# Patient Record
Sex: FEMALE | Race: WHITE | URBAN - METROPOLITAN AREA
[De-identification: names, ages, dates, MRNs, and addresses within clinical notes are randomized per-mention and may not be internally consistent; named-entity substitution may affect disease eponyms.]

---

## 2017-06-13 ENCOUNTER — OUTPATIENT (OUTPATIENT)
Dept: OUTPATIENT SERVICES | Facility: HOSPITAL | Age: 77
LOS: 1 days | Discharge: HOME | End: 2017-06-13

## 2017-06-28 DIAGNOSIS — D13.1 BENIGN NEOPLASM OF STOMACH: ICD-10-CM

## 2017-06-28 DIAGNOSIS — Z88.0 ALLERGY STATUS TO PENICILLIN: ICD-10-CM

## 2017-06-28 DIAGNOSIS — K29.50 UNSPECIFIED CHRONIC GASTRITIS WITHOUT BLEEDING: ICD-10-CM

## 2017-06-28 DIAGNOSIS — K86.2 CYST OF PANCREAS: ICD-10-CM

## 2017-06-28 DIAGNOSIS — Z88.2 ALLERGY STATUS TO SULFONAMIDES: ICD-10-CM

## 2017-06-28 DIAGNOSIS — I10 ESSENTIAL (PRIMARY) HYPERTENSION: ICD-10-CM

## 2017-06-28 DIAGNOSIS — E78.00 PURE HYPERCHOLESTEROLEMIA, UNSPECIFIED: ICD-10-CM

## 2017-11-04 ENCOUNTER — OUTPATIENT (OUTPATIENT)
Dept: OUTPATIENT SERVICES | Facility: HOSPITAL | Age: 77
LOS: 1 days | Discharge: HOME | End: 2017-11-04

## 2017-11-04 DIAGNOSIS — Z12.31 ENCOUNTER FOR SCREENING MAMMOGRAM FOR MALIGNANT NEOPLASM OF BREAST: ICD-10-CM

## 2018-04-25 ENCOUNTER — RX RENEWAL (OUTPATIENT)
Age: 78
End: 2018-04-25

## 2018-04-25 PROBLEM — Z00.00 ENCOUNTER FOR PREVENTIVE HEALTH EXAMINATION: Status: ACTIVE | Noted: 2018-04-25

## 2018-11-24 ENCOUNTER — OUTPATIENT (OUTPATIENT)
Dept: OUTPATIENT SERVICES | Facility: HOSPITAL | Age: 78
LOS: 1 days | Discharge: HOME | End: 2018-11-24

## 2018-11-24 DIAGNOSIS — Z12.31 ENCOUNTER FOR SCREENING MAMMOGRAM FOR MALIGNANT NEOPLASM OF BREAST: ICD-10-CM

## 2019-06-18 ENCOUNTER — RX RENEWAL (OUTPATIENT)
Age: 79
End: 2019-06-18

## 2019-07-12 ENCOUNTER — INPATIENT (INPATIENT)
Facility: HOSPITAL | Age: 79
LOS: 5 days | Discharge: HOME | End: 2019-07-18
Attending: INTERNAL MEDICINE | Admitting: INTERNAL MEDICINE
Payer: MEDICARE

## 2019-07-12 VITALS — WEIGHT: 190.04 LBS

## 2019-07-12 LAB
ALBUMIN SERPL ELPH-MCNC: 3.4 G/DL — LOW (ref 3.5–5.2)
ALP SERPL-CCNC: 566 U/L — HIGH (ref 30–115)
ALT FLD-CCNC: 288 U/L — HIGH (ref 0–41)
ANION GAP SERPL CALC-SCNC: 13 MMOL/L — SIGNIFICANT CHANGE UP (ref 7–14)
APPEARANCE UR: CLEAR — SIGNIFICANT CHANGE UP
AST SERPL-CCNC: 399 U/L — HIGH (ref 0–41)
BACTERIA # UR AUTO: NEGATIVE — SIGNIFICANT CHANGE UP
BASOPHILS # BLD AUTO: 0.01 K/UL — SIGNIFICANT CHANGE UP (ref 0–0.2)
BASOPHILS NFR BLD AUTO: 0.2 % — SIGNIFICANT CHANGE UP (ref 0–1)
BILIRUB SERPL-MCNC: 2.5 MG/DL — HIGH (ref 0.2–1.2)
BILIRUB UR-MCNC: NEGATIVE — SIGNIFICANT CHANGE UP
BUN SERPL-MCNC: 18 MG/DL — SIGNIFICANT CHANGE UP (ref 10–20)
CALCIUM SERPL-MCNC: 8.4 MG/DL — LOW (ref 8.5–10.1)
CHLORIDE SERPL-SCNC: 102 MMOL/L — SIGNIFICANT CHANGE UP (ref 98–110)
CO2 SERPL-SCNC: 23 MMOL/L — SIGNIFICANT CHANGE UP (ref 17–32)
COLOR SPEC: YELLOW — SIGNIFICANT CHANGE UP
CREAT SERPL-MCNC: 0.9 MG/DL — SIGNIFICANT CHANGE UP (ref 0.7–1.5)
DIFF PNL FLD: ABNORMAL
EOSINOPHIL # BLD AUTO: 0.01 K/UL — SIGNIFICANT CHANGE UP (ref 0–0.7)
EOSINOPHIL NFR BLD AUTO: 0.2 % — SIGNIFICANT CHANGE UP (ref 0–8)
EPI CELLS # UR: 1 /HPF — SIGNIFICANT CHANGE UP (ref 0–5)
GLUCOSE SERPL-MCNC: 136 MG/DL — HIGH (ref 70–99)
GLUCOSE UR QL: NEGATIVE — SIGNIFICANT CHANGE UP
HCT VFR BLD CALC: 32.4 % — LOW (ref 37–47)
HGB BLD-MCNC: 10 G/DL — LOW (ref 12–16)
HYALINE CASTS # UR AUTO: 1 /LPF — SIGNIFICANT CHANGE UP (ref 0–7)
IMM GRANULOCYTES NFR BLD AUTO: 0.2 % — SIGNIFICANT CHANGE UP (ref 0.1–0.3)
KETONES UR-MCNC: NEGATIVE — SIGNIFICANT CHANGE UP
LACTATE SERPL-SCNC: 1.7 MMOL/L — SIGNIFICANT CHANGE UP (ref 0.5–2.2)
LEUKOCYTE ESTERASE UR-ACNC: NEGATIVE — SIGNIFICANT CHANGE UP
LYMPHOCYTES # BLD AUTO: 0.15 K/UL — LOW (ref 1.2–3.4)
LYMPHOCYTES # BLD AUTO: 2.8 % — LOW (ref 20.5–51.1)
MCHC RBC-ENTMCNC: 30 PG — SIGNIFICANT CHANGE UP (ref 27–31)
MCHC RBC-ENTMCNC: 30.9 G/DL — LOW (ref 32–37)
MCV RBC AUTO: 97.3 FL — SIGNIFICANT CHANGE UP (ref 81–99)
MONOCYTES # BLD AUTO: 0.35 K/UL — SIGNIFICANT CHANGE UP (ref 0.1–0.6)
MONOCYTES NFR BLD AUTO: 6.6 % — SIGNIFICANT CHANGE UP (ref 1.7–9.3)
NEUTROPHILS # BLD AUTO: 4.81 K/UL — SIGNIFICANT CHANGE UP (ref 1.4–6.5)
NEUTROPHILS NFR BLD AUTO: 90 % — HIGH (ref 42.2–75.2)
NITRITE UR-MCNC: NEGATIVE — SIGNIFICANT CHANGE UP
NRBC # BLD: 0 /100 WBCS — SIGNIFICANT CHANGE UP (ref 0–0)
PH UR: 6 — SIGNIFICANT CHANGE UP (ref 5–8)
PLATELET # BLD AUTO: 230 K/UL — SIGNIFICANT CHANGE UP (ref 130–400)
POTASSIUM SERPL-MCNC: 3.5 MMOL/L — SIGNIFICANT CHANGE UP (ref 3.5–5)
POTASSIUM SERPL-SCNC: 3.5 MMOL/L — SIGNIFICANT CHANGE UP (ref 3.5–5)
PROT SERPL-MCNC: 6.3 G/DL — SIGNIFICANT CHANGE UP (ref 6–8)
PROT UR-MCNC: ABNORMAL
RBC # BLD: 3.33 M/UL — LOW (ref 4.2–5.4)
RBC # FLD: 13.9 % — SIGNIFICANT CHANGE UP (ref 11.5–14.5)
RBC CASTS # UR COMP ASSIST: 2 /HPF — SIGNIFICANT CHANGE UP (ref 0–4)
SODIUM SERPL-SCNC: 138 MMOL/L — SIGNIFICANT CHANGE UP (ref 135–146)
SP GR SPEC: 1.01 — SIGNIFICANT CHANGE UP (ref 1.01–1.02)
UROBILINOGEN FLD QL: ABNORMAL
WBC # BLD: 5.34 K/UL — SIGNIFICANT CHANGE UP (ref 4.8–10.8)
WBC # FLD AUTO: 5.34 K/UL — SIGNIFICANT CHANGE UP (ref 4.8–10.8)
WBC UR QL: 5 /HPF — SIGNIFICANT CHANGE UP (ref 0–5)

## 2019-07-12 PROCEDURE — 71046 X-RAY EXAM CHEST 2 VIEWS: CPT | Mod: 26

## 2019-07-12 PROCEDURE — 93010 ELECTROCARDIOGRAM REPORT: CPT

## 2019-07-12 PROCEDURE — 99285 EMERGENCY DEPT VISIT HI MDM: CPT

## 2019-07-12 PROCEDURE — 76705 ECHO EXAM OF ABDOMEN: CPT | Mod: 26

## 2019-07-12 PROCEDURE — 74177 CT ABD & PELVIS W/CONTRAST: CPT | Mod: 26

## 2019-07-12 RX ORDER — IBUPROFEN 200 MG
600 TABLET ORAL ONCE
Refills: 0 | Status: COMPLETED | OUTPATIENT
Start: 2019-07-12 | End: 2019-07-12

## 2019-07-12 RX ORDER — AZTREONAM 2 G
2000 VIAL (EA) INJECTION ONCE
Refills: 0 | Status: COMPLETED | OUTPATIENT
Start: 2019-07-12 | End: 2019-07-12

## 2019-07-12 RX ORDER — SODIUM CHLORIDE 9 MG/ML
2700 INJECTION, SOLUTION INTRAVENOUS ONCE
Refills: 0 | Status: COMPLETED | OUTPATIENT
Start: 2019-07-12 | End: 2019-07-12

## 2019-07-12 RX ORDER — METRONIDAZOLE 500 MG
500 TABLET ORAL ONCE
Refills: 0 | Status: COMPLETED | OUTPATIENT
Start: 2019-07-12 | End: 2019-07-12

## 2019-07-12 RX ADMIN — Medication 600 MILLIGRAM(S): at 18:30

## 2019-07-12 RX ADMIN — SODIUM CHLORIDE 2700 MILLILITER(S): 9 INJECTION, SOLUTION INTRAVENOUS at 22:29

## 2019-07-12 RX ADMIN — SODIUM CHLORIDE 2700 MILLILITER(S): 9 INJECTION, SOLUTION INTRAVENOUS at 18:10

## 2019-07-12 RX ADMIN — Medication 100 MILLIGRAM(S): at 20:56

## 2019-07-12 RX ADMIN — Medication 100 MILLIGRAM(S): at 22:29

## 2019-07-12 RX ADMIN — Medication 2000 MILLIGRAM(S): at 22:29

## 2019-07-12 NOTE — ED PROVIDER NOTE - OBJECTIVE STATEMENT
79 y/o female non smoker with hx of asthma, dyslipidemia, HTN, RA on Arava, with known low WBC counts presents to ED for evaluation of fever/chills x 1 day.  (+) generalized malaise and weakness.  No cough, dyspnea or chest pain.  No HA, sorethroat or rhinorrhea.  No abdominal pain, N/V/D.  No dysuria/flank pain. PMD Marchisella, Cardio Sicat, Heme Augusto.  ALL:  PCN/Sulfa

## 2019-07-12 NOTE — ED PROVIDER NOTE - CLINICAL SUMMARY MEDICAL DECISION MAKING FREE TEXT BOX
Elevated transaminases, RUQ sono shows dilated CBD.  Abx given for cholangitis.  Will need GI and MRCP.  Clinically stable for floor admission.  D/w MAR

## 2019-07-12 NOTE — ED PROVIDER NOTE - PHYSICAL EXAMINATION
VITAL SIGNS: I have reviewed nursing notes and confirm.  CONSTITUTIONAL: Well-developed; well-nourished; in no acute distress.  SKIN: Skin exam is warm and dry, no acute rash.  HEAD: Normocephalic; atraumatic.  EYES: PERRL, EOM intact; conjunctiva and sclera clear.  No icterus.    ENT: No nasal discharge; airway clear. TMs clear.  NECK: Supple; non tender.  CARD: S1, S2 normal; Regular rate and rhythm.  RESP: No wheezes, rales or rhonchi.  ABD: Normal bowel sounds; soft; non-distended; non-tender;   EXT: Normal ROM. Bilateral non pitting edema.  LYMPH: No acute cervical adenopathy.  NEURO: Alert, oriented. Grossly unremarkable. No focal deficits.  PSYCH: Cooperative, appropriate.

## 2019-07-12 NOTE — ED PROVIDER NOTE - NS ED ROS FT
Constitutional: (+) fever  (+)chills  (-)sweats  Eyes/ENT: (-) blurry vision, (-) epistaxis  (-)rhinorrhea   (-) sore throat    Cardiovascular: (-) chest pain, (-) palpitations (-) edema   Respiratory: (-) cough, (-) shortness of breath   Gastrointestinal: (-)nausea  (-)vomiting, (-) diarrhea  (-) abdominal pain   Musculoskeletal: (-) neck pain, (-) back pain, (-) joint pain  Integumentary: (-) rash, (-) edema  Neurological: (-) headache, (-) altered mental status  (-)LOC  Psychiatric: (-) hallucinations  Allergic/Immunologic: (-) pruritus

## 2019-07-12 NOTE — ED ADULT NURSE NOTE - NSIMPLEMENTINTERV_GEN_ALL_ED
Implemented All Fall Risk Interventions:  [place] to call system. Call bell, personal items and telephone within reach. Instruct patient to call for assistance. Room bathroom lighting operational. Non-slip footwear when patient is off stretcher. Physically safe environment: no spills, clutter or unnecessary equipment. Stretcher in lowest position, wheels locked, appropriate side rails in place. Provide visual cue, wrist band, yellow gown, etc. Monitor gait and stability. Monitor for mental status changes and reorient to person, place, and time. Review medications for side effects contributing to fall risk. Reinforce activity limits and safety measures with patient and family.

## 2019-07-13 DIAGNOSIS — Z96.651 PRESENCE OF RIGHT ARTIFICIAL KNEE JOINT: Chronic | ICD-10-CM

## 2019-07-13 LAB
ALBUMIN SERPL ELPH-MCNC: 3.1 G/DL — LOW (ref 3.5–5.2)
ALP SERPL-CCNC: 451 U/L — HIGH (ref 30–115)
ALT FLD-CCNC: 233 U/L — HIGH (ref 0–41)
ANION GAP SERPL CALC-SCNC: 11 MMOL/L — SIGNIFICANT CHANGE UP (ref 7–14)
AST SERPL-CCNC: 209 U/L — HIGH (ref 0–41)
BASOPHILS # BLD AUTO: 0.02 K/UL — SIGNIFICANT CHANGE UP (ref 0–0.2)
BASOPHILS NFR BLD AUTO: 0.4 % — SIGNIFICANT CHANGE UP (ref 0–1)
BILIRUB DIRECT SERPL-MCNC: 3.3 MG/DL — HIGH (ref 0–0.2)
BILIRUB INDIRECT FLD-MCNC: 0.4 MG/DL — SIGNIFICANT CHANGE UP (ref 0.2–1.2)
BILIRUB SERPL-MCNC: 3.7 MG/DL — HIGH (ref 0.2–1.2)
BUN SERPL-MCNC: 17 MG/DL — SIGNIFICANT CHANGE UP (ref 10–20)
CALCIUM SERPL-MCNC: 8.1 MG/DL — LOW (ref 8.5–10.1)
CHLORIDE SERPL-SCNC: 107 MMOL/L — SIGNIFICANT CHANGE UP (ref 98–110)
CO2 SERPL-SCNC: 27 MMOL/L — SIGNIFICANT CHANGE UP (ref 17–32)
CREAT SERPL-MCNC: 0.9 MG/DL — SIGNIFICANT CHANGE UP (ref 0.7–1.5)
E COLI DNA BLD POS QL NAA+NON-PROBE: SIGNIFICANT CHANGE UP
EOSINOPHIL # BLD AUTO: 0.03 K/UL — SIGNIFICANT CHANGE UP (ref 0–0.7)
EOSINOPHIL NFR BLD AUTO: 0.5 % — SIGNIFICANT CHANGE UP (ref 0–8)
GLUCOSE SERPL-MCNC: 102 MG/DL — HIGH (ref 70–99)
GRAM STN FLD: SIGNIFICANT CHANGE UP
HCT VFR BLD CALC: 31.1 % — LOW (ref 37–47)
HGB BLD-MCNC: 9.4 G/DL — LOW (ref 12–16)
IMM GRANULOCYTES NFR BLD AUTO: 0.5 % — HIGH (ref 0.1–0.3)
LIDOCAIN IGE QN: 17 U/L — SIGNIFICANT CHANGE UP (ref 7–60)
LYMPHOCYTES # BLD AUTO: 0.27 K/UL — LOW (ref 1.2–3.4)
LYMPHOCYTES # BLD AUTO: 4.9 % — LOW (ref 20.5–51.1)
MAGNESIUM SERPL-MCNC: 1.6 MG/DL — LOW (ref 1.8–2.4)
MCHC RBC-ENTMCNC: 30 PG — SIGNIFICANT CHANGE UP (ref 27–31)
MCHC RBC-ENTMCNC: 30.2 G/DL — LOW (ref 32–37)
MCV RBC AUTO: 99.4 FL — HIGH (ref 81–99)
METHOD TYPE: SIGNIFICANT CHANGE UP
MONOCYTES # BLD AUTO: 0.53 K/UL — SIGNIFICANT CHANGE UP (ref 0.1–0.6)
MONOCYTES NFR BLD AUTO: 9.7 % — HIGH (ref 1.7–9.3)
NEUTROPHILS # BLD AUTO: 4.58 K/UL — SIGNIFICANT CHANGE UP (ref 1.4–6.5)
NEUTROPHILS NFR BLD AUTO: 84 % — HIGH (ref 42.2–75.2)
NRBC # BLD: 0 /100 WBCS — SIGNIFICANT CHANGE UP (ref 0–0)
PHOSPHATE SERPL-MCNC: 3.3 MG/DL — SIGNIFICANT CHANGE UP (ref 2.1–4.9)
PLATELET # BLD AUTO: 101 K/UL — LOW (ref 130–400)
POTASSIUM SERPL-MCNC: 3.8 MMOL/L — SIGNIFICANT CHANGE UP (ref 3.5–5)
POTASSIUM SERPL-SCNC: 3.8 MMOL/L — SIGNIFICANT CHANGE UP (ref 3.5–5)
PROT SERPL-MCNC: 5.8 G/DL — LOW (ref 6–8)
RBC # BLD: 3.13 M/UL — LOW (ref 4.2–5.4)
RBC # FLD: 14.1 % — SIGNIFICANT CHANGE UP (ref 11.5–14.5)
SODIUM SERPL-SCNC: 145 MMOL/L — SIGNIFICANT CHANGE UP (ref 135–146)
SPECIMEN SOURCE: SIGNIFICANT CHANGE UP
SPECIMEN SOURCE: SIGNIFICANT CHANGE UP
WBC # BLD: 5.46 K/UL — SIGNIFICANT CHANGE UP (ref 4.8–10.8)
WBC # FLD AUTO: 5.46 K/UL — SIGNIFICANT CHANGE UP (ref 4.8–10.8)

## 2019-07-13 PROCEDURE — 99223 1ST HOSP IP/OBS HIGH 75: CPT

## 2019-07-13 RX ORDER — PANTOPRAZOLE SODIUM 20 MG/1
40 TABLET, DELAYED RELEASE ORAL
Refills: 0 | Status: DISCONTINUED | OUTPATIENT
Start: 2019-07-13 | End: 2019-07-18

## 2019-07-13 RX ORDER — ATENOLOL 25 MG/1
50 TABLET ORAL DAILY
Refills: 0 | Status: DISCONTINUED | OUTPATIENT
Start: 2019-07-13 | End: 2019-07-18

## 2019-07-13 RX ORDER — CHLORHEXIDINE GLUCONATE 213 G/1000ML
1 SOLUTION TOPICAL
Refills: 0 | Status: DISCONTINUED | OUTPATIENT
Start: 2019-07-13 | End: 2019-07-18

## 2019-07-13 RX ORDER — SODIUM CHLORIDE 9 MG/ML
1000 INJECTION, SOLUTION INTRAVENOUS
Refills: 0 | Status: DISCONTINUED | OUTPATIENT
Start: 2019-07-13 | End: 2019-07-17

## 2019-07-13 RX ORDER — POTASSIUM CHLORIDE 20 MEQ
20 PACKET (EA) ORAL
Refills: 0 | Status: DISCONTINUED | OUTPATIENT
Start: 2019-07-13 | End: 2019-07-13

## 2019-07-13 RX ORDER — OXYBUTYNIN CHLORIDE 5 MG
10 TABLET ORAL DAILY
Refills: 0 | Status: DISCONTINUED | OUTPATIENT
Start: 2019-07-13 | End: 2019-07-16

## 2019-07-13 RX ORDER — FOLIC ACID 0.8 MG
1 TABLET ORAL DAILY
Refills: 0 | Status: DISCONTINUED | OUTPATIENT
Start: 2019-07-13 | End: 2019-07-18

## 2019-07-13 RX ORDER — AZTREONAM 2 G
2000 VIAL (EA) INJECTION EVERY 12 HOURS
Refills: 0 | Status: DISCONTINUED | OUTPATIENT
Start: 2019-07-13 | End: 2019-07-15

## 2019-07-13 RX ORDER — AMLODIPINE BESYLATE 2.5 MG/1
5 TABLET ORAL DAILY
Refills: 0 | Status: DISCONTINUED | OUTPATIENT
Start: 2019-07-13 | End: 2019-07-18

## 2019-07-13 RX ORDER — ENOXAPARIN SODIUM 100 MG/ML
40 INJECTION SUBCUTANEOUS EVERY 24 HOURS
Refills: 0 | Status: DISCONTINUED | OUTPATIENT
Start: 2019-07-13 | End: 2019-07-18

## 2019-07-13 RX ORDER — IBUPROFEN 200 MG
400 TABLET ORAL ONCE
Refills: 0 | Status: COMPLETED | OUTPATIENT
Start: 2019-07-13 | End: 2019-07-13

## 2019-07-13 RX ORDER — MAGNESIUM SULFATE 500 MG/ML
2 VIAL (ML) INJECTION ONCE
Refills: 0 | Status: COMPLETED | OUTPATIENT
Start: 2019-07-13 | End: 2019-07-13

## 2019-07-13 RX ORDER — METRONIDAZOLE 500 MG
500 TABLET ORAL EVERY 8 HOURS
Refills: 0 | Status: DISCONTINUED | OUTPATIENT
Start: 2019-07-13 | End: 2019-07-18

## 2019-07-13 RX ADMIN — Medication 100 MILLIGRAM(S): at 22:19

## 2019-07-13 RX ADMIN — Medication 400 MILLIGRAM(S): at 20:50

## 2019-07-13 RX ADMIN — AMLODIPINE BESYLATE 5 MILLIGRAM(S): 2.5 TABLET ORAL at 05:55

## 2019-07-13 RX ADMIN — Medication 100 MILLIGRAM(S): at 13:03

## 2019-07-13 RX ADMIN — SODIUM CHLORIDE 75 MILLILITER(S): 9 INJECTION, SOLUTION INTRAVENOUS at 05:53

## 2019-07-13 RX ADMIN — ATENOLOL 50 MILLIGRAM(S): 25 TABLET ORAL at 05:55

## 2019-07-13 RX ADMIN — Medication 16.67 GRAM(S): at 11:12

## 2019-07-13 RX ADMIN — Medication 1 MILLIGRAM(S): at 11:02

## 2019-07-13 RX ADMIN — Medication 400 MILLIGRAM(S): at 20:15

## 2019-07-13 RX ADMIN — PANTOPRAZOLE SODIUM 40 MILLIGRAM(S): 20 TABLET, DELAYED RELEASE ORAL at 05:55

## 2019-07-13 RX ADMIN — ENOXAPARIN SODIUM 40 MILLIGRAM(S): 100 INJECTION SUBCUTANEOUS at 05:53

## 2019-07-13 RX ADMIN — Medication 10 MILLIGRAM(S): at 11:02

## 2019-07-13 RX ADMIN — Medication 100 MILLIGRAM(S): at 17:21

## 2019-07-13 RX ADMIN — Medication 100 MILLIGRAM(S): at 05:53

## 2019-07-13 RX ADMIN — SODIUM CHLORIDE 75 MILLILITER(S): 9 INJECTION, SOLUTION INTRAVENOUS at 17:21

## 2019-07-13 NOTE — H&P ADULT - NSHPLABSRESULTS_GEN_ALL_CORE
10.0   5.34  )-----------( 230      ( 2019 18:31 )             32.4       138  |  102  |  18  ----------------------------<  136<H>  3.5   |  23  |  0.9    Ca    8.4<L>      2019 18:31    TPro  6.3  /  Alb  3.4<L>  /  TBili  2.5<H>  /  DBili  x   /  AST  399<H>  /  ALT  288<H>  /  AlkPhos  566<H>        Urinalysis Basic - ( 2019 20:30 )  Color: Yellow / Appearance: Clear / S.011 / pH: x  Gluc: x / Ketone: Negative  / Bili: Negative / Urobili: 3 mg/dL   Blood: x / Protein: 30 mg/dL / Nitrite: Negative   Leuk Esterase: Negative / RBC: 2 /HPF / WBC 5 /HPF   Sq Epi: x / Non Sq Epi: 1 /HPF / Bacteria: Negative      < from: 12 Lead ECG (19 @ 18:52) >  Ventricular Rate 88 BPM  Atrial Rate 88 BPM  P-R Interval 162 ms  QRS Duration 100 ms  Q-T Interval 374 ms  QTC Calculation(Bezet) 452 ms  P Axis 25 degrees  R Axis -26 degrees  T Axis -15 degrees    Diagnosis Line Normal sinus rhythm  Voltage criteria for left ventricular hypertrophy  Nonspecific ST and T wave abnormality  Abnormal ECG      < from: US Abdomen Limited (19 @ 21:37) >IMPRESSION:  1.  Dilated common bile duct measuring 11 mm and mildly dilated pancreatic duct measuring 4 mm. Further evaluation with a contrast enhanced pancreas protocol MRI/MRCP is recommended.     2.  Gallbladder sludge without cholelithiasis or sonographic evidence for acute cholecystitis.

## 2019-07-13 NOTE — H&P ADULT - ATTENDING COMMENTS
I agree with the above history ,physical and plan which I Have reviewed and examined independently  seen and examined  document notes

## 2019-07-13 NOTE — PROGRESS NOTE ADULT - SUBJECTIVE AND OBJECTIVE BOX
SUBJECTIVE:    Patient is a 78y old Female who presents with a chief complaint of Fever and chills (2019 01:17)    Currently admitted to medicine with the primary diagnosis of Cholangitis   HPI:  78 F PMHx of asthma, DLD, HTN, RA on Arava, with known low WBC counts presents to ED for evaluation of fever/chills x 1 day associated with generalized malaise and weakness. Pt states highest temp at home was 105. Pt denies cough, dyspnea or chest pain, h/a, URI symptoms, abdominal pain, N/V/D, dysuria or flank pain or recent sick contacts. She states she had bad RA adn was taken off Remicade because of low blood counts, and she has been on Arava for sometime now.     Initial ER V/S significant for tachycardia to 111, Tmax 103.1. Elevated liver enzymes on CMP prompted RUQ U/S + GB sludge and dilated CBD. (2019 01:17)    Today is hospital day 1d. This morning she is resting comfortably in bed and reports no new issues or overnight events.     PAST MEDICAL & SURGICAL HISTORY  Asthma  Stress incontinence  Hypertension  Hyperlipemia  Rheumatoid arthritis  S/P total knee replacement, right    SOCIAL HISTORY:  Negative for smoking/alcohol/drug use.     ALLERGIES:  penicillins (Rash)  sulfa drugs (Rash)    MEDICATIONS:  STANDING MEDICATIONS  amLODIPine   Tablet 5 milliGRAM(s) Oral daily  ATENolol  Tablet 50 milliGRAM(s) Oral daily  aztreonam  IVPB 2000 milliGRAM(s) IV Intermittent every 12 hours  chlorhexidine 4% Liquid 1 Application(s) Topical <User Schedule>  enoxaparin Injectable 40 milliGRAM(s) SubCutaneous every 24 hours  folic acid 1 milliGRAM(s) Oral daily  lactated ringers. 1000 milliLiter(s) IV Continuous <Continuous>  metroNIDAZOLE  IVPB 500 milliGRAM(s) IV Intermittent every 8 hours  oxybutynin 10 milliGRAM(s) Oral daily  pantoprazole    Tablet 40 milliGRAM(s) Oral before breakfast    PRN MEDICATIONS    VITALS:   T(F): 98.2  HR: 78  BP: 165/70  RR: 18  SpO2: 96%    LABS:                        10.0   5.34  )-----------( 230      ( 2019 18:31 )             32.4     07-12    138  |  102  |  18  ----------------------------<  136<H>  3.5   |  23  |  0.9    Ca    8.4<L>      2019 18:31    TPro  6.3  /  Alb  3.4<L>  /  TBili  2.5<H>  /  DBili  x   /  AST  399<H>  /  ALT  288<H>  /  AlkPhos  566<H>        Urinalysis Basic - ( 2019 20:30 )    Color: Yellow / Appearance: Clear / S.011 / pH: x  Gluc: x / Ketone: Negative  / Bili: Negative / Urobili: 3 mg/dL   Blood: x / Protein: 30 mg/dL / Nitrite: Negative   Leuk Esterase: Negative / RBC: 2 /HPF / WBC 5 /HPF   Sq Epi: x / Non Sq Epi: 1 /HPF / Bacteria: Negative        Lactate, Blood: 1.7 mmol/L (19 @ 18:31)          RADIOLOGY:    PHYSICAL EXAM:  GEN: No acute distress  LUNGS: Clear to auscultation bilaterally   HEART: Regular  ABD: Soft, non-tender, non-distended.  EXT: NC/NC/NE/2+PP/CEDILLO/Skin Intact.   NEURO: AAOX3    Intravenous access:   NG tube:   Jama Catheter:

## 2019-07-13 NOTE — H&P ADULT - HISTORY OF PRESENT ILLNESS
78 F PMHx of asthma, DLD, HTN, RA on Arava, with known low WBC counts presents to ED for evaluation of fever/chills x 1 day associated with generalized malaise and weakness. Pt states highest temp at home was 105. Pt denies cough, dyspnea or chest pain, h/a, URI symptoms, abdominal pain, N/V/D, dysuria or flank pain or recent sick contacts. She states she had bad RA adn was taken off Remicade because of low blood counts, and she has been on Arava for sometime now.     Initial ER V/S significant for tachycardia to 111, Tmax 103.1. Elevated liver enzymes on CMP prompted RUQ U/S + GB sludge and dilated CBD.

## 2019-07-13 NOTE — H&P ADULT - ASSESSMENT
A/P: 78 F PMHx of asthma, DLD, HTN, RA on Arava with known low WBC counts presents to ED for evaluation of fever/chills x 1 day associated with generalized malaise and weakness. ER V/S significant for tachycardia to 111, Tmax 103.1, Labs with Elevated liver enzymes which prompted RUQ U/S + GB sludge and dilated CBD. Pt admitted for sepsis secondary acute cholangitis     # Fever tachycardia secondary to sepsis due to acute cholangitis in immunocompromised pt  - Tmax 103.1,  s/p 2L LR bolus, no leukocytosis but + L shift (90%), likely blunted response due Leflunomide.   - Responded to IVF, normotensive non tachy, no need for pressors or ICU at this time. Closely monitor  - U/A negative, CXR no change from prior or discrete consolidation noted (no cough)  - U/S with GB sludge, dilated CBD 11 mm and pancreatic duct 4mm. Pt has no abdominal pain clinically, but given fever and tachy, elevated LFT TB 2.5 [id number] AST//288 treat for cholangitis.  - NPO, IVF, LR@100/hr, Aztreonam/ Flagyl (GNR/ anaerobic coverage). MRCP and GI consult.   - Follow up blood cultures, trend LFT, f/u Lipase, f/u CT A/P. Hold immunosuppressive therapy.     # RA - holding Arava due sepsis. Resume Folic acid.   # HTN / DLD - Resume Atenolol / Statin and Norvasc  # Holosystolic murmur - Obtain 2D echo  # GERD- Resume Protonix   # Stress incontinence - Resume oxybutynin   # DVT PPx - (X) Lovenox 40 mg SubQ    # Activity -  (X) Increase as Tolerated    # Dispo -   Patient to be discharged when medically optimized.  # Code Status - (X) FULL A/P: 78 F PMHx of asthma, DLD, HTN, RA on Arava with known low WBC counts presents to ED for evaluation of fever/chills x 1 day associated with generalized malaise and weakness. ER V/S significant for tachycardia to 111, Tmax 103.1, Labs with Elevated liver enzymes which prompted RUQ U/S + GB sludge and dilated CBD. Pt admitted for sepsis secondary acute cholangitis     # Fever tachycardia secondary to sepsis due to acute cholangitis in immunocompromised pt  - T 103.1, , WBC 5 but + L shift (90%), likely blunted response due Leflunomide.   - Responded to 2L bolus, normotensive non tachy, no need for pressors / ICU at this time. Monitor  - U/A negative, CXR no change from prior or discrete consolidation noted (no cough)  - Pt has no abdominal pain clinically, but given fever and tachy, elevated LFT TB 2.5 [id number] AST//288 and U/S with GB sludge, dilated CBD 11 mm and pancreatic duct 4mm treat for cholangitis.  - NPO, IVF, LR@100/hr, Aztreonam/ Flagyl (GNR/ anaerobic coverage). MRCP and GI consult.   - Follow up blood cultures, trend LFT, f/u Lipase, f/u CT A/P. Hold immunosuppressive therapy.   - If fever use Ibuprofen, no Tylenol for now.     # RA - holding Arava due sepsis. Resume Folic acid.   # HTN / DLD - Resume Atenolol / Statin and Norvasc  # Holosystolic murmur - Obtain 2D echo  # GERD- Resume Protonix   # Stress incontinence - Resume oxybutynin   # DVT PPx - (X) Lovenox 40 mg SubQ    # Activity -  (X) Increase as Tolerated    # Dispo -   Patient to be discharged when medically optimized.  # Code Status - (X) FULL

## 2019-07-13 NOTE — H&P ADULT - NSHPPHYSICALEXAM_GEN_ALL_CORE
PHYSICAL EXAM:  GENERAL: The patient is a well-developed, well-nourished in no apparent distress. AOX3.  HEENT: NCAT anicteric   NECK: Supple. No lymphadenopathy or thyromegaly.  LUNGS: No respiratory distress or accessory muscle use. CTAB  HEART: RRR, S1 S2 Audible. + Holosystolic blowing murmur noted.   ABDOMEN: Soft, nontender, and nondistended.  No guarding or rigidity.  No hepatosplenomegaly was noted.  EXTREMITIES: Without any cyanosis, clubbing, rash, lesions or edema.

## 2019-07-13 NOTE — H&P ADULT - NSICDXPASTMEDICALHX_GEN_ALL_CORE_FT
PAST MEDICAL HISTORY:  Asthma     Hyperlipemia     Hypertension     Rheumatoid arthritis     Stress incontinence

## 2019-07-13 NOTE — CONSULT NOTE ADULT - ASSESSMENT
79 yo F Hx of HTN, RA, asthma, presented for fever and chills admitted for choledocholithiasis and cholangitis.    1)Choledocholithiasis/Cholangitis  2) Pancreas divisum    Rec:  IV fluids  IV Abx  Bcx  ID consult  ERCP within 48 hours. on urgent basis if develops hypotension. 77 yo F Hx of HTN, RA, asthma, presented for fever and chills admitted for choledocholithiasis and cholangitis.    1)Choledocholithiasis/Cholangitis  2) Pancreas divisum    Rec:  IV fluids  Trend CMP and CBC  IV Abx  Bcx  ID consult  ERCP within 48 hours. on urgent basis if develops hypotension.

## 2019-07-13 NOTE — CONSULT NOTE ADULT - SUBJECTIVE AND OBJECTIVE BOX
Gastroenterology/Hepatology Consultation    For questions and inquiries please page [phone].  For urgent matters or after 5pm and on weekends please page the fellow on call through the GI paging system.    78y Female with   Patient is a 78y old  Female who presents with a chief complaint of Fever and chills (13 Jul 2019 07:11)    HPI:  78 F PMHx of asthma, DLD, HTN, RA on Arava, with known low WBC counts presents to ED for evaluation of fever/chills x 1 day associated with generalized malaise and weakness. Pt states highest temp at home was 105. Pt denies cough, dyspnea or chest pain, h/a, URI symptoms, abdominal pain, N/V/D, dysuria or flank pain or recent sick contacts. She states she had bad RA adn was taken off Remicade because of low blood counts, and she has been on Arava for sometime now.     Initial ER V/S significant for tachycardia to 111, Tmax 103.1. Elevated liver enzymes on CMP prompted RUQ U/S + GB sludge and dilated CBD. (13 Jul 2019 01:17)      GI Hx:    Previous colonoscopy(ies):  Previous EGD(s):    FH: hypertension      Review of system  General:  (-) weight loss, (-) fevers  Eyes:  (-) visual changes  CV:  (-) chest pain  Resp: (-) SOB, (-) wheezing  GI: (-) abdominal pain,  (-) nausea, (-) vomiting, (-) dysphagia, (-) diarrhea, (-) constipation, (-) rectal bleeding, (-) melena, (-) hematemesis.  Neuro: (-) confusion, (-) weakness  Psych:  (-) Hallucinations  Heme:  (-) easy bruisability    Past medical/surgical Hx:  PAST MEDICAL & SURGICAL HISTORY:  Asthma  Stress incontinence  Hypertension  Hyperlipemia  Rheumatoid arthritis  S/P total knee replacement, right    Home Medications:  amLODIPine 5 mg oral tablet: 1 tab(s) orally once a day  Arava 100 mg oral tablet:   atenolol 50 mg oral tablet: 1 tab(s) orally once a day  folic acid 1 mg oral tablet: 1 tab(s) orally once a day  oxybutynin 15 mg/24 hr oral tablet, extended release: 1 tab(s) orally once a day  pantoprazole 40 mg oral delayed release tablet: 1 tab(s) orally once a day  simvastatin 40 mg oral tablet: 1 tab(s) orally once a day (at bedtime)      Allergies: penicillins (Rash)  sulfa drugs (Rash)      Current Medications:   amLODIPine   Tablet 5 milliGRAM(s) Oral daily  ATENolol  Tablet 50 milliGRAM(s) Oral daily  aztreonam  IVPB 2000 milliGRAM(s) IV Intermittent every 12 hours  chlorhexidine 4% Liquid 1 Application(s) Topical <User Schedule>  enoxaparin Injectable 40 milliGRAM(s) SubCutaneous every 24 hours  folic acid 1 milliGRAM(s) Oral daily  lactated ringers. 1000 milliLiter(s) IV Continuous <Continuous>  metroNIDAZOLE  IVPB 500 milliGRAM(s) IV Intermittent every 8 hours  oxybutynin 10 milliGRAM(s) Oral daily  pantoprazole    Tablet 40 milliGRAM(s) Oral before breakfast      Physical exam:  T(C): 36.8 (07-13-19 @ 06:02), Max: 39.5 (07-12-19 @ 16:52)  HR: 78 (07-13-19 @ 06:02) (76 - 111)  BP: 165/70 (07-13-19 @ 06:02) (124/55 - 165/70)  RR: 18 (07-13-19 @ 06:02) (18 - 20)  SpO2: 96% (07-13-19 @ 06:02) (95% - 96%)  GENERAL: NAD  HEAD:  Atraumatic, Normocephalic  EYES: Sclera:NL  NECK: Supple, no JVD or thyromegaly  CHEST/LUNG: Good bilateral air entry  HEART: normal S1, S2. Regular  ABDOMEN: (-) distended, (-) tender, (-) rebound, (+) BS, (-)HSM  EXTREMITIES: (-) edema  NEUROLOGY: (-) asterixis  SKIN: (-) jaundice    Data:                        9.4    5.46  )-----------( 101      ( 13 Jul 2019 06:06 )             31.1     MCV 99.4 (07-13-19)  97.3 (07-12-19)    RDW 14.1 (07-13-19)  13.9 (07-12-19)    HGB trend:  9.4  07-13-19 @ 06:06  10.0  07-12-19 @ 18:31      07-13    145  |  107  |  17  ----------------------------<  102<H>  3.8   |  27  |  0.9    Ca    8.1<L>      13 Jul 2019 06:06  Phos  3.3     07-13  Mg     1.6     07-13    LipaseLipase, Serum: 17 U/L (07.13.19 @ 06:06)        TPro  5.8<L>  /  Alb  3.1<L>  /  TBili  3.7<H>  /  DBili  3.3<H>  /  AST  209<H>  /  ALT  233<H>  /  AlkPhos  451<H>  07-13    Liver panel trend:  TBili 3.7   /      /      /   AlkP 451   /   Tptn 5.8   /   Alb 3.1    /   DBili 3.3      07-13  TBili 2.5   /      /      /   AlkP 566   /   Tptn 6.3   /   Alb 3.4    /   DBili --      07-12    Lipase, Serum: 17 U/L (07-13-19 @ 06:06) Gastroenterology/Hepatology Consultation    For questions and inquiries please page [phone].  For urgent matters or after 5pm and on weekends please page the fellow on call through the GI paging system.    78y Female with choledocholithiasis/cholangitis  Patient is a 78y old  Female who presents with a chief complaint of Fever and chills (13 Jul 2019 07:11)    HPI:  78 F PMHx of asthma, DLD, HTN, RA on Arava, with known low WBC counts presents to ED for evaluation of fever/chills x 1 day associated with generalized malaise and weakness. Pt states highest temp at home was 105. Pt denies cough, dyspnea or chest pain, h/a, URI symptoms, abdominal pain, N/V/D, dysuria or flank pain or recent sick contacts. She states she had bad RA adn was taken off Remicade because of low blood counts, and she has been on Arava for sometime now.     Initial ER V/S significant for tachycardia to 111, Tmax 103.1. Elevated liver enzymes on CMP prompted RUQ U/S + GB sludge and dilated CBD. (13 Jul 2019 01:17)        FH: hypertension      Review of system  General:  (-) weight loss, (-) fevers  Eyes:  (-) visual changes  CV:  (-) chest pain  Resp: (-) SOB, (-) wheezing  GI: (-) abdominal pain,  (-) nausea, (-) vomiting, (-) dysphagia, (-) diarrhea, (-) constipation, (-) rectal bleeding, (-) melena, (-) hematemesis.  Neuro: (-) confusion, (-) weakness  Psych:  (-) Hallucinations  Heme:  (-) easy bruisability    Past medical/surgical Hx:  PAST MEDICAL & SURGICAL HISTORY:  Asthma  Stress incontinence  Hypertension  Hyperlipemia  Rheumatoid arthritis  S/P total knee replacement, right    Home Medications:  amLODIPine 5 mg oral tablet: 1 tab(s) orally once a day  Arava 100 mg oral tablet:   atenolol 50 mg oral tablet: 1 tab(s) orally once a day  folic acid 1 mg oral tablet: 1 tab(s) orally once a day  oxybutynin 15 mg/24 hr oral tablet, extended release: 1 tab(s) orally once a day  pantoprazole 40 mg oral delayed release tablet: 1 tab(s) orally once a day  simvastatin 40 mg oral tablet: 1 tab(s) orally once a day (at bedtime)      Allergies: penicillins (Rash)  sulfa drugs (Rash)      Current Medications:   amLODIPine   Tablet 5 milliGRAM(s) Oral daily  ATENolol  Tablet 50 milliGRAM(s) Oral daily  aztreonam  IVPB 2000 milliGRAM(s) IV Intermittent every 12 hours  chlorhexidine 4% Liquid 1 Application(s) Topical <User Schedule>  enoxaparin Injectable 40 milliGRAM(s) SubCutaneous every 24 hours  folic acid 1 milliGRAM(s) Oral daily  lactated ringers. 1000 milliLiter(s) IV Continuous <Continuous>  metroNIDAZOLE  IVPB 500 milliGRAM(s) IV Intermittent every 8 hours  oxybutynin 10 milliGRAM(s) Oral daily  pantoprazole    Tablet 40 milliGRAM(s) Oral before breakfast      Physical exam:  T(C): 36.8 (07-13-19 @ 06:02), Max: 39.5 (07-12-19 @ 16:52)  HR: 78 (07-13-19 @ 06:02) (76 - 111)  BP: 165/70 (07-13-19 @ 06:02) (124/55 - 165/70)  RR: 18 (07-13-19 @ 06:02) (18 - 20)  SpO2: 96% (07-13-19 @ 06:02) (95% - 96%)  GENERAL: NAD  HEAD:  Atraumatic, Normocephalic  EYES: Sclera:NL  NECK: Supple, no JVD or thyromegaly  CHEST/LUNG: Good bilateral air entry  HEART: normal S1, S2. Regular  ABDOMEN: (-) distended, (-) tender, (-) rebound, (+) BS, (-)HSM  EXTREMITIES: (-) edema  NEUROLOGY: (-) asterixis  SKIN: (-) jaundice    Data:                        9.4    5.46  )-----------( 101      ( 13 Jul 2019 06:06 )             31.1     MCV 99.4 (07-13-19)  97.3 (07-12-19)    RDW 14.1 (07-13-19)  13.9 (07-12-19)    HGB trend:  9.4  07-13-19 @ 06:06  10.0  07-12-19 @ 18:31      07-13    145  |  107  |  17  ----------------------------<  102<H>  3.8   |  27  |  0.9    Ca    8.1<L>      13 Jul 2019 06:06  Phos  3.3     07-13  Mg     1.6     07-13    Lipase, Serum: 17 U/L (07.13.19 @ 06:06)        TPro  5.8<L>  /  Alb  3.1<L>  /  TBili  3.7<H>  /  DBili  3.3<H>  /  AST  209<H>  /  ALT  233<H>  /  AlkPhos  451<H>  07-13    Liver panel trend:  TBili 3.7   /      /      /   AlkP 451   /   Tptn 5.8   /   Alb 3.1    /   DBili 3.3      07-13  TBili 2.5   /      /      /   AlkP 566   /   Tptn 6.3   /   Alb 3.4    /   DBili --      07-12    Lipase, Serum: 17 U/L (07-13-19 @ 06:06)

## 2019-07-13 NOTE — PROGRESS NOTE ADULT - ASSESSMENT
>>> pancreatic cholangitis w dilated common bile duct w fever and increase liver function test      plan    continue   _aztreonam   _flagyl     need MRCP   GI consult   continue home meds

## 2019-07-14 LAB
ALBUMIN SERPL ELPH-MCNC: 2.7 G/DL — LOW (ref 3.5–5.2)
ALP SERPL-CCNC: 389 U/L — HIGH (ref 30–115)
ALT FLD-CCNC: 149 U/L — HIGH (ref 0–41)
ANION GAP SERPL CALC-SCNC: 13 MMOL/L — SIGNIFICANT CHANGE UP (ref 7–14)
AST SERPL-CCNC: 88 U/L — HIGH (ref 0–41)
BASOPHILS # BLD AUTO: 0.02 K/UL — SIGNIFICANT CHANGE UP (ref 0–0.2)
BASOPHILS NFR BLD AUTO: 0.4 % — SIGNIFICANT CHANGE UP (ref 0–1)
BILIRUB DIRECT SERPL-MCNC: 2 MG/DL — HIGH (ref 0–0.2)
BILIRUB INDIRECT FLD-MCNC: 0.5 MG/DL — SIGNIFICANT CHANGE UP (ref 0.2–1.2)
BILIRUB SERPL-MCNC: 2.5 MG/DL — HIGH (ref 0.2–1.2)
BUN SERPL-MCNC: 12 MG/DL — SIGNIFICANT CHANGE UP (ref 10–20)
CALCIUM SERPL-MCNC: 8.2 MG/DL — LOW (ref 8.5–10.1)
CHLORIDE SERPL-SCNC: 106 MMOL/L — SIGNIFICANT CHANGE UP (ref 98–110)
CO2 SERPL-SCNC: 25 MMOL/L — SIGNIFICANT CHANGE UP (ref 17–32)
CREAT SERPL-MCNC: 0.7 MG/DL — SIGNIFICANT CHANGE UP (ref 0.7–1.5)
EOSINOPHIL # BLD AUTO: 0.05 K/UL — SIGNIFICANT CHANGE UP (ref 0–0.7)
EOSINOPHIL NFR BLD AUTO: 1 % — SIGNIFICANT CHANGE UP (ref 0–8)
GLUCOSE SERPL-MCNC: 115 MG/DL — HIGH (ref 70–99)
HCT VFR BLD CALC: 30.1 % — LOW (ref 37–47)
HGB BLD-MCNC: 9.3 G/DL — LOW (ref 12–16)
IMM GRANULOCYTES NFR BLD AUTO: 0.4 % — HIGH (ref 0.1–0.3)
LYMPHOCYTES # BLD AUTO: 0.23 K/UL — LOW (ref 1.2–3.4)
LYMPHOCYTES # BLD AUTO: 4.7 % — LOW (ref 20.5–51.1)
MAGNESIUM SERPL-MCNC: 1.8 MG/DL — SIGNIFICANT CHANGE UP (ref 1.8–2.4)
MCHC RBC-ENTMCNC: 30.2 PG — SIGNIFICANT CHANGE UP (ref 27–31)
MCHC RBC-ENTMCNC: 30.9 G/DL — LOW (ref 32–37)
MCV RBC AUTO: 97.7 FL — SIGNIFICANT CHANGE UP (ref 81–99)
MONOCYTES # BLD AUTO: 0.43 K/UL — SIGNIFICANT CHANGE UP (ref 0.1–0.6)
MONOCYTES NFR BLD AUTO: 8.9 % — SIGNIFICANT CHANGE UP (ref 1.7–9.3)
NEUTROPHILS # BLD AUTO: 4.1 K/UL — SIGNIFICANT CHANGE UP (ref 1.4–6.5)
NEUTROPHILS NFR BLD AUTO: 84.6 % — HIGH (ref 42.2–75.2)
NRBC # BLD: 0 /100 WBCS — SIGNIFICANT CHANGE UP (ref 0–0)
PHOSPHATE SERPL-MCNC: 2.6 MG/DL — SIGNIFICANT CHANGE UP (ref 2.1–4.9)
PLATELET # BLD AUTO: 107 K/UL — LOW (ref 130–400)
POTASSIUM SERPL-MCNC: 3.5 MMOL/L — SIGNIFICANT CHANGE UP (ref 3.5–5)
POTASSIUM SERPL-SCNC: 3.5 MMOL/L — SIGNIFICANT CHANGE UP (ref 3.5–5)
PROT SERPL-MCNC: 5.6 G/DL — LOW (ref 6–8)
RBC # BLD: 3.08 M/UL — LOW (ref 4.2–5.4)
RBC # FLD: 14.3 % — SIGNIFICANT CHANGE UP (ref 11.5–14.5)
SODIUM SERPL-SCNC: 144 MMOL/L — SIGNIFICANT CHANGE UP (ref 135–146)
WBC # BLD: 4.85 K/UL — SIGNIFICANT CHANGE UP (ref 4.8–10.8)
WBC # FLD AUTO: 4.85 K/UL — SIGNIFICANT CHANGE UP (ref 4.8–10.8)

## 2019-07-14 PROCEDURE — 99233 SBSQ HOSP IP/OBS HIGH 50: CPT

## 2019-07-14 PROCEDURE — 74181 MRI ABDOMEN W/O CONTRAST: CPT | Mod: 26

## 2019-07-14 RX ORDER — POTASSIUM CHLORIDE 20 MEQ
20 PACKET (EA) ORAL ONCE
Refills: 0 | Status: COMPLETED | OUTPATIENT
Start: 2019-07-14 | End: 2019-07-14

## 2019-07-14 RX ADMIN — ENOXAPARIN SODIUM 40 MILLIGRAM(S): 100 INJECTION SUBCUTANEOUS at 05:21

## 2019-07-14 RX ADMIN — PANTOPRAZOLE SODIUM 40 MILLIGRAM(S): 20 TABLET, DELAYED RELEASE ORAL at 13:58

## 2019-07-14 RX ADMIN — Medication 100 MILLIGRAM(S): at 18:29

## 2019-07-14 RX ADMIN — Medication 100 MILLIGRAM(S): at 13:58

## 2019-07-14 RX ADMIN — AMLODIPINE BESYLATE 5 MILLIGRAM(S): 2.5 TABLET ORAL at 05:21

## 2019-07-14 RX ADMIN — Medication 100 MILLIGRAM(S): at 21:13

## 2019-07-14 RX ADMIN — Medication 50 MILLIEQUIVALENT(S): at 13:56

## 2019-07-14 RX ADMIN — Medication 1 MILLIGRAM(S): at 13:58

## 2019-07-14 RX ADMIN — Medication 100 MILLIGRAM(S): at 05:21

## 2019-07-14 RX ADMIN — SODIUM CHLORIDE 75 MILLILITER(S): 9 INJECTION, SOLUTION INTRAVENOUS at 18:29

## 2019-07-14 RX ADMIN — ATENOLOL 50 MILLIGRAM(S): 25 TABLET ORAL at 05:21

## 2019-07-14 NOTE — PROGRESS NOTE ADULT - SUBJECTIVE AND OBJECTIVE BOX
Hospital Day # 2d    Gastroenterology team is following this patient for  choledocholithiasis and cholangitis    Interval Events: no events over night , patient is asymptomatic     PAST MEDICAL & SURGICAL HISTORY  Asthma  Stress incontinence  Hypertension  Hyperlipemia  Rheumatoid arthritis  S/P total knee replacement, right      ALLERGIES:  penicillins (Rash)  sulfa drugs (Rash)      MEDICATIONS:  MEDICATIONS  (STANDING):  amLODIPine   Tablet 5 milliGRAM(s) Oral daily  ATENolol  Tablet 50 milliGRAM(s) Oral daily  aztreonam  IVPB 2000 milliGRAM(s) IV Intermittent every 12 hours  chlorhexidine 4% Liquid 1 Application(s) Topical <User Schedule>  enoxaparin Injectable 40 milliGRAM(s) SubCutaneous every 24 hours  folic acid 1 milliGRAM(s) Oral daily  lactated ringers. 1000 milliLiter(s) (75 mL/Hr) IV Continuous <Continuous>  metroNIDAZOLE  IVPB 500 milliGRAM(s) IV Intermittent every 8 hours  oxybutynin 10 milliGRAM(s) Oral daily  pantoprazole    Tablet 40 milliGRAM(s) Oral before breakfast    MEDICATIONS  (PRN):      REVIEW OF SYSTEMS:    CONSTITUTIONAL: No fever  EYES/ENT: No scleral icterus  RESPIRATORY: No shortness of breath  CARDIOVASCULAR: No chest pain   GASTROINTESTINAL:  as listed above   GENITOURINARY: No dysuria  NEUROLOGICAL: No dizziness  SKIN: No cyanosis        VITALS:   T(F): 97.4 (07-14 @ 15:28), Max: 103.1 (07-12 @ 16:52)  HR: 94 (07-14 @ 15:28) (76 - 111)  BP: 189/85 (07-14 @ 15:28) (124/55 - 189/85)  BP(mean): --  RR: 18 (07-14 @ 15:28) (18 - 20)  SpO2: 94% (07-13 @ 19:30) (94% - 96%)    I&O's Summary    13 Jul 2019 07:01  -  14 Jul 2019 07:00  --------------------------------------------------------  IN: 300 mL / OUT: 0 mL / NET: 300 mL        Physical Exam:  GENERAL: NAD, well-developed  HEAD:  Atraumatic, Normocephalic  EYES: EOMI, PERRLA, conjunctiva and sclera clear  NECK: No thyromegaly  CHEST/LUNG: No accessory use of muscle  HEART: S1S2 Normal  ABDOMEN: Soft, Nontender, Nondistended; Bowel sounds present, no guarding or rigidity.  EXTREMITIES:  2+ Peripheral Pulses, No cyanosis  PSYCH: AAOx3  NEUROLOGY: non-focal      LABS:                        9.3    4.85  )-----------( 107      ( 14 Jul 2019 08:20 )             30.1         LIVER FUNCTIONS - ( 14 Jul 2019 08:20 )  Alb: 2.7 g/dL / Pro: 5.6 g/dL / ALK PHOS: 389 U/L / ALT: 149 U/L / AST: 88 U/L / GGT: x           LIVER FUNCTIONS - ( 14 Jul 2019 08:20 )  Alb: 2.7 [3.5 - 5.2] / Pro: 5.6 [6.0 - 8.0] / ALK PHOS: 389 [30 - 115] / ALT: 149 [0 - 41] / AST: 88 [0 - 41] / GGT: x     LIVER FUNCTIONS - ( 13 Jul 2019 06:06 )  Alb: 3.1 [3.5 - 5.2] / Pro: 5.8 [6.0 - 8.0] / ALK PHOS: 451 [30 - 115] / ALT: 233 [0 - 41] / AST: 209 [0 - 41] / GGT: x     LIVER FUNCTIONS - ( 12 Jul 2019 18:31 )  Alb: 3.4 [3.5 - 5.2] / Pro: 6.3 [6.0 - 8.0] / ALK PHOS: 566 [30 - 115] / ALT: 288 [0 - 41] / AST: 399 [0 - 41] / GGT: x       07-14    144  |  106  |  12  ----------------------------<  115<H>  3.5   |  25  |  0.7    Ca    8.2<L>      14 Jul 2019 08:20  Phos  2.6     07-14  Mg     1.8     07-14

## 2019-07-14 NOTE — PROGRESS NOTE ADULT - SUBJECTIVE AND OBJECTIVE BOX
SUBJECTIVE:    Patient is a 78y old Female who presents with a chief complaint of Fever and chills (2019 06:53)    Currently admitted to medicine with the primary diagnosis of Cholangitis     Today is hospital day 2d.   still feeling chills    PAST MEDICAL & SURGICAL HISTORY  Asthma  Stress incontinence  Hypertension  Hyperlipemia  Rheumatoid arthritis  S/P total knee replacement, right    SOCIAL HISTORY:  Negative for smoking/alcohol/drug use.     ALLERGIES:  penicillins (Rash)  sulfa drugs (Rash)    MEDICATIONS:  STANDING MEDICATIONS  amLODIPine   Tablet 5 milliGRAM(s) Oral daily  ATENolol  Tablet 50 milliGRAM(s) Oral daily  aztreonam  IVPB 2000 milliGRAM(s) IV Intermittent every 12 hours  chlorhexidine 4% Liquid 1 Application(s) Topical <User Schedule>  enoxaparin Injectable 40 milliGRAM(s) SubCutaneous every 24 hours  folic acid 1 milliGRAM(s) Oral daily  lactated ringers. 1000 milliLiter(s) IV Continuous <Continuous>  metroNIDAZOLE  IVPB 500 milliGRAM(s) IV Intermittent every 8 hours  oxybutynin 10 milliGRAM(s) Oral daily  pantoprazole    Tablet 40 milliGRAM(s) Oral before breakfast  potassium chloride  20 mEq/100 mL IVPB 20 milliEquivalent(s) IV Intermittent once    PRN MEDICATIONS    VITALS:   T(F): 98.4  HR: 93  BP: 168/79  RR: 18  SpO2: 94%    LABS:                        9.3    4.85  )-----------( 107      ( 2019 08:20 )             30.1     07-14    144  |  106  |  12  ----------------------------<  115<H>  3.5   |  25  |  0.7    Ca    8.2<L>      2019 08:20  Phos  2.6     07-14  Mg     1.8     07-14    TPro  5.6<L>  /  Alb  2.7<L>  /  TBili  2.5<H>  /  DBili  2.0<H>  /  AST  88<H>  /  ALT  149<H>  /  AlkPhos  389<H>  -14      Urinalysis Basic - ( 2019 20:30 )    Color: Yellow / Appearance: Clear / S.011 / pH: x  Gluc: x / Ketone: Negative  / Bili: Negative / Urobili: 3 mg/dL   Blood: x / Protein: 30 mg/dL / Nitrite: Negative   Leuk Esterase: Negative / RBC: 2 /HPF / WBC 5 /HPF   Sq Epi: x / Non Sq Epi: 1 /HPF / Bacteria: Negative            Culture - Blood (collected 2019 18:31)  Source: .Blood Blood-Peripheral  Gram Stain (2019 13:11):    Growth in anaerobic bottle: Gram Negative Rods  Preliminary Report (2019 09:59):    Growth in anaerobic bottle: Gram Negative Rods    See previous culture [id number]    Culture - Blood (collected 2019 18:31)  Source: .Blood Blood-Peripheral  Gram Stain (2019 16:32):    Growth in aerobic and anaerobic bottles: Gram Negative Rods  Preliminary Report (2019 16:33):    Growth in aerobic and anaerobic bottles: Gram Negative Rods    "Due to technical problems, Proteus sp. will Not be reported as part of    the BCID panel until further notice"    ***Blood Panel PCR results on this specimen are available    approximately 3 hours after the Gram stain result.***    Gram stain, PCR, and/or culture results may not always    correspond due to difference in methodologies.    ************************************************************    This PCR assay was performed using [employer].    The following targets are tested for: Enterococcus,    vancomycin resistant enterococci, Listeria monocytogenes,    coagulase negative staphylococci, S. aureus,    methicillin resistant S. aureus, Streptococcus agalactiae    (Group B), S. pneumoniae, S. pyogenes (Group A),    Acinetobacter baumannii, Enterobacter cloacae, E. coli,    Klebsiella oxytoca, K. pneumoniae, Proteus sp.,    Serratia marcescens, Haemophilus influenzae,    Neisseria meningitidis, Pseudomonas aeruginosa, Candida    albicans, C. glabrata, C krusei, C parapsilosis,    C. tropicalis and the KPC resistance gene.  Organism: Blood Culture PCR (2019 14:01)  Organism: Blood Culture PCR (2019 14:01)          RADIOLOGY:    PHYSICAL EXAM:  GEN: No acute distress  LUNGS: Clear to auscultation bilaterally   HEART: Regular  ABD: Soft, non-tender, non-distended.  EXT: NC/NC/NE/2+PP/CEDILLO/Skin Intact.   NEURO: AAOX3    Intravenous access:   NG tube:   Jama Catheter:

## 2019-07-14 NOTE — CONSULT NOTE ADULT - ASSESSMENT
ASSESSMENT  78 F PMHx of asthma, DLD, HTN, RA on Arava, with known low WBC counts presents to ED for evaluation of fever/chills x 1 day associated with generalized malaise and weakness     IMPRESSION  #Ecoli bacteremia 2/2 Cholangitis    Severe Sepsis on admission T>101F, Pulse>90    Pt has an acute illness which poses threat to bodily function     UA neg    CTAP Mildly dilated CBD to 9 mm secondary to apparent distal CBD choledocholithiasis measuring 1 cm    RUQ 1.  Dilated common bile duct measuring 11 mm and mildly dilated pancreatic duct measuring 4 mm. Further evaluation with a contrast enhanced pancreas protocol MRI/MRCP is recommended. Gallbladder sludge without cholelithiasis or sonographic evidence for acute cholecystitis.  #Transaminitis  #Obesity BMI (kg/m2): 31.9 (07-14-19 @ 12:59)  #PCN rash, Sulfa Rash    RECOMMENDATIONS  - CHANGE aztreonam to Cefepime 2g q12h IV (aware of PCN allergy)  - Flagyl 500mg q8h IV  - GI consulted ERCP    Spectra [id number]

## 2019-07-14 NOTE — PROGRESS NOTE ADULT - ASSESSMENT
>>> Choledocholithiasis/Cholangitis  >>> blood culture positive   >>> gram negative rods    plan     check result of MRCP  IV antibiotic  infectious dis consult d/w ID  IVF

## 2019-07-14 NOTE — CONSULT NOTE ADULT - SUBJECTIVE AND OBJECTIVE BOX
MADISON GLORIA  78y, Female  Allergy: penicillins (Rash)  sulfa drugs (Rash)      CHIEF COMPLAINT: Fever and chills (2019 12:59)      HPI:  78 F PMHx of asthma, DLD, HTN, RA on Arava, with known low WBC counts presents to ED for evaluation of fever/chills x 1 day associated with generalized malaise and weakness. Pt states highest temp at home was 105. Pt denies cough, dyspnea or chest pain, h/a, URI symptoms, abdominal pain, N/V/D, dysuria or flank pain or recent sick contacts. She states she had bad RA adn was taken off Remicade because of low blood counts, and she has been on Arava for sometime now.     Initial ER V/S significant for tachycardia to 111, Tmax 103.1. Elevated liver enzymes on CMP prompted RUQ U/S + GB sludge and dilated CBD. (2019 01:17)      INFECTIOUS DISEASE HISTORY:    PAST MEDICAL & SURGICAL HISTORY:  Asthma  Stress incontinence  Hypertension  Hyperlipemia  Rheumatoid arthritis  S/P total knee replacement, right      FAMILY HISTORY  FH: hypertension      SOCIAL HISTORY    Substance Use ( x ) never used  (  ) IVDU (  ) Other:  Tobacco Usage:  ( x  ) never smoked   (   ) former smoker   (   ) current smoker   Alcohol Usage: (   ) social  (   ) daily use ( x  ) denies  Sexual History: na      ROS  General: as noted above   HEENT: Denies headache, rhinorrhea, sore throat, eye pain  CV: Denies CP, palpitations  PULM: Denies SOB, wheezing  GI: as noted above   : Denies dysuria, hematuria  MSK: Denies arthralgias, myalgias  SKIN: Denies rash, lesions  NEURO: Denies paresthesias, weakness  PSYCH: Denies depression, anxiety    VITALS:  T(F): 98.4, Max: 100.2 (19 @ 19:37)  HR: 93  BP: 168/79  RR: 18Vital Signs Last 24 Hrs  T(C): 36.9 (2019 05:17), Max: 37.9 (2019 19:37)  T(F): 98.4 (2019 05:17), Max: 100.2 (2019 19:37)  HR: 93 (2019 05:17) (77 - 100)  BP: 168/79 (2019 05:17) (144/80 - 168/79)  BP(mean): --  RR: 18 (2019 05:17) (18 - 20)  SpO2: 94% (2019 19:30) (94% - 96%)    PHYSICAL EXAM:  Gen: NAD, resting in bed  HEENT: Normocephalic, atraumatic  Neck: supple, no lymphadenopathy  CV: Regular rate & regular rhythm  Lungs: decreased BS at bases, no fremitus  Abdomen: Soft, RUQ TTP  Ext: Warm, well perfused  Neuro: non focal, awake  Skin: no rash, no erythema  Lines: no phlebitis    TESTS & MEASUREMENTS:                        9.3    4.85  )-----------( 107      ( 2019 08:20 )             30.1         144  |  106  |  12  ----------------------------<  115<H>  3.5   |  25  |  0.7    Ca    8.2<L>      2019 08:20  Phos  2.6       Mg     1.8         TPro  5.6<L>  /  Alb  2.7<L>  /  TBili  2.5<H>  /  DBili  2.0<H>  /  AST  88<H>  /  ALT  149<H>  /  AlkPhos  389<H>      eGFR if Non African American: 84 mL/min/1.73M2 (19 @ 08:20)  eGFR if : 97 mL/min/1.73M2 (19 @ 08:20)    LIVER FUNCTIONS - ( 2019 08:20 )  Alb: 2.7 g/dL / Pro: 5.6 g/dL / ALK PHOS: 389 U/L / ALT: 149 U/L / AST: 88 U/L / GGT: x           Urinalysis Basic - ( 2019 20:30 )    Color: Yellow / Appearance: Clear / S.011 / pH: x  Gluc: x / Ketone: Negative  / Bili: Negative / Urobili: 3 mg/dL   Blood: x / Protein: 30 mg/dL / Nitrite: Negative   Leuk Esterase: Negative / RBC: 2 /HPF / WBC 5 /HPF   Sq Epi: x / Non Sq Epi: 1 /HPF / Bacteria: Negative        Culture - Blood (collected 19 @ 18:31)  Source: .Blood Blood-Peripheral  Gram Stain (19 @ 13:11):    Growth in anaerobic bottle: Gram Negative Rods  Preliminary Report (19 @ 09:59):    Growth in anaerobic bottle: Gram Negative Rods    See previous culture [id number]    Culture - Blood (collected 19 @ 18:31)  Source: .Blood Blood-Peripheral  Gram Stain (19 @ 16:32):    Growth in aerobic and anaerobic bottles: Gram Negative Rods  Preliminary Report (19 @ 16:33):    Growth in aerobic and anaerobic bottles: Gram Negative Rods    "Due to technical problems, Proteus sp. will Not be reported as part of    the BCID panel until further notice"    ***Blood Panel PCR results on this specimen are available    approximately 3 hours after the Gram stain result.***    Gram stain, PCR, and/or culture results may not always    correspond due to difference in methodologies.    ************************************************************    This PCR assay was performed using [employer].    The following targets are tested for: Enterococcus,    vancomycin resistant enterococci, Listeria monocytogenes,    coagulase negative staphylococci, S. aureus,    methicillin resistant S. aureus, Streptococcus agalactiae    (Group B), S. pneumoniae, S. pyogenes (Group A),    Acinetobacter baumannii, Enterobacter cloacae, E. coli,    Klebsiella oxytoca, K. pneumoniae, Proteus sp.,    Serratia marcescens, Haemophilus influenzae,    Neisseria meningitidis, Pseudomonas aeruginosa, Candida    albicans, C. glabrata, C krusei, C parapsilosis,    C. tropicalis and the KPC resistance gene.  Organism: Blood Culture PCR (19 @ 14:01)  Organism: Blood Culture PCR (19 @ 14:01)      -  Escherichia coli: Detec      Method Type: PCR        Lactate, Blood: 1.7 mmol/L (19 @ 18:31)      INFECTIOUS DISEASES TESTING      RADIOLOGY & ADDITIONAL TESTS:  I have personally reviewed the last Chest xray  CXR  Xray Chest 2 Views PA/Lat:   EXAM:  XR CHEST PA LAT 2V            PROCEDURE DATE:  2019            INTERPRETATION:  Clinical History/Reason for Exam:  Sepsis  Comparison : Chest x-ray-  10/13/2003      Findings:    Technique/Positioning:  Satisfactory. Lateral views    Support devices:  none    Cardiac/mediastinum/hilum: Mild cardiomegaly with enlarged central   pulmonary vascularity particularly on the right.    Lung parenchyma/ Pleura: Thin wall cyst, lingula segment left upper lobe.   No consolidation or pneumothorax. Mild blunting right costophrenic angle.      Skeleton/soft tissues: Degenerative changes shoulders and thoracic spine.      Impression:    Mild cardiomegaly with prominent central pulmonary vascularity   particularly on the right. Consider pulmonary hypertension.    New thin wall cyst, lingula segment left upper lobe. No consolidation or   pneumothorax.     Nonspecific blunting left costophrenic angle.                        NAHOMY SNIDER M.D., ATTENDING RADIOLOGIST  This document has been electronically signed. 2019 11:51AM             (19 @ 20:49)      CT  CT Abdomen and Pelvis w/ IV Cont:   EXAM:  CT ABDOMEN AND PELVIS IC            PROCEDURE DATE:  2019            INTERPRETATION:  CLINICAL STATEMENT: Fever. Elevated transaminases      TECHNIQUE: Contiguous CT images were obtained of the abdomen and pelvis.  Intravenous Contrast:  Optiray 320 intravenous contrast administered.    Oral contrast was not administered.      COMPARISON:  None    FINDINGS:    LOWER CHEST: There is mild bibasilar subsegmental atelectasis. A few   small pulmonary cysts.    LIVER: Unremarkable.    SPLEEN: Unremarkable.    PANCREAS: There is suggestion of pancreatic divisum. The main pancreatic   duct is minimally dilated measuring 4 to 5 mm. Relative homogeneous   pancreatic enhancement without identifiable mass. No peripancreatic   inflammation.    GALLBLADDER AND BILIARY TREE: Mildly distended gallbladder. CBD is mildly   dilated to 9 mm with apparent filling defect in the distal CBD likely a   choledocholith measuring up to 1 cm    ADRENALS: Unremarkable.    KIDNEYS: Symmetric renal enhancement. No hydronephrosis. There are   bilateral renal hypodensities, many of which cannot be accurately   characterize given streak artifact from bilateral arms. A 3 cm left renal   hypodensity (series 7, image 106) appears to measure higher attenuation   than expected for simple fluid.    LYMPH NODES: There are no enlarged abdominal or pelvic lymph nodes.    VASCULATURE: The abdominal aorta is normal in caliber and demonstrates   atherosclerotic changes.    BOWEL: No bowel obstruction or bowel wall thickening. Unremarkable   appendix. Colonic diverticulosis.    PERITONEUM/RETROPERITONEUM/MESENTERY: There is no ascites or   pneumoperitoneum.    PELVIC VISCERA: Hysterectomy    BONES AND SOFT TISSUES: No acute osseous abnormality is noted.     IMPRESSION:    Mildly dilated CBD to 9 mm secondary to apparent distal CBD   choledocholithiasis measuring 1 cm    Suggestion of pancreatic divisum with minimal dilatation of the   pancreatic duct to 4 to 5 mm.                  YANIV ASIF M.D., ATTENDING RADIOLOGIST  This document has been electronically signed. 2019  3:18AM             (19 @ 23:22)      CARDIOLOGY TESTING  12 Lead ECG:   Ventricular Rate 88 BPM    Atrial Rate 88 BPM    P-R Interval 162 ms    QRS Duration 100 ms    Q-T Interval 374 ms    QTC Calculation(Bezet) 452 ms    P Axis 25 degrees    R Axis -26 degrees    T Axis -15 degrees    Diagnosis Line Normal sinus rhythm  Voltage criteria for left ventricular hypertrophy  Nonspecific ST and T wave abnormality  Abnormal ECG    Confirmed by Tonny Edwards ([id number]) on 2019 8:16:28 PM (19 @ 18:52)      MEDICATIONS  amLODIPine   Tablet 5  ATENolol  Tablet 50  aztreonam  IVPB 2000  chlorhexidine 4% Liquid 1  enoxaparin Injectable 40  folic acid 1  lactated ringers. 1000  metroNIDAZOLE  IVPB 500  oxybutynin 10  pantoprazole    Tablet 40  potassium chloride  20 mEq/100 mL IVPB 20      ANTIBIOTICS:  aztreonam  IVPB 2000 milliGRAM(s) IV Intermittent every 12 hours  metroNIDAZOLE  IVPB 500 milliGRAM(s) IV Intermittent every 8 hours      penicillins (Rash)  sulfa drugs (Rash) MADISON GLORIA  78y, Female  Allergy: penicillins (Rash)  sulfa drugs (Rash)      CHIEF COMPLAINT: Fever and chills (2019 12:59)      HPI:  78 F PMHx of asthma, DLD, HTN, RA on Arava, with known low WBC counts presents to ED for evaluation of fever/chills x 1 day associated with generalized malaise and weakness. Pt states highest temp at home was 105. Pt denies cough, dyspnea or chest pain, h/a, URI symptoms, abdominal pain, N/V/D, dysuria or flank pain or recent sick contacts. She states she had bad RA adn was taken off Remicade because of low blood counts, and she has been on Arava for sometime now.     Initial ER V/S significant for tachycardia to 111, Tmax 103.1. Elevated liver enzymes on CMP prompted RUQ U/S + GB sludge and dilated CBD. (2019 01:17)      INFECTIOUS DISEASE HISTORY:    PAST MEDICAL & SURGICAL HISTORY:  Asthma  Stress incontinence  Hypertension  Hyperlipemia  Rheumatoid arthritis  S/P total knee replacement, right      FAMILY HISTORY  FH: hypertension      SOCIAL HISTORY    Substance Use ( x ) never used  (  ) IVDU (  ) Other:  Tobacco Usage:  ( x  ) never smoked   (   ) former smoker   (   ) current smoker   Alcohol Usage: (   ) social  (   ) daily use ( x  ) denies  Sexual History: na      ROS  General: as noted above   HEENT: Denies headache, rhinorrhea, sore throat, eye pain  CV: Denies CP, palpitations  PULM: Denies SOB, wheezing  GI: as noted above   : Denies dysuria, hematuria  MSK: Denies arthralgias, myalgias  SKIN: Denies rash, lesions  NEURO: Denies paresthesias, weakness  PSYCH: Denies depression, anxiety    VITALS:  T(F): 98.4, Max: 100.2 (19 @ 19:37)  HR: 93  BP: 168/79  RR: 18Vital Signs Last 24 Hrs  T(C): 36.9 (2019 05:17), Max: 37.9 (2019 19:37)  T(F): 98.4 (2019 05:17), Max: 100.2 (2019 19:37)  HR: 93 (2019 05:17) (77 - 100)  BP: 168/79 (2019 05:17) (144/80 - 168/79)  BP(mean): --  RR: 18 (2019 05:17) (18 - 20)  SpO2: 94% (2019 19:30) (94% - 96%)    PHYSICAL EXAM:  Gen: NAD, resting in bed  HEENT: Normocephalic, atraumatic  Neck: supple, no lymphadenopathy  CV: Regular rate & regular rhythm  Lungs: decreased BS at bases, no fremitus  Abdomen: Soft, no RUQ TTP, neg hernandez  Ext: Warm, well perfused  Neuro: non focal, awake  Skin: no rash, no erythema  Lines: no phlebitis    TESTS & MEASUREMENTS:                        9.3    4.85  )-----------( 107      ( 2019 08:20 )             30.1         144  |  106  |  12  ----------------------------<  115<H>  3.5   |  25  |  0.7    Ca    8.2<L>      2019 08:20  Phos  2.6       Mg     1.8         TPro  5.6<L>  /  Alb  2.7<L>  /  TBili  2.5<H>  /  DBili  2.0<H>  /  AST  88<H>  /  ALT  149<H>  /  AlkPhos  389<H>      eGFR if Non African American: 84 mL/min/1.73M2 (19 @ 08:20)  eGFR if : 97 mL/min/1.73M2 (19 @ 08:20)    LIVER FUNCTIONS - ( 2019 08:20 )  Alb: 2.7 g/dL / Pro: 5.6 g/dL / ALK PHOS: 389 U/L / ALT: 149 U/L / AST: 88 U/L / GGT: x           Urinalysis Basic - ( 2019 20:30 )    Color: Yellow / Appearance: Clear / S.011 / pH: x  Gluc: x / Ketone: Negative  / Bili: Negative / Urobili: 3 mg/dL   Blood: x / Protein: 30 mg/dL / Nitrite: Negative   Leuk Esterase: Negative / RBC: 2 /HPF / WBC 5 /HPF   Sq Epi: x / Non Sq Epi: 1 /HPF / Bacteria: Negative        Culture - Blood (collected 19 @ 18:31)  Source: .Blood Blood-Peripheral  Gram Stain (19 @ 13:11):    Growth in anaerobic bottle: Gram Negative Rods  Preliminary Report (19 @ 09:59):    Growth in anaerobic bottle: Gram Negative Rods    See previous culture [id number]    Culture - Blood (collected 19 @ 18:31)  Source: .Blood Blood-Peripheral  Gram Stain (19 @ 16:32):    Growth in aerobic and anaerobic bottles: Gram Negative Rods  Preliminary Report (19 @ 16:33):    Growth in aerobic and anaerobic bottles: Gram Negative Rods    "Due to technical problems, Proteus sp. will Not be reported as part of    the BCID panel until further notice"    ***Blood Panel PCR results on this specimen are available    approximately 3 hours after the Gram stain result.***    Gram stain, PCR, and/or culture results may not always    correspond due to difference in methodologies.    ************************************************************    This PCR assay was performed using [employer].    The following targets are tested for: Enterococcus,    vancomycin resistant enterococci, Listeria monocytogenes,    coagulase negative staphylococci, S. aureus,    methicillin resistant S. aureus, Streptococcus agalactiae    (Group B), S. pneumoniae, S. pyogenes (Group A),    Acinetobacter baumannii, Enterobacter cloacae, E. coli,    Klebsiella oxytoca, K. pneumoniae, Proteus sp.,    Serratia marcescens, Haemophilus influenzae,    Neisseria meningitidis, Pseudomonas aeruginosa, Candida    albicans, C. glabrata, C krusei, C parapsilosis,    C. tropicalis and the KPC resistance gene.  Organism: Blood Culture PCR (19 @ 14:01)  Organism: Blood Culture PCR (19 @ 14:01)      -  Escherichia coli: Detec      Method Type: PCR        Lactate, Blood: 1.7 mmol/L (19 @ 18:31)      INFECTIOUS DISEASES TESTING      RADIOLOGY & ADDITIONAL TESTS:  I have personally reviewed the last Chest xray  CXR  Xray Chest 2 Views PA/Lat:   EXAM:  XR CHEST PA LAT 2V            PROCEDURE DATE:  2019            INTERPRETATION:  Clinical History/Reason for Exam:  Sepsis  Comparison : Chest x-ray-  10/13/2003      Findings:    Technique/Positioning:  Satisfactory. Lateral views    Support devices:  none    Cardiac/mediastinum/hilum: Mild cardiomegaly with enlarged central   pulmonary vascularity particularly on the right.    Lung parenchyma/ Pleura: Thin wall cyst, lingula segment left upper lobe.   No consolidation or pneumothorax. Mild blunting right costophrenic angle.      Skeleton/soft tissues: Degenerative changes shoulders and thoracic spine.      Impression:    Mild cardiomegaly with prominent central pulmonary vascularity   particularly on the right. Consider pulmonary hypertension.    New thin wall cyst, lingula segment left upper lobe. No consolidation or   pneumothorax.     Nonspecific blunting left costophrenic angle.                        NAHOMY SNIDER M.D., ATTENDING RADIOLOGIST  This document has been electronically signed. 2019 11:51AM             (19 @ 20:49)      CT  CT Abdomen and Pelvis w/ IV Cont:   EXAM:  CT ABDOMEN AND PELVIS IC            PROCEDURE DATE:  2019            INTERPRETATION:  CLINICAL STATEMENT: Fever. Elevated transaminases      TECHNIQUE: Contiguous CT images were obtained of the abdomen and pelvis.  Intravenous Contrast:  Optiray 320 intravenous contrast administered.    Oral contrast was not administered.      COMPARISON:  None    FINDINGS:    LOWER CHEST: There is mild bibasilar subsegmental atelectasis. A few   small pulmonary cysts.    LIVER: Unremarkable.    SPLEEN: Unremarkable.    PANCREAS: There is suggestion of pancreatic divisum. The main pancreatic   duct is minimally dilated measuring 4 to 5 mm. Relative homogeneous   pancreatic enhancement without identifiable mass. No peripancreatic   inflammation.    GALLBLADDER AND BILIARY TREE: Mildly distended gallbladder. CBD is mildly   dilated to 9 mm with apparent filling defect in the distal CBD likely a   choledocholith measuring up to 1 cm    ADRENALS: Unremarkable.    KIDNEYS: Symmetric renal enhancement. No hydronephrosis. There are   bilateral renal hypodensities, many of which cannot be accurately   characterize given streak artifact from bilateral arms. A 3 cm left renal   hypodensity (series 7, image 106) appears to measure higher attenuation   than expected for simple fluid.    LYMPH NODES: There are no enlarged abdominal or pelvic lymph nodes.    VASCULATURE: The abdominal aorta is normal in caliber and demonstrates   atherosclerotic changes.    BOWEL: No bowel obstruction or bowel wall thickening. Unremarkable   appendix. Colonic diverticulosis.    PERITONEUM/RETROPERITONEUM/MESENTERY: There is no ascites or   pneumoperitoneum.    PELVIC VISCERA: Hysterectomy    BONES AND SOFT TISSUES: No acute osseous abnormality is noted.     IMPRESSION:    Mildly dilated CBD to 9 mm secondary to apparent distal CBD   choledocholithiasis measuring 1 cm    Suggestion of pancreatic divisum with minimal dilatation of the   pancreatic duct to 4 to 5 mm.                  YANIV ASIF M.D., ATTENDING RADIOLOGIST  This document has been electronically signed. 2019  3:18AM             (19 @ 23:22)      CARDIOLOGY TESTING  12 Lead ECG:   Ventricular Rate 88 BPM    Atrial Rate 88 BPM    P-R Interval 162 ms    QRS Duration 100 ms    Q-T Interval 374 ms    QTC Calculation(Bezet) 452 ms    P Axis 25 degrees    R Axis -26 degrees    T Axis -15 degrees    Diagnosis Line Normal sinus rhythm  Voltage criteria for left ventricular hypertrophy  Nonspecific ST and T wave abnormality  Abnormal ECG    Confirmed by Tonny Edwards ([id number]) on 2019 8:16:28 PM (19 @ 18:52)      MEDICATIONS  amLODIPine   Tablet 5  ATENolol  Tablet 50  aztreonam  IVPB 2000  chlorhexidine 4% Liquid 1  enoxaparin Injectable 40  folic acid 1  lactated ringers. 1000  metroNIDAZOLE  IVPB 500  oxybutynin 10  pantoprazole    Tablet 40  potassium chloride  20 mEq/100 mL IVPB 20      ANTIBIOTICS:  aztreonam  IVPB 2000 milliGRAM(s) IV Intermittent every 12 hours  metroNIDAZOLE  IVPB 500 milliGRAM(s) IV Intermittent every 8 hours      penicillins (Rash)  sulfa drugs (Rash)

## 2019-07-14 NOTE — PROGRESS NOTE ADULT - ASSESSMENT
79 yo F Hx of HTN, RA, asthma, presented for fever and chills admitted for choledocholithiasis and cholangitis.    1)Choledocholithiasis/Cholangitis  2) Pancreas divisum    Rec:  IV fluids  Trend CMP and CBC  IV Abx  Bcx  f/u ID recommendations  ERCP Monday am on urgent basis if develops hypotension.  keep NPO after midnight 79 yo F Hx of HTN, RA, asthma, presented for fever and chills admitted for choledocholithiasis and cholangitis.    1)Choledocholithiasis/Cholangitis  2) Pancreas divisum    Rec:  IV fluids  Trend CMP and CBC  IV Abx  Bcx Culture Results:  Growth in anaerobic bottle: Escherichia coli  f/u ID recommendations  ERCP Monday am on urgent basis if develops hypotension.  keep NPO after midnight 77 yo F Hx of HTN, RA, asthma, presented for fever and chills admitted for choledocholithiasis and cholangitis.    1)Choledocholithiasis/Cholangitis  2) Pancreas divisum    Rec:  IV fluids  Trend CMP and CBC  IV Abx  Bcx Culture Results:  Growth in anaerobic bottle: Escherichia coli  f/u ID recommendations  ERCP Monday am   or earlier  if develops hypotension.  keep NPO after midnight

## 2019-07-15 LAB
-  AMIKACIN: SIGNIFICANT CHANGE UP
-  AMPICILLIN/SULBACTAM: SIGNIFICANT CHANGE UP
-  AMPICILLIN: SIGNIFICANT CHANGE UP
-  AZTREONAM: SIGNIFICANT CHANGE UP
-  CEFAZOLIN: SIGNIFICANT CHANGE UP
-  CEFEPIME: SIGNIFICANT CHANGE UP
-  CEFOXITIN: SIGNIFICANT CHANGE UP
-  CEFTRIAXONE: SIGNIFICANT CHANGE UP
-  CIPROFLOXACIN: SIGNIFICANT CHANGE UP
-  ERTAPENEM: SIGNIFICANT CHANGE UP
-  GENTAMICIN: SIGNIFICANT CHANGE UP
-  IMIPENEM: SIGNIFICANT CHANGE UP
-  LEVOFLOXACIN: SIGNIFICANT CHANGE UP
-  MEROPENEM: SIGNIFICANT CHANGE UP
-  PIPERACILLIN/TAZOBACTAM: SIGNIFICANT CHANGE UP
-  TOBRAMYCIN: SIGNIFICANT CHANGE UP
-  TRIMETHOPRIM/SULFAMETHOXAZOLE: SIGNIFICANT CHANGE UP
ALBUMIN SERPL ELPH-MCNC: 2.7 G/DL — LOW (ref 3.5–5.2)
ALP SERPL-CCNC: 364 U/L — HIGH (ref 30–115)
ALT FLD-CCNC: 100 U/L — HIGH (ref 0–41)
ANION GAP SERPL CALC-SCNC: 12 MMOL/L — SIGNIFICANT CHANGE UP (ref 7–14)
APTT BLD: 33.2 SEC — SIGNIFICANT CHANGE UP (ref 27–39.2)
AST SERPL-CCNC: 38 U/L — SIGNIFICANT CHANGE UP (ref 0–41)
BASOPHILS # BLD AUTO: 0.01 K/UL — SIGNIFICANT CHANGE UP (ref 0–0.2)
BASOPHILS NFR BLD AUTO: 0.3 % — SIGNIFICANT CHANGE UP (ref 0–1)
BILIRUB SERPL-MCNC: 1.9 MG/DL — HIGH (ref 0.2–1.2)
BLD GP AB SCN SERPL QL: SIGNIFICANT CHANGE UP
BUN SERPL-MCNC: 12 MG/DL — SIGNIFICANT CHANGE UP (ref 10–20)
CALCIUM SERPL-MCNC: 8 MG/DL — LOW (ref 8.5–10.1)
CHLORIDE SERPL-SCNC: 106 MMOL/L — SIGNIFICANT CHANGE UP (ref 98–110)
CO2 SERPL-SCNC: 26 MMOL/L — SIGNIFICANT CHANGE UP (ref 17–32)
CREAT SERPL-MCNC: 0.7 MG/DL — SIGNIFICANT CHANGE UP (ref 0.7–1.5)
CULTURE RESULTS: SIGNIFICANT CHANGE UP
CULTURE RESULTS: SIGNIFICANT CHANGE UP
EOSINOPHIL # BLD AUTO: 0.02 K/UL — SIGNIFICANT CHANGE UP (ref 0–0.7)
EOSINOPHIL NFR BLD AUTO: 0.5 % — SIGNIFICANT CHANGE UP (ref 0–8)
GLUCOSE SERPL-MCNC: 97 MG/DL — SIGNIFICANT CHANGE UP (ref 70–99)
HCT VFR BLD CALC: 30.3 % — LOW (ref 37–47)
HGB BLD-MCNC: 9.4 G/DL — LOW (ref 12–16)
IMM GRANULOCYTES NFR BLD AUTO: 0.8 % — HIGH (ref 0.1–0.3)
INR BLD: 1.11 RATIO — SIGNIFICANT CHANGE UP (ref 0.65–1.3)
LYMPHOCYTES # BLD AUTO: 0.25 K/UL — LOW (ref 1.2–3.4)
LYMPHOCYTES # BLD AUTO: 6.5 % — LOW (ref 20.5–51.1)
MCHC RBC-ENTMCNC: 30 PG — SIGNIFICANT CHANGE UP (ref 27–31)
MCHC RBC-ENTMCNC: 31 G/DL — LOW (ref 32–37)
MCV RBC AUTO: 96.8 FL — SIGNIFICANT CHANGE UP (ref 81–99)
METHOD TYPE: SIGNIFICANT CHANGE UP
MONOCYTES # BLD AUTO: 0.37 K/UL — SIGNIFICANT CHANGE UP (ref 0.1–0.6)
MONOCYTES NFR BLD AUTO: 9.6 % — HIGH (ref 1.7–9.3)
NEUTROPHILS # BLD AUTO: 3.18 K/UL — SIGNIFICANT CHANGE UP (ref 1.4–6.5)
NEUTROPHILS NFR BLD AUTO: 82.3 % — HIGH (ref 42.2–75.2)
NRBC # BLD: 0 /100 WBCS — SIGNIFICANT CHANGE UP (ref 0–0)
ORGANISM # SPEC MICROSCOPIC CNT: SIGNIFICANT CHANGE UP
PLATELET # BLD AUTO: 127 K/UL — LOW (ref 130–400)
POTASSIUM SERPL-MCNC: 3.4 MMOL/L — LOW (ref 3.5–5)
POTASSIUM SERPL-SCNC: 3.4 MMOL/L — LOW (ref 3.5–5)
PROT SERPL-MCNC: 5.5 G/DL — LOW (ref 6–8)
PROTHROM AB SERPL-ACNC: 12.8 SEC — SIGNIFICANT CHANGE UP (ref 9.95–12.87)
RBC # BLD: 3.13 M/UL — LOW (ref 4.2–5.4)
RBC # FLD: 14.1 % — SIGNIFICANT CHANGE UP (ref 11.5–14.5)
SODIUM SERPL-SCNC: 144 MMOL/L — SIGNIFICANT CHANGE UP (ref 135–146)
SPECIMEN SOURCE: SIGNIFICANT CHANGE UP
SPECIMEN SOURCE: SIGNIFICANT CHANGE UP
WBC # BLD: 3.86 K/UL — LOW (ref 4.8–10.8)
WBC # FLD AUTO: 3.86 K/UL — LOW (ref 4.8–10.8)

## 2019-07-15 PROCEDURE — 99233 SBSQ HOSP IP/OBS HIGH 50: CPT

## 2019-07-15 RX ORDER — CEFTRIAXONE 500 MG/1
2000 INJECTION, POWDER, FOR SOLUTION INTRAMUSCULAR; INTRAVENOUS EVERY 24 HOURS
Refills: 0 | Status: DISCONTINUED | OUTPATIENT
Start: 2019-07-15 | End: 2019-07-18

## 2019-07-15 RX ORDER — CEFEPIME 1 G/1
2000 INJECTION, POWDER, FOR SOLUTION INTRAMUSCULAR; INTRAVENOUS EVERY 12 HOURS
Refills: 0 | Status: DISCONTINUED | OUTPATIENT
Start: 2019-07-15 | End: 2019-07-15

## 2019-07-15 RX ADMIN — Medication 100 MILLIGRAM(S): at 13:40

## 2019-07-15 RX ADMIN — Medication 10 MILLIGRAM(S): at 13:40

## 2019-07-15 RX ADMIN — SODIUM CHLORIDE 75 MILLILITER(S): 9 INJECTION, SOLUTION INTRAVENOUS at 07:58

## 2019-07-15 RX ADMIN — Medication 1 MILLIGRAM(S): at 13:39

## 2019-07-15 RX ADMIN — PANTOPRAZOLE SODIUM 40 MILLIGRAM(S): 20 TABLET, DELAYED RELEASE ORAL at 08:01

## 2019-07-15 RX ADMIN — Medication 100 MILLIGRAM(S): at 23:48

## 2019-07-15 RX ADMIN — AMLODIPINE BESYLATE 5 MILLIGRAM(S): 2.5 TABLET ORAL at 05:42

## 2019-07-15 RX ADMIN — Medication 100 MILLIGRAM(S): at 05:42

## 2019-07-15 RX ADMIN — CEFTRIAXONE 100 MILLIGRAM(S): 500 INJECTION, POWDER, FOR SOLUTION INTRAMUSCULAR; INTRAVENOUS at 14:58

## 2019-07-15 RX ADMIN — ATENOLOL 50 MILLIGRAM(S): 25 TABLET ORAL at 05:41

## 2019-07-15 NOTE — PROGRESS NOTE ADULT - SUBJECTIVE AND OBJECTIVE BOX
MADISON GLORIA  78y, Female      OVERNIGHT EVENTS:    no fevers, has no abdominal pain    VITALS:  T(F): 97.2, Max: 98 (07-15-19 @ 02:46)  HR: 91  BP: 145/77  RR: 18Vital Signs Last 24 Hrs  T(C): 36.2 (15 Jul 2019 05:54), Max: 36.7 (15 Jul 2019 02:46)  T(F): 97.2 (15 Jul 2019 05:54), Max: 98 (15 Jul 2019 02:46)  HR: 91 (15 Jul 2019 05:54) (91 - 94)  BP: 145/77 (15 Jul 2019 05:54) (145/77 - 189/85)  BP(mean): --  RR: 18 (15 Jul 2019 05:54) (18 - 18)  SpO2: 94% (15 Jul 2019 08:22) (94% - 94%)    TESTS & MEASUREMENTS:                        9.4    3.86  )-----------( 127      ( 15 Jul 2019 08:28 )             30.3     07-15    144  |  106  |  12  ----------------------------<  97  3.4<L>   |  26  |  0.7    Ca    8.0<L>      15 Jul 2019 08:28  Phos  2.6     07-14  Mg     1.8     07-14    TPro  5.5<L>  /  Alb  2.7<L>  /  TBili  1.9<H>  /  DBili  1.4<H>  /  AST  38  /  ALT  100<H>  /  AlkPhos  364<H>  07-15    LIVER FUNCTIONS - ( 15 Jul 2019 08:28 )  Alb: 2.7 g/dL / Pro: 5.5 g/dL / ALK PHOS: 364 U/L / ALT: 100 U/L / AST: 38 U/L / GGT: x             Culture - Blood (collected 07-12-19 @ 18:31)  Source: .Blood Blood-Peripheral  Gram Stain (07-13-19 @ 13:11):    Growth in anaerobic bottle: Gram Negative Rods  Final Report (07-15-19 @ 10:38):    Growth in anaerobic bottle: Escherichia coli    See previous culture [id number]    Culture - Blood (collected 07-12-19 @ 18:31)  Source: .Blood Blood-Peripheral  Gram Stain (07-13-19 @ 16:32):    Growth in aerobic and anaerobic bottles: Gram Negative Rods  Final Report (07-15-19 @ 08:47):    Growth in aerobic and anaerobic bottles: Escherichia coli    "Due to technical problems, Proteus sp. will Not be reported as part of    the BCID panel until further notice"    ***Blood Panel PCR results on this specimen are available    approximately 3 hours after the Gram stain result.***    Gram stain, PCR, and/or culture results may not always    correspond due to difference in methodologies.    ************************************************************    This PCR assay was performed using [employer].    The following targets are tested for: Enterococcus,    vancomycin resistant enterococci, Listeria monocytogenes,    coagulase negative staphylococci, S. aureus,    methicillin resistant S. aureus, Streptococcus agalactiae    (Group B), S. pneumoniae, S.pyogenes (Group A),    Acinetobacter baumannii, Enterobacter cloacae, E. coli,    Klebsiella oxytoca, K. pneumoniae, Proteus sp.,    Serratia marcescens, Haemophilus influenzae,    Neisseria meningitidis, Pseudomonas aeruginosa, Candida    albicans, C. glabrata, C krusei, C parapsilosis,    C. tropicalis and the KPC resistance gene.  Organism: Blood Culture PCR  Escherichia coli (07-15-19 @ 08:47)  Organism: Escherichia coli (07-15-19 @ 08:47)      -  Amikacin: S <=8      -  Ampicillin: S 4 These ampicillin results predict results for amoxicillin      -  Ampicillin/Sulbactam: S <=4/2 Enterobacter, Citrobacter, and Serratia may develop resistance during prolonged therapy (3-4 days)      -  Aztreonam: S <=4      -  Cefazolin: S <=2 Enterobacter, Citrobacter, and Serratia may develop resistance during prolonged therapy (3-4 days)      -  Cefepime: S <=2      -  Cefoxitin: S <=4      -  Ceftriaxone: S <=1 Enterobacter, Citrobacter, and Serratia may develop resistance during prolonged therapy      -  Ciprofloxacin: S <=0.5      -  Ertapenem: S <=0.5      -  Gentamicin: S <=1      -  Imipenem: S <=1      -  Levofloxacin: S <=1      -  Meropenem: S <=1      -  Piperacillin/Tazobactam: S <=8      -  Tobramycin: S <=2      -  Trimethoprim/Sulfamethoxazole: S <=0.5/9.5      Method Type: BRIANA  Organism: Blood Culture PCR (07-15-19 @ 08:47)      -  Escherichia coli: Detec      Method Type: PCR            RADIOLOGY & ADDITIONAL TESTS:    ANTIBIOTICS:  cefepime   IVPB 2000 milliGRAM(s) IV Intermittent every 12 hours  metroNIDAZOLE  IVPB 500 milliGRAM(s) IV Intermittent every 8 hours

## 2019-07-15 NOTE — PROGRESS NOTE ADULT - SUBJECTIVE AND OBJECTIVE BOX
patient is a 78y old Female with PMHx of DLD,HTN, RA on Arava who presents with a chief complaint of Fever and chills   Currently admitted to medicine with the primary diagnosis of Cholangitis     Today is hospital day 2d.   still feeling chills        PAST MEDICAL & SURGICAL HISTORY:  Asthma  Stress incontinence  Hypertension  Hyperlipemia  Rheumatoid arthritis  S/P total knee replacement, right    MEDICATIONS  (STANDING):  amLODIPine   Tablet 5 milliGRAM(s) Oral daily  ATENolol  Tablet 50 milliGRAM(s) Oral daily  aztreonam  IVPB 2000 milliGRAM(s) IV Intermittent every 12 hours  chlorhexidine 4% Liquid 1 Application(s) Topical <User Schedule>  enoxaparin Injectable 40 milliGRAM(s) SubCutaneous every 24 hours  folic acid 1 milliGRAM(s) Oral daily  lactated ringers. 1000 milliLiter(s) (75 mL/Hr) IV Continuous <Continuous>  metroNIDAZOLE  IVPB 500 milliGRAM(s) IV Intermittent every 8 hours  oxybutynin 10 milliGRAM(s) Oral daily  pantoprazole    Tablet 40 milliGRAM(s) Oral before breakfast          Vital Signs Last 24 Hrs  T(C): 36.2 (15 Jul 2019 05:54), Max: 36.7 (15 Jul 2019 02:46)  T(F): 97.2 (15 Jul 2019 05:54), Max: 98 (15 Jul 2019 02:46)  HR: 91 (15 Jul 2019 05:54) (91 - 94)  BP: 145/77 (15 Jul 2019 05:54) (145/77 - 189/85)  BP(mean): --  RR: 18 (15 Jul 2019 05:54) (18 - 18)  SpO2: 94% (15 Jul 2019 08:22) (94% - 94%)    Physical exam :  General: NAD, Alert, conscious, oriented x3   chest: equal bilateral breath sound, no wheezes, S1/S2, Holosystolic murmur  abdomen: soft, non tender, non distended, no RUQ pain  lower extremities: No edema       RECENT LABS    Vital Signs Last 24 Hrs  T(C): 36.2 (15 Jul 2019 05:54), Max: 36.7 (15 Jul 2019 02:46)  T(F): 97.2 (15 Jul 2019 05:54), Max: 98 (15 Jul 2019 02:46)  HR: 91 (15 Jul 2019 05:54) (91 - 94)  BP: 145/77 (15 Jul 2019 05:54) (145/77 - 189/85)  BP(mean): --  RR: 18 (15 Jul 2019 05:54) (18 - 18)  SpO2: 94% (15 Jul 2019 08:22) (94% - 94%)                          9.4    3.86  )-----------( 127      ( 15 Jul 2019 08:28 )             30.3     07-15    144  |  106  |  12  ----------------------------<  97  3.4<L>   |  26  |  0.7    Ca    8.0<L>      15 Jul 2019 08:28  Phos  2.6     07-14  Mg     1.8     07-14    TPro  5.5<L>  /  Alb  2.7<L>  /  TBili  1.9<H>  /  DBili  1.4<H>  /  AST  38  /  ALT  100<H>  /  AlkPhos  364<H>  07-15      Culture - Blood (collected 12 Jul 2019 18:31)  Source: .Blood Blood-Peripheral  Gram Stain (13 Jul 2019 13:11):    Growth in anaerobic bottle: Gram Negative Rods  Final Report (15 Jul 2019 10:38):    Growth in anaerobic bottle: Escherichia coli    See previous culture [id number]    Culture - Blood (collected 12 Jul 2019 18:31)  Source: .Blood Blood-Peripheral  Gram Stain (13 Jul 2019 16:32):    Growth in aerobic and anaerobic bottles: Gram Negative Rods  Final Report (15 Jul 2019 08:47):    Growth in aerobic and anaerobic bottles: Escherichia coli    "Due to technical problems, Proteus sp. will Not be reported as part of    the BCID panel until further notice"    ***Blood Panel PCR results on this specimen are available    approximately 3 hours after the Gram stain result.***    Gram stain, PCR, and/or culture results may not always    correspond due to difference in methodologies.    ************************************************************    This PCR assay was performed using [employer].    The following targets are tested for: Enterococcus,    vancomycin resistant enterococci, Listeria monocytogenes,    coagulase negative staphylococci, S. aureus,    methicillin resistant S. aureus, Streptococcus agalactiae    (Group B), S. pneumoniae, S.pyogenes (Group A),    Acinetobacter baumannii, Enterobacter cloacae, E. coli,    Klebsiella oxytoca, K. pneumoniae, Proteus sp.,    Serratia marcescens, Haemophilus influenzae,    Neisseria meningitidis, Pseudomonas aeruginosa, Candida    albicans, C. glabrata, C krusei, C parapsilosis,    C. tropicalis and the KPC resistance gene.  Organism: Blood Culture PCR  Escherichia coli (15 Jul 2019 08:47)  Organism: Escherichia coli (15 Jul 2019 08:47)  Organism: Blood Culture PCR (15 Jul 2019 08:47)      Assessment and Plan :  A 77 YO female with PMHx of RA on Arava to the ED with fever, chills, generalized weakness. She was admitted as a case of cholangitis based on the fever, WBCs was 5 but shift left was seen 90% neutrophils and because the patient is leukopenic at baseline due to her medication for RA. LFT showed ALP of 566. AST and ALT were 399,288. Tb was 2.5    - As per ID recommendations: Switch from AZtreonam to Cefepime 2g IV [id number] And Start Flagyl 500 mg IV [id number]

## 2019-07-15 NOTE — PROGRESS NOTE ADULT - ASSESSMENT
Patient is a 79 y/o PMHx of asthma, DLD, HTN, RA on Arava, with known low WBC counts presents to ED for evaluation of fever/chills  day associated with generalized malaise and weakness. Patient found to have Gram negative bacteremia as well as a CBD stone. Clinically she is improved but will require ERCP.    Choledocholithiasis / E Coli bacteremia  - Antibiotics tailor to speciations  - Fluid resuscitation  - Plan ERCP 7/16 in am  - Keep NPO tonight  - INR and TnS ordered  - Hold AC  - Notify GI team or fellow on call for any change in clinical status

## 2019-07-15 NOTE — PROGRESS NOTE ADULT - SUBJECTIVE AND OBJECTIVE BOX
SUBJECTIVE:    Patient is a 78y old Female who presents with a chief complaint of Fever and chills (15 Jul 2019 11:12)    Currently admitted to medicine with the primary diagnosis of Cholangitis     Today is hospital day 3d. This morning she is resting comfortably in bed and reports no new issues or overnight events.     PAST MEDICAL & SURGICAL HISTORY  Asthma  Stress incontinence  Hypertension  Hyperlipemia  Rheumatoid arthritis  S/P total knee replacement, right    SOCIAL HISTORY:  Negative for smoking/alcohol/drug use.     ALLERGIES:  penicillins (Rash)  sulfa drugs (Rash)    MEDICATIONS:  STANDING MEDICATIONS  amLODIPine   Tablet 5 milliGRAM(s) Oral daily  ATENolol  Tablet 50 milliGRAM(s) Oral daily  cefTRIAXone   IVPB 2000 milliGRAM(s) IV Intermittent every 24 hours  chlorhexidine 4% Liquid 1 Application(s) Topical <User Schedule>  enoxaparin Injectable 40 milliGRAM(s) SubCutaneous every 24 hours  folic acid 1 milliGRAM(s) Oral daily  lactated ringers. 1000 milliLiter(s) IV Continuous <Continuous>  metroNIDAZOLE  IVPB 500 milliGRAM(s) IV Intermittent every 8 hours  oxybutynin 10 milliGRAM(s) Oral daily  pantoprazole    Tablet 40 milliGRAM(s) Oral before breakfast    PRN MEDICATIONS    VITALS:   T(F): 97.2  HR: 91  BP: 145/77  RR: 18  SpO2: 94%    LABS:                        9.4    3.86  )-----------( 127      ( 15 Jul 2019 08:28 )             30.3     07-15    144  |  106  |  12  ----------------------------<  97  3.4<L>   |  26  |  0.7    Ca    8.0<L>      15 Jul 2019 08:28  Phos  2.6     07-14  Mg     1.8     07-14    TPro  5.5<L>  /  Alb  2.7<L>  /  TBili  1.9<H>  /  DBili  1.4<H>  /  AST  38  /  ALT  100<H>  /  AlkPhos  364<H>  07-15              Culture - Blood (collected 12 Jul 2019 18:31)  Source: .Blood Blood-Peripheral  Gram Stain (13 Jul 2019 13:11):    Growth in anaerobic bottle: Gram Negative Rods  Final Report (15 Jul 2019 10:38):    Growth in anaerobic bottle: Escherichia coli    See previous culture [id number]    Culture - Blood (collected 12 Jul 2019 18:31)  Source: .Blood Blood-Peripheral  Gram Stain (13 Jul 2019 16:32):    Growth in aerobic and anaerobic bottles: Gram Negative Rods  Final Report (15 Jul 2019 08:47):    Growth in aerobic and anaerobic bottles: Escherichia coli    "Due to technical problems, Proteus sp. will Not be reported as part of    the BCID panel until further notice"    ***Blood Panel PCR results on this specimen are available    approximately 3 hours after the Gram stain result.***    Gram stain, PCR, and/or culture results may not always    correspond due to difference in methodologies.    ************************************************************    This PCR assay was performed using [employer].    The following targets are tested for: Enterococcus,    vancomycin resistant enterococci, Listeria monocytogenes,    coagulase negative staphylococci, S. aureus,    methicillin resistant S. aureus, Streptococcus agalactiae    (Group B), S. pneumoniae, S.pyogenes (Group A),    Acinetobacter baumannii, Enterobacter cloacae, E. coli,    Klebsiella oxytoca, K. pneumoniae, Proteus sp.,    Serratia marcescens, Haemophilus influenzae,    Neisseria meningitidis, Pseudomonas aeruginosa, Candida    albicans, C. glabrata, C krusei, C parapsilosis,    C. tropicalis and the KPC resistance gene.  Organism: Blood Culture PCR  Escherichia coli (15 Jul 2019 08:47)  Organism: Escherichia coli (15 Jul 2019 08:47)  Organism: Blood Culture PCR (15 Jul 2019 08:47)          RADIOLOGY:    PHYSICAL EXAM:  GEN: No acute distress  LUNGS: Clear to auscultation bilaterally   HEART: Regular  ABD: Soft, non-tender, non-distended.  EXT: NC/NC/NE/2+PP/CEDILLO/Skin Intact.   NEURO: AAOX3    Intravenous access:   NG tube:   Jama Catheter:

## 2019-07-15 NOTE — PROGRESS NOTE ADULT - SUBJECTIVE AND OBJECTIVE BOX
Patient is a 79 y/o PMHx of asthma, DLD, HTN, RA on Arava, with known low WBC counts presents to ED for evaluation of fever/chills  day associated with generalized malaise and weakness. Patient feels better since admission and denies any abdominal pain, nausea, emesis, itchiness, or discoloration of skin.       PAST MEDICAL & SURGICAL HISTORY:  Asthma  Stress incontinence  Hypertension  Hyperlipemia  Rheumatoid arthritis  S/P total knee replacement, right        Vital Signs:  T(F): 98.9  HR: 68   BP: 165/78   RR: 18   SpO2: 94%  PHYSICAL EXAM:  GENERAL: NAD, well-appearing  CHEST/LUNG: Clear to auscultation bilaterally  HEART: Regular rate and rhythm  ABDOMEN: Soft, Nontender, Nondistended  EXTREMITIES:  No clubbing, cyanosis, or edema                              9.4    3.86  )-----------( 127      ( 15 Jul 2019 08:28 )             30.3           144  |  106  |  12  ----------------------------<  97  3.4<L>   |  26  |  0.7      Calcium, Total Serum: 8.0 mg/dL (07-15-19 @ 08:28)      LFTs:             5.5  | 1.9  | 38       ------------------[364     ( 15 Jul 2019 08:28 )  2.7  | 1.4  | 100           Lactate, Blood: 1.7 mmol/L (07-12-19 @ 18:31)      MR Abdomen No Cont 07.14.19  IMPRESSION:  Intra and extra hepatic biliary ductal dilatation with a 1.2 cm calculus   in the distal common bile duct.    Pancreas divisum.

## 2019-07-15 NOTE — PROGRESS NOTE ADULT - ASSESSMENT
· Assessment		  ASSESSMENT  78 F PMHx of asthma, DLD, HTN, RA on Arava, with known low WBC counts presents to ED for evaluation of fever/chills x 1 day associated with generalized malaise and weakness     IMPRESSION  #Ecoli bacteremia 2/2 Cholangitis with CBD stone    Severe Sepsis on admission T>101F, Pulse>90    Pt has an acute illness which poses threat to bodily function     presently asymptomatic    UA neg    CTAP Mildly dilated CBD to 9 mm secondary to apparent distal CBD choledocholithiasis measuring 1 cm    RUQ 1.  Dilated common bile duct measuring 11 mm and mildly dilated pancreatic duct measuring 4 mm. Further evaluation with a contrast enhanced pancreas protocol MRI/MRCP is recommended. Gallbladder sludge without cholelithiasis or sonographic evidence for acute cholecystitis.  #Transaminitis  #Obesity BMI (kg/m2): 31.9 (07-14-19 @ 12:59)  #PCN rash, Sulfa Rash    RECOMMENDATIONS  - CHANGE Cefepime to Rocephin 2 gm iv q24h ( i am aware of PCN allergies )  - Flagyl 500mg q8h IV  - GI ERCP today

## 2019-07-15 NOTE — PRE-OP CHECKLIST - SELECT TESTS ORDERED
CXR/CBC/EKG/BMP/PT/PTT/INR/Urinalysis/CMP/Hepatic Function/Type and Screen BMP/CMP/CBC/EKG/CXR/Hepatic Function/Urinalysis

## 2019-07-15 NOTE — PROGRESS NOTE ADULT - ASSESSMENT
MRCP common bile duct stone going for ERCP today    gram negative bacteremia infectious dis advised IV antibiotic    monitor lab

## 2019-07-16 ENCOUNTER — TRANSCRIPTION ENCOUNTER (OUTPATIENT)
Age: 79
End: 2019-07-16

## 2019-07-16 LAB
ALBUMIN SERPL ELPH-MCNC: 2.6 G/DL — LOW (ref 3.5–5.2)
ALP SERPL-CCNC: 350 U/L — HIGH (ref 30–115)
ALT FLD-CCNC: 83 U/L — HIGH (ref 0–41)
ANION GAP SERPL CALC-SCNC: 15 MMOL/L — HIGH (ref 7–14)
AST SERPL-CCNC: 40 U/L — SIGNIFICANT CHANGE UP (ref 0–41)
BASOPHILS # BLD AUTO: 0.03 K/UL — SIGNIFICANT CHANGE UP (ref 0–0.2)
BASOPHILS NFR BLD AUTO: 0.8 % — SIGNIFICANT CHANGE UP (ref 0–1)
BILIRUB DIRECT SERPL-MCNC: 0.6 MG/DL — HIGH (ref 0–0.2)
BILIRUB INDIRECT FLD-MCNC: 0.5 MG/DL — SIGNIFICANT CHANGE UP (ref 0.2–1.2)
BILIRUB SERPL-MCNC: 1.1 MG/DL — SIGNIFICANT CHANGE UP (ref 0.2–1.2)
BUN SERPL-MCNC: 15 MG/DL — SIGNIFICANT CHANGE UP (ref 10–20)
CALCIUM SERPL-MCNC: 8.2 MG/DL — LOW (ref 8.5–10.1)
CHLORIDE SERPL-SCNC: 106 MMOL/L — SIGNIFICANT CHANGE UP (ref 98–110)
CO2 SERPL-SCNC: 22 MMOL/L — SIGNIFICANT CHANGE UP (ref 17–32)
CREAT SERPL-MCNC: 0.9 MG/DL — SIGNIFICANT CHANGE UP (ref 0.7–1.5)
EOSINOPHIL # BLD AUTO: 0.14 K/UL — SIGNIFICANT CHANGE UP (ref 0–0.7)
EOSINOPHIL NFR BLD AUTO: 3.7 % — SIGNIFICANT CHANGE UP (ref 0–8)
GLUCOSE SERPL-MCNC: 89 MG/DL — SIGNIFICANT CHANGE UP (ref 70–99)
HCT VFR BLD CALC: 30.5 % — LOW (ref 37–47)
HGB BLD-MCNC: 9.6 G/DL — LOW (ref 12–16)
IMM GRANULOCYTES NFR BLD AUTO: 1.1 % — HIGH (ref 0.1–0.3)
LYMPHOCYTES # BLD AUTO: 0.47 K/UL — LOW (ref 1.2–3.4)
LYMPHOCYTES # BLD AUTO: 12.4 % — LOW (ref 20.5–51.1)
MCHC RBC-ENTMCNC: 30.6 PG — SIGNIFICANT CHANGE UP (ref 27–31)
MCHC RBC-ENTMCNC: 31.5 G/DL — LOW (ref 32–37)
MCV RBC AUTO: 97.1 FL — SIGNIFICANT CHANGE UP (ref 81–99)
MONOCYTES # BLD AUTO: 0.37 K/UL — SIGNIFICANT CHANGE UP (ref 0.1–0.6)
MONOCYTES NFR BLD AUTO: 9.7 % — HIGH (ref 1.7–9.3)
NEUTROPHILS # BLD AUTO: 2.75 K/UL — SIGNIFICANT CHANGE UP (ref 1.4–6.5)
NEUTROPHILS NFR BLD AUTO: 72.3 % — SIGNIFICANT CHANGE UP (ref 42.2–75.2)
NRBC # BLD: 0 /100 WBCS — SIGNIFICANT CHANGE UP (ref 0–0)
PLATELET # BLD AUTO: 140 K/UL — SIGNIFICANT CHANGE UP (ref 130–400)
POTASSIUM SERPL-MCNC: 3.9 MMOL/L — SIGNIFICANT CHANGE UP (ref 3.5–5)
POTASSIUM SERPL-SCNC: 3.9 MMOL/L — SIGNIFICANT CHANGE UP (ref 3.5–5)
PROT SERPL-MCNC: 6 G/DL — SIGNIFICANT CHANGE UP (ref 6–8)
RBC # BLD: 3.14 M/UL — LOW (ref 4.2–5.4)
RBC # FLD: 14.3 % — SIGNIFICANT CHANGE UP (ref 11.5–14.5)
SODIUM SERPL-SCNC: 143 MMOL/L — SIGNIFICANT CHANGE UP (ref 135–146)
WBC # BLD: 3.8 K/UL — LOW (ref 4.8–10.8)
WBC # FLD AUTO: 3.8 K/UL — LOW (ref 4.8–10.8)

## 2019-07-16 PROCEDURE — 74328 X-RAY BILE DUCT ENDOSCOPY: CPT | Mod: 26

## 2019-07-16 PROCEDURE — 43264 ERCP REMOVE DUCT CALCULI: CPT

## 2019-07-16 PROCEDURE — 43262 ENDO CHOLANGIOPANCREATOGRAPH: CPT

## 2019-07-16 PROCEDURE — 43235 EGD DIAGNOSTIC BRUSH WASH: CPT | Mod: 59

## 2019-07-16 RX ORDER — OXYBUTYNIN CHLORIDE 5 MG
5 TABLET ORAL EVERY 12 HOURS
Refills: 0 | Status: DISCONTINUED | OUTPATIENT
Start: 2019-07-16 | End: 2019-07-18

## 2019-07-16 RX ORDER — INDOMETHACIN 50 MG
100 CAPSULE ORAL ONCE
Refills: 0 | Status: COMPLETED | OUTPATIENT
Start: 2019-07-16 | End: 2019-07-16

## 2019-07-16 RX ORDER — HYDRALAZINE HCL 50 MG
25 TABLET ORAL ONCE
Refills: 0 | Status: COMPLETED | OUTPATIENT
Start: 2019-07-16 | End: 2019-07-16

## 2019-07-16 RX ADMIN — AMLODIPINE BESYLATE 5 MILLIGRAM(S): 2.5 TABLET ORAL at 05:43

## 2019-07-16 RX ADMIN — Medication 20 MILLIGRAM(S): at 16:41

## 2019-07-16 RX ADMIN — ATENOLOL 50 MILLIGRAM(S): 25 TABLET ORAL at 05:43

## 2019-07-16 RX ADMIN — Medication 100 MILLIGRAM(S): at 12:23

## 2019-07-16 RX ADMIN — CEFTRIAXONE 100 MILLIGRAM(S): 500 INJECTION, POWDER, FOR SOLUTION INTRAMUSCULAR; INTRAVENOUS at 16:42

## 2019-07-16 RX ADMIN — Medication 1 MILLIGRAM(S): at 14:19

## 2019-07-16 RX ADMIN — Medication 100 MILLIGRAM(S): at 22:38

## 2019-07-16 RX ADMIN — Medication 100 MILLIGRAM(S): at 05:43

## 2019-07-16 RX ADMIN — SODIUM CHLORIDE 75 MILLILITER(S): 9 INJECTION, SOLUTION INTRAVENOUS at 14:20

## 2019-07-16 RX ADMIN — Medication 5 MILLIGRAM(S): at 16:43

## 2019-07-16 RX ADMIN — CHLORHEXIDINE GLUCONATE 1 APPLICATION(S): 213 SOLUTION TOPICAL at 05:41

## 2019-07-16 RX ADMIN — SODIUM CHLORIDE 75 MILLILITER(S): 9 INJECTION, SOLUTION INTRAVENOUS at 16:36

## 2019-07-16 RX ADMIN — Medication 100 MILLIGRAM(S): at 14:19

## 2019-07-16 NOTE — DIETITIAN INITIAL EVALUATION ADULT. - FACTORS AFF FOOD INTAKE
denies difficulty chewing/swallowing or abd discomfort at this time despite choledocholithiasis. LBM 7/14

## 2019-07-16 NOTE — CHART NOTE - NSCHARTNOTEFT_GEN_A_CORE
PACU ANESTHESIA ADMISSION NOTE  X    Procedure:   Post op diagnosis:      ____  Intubated  TV:______       Rate: ______      FiO2: ______    ___X_  Patent Airway    ___X_  Full return of protective reflexes    ___X_  Full recovery from anesthesia / back to baseline     Vitals:   T:           R:                  BP:                  Sat:                   P:   SEE ANESTHESIA RECORD    Mental Status:  ___X_ Awake   _____ Alert   _____ Drowsy   _____ Sedated    Nausea/Vomiting:  ____ NO  _____X_Yes,   See Post - Op Orders          Pain Scale (0-10):  _____    Treatment: ____ None    ____ See Post - Op/PCA Orders    Post - Operative Fluids:   ____ Oral   ____ See Post - Op Orders    Plan: Discharge:   ____Home       ____X_Floor     _____Critical Care    _____  Other:_________________    Comments: REPORT GIVEN

## 2019-07-16 NOTE — DIETITIAN INITIAL EVALUATION ADULT. - ENERGY NEEDS
estimated calorie needs = ~5490-8882 kcal/day (MSJ x1.0-1.1 d/t BMI >30 with limited physical activity).   estimated protein needs = 59-71 g/day(1.0-1.2 g/kg IBW, reason mentioned above).   estimated fluid needs = 1mL/kcal or per LIP

## 2019-07-16 NOTE — PROGRESS NOTE ADULT - ASSESSMENT
A 77 YO F patient with PMHx of asthma, HTN, DLD and RA on arava (known to have low WBCs) presented to the ED 07/12 due to fever, malaise, chills. Her CBC back then showed wbcs of 5 but a left shift of 90% was noted at that time. Her other significant lab showed ALP of 566, AST and ALT of 399 and 288 so a diagnosis of Cholangitis was made. Today she received ERCP with sphincterectomy and stone retraction. Blood culture were positive.    Plan :    - Repeat blood culture  -echocardiogram

## 2019-07-16 NOTE — PROGRESS NOTE ADULT - SUBJECTIVE AND OBJECTIVE BOX
Patient was seen and examined. Spoke with RN. Chart reviewed.  s/p ercp  bp elevated  daughter at bedisde  d/w ho    No events overnight.  Vital Signs Last 24 Hrs  T(F): 97.3 (16 Jul 2019 13:20), Max: 99.4 (15 Jul 2019 20:02)  HR: 63 (16 Jul 2019 13:20) (61 - 95)  BP: 185/83 (16 Jul 2019 13:20) (128/64 - 186/87)  SpO2: 99% (16 Jul 2019 12:15) (99% - 99%)  MEDICATIONS  (STANDING):  amLODIPine   Tablet 5 milliGRAM(s) Oral daily  ATENolol  Tablet 50 milliGRAM(s) Oral daily  cefTRIAXone   IVPB 2000 milliGRAM(s) IV Intermittent every 24 hours  chlorhexidine 4% Liquid 1 Application(s) Topical <User Schedule>  enoxaparin Injectable 40 milliGRAM(s) SubCutaneous every 24 hours  folic acid 1 milliGRAM(s) Oral daily  hydrALAZINE Injectable 25 milliGRAM(s) IV Push once  lactated ringers. 1000 milliLiter(s) (75 mL/Hr) IV Continuous <Continuous>  metroNIDAZOLE  IVPB 500 milliGRAM(s) IV Intermittent every 8 hours  oxybutynin 5 milliGRAM(s) Oral every 12 hours  pantoprazole    Tablet 40 milliGRAM(s) Oral before breakfast    MEDICATIONS  (PRN):    Labs:                        9.6    3.80  )-----------( 140      ( 16 Jul 2019 06:55 )             30.5                         9.4    3.86  )-----------( 127      ( 15 Jul 2019 08:28 )             30.3     16 Jul 2019 06:55    143    |  106    |  15     ----------------------------<  89     3.9     |  22     |  0.9    15 Jul 2019 08:28    144    |  106    |  12     ----------------------------<  97     3.4     |  26     |  0.7      Ca    8.2        16 Jul 2019 06:55  Ca    8.0        15 Jul 2019 08:28    TPro  6.0    /  Alb  2.6    /  TBili  1.1    /  DBili  0.6    /  AST  40     /  ALT  83     /  AlkPhos  350    16 Jul 2019 06:55  TPro  5.5    /  Alb  2.7    /  TBili  1.9    /  DBili  1.4    /  AST  38     /  ALT  100    /  AlkPhos  364    15 Jul 2019 08:28    PT/INR - ( 15 Jul 2019 19:00 )   PT: 12.80 sec;   INR: 1.11 ratio         PTT - ( 15 Jul 2019 19:00 )  PTT:33.2 sec        Radiology:    General: comfortable, NAD  Neurology: A&Ox3, nonfocal  Head:  Normocephalic, atraumatic  ENT:  Mucosa moist, no ulcerations  Neck:  Supple, no JVD,   Skin: no breakdowns (as per RN)  Resp: CTA B/L  CV: RRR, S1S2,   GI: Soft, NT, bowel sounds  MS: No edema, + peripheral pulses, FROM all 4 extremity

## 2019-07-16 NOTE — PROGRESS NOTE ADULT - SUBJECTIVE AND OBJECTIVE BOX
24H events:    Patient is a 78y old Female who presents with a chief complaint of Fever and chills   Primary diagnosis of Cholangitis     Today is hospital day 4d.     Patient was feeling well today with no complaints. she was taken to ERCP at 9:00 am with Successful ERCP with sphincterotomy, and stone extraction.     Plan: Monitor for pain, fever and hypotension   If any of the above symptoms are not experienced after 1 hour may start clears  Advance diet in AM to low fat  Follow up with Dr. Richter in 4 weeks for MRI to evaluate for possible  choledochal cyst type 1       PAST MEDICAL & SURGICAL HISTORY  Asthma  Stress incontinence  Hypertension  Hyperlipemia  Rheumatoid arthritis  S/P total knee replacement, right    SOCIAL HISTORY:  Negative for smoking/alcohol/drug use.     ALLERGIES:  penicillins (Rash)  sulfa drugs (Rash)    MEDICATIONS:  STANDING MEDICATIONS  amLODIPine   Tablet 5 milliGRAM(s) Oral daily  ATENolol  Tablet 50 milliGRAM(s) Oral daily  cefTRIAXone   IVPB 2000 milliGRAM(s) IV Intermittent every 24 hours  chlorhexidine 4% Liquid 1 Application(s) Topical <User Schedule>  enoxaparin Injectable 40 milliGRAM(s) SubCutaneous every 24 hours  folic acid 1 milliGRAM(s) Oral daily  lactated ringers. 1000 milliLiter(s) IV Continuous <Continuous>  metroNIDAZOLE  IVPB 500 milliGRAM(s) IV Intermittent every 8 hours  oxybutynin 5 milliGRAM(s) Oral every 12 hours  pantoprazole    Tablet 40 milliGRAM(s) Oral before breakfast    PRN MEDICATIONS    VITALS:   T(F): 98.5  HR: 65  BP: 186/87  RR: 18  SpO2: 99%    LABS:                        9.6    3.80  )-----------( 140      ( 16 Jul 2019 06:55 )             30.5     07-16    143  |  106  |  15  ----------------------------<  89  3.9   |  22  |  0.9    Ca    8.2<L>      16 Jul 2019 06:55    TPro  6.0  /  Alb  2.6<L>  /  TBili  1.1  /  DBili  0.6<H>  /  AST  40  /  ALT  83<H>  /  AlkPhos  350<H>  07-16    PT/INR - ( 15 Jul 2019 19:00 )   PT: 12.80 sec;   INR: 1.11 ratio         PTT - ( 15 Jul 2019 19:00 )  PTT:33.2 sec              RADIOLOGY:    PHYSICAL EXAM:  GENERAL: NAD, well-groomed, well-developed  HEAD:  Atraumatic, Normocephalic  EYES: EOMI, PERRLA, conjunctiva and sclera clear  ENMT: No tonsillar erythema, exudates, or enlargement; Moist mucous membranes, Good dentition, No lesions  NECK: Supple, No JVD, Normal thyroid  NERVOUS SYSTEM:  Alert & Oriented X3, Good concentration; Motor Strength 5/5 B/L upper and lower extremities; DTRs 2+ intact and symmetric  CHEST/LUNG: Clear to percussion bilaterally; No rales, rhonchi, wheezing, or rubs  HEART: Regular rate and rhythm; No murmurs, rubs, or gallops  ABDOMEN: Soft, Nontender, Nondistended; Bowel sounds present  EXTREMITIES:  2+ Peripheral Pulses, No clubbing, cyanosis, or edema  LYMPH: No lymphadenopathy noted  SKIN: No rashes or lesions 24H events:    Patient is a 78y old Female who presents with a chief complaint of Fever and chills   Primary diagnosis of Cholangitis     Today is hospital day 4d.     Patient was feeling well today with no complaints. she was taken to ERCP at 9:00 am with Successful ERCP with sphincterotomy, and stone extraction.     Plan: Monitor for pain, fever and hypotension   If any of the above symptoms are not experienced after 1 hour may start clears  Advance diet in AM to low fat  Follow up with Dr. Richter in 4 weeks for MRI to evaluate for possible  choledochal cyst type 1       PAST MEDICAL & SURGICAL HISTORY  Asthma  Stress incontinence  Hypertension  Hyperlipemia  Rheumatoid arthritis  S/P total knee replacement, right    SOCIAL HISTORY:  Negative for smoking/alcohol/drug use.     ALLERGIES:  penicillins (Rash)  sulfa drugs (Rash)    MEDICATIONS:  STANDING MEDICATIONS  amLODIPine   Tablet 5 milliGRAM(s) Oral daily  ATENolol  Tablet 50 milliGRAM(s) Oral daily  cefTRIAXone   IVPB 2000 milliGRAM(s) IV Intermittent every 24 hours  chlorhexidine 4% Liquid 1 Application(s) Topical <User Schedule>  enoxaparin Injectable 40 milliGRAM(s) SubCutaneous every 24 hours  folic acid 1 milliGRAM(s) Oral daily  lactated ringers. 1000 milliLiter(s) IV Continuous <Continuous>  metroNIDAZOLE  IVPB 500 milliGRAM(s) IV Intermittent every 8 hours  oxybutynin 5 milliGRAM(s) Oral every 12 hours  pantoprazole    Tablet 40 milliGRAM(s) Oral before breakfast    PRN MEDICATIONS    VITALS:   T(F): 98.5  HR: 65  BP: 186/87  RR: 18  SpO2: 99%    LABS:                        9.6    3.80  )-----------( 140      ( 16 Jul 2019 06:55 )             30.5     07-16    143  |  106  |  15  ----------------------------<  89  3.9   |  22  |  0.9    Ca    8.2<L>      16 Jul 2019 06:55    TPro  6.0  /  Alb  2.6<L>  /  TBili  1.1  /  DBili  0.6<H>  /  AST  40  /  ALT  83<H>  /  AlkPhos  350<H>  07-16    PT/INR - ( 15 Jul 2019 19:00 )   PT: 12.80 sec;   INR: 1.11 ratio         PTT - ( 15 Jul 2019 19:00 )  PTT:33.2 sec              RADIOLOGY:    PHYSICAL EXAM:  GENERAL: NAD, well-groomed, well-developed  HEAD:  Atraumatic, Normocephalic  EYES: EOMI, PERRLA, conjunctiva and sclera clear  ENMT: No tonsillar erythema, exudates, or enlargement; Moist mucous membranes, Good dentition, No lesions  NECK: Supple, No JVD, Normal thyroid  NERVOUS SYSTEM:  Alert & Oriented X3, Good concentration; Motor Strength 5/5 B/L upper and lower extremities; DTRs 2+ intact and symmetric  CHEST/LUNG:; No rales, rhonchi, wheezing, or rubs  HEART: Regular rate and rhythm; No murmurs, rubs, or gallops  ABDOMEN: Soft, Nontender, Nondistended; Bowel sounds present

## 2019-07-16 NOTE — DIETITIAN INITIAL EVALUATION ADULT. - OTHER INFO
Pt p/w fever and weakness x1 day PTA. Primary dx: cholangitis. On adm pt found to have gram negative bacteremia and CBD stone. Per GI: Choledocholithiasis / E Coli bacteremia s/p ERCP today with sphincterotomy, and stone extraction.

## 2019-07-16 NOTE — PROGRESS NOTE ADULT - ASSESSMENT
severe sepsis  choledocholithiasis  cholangitis  bacteremia  RA on arava  accel htn    s/p ercp  iv abx  id fu  given iv hydrallazine  optimizebp meds  2decho  gi fu   continue current rx

## 2019-07-16 NOTE — PROGRESS NOTE ADULT - SUBJECTIVE AND OBJECTIVE BOX
MADISON GLORIA  78y, Female  Allergy: penicillins (Rash)  sulfa drugs (Rash)      CHIEF COMPLAINT: Fever and chills (16 Jul 2019 13:12)      INTERVAL EVENTS/HPI  - No acute events overnight  - T(F): , Max: 99.4 (07-15-19 @ 20:02)  - Denies any worsening symptoms  - Tolerating medication  - WBC Count: 3.80 K/uL (07-16-19 @ 06:55)      ROS  General: Denies rigors, nightsweats  HEENT: Denies headache, rhinorrhea, sore throat, eye pain  CV: Denies CP, palpitations  PULM: Denies SOB, wheezing  GI: Denies abdominal pain, hematochezia/melena  : Denies dysuria, hematuria  MSK: Denies arthralgias, myalgias  SKIN: Denies rash, lesions  NEURO: Denies paresthesias, weakness  PSYCH: Denies depression, anxiety    VITALS:  T(F): 97.3, Max: 99.4 (07-15-19 @ 20:02)  HR: 63  BP: 185/83  RR: 19Vital Signs Last 24 Hrs  T(C): 36.3 (16 Jul 2019 13:20), Max: 37.4 (15 Jul 2019 20:02)  T(F): 97.3 (16 Jul 2019 13:20), Max: 99.4 (15 Jul 2019 20:02)  HR: 63 (16 Jul 2019 13:20) (61 - 95)  BP: 185/83 (16 Jul 2019 13:20) (128/64 - 186/87)  BP(mean): --  RR: 19 (16 Jul 2019 13:20) (12 - 24)  SpO2: 99% (16 Jul 2019 12:15) (99% - 99%)    PHYSICAL EXAM:  Gen: NAD, resting in bed  HEENT: Normocephalic, atraumatic  Neck: supple, no lymphadenopathy  CV: Regular rate & regular rhythm  Lungs: decreased BS at bases, no fremitus  Abdomen: Soft, BS present  Ext: Warm, well perfused  Neuro: non focal, awake  Skin: no rash, no erythema  Lines: no phlebitis    FH: Non-contributory  Social Hx: Non-contributory    TESTS & MEASUREMENTS:                        9.6    3.80  )-----------( 140      ( 16 Jul 2019 06:55 )             30.5     07-16    143  |  106  |  15  ----------------------------<  89  3.9   |  22  |  0.9    Ca    8.2<L>      16 Jul 2019 06:55    TPro  6.0  /  Alb  2.6<L>  /  TBili  1.1  /  DBili  0.6<H>  /  AST  40  /  ALT  83<H>  /  AlkPhos  350<H>  07-16    eGFR if Non African American: 62 mL/min/1.73M2 (07-16-19 @ 06:55)  eGFR if African American: 71 mL/min/1.73M2 (07-16-19 @ 06:55)    LIVER FUNCTIONS - ( 16 Jul 2019 06:55 )  Alb: 2.6 g/dL / Pro: 6.0 g/dL / ALK PHOS: 350 U/L / ALT: 83 U/L / AST: 40 U/L / GGT: x               Culture - Blood (collected 07-12-19 @ 18:31)  Source: .Blood Blood-Peripheral  Gram Stain (07-13-19 @ 13:11):    Growth in anaerobic bottle: Gram Negative Rods  Final Report (07-15-19 @ 10:38):    Growth in anaerobic bottle: Escherichia coli    See previous culture [id number]    Culture - Blood (collected 07-12-19 @ 18:31)  Source: .Blood Blood-Peripheral  Gram Stain (07-13-19 @ 16:32):    Growth in aerobic and anaerobic bottles: Gram Negative Rods  Final Report (07-15-19 @ 08:47):    Growth in aerobic and anaerobic bottles: Escherichia coli    "Due to technical problems, Proteus sp. will Not be reported as part of    the BCID panel until further notice"    ***Blood Panel PCR results on this specimen are available    approximately 3 hours after the Gram stain result.***    Gram stain, PCR, and/or culture results may not always    correspond due to difference in methodologies.    ************************************************************    This PCR assay was performed using [employer].    The following targets are tested for: Enterococcus,    vancomycin resistant enterococci, Listeria monocytogenes,    coagulase negative staphylococci, S. aureus,    methicillin resistant S. aureus, Streptococcus agalactiae    (Group B), S. pneumoniae, S.pyogenes (Group A),    Acinetobacter baumannii, Enterobacter cloacae, E. coli,    Klebsiella oxytoca, K. pneumoniae, Proteus sp.,    Serratia marcescens, Haemophilus influenzae,    Neisseria meningitidis, Pseudomonas aeruginosa, Candida    albicans, C. glabrata, C krusei, C parapsilosis,    C. tropicalis and the KPC resistance gene.  Organism: Blood Culture PCR  Escherichia coli (07-15-19 @ 08:47)  Organism: Escherichia coli (07-15-19 @ 08:47)      -  Amikacin: S <=8      -  Ampicillin: S 4 These ampicillin results predict results for amoxicillin      -  Ampicillin/Sulbactam: S <=4/2 Enterobacter, Citrobacter, and Serratia may develop resistance during prolonged therapy (3-4 days)      -  Aztreonam: S <=4      -  Cefazolin: S <=2 Enterobacter, Citrobacter, and Serratia may develop resistance during prolonged therapy (3-4 days)      -  Cefepime: S <=2      -  Cefoxitin: S <=4      -  Ceftriaxone: S <=1 Enterobacter, Citrobacter, and Serratia may develop resistance during prolonged therapy      -  Ciprofloxacin: S <=0.5      -  Ertapenem: S <=0.5      -  Gentamicin: S <=1      -  Imipenem: S <=1      -  Levofloxacin: S <=1      -  Meropenem: S <=1      -  Piperacillin/Tazobactam: S <=8      -  Tobramycin: S <=2      -  Trimethoprim/Sulfamethoxazole: S <=0.5/9.5      Method Type: BRIANA  Organism: Blood Culture PCR (07-15-19 @ 08:47)      -  Escherichia coli: Detec      Method Type: PCR        Lactate, Blood: 1.7 mmol/L (07-12-19 @ 18:31)      INFECTIOUS DISEASES TESTING      RADIOLOGY & ADDITIONAL TESTS:  I have personally reviewed the last Chest xray  CXR      CT      CARDIOLOGY TESTING  12 Lead ECG:   Ventricular Rate 88 BPM    Atrial Rate 88 BPM    P-R Interval 162 ms    QRS Duration 100 ms    Q-T Interval 374 ms    QTC Calculation(Bezet) 452 ms    P Axis 25 degrees    R Axis -26 degrees    T Axis -15 degrees    Diagnosis Line Normal sinus rhythm  Voltage criteria for left ventricular hypertrophy  Nonspecific ST and T wave abnormality  Abnormal ECG    Confirmed by Tonny Edwards ([id number]) on 7/12/2019 8:16:28 PM (07-12-19 @ 18:52)      MEDICATIONS  amLODIPine   Tablet 5  ATENolol  Tablet 50  cefTRIAXone   IVPB 2000  chlorhexidine 4% Liquid 1  enoxaparin Injectable 40  folic acid 1  hydrALAZINE Injectable 25  lactated ringers. 1000  metroNIDAZOLE  IVPB 500  oxybutynin 5  pantoprazole    Tablet 40      ANTIBIOTICS:  cefTRIAXone   IVPB 2000 milliGRAM(s) IV Intermittent every 24 hours  metroNIDAZOLE  IVPB 500 milliGRAM(s) IV Intermittent every 8 hours

## 2019-07-16 NOTE — DIETITIAN INITIAL EVALUATION ADULT. - FEEDING SKILL
Pt reports adequate appetite and PO intake at baseline. Notes decreased intake x1 day PTA d/t acute symptoms but since has resolved and appetite has returned, awaiting diet advance. NKFA. Denies use of nutrition supplements./independent

## 2019-07-16 NOTE — PROGRESS NOTE ADULT - ASSESSMENT
· Assessment		  ASSESSMENT  78 F PMHx of asthma, DLD, HTN, RA on Arava, with known low WBC counts presents to ED for evaluation of fever/chills x 1 day associated with generalized malaise and weakness     IMPRESSION  #Ecoli bacteremia 2/2 Cholangitis with CBD stone    7/16 s/p ERCP with sphincterotomy, and stone extraction    Severe Sepsis on admission T>101F, Pulse>90    Pt has an acute illness which poses threat to bodily function     presently asymptomatic    UA neg    CTAP Mildly dilated CBD to 9 mm secondary to apparent distal CBD choledocholithiasis measuring 1 cm    RUQ 1.  Dilated common bile duct measuring 11 mm and mildly dilated pancreatic duct measuring 4 mm. Further evaluation with a contrast enhanced pancreas protocol MRI/MRCP is recommended. Gallbladder sludge without cholelithiasis or sonographic evidence for acute cholecystitis.  #Transaminitis  #Obesity BMI (kg/m2): 31.9 (07-14-19 @ 12:59)  #PCN rash, Sulfa Rash    RECOMMENDATIONS  - Ceftriaxone 2 gm iv q24h ( i am aware of PCN allergies )  - Flagyl 500mg q8h IV  - on d/c cipro 500mg BID and flagyl 500mg BID x 7 days from stone extraction (end 7/22)  - GI ERCP today

## 2019-07-17 PROCEDURE — 99232 SBSQ HOSP IP/OBS MODERATE 35: CPT

## 2019-07-17 RX ADMIN — Medication 1 MILLIGRAM(S): at 11:13

## 2019-07-17 RX ADMIN — CEFTRIAXONE 100 MILLIGRAM(S): 500 INJECTION, POWDER, FOR SOLUTION INTRAMUSCULAR; INTRAVENOUS at 15:28

## 2019-07-17 RX ADMIN — ATENOLOL 50 MILLIGRAM(S): 25 TABLET ORAL at 06:57

## 2019-07-17 RX ADMIN — Medication 5 MILLIGRAM(S): at 05:43

## 2019-07-17 RX ADMIN — ENOXAPARIN SODIUM 40 MILLIGRAM(S): 100 INJECTION SUBCUTANEOUS at 05:44

## 2019-07-17 RX ADMIN — Medication 100 MILLIGRAM(S): at 05:47

## 2019-07-17 RX ADMIN — Medication 100 MILLIGRAM(S): at 15:30

## 2019-07-17 RX ADMIN — PANTOPRAZOLE SODIUM 40 MILLIGRAM(S): 20 TABLET, DELAYED RELEASE ORAL at 05:43

## 2019-07-17 RX ADMIN — Medication 5 MILLIGRAM(S): at 18:19

## 2019-07-17 RX ADMIN — AMLODIPINE BESYLATE 5 MILLIGRAM(S): 2.5 TABLET ORAL at 05:43

## 2019-07-17 RX ADMIN — Medication 100 MILLIGRAM(S): at 21:51

## 2019-07-17 NOTE — PROGRESS NOTE ADULT - SUBJECTIVE AND OBJECTIVE BOX
MADISON GLORIA  78y, Female  Allergy: penicillins (Rash)  sulfa drugs (Rash)      CHIEF COMPLAINT: Fever and chills (16 Jul 2019 16:12)      INTERVAL EVENTS/HPI  - No acute events overnight  - T(F): , Max: 97.9 (07-16-19 @ 19:40)  - Denies any worsening symptoms  - Tolerating medication  -     ROS  General: Denies rigors, nightsweats  HEENT: Denies headache, rhinorrhea, sore throat, eye pain  CV: Denies CP, palpitations  PULM: Denies SOB, wheezing  GI: Denies abdominal pain, hematochezia/melena  : Denies dysuria, hematuria  MSK: Denies arthralgias, myalgias  SKIN: Denies rash, lesions  NEURO: Denies paresthesias, weakness  PSYCH: Denies depression, anxiety    VITALS:  T(F): 97.6, Max: 97.9 (07-16-19 @ 19:40)  HR: 68  BP: 151/67  RR: 28Vital Signs Last 24 Hrs  T(C): 36.4 (17 Jul 2019 05:12), Max: 36.9 (16 Jul 2019 10:52)  T(F): 97.6 (17 Jul 2019 05:12), Max: 97.9 (16 Jul 2019 19:40)  HR: 68 (17 Jul 2019 05:12) (61 - 95)  BP: 151/67 (17 Jul 2019 05:12) (129/59 - 186/87)  BP(mean): --  RR: 28 (17 Jul 2019 05:12) (12 - 28)  SpO2: 99% (16 Jul 2019 12:15) (99% - 99%)    PHYSICAL EXAM:  Gen: NAD, resting in bed  HEENT: Normocephalic, atraumatic  Neck: supple, no lymphadenopathy  CV: Regular rate & regular rhythm  Lungs: decreased BS at bases, no fremitus  Abdomen: Soft, BS present  Ext: Warm, well perfused  Neuro: non focal, awake  Skin: no rash, no erythema  Lines: no phlebitis      FH: Non-contributory  Social Hx: Non-contributory    TESTS & MEASUREMENTS:                        9.6    3.80  )-----------( 140      ( 16 Jul 2019 06:55 )             30.5     07-16    143  |  106  |  15  ----------------------------<  89  3.9   |  22  |  0.9    Ca    8.2<L>      16 Jul 2019 06:55    TPro  6.0  /  Alb  2.6<L>  /  TBili  1.1  /  DBili  0.6<H>  /  AST  40  /  ALT  83<H>  /  AlkPhos  350<H>  07-16      LIVER FUNCTIONS - ( 16 Jul 2019 06:55 )  Alb: 2.6 g/dL / Pro: 6.0 g/dL / ALK PHOS: 350 U/L / ALT: 83 U/L / AST: 40 U/L / GGT: x               Culture - Blood (collected 07-15-19 @ 19:00)  Source: .Blood None  Preliminary Report (07-17-19 @ 03:01):    No growth to date.    Culture - Blood (collected 07-12-19 @ 18:31)  Source: .Blood Blood-Peripheral  Gram Stain (07-13-19 @ 13:11):    Growth in anaerobic bottle: Gram Negative Rods  Final Report (07-15-19 @ 10:38):    Growth in anaerobic bottle: Escherichia coli    See previous culture [id number]    Culture - Blood (collected 07-12-19 @ 18:31)  Source: .Blood Blood-Peripheral  Gram Stain (07-13-19 @ 16:32):    Growth in aerobic and anaerobic bottles: Gram Negative Rods  Final Report (07-15-19 @ 08:47):    Growth in aerobic and anaerobic bottles: Escherichia coli    "Due to technical problems, Proteus sp. will Not be reported as part of    the BCID panel until further notice"    ***Blood Panel PCR results on this specimen are available    approximately 3 hours after the Gram stain result.***    Gram stain, PCR, and/or culture results may not always    correspond due to difference in methodologies.    ************************************************************    This PCR assay was performed using [employer].    The following targets are tested for: Enterococcus,    vancomycin resistant enterococci, Listeria monocytogenes,    coagulase negative staphylococci, S. aureus,    methicillin resistant S. aureus, Streptococcus agalactiae    (Group B), S. pneumoniae, S.pyogenes (Group A),    Acinetobacter baumannii, Enterobacter cloacae, E. coli,    Klebsiella oxytoca, K. pneumoniae, Proteus sp.,    Serratia marcescens, Haemophilus influenzae,    Neisseria meningitidis, Pseudomonas aeruginosa, Candida    albicans, C. glabrata, C krusei, C parapsilosis,    C. tropicalis and the KPC resistance gene.  Organism: Blood Culture PCR  Escherichia coli (07-15-19 @ 08:47)  Organism: Escherichia coli (07-15-19 @ 08:47)      -  Amikacin: S <=8      -  Ampicillin: S 4 These ampicillin results predict results for amoxicillin      -  Ampicillin/Sulbactam: S <=4/2 Enterobacter, Citrobacter, and Serratia may develop resistance during prolonged therapy (3-4 days)      -  Aztreonam: S <=4      -  Cefazolin: S <=2 Enterobacter, Citrobacter, and Serratia may develop resistance during prolonged therapy (3-4 days)      -  Cefepime: S <=2      -  Cefoxitin: S <=4      -  Ceftriaxone: S <=1 Enterobacter, Citrobacter, and Serratia may develop resistance during prolonged therapy      -  Ciprofloxacin: S <=0.5      -  Ertapenem: S <=0.5      -  Gentamicin: S <=1      -  Imipenem: S <=1      -  Levofloxacin: S <=1      -  Meropenem: S <=1      -  Piperacillin/Tazobactam: S <=8      -  Tobramycin: S <=2      -  Trimethoprim/Sulfamethoxazole: S <=0.5/9.5      Method Type: BRIANA  Organism: Blood Culture PCR (07-15-19 @ 08:47)      -  Escherichia coli: Detec      Method Type: PCR        Lactate, Blood: 1.7 mmol/L (07-12-19 @ 18:31)      INFECTIOUS DISEASES TESTING      RADIOLOGY & ADDITIONAL TESTS:  I have personally reviewed the last Chest xray  CXR      CT      CARDIOLOGY TESTING  12 Lead ECG:   Ventricular Rate 88 BPM    Atrial Rate 88 BPM    P-R Interval 162 ms    QRS Duration 100 ms    Q-T Interval 374 ms    QTC Calculation(Bezet) 452 ms    P Axis 25 degrees    R Axis -26 degrees    T Axis -15 degrees    Diagnosis Line Normal sinus rhythm  Voltage criteria for left ventricular hypertrophy  Nonspecific ST and T wave abnormality  Abnormal ECG    Confirmed by Kandov, Tonny ([id number]) on 7/12/2019 8:16:28 PM (07-12-19 @ 18:52)      MEDICATIONS  amLODIPine   Tablet 5  ATENolol  Tablet 50  cefTRIAXone   IVPB 2000  chlorhexidine 4% Liquid 1  enoxaparin Injectable 40  folic acid 1  metroNIDAZOLE  IVPB 500  oxybutynin 5  pantoprazole    Tablet 40      ANTIBIOTICS:  cefTRIAXone   IVPB 2000 milliGRAM(s) IV Intermittent every 24 hours  metroNIDAZOLE  IVPB 500 milliGRAM(s) IV Intermittent every 8 hours

## 2019-07-17 NOTE — PROGRESS NOTE ADULT - SUBJECTIVE AND OBJECTIVE BOX
Hepatology/GI follow up note: Pt seen and examined at bedside.     For questions and inquiries please page [phone].  For urgent matters or after 5pm and on weekends please page the fellow on call through the GI paging system.    78y Female seen for: Cholangitis    Subjective/Interval events:  Post ERCP with sphincterotomy and large CBD stone extraction  No bleeding over night  tolerating diet    Review of system  General:  (-) weight loss, (-) fevers  Eyes:  (-) visual changes  CV:  (-) chest pain  Resp: (-) SOB, (-) wheezing  GI: (-) abdominal pain,  (-) nausea, (-) vomiting, (-) dysphagia, (-) diarrhea, (-) constipation, (-) rectal bleeding, (-) melena, (-) hematemesis.  Neuro: (-) confusion, (-) weakness  Psych:  (-) Hallucinations  Heme:  (-) easy bruisability    Past medical/surgical Hx:  PAST MEDICAL & SURGICAL HISTORY:  Asthma  Stress incontinence  Hypertension  Hyperlipemia  Rheumatoid arthritis  S/P total knee replacement, right    Home Medications:  Last Order Reconciliation Date: 07-13-19 @ 01:28 (Admission Reconciliation)  amLODIPine 5 mg oral tablet: 1 tab(s) orally once a day  Arava 100 mg oral tablet:   atenolol 50 mg oral tablet: 1 tab(s) orally once a day  folic acid 1 mg oral tablet: 1 tab(s) orally once a day  oxybutynin 15 mg/24 hr oral tablet, extended release: 1 tab(s) orally once a day  pantoprazole 40 mg oral delayed release tablet: 1 tab(s) orally once a day  simvastatin 40 mg oral tablet: 1 tab(s) orally once a day (at bedtime)      Allergies:  penicillins (Rash)  sulfa drugs (Rash)      Current Medications:   amLODIPine   Tablet 5 milliGRAM(s) Oral daily  ATENolol  Tablet 50 milliGRAM(s) Oral daily  cefTRIAXone   IVPB 2000 milliGRAM(s) IV Intermittent every 24 hours  chlorhexidine 4% Liquid 1 Application(s) Topical <User Schedule>  enoxaparin Injectable 40 milliGRAM(s) SubCutaneous every 24 hours  folic acid 1 milliGRAM(s) Oral daily  metroNIDAZOLE  IVPB 500 milliGRAM(s) IV Intermittent every 8 hours  oxybutynin 5 milliGRAM(s) Oral every 12 hours  pantoprazole    Tablet 40 milliGRAM(s) Oral before breakfast        Physical exam:  T(C): 36.4 (07-17-19 @ 05:12), Max: 36.9 (07-16-19 @ 10:52)  HR: 68 (07-17-19 @ 05:12) (61 - 95)  BP: 151/67 (07-17-19 @ 05:12) (129/59 - 186/87)  RR: 28 (07-17-19 @ 05:12) (12 - 28)  SpO2: 99% (07-16-19 @ 12:15) (99% - 99%)    GENERAL: NAD  HEAD:  Atraumatic, Normocephalic  EYES: Sclera:NL  NECK: Supple, no JVD or thyromegaly  CHEST/LUNG: Good bilateral air entry  HEART: normal S1, S2. Regular  ABDOMEN: (-) distended, (-) tender, (-) rebound, (+) BS, (-)HSM  EXTREMITIES: (-) edema  NEUROLOGY: (-) asterixis  SKIN: (-) jaundice      Data:                        9.6    3.80  )-----------( 140      ( 16 Jul 2019 06:55 )             30.5     MCV   RDW   HGB trend:  9.6  07-16-19 @ 06:55  9.4  07-15-19 @ 08:28        WBC trend:  3.80  07-16-19 @ 06:55  3.86  07-15-19 @ 08:28    07-16    143  |  106  |  15  ----------------------------<  89  3.9   |  22  |  0.9    Ca    8.2<L>      16 Jul 2019 06:55    TPro  6.0  /  Alb  2.6<L>  /  TBili  1.1  /  DBili  0.6<H>  /  AST  40  /  ALT  83<H>  /  AlkPhos  350<H>  07-16    Liver panel trend:  TBili 1.1   /   AST 40   /   ALT 83   /   AlkP 350   /   Tptn 6.0   /   Alb 2.6    /   DBili 0.6      07-16  TBili 1.9   /   AST 38   /      /   AlkP 364   /   Tptn 5.5   /   Alb 2.7    /   DBili 1.4      07-15  TBili 2.5   /   AST 88   /      /   AlkP 389   /   Tptn 5.6   /   Alb 2.7    /   DBili 2.0      07-14  TBili 3.7   /      /      /   AlkP 451   /   Tptn 5.8   /   Alb 3.1    /   DBili 3.3      07-13  TBili 2.5   /      /      /   AlkP 566   /   Tptn 6.3   /   Alb 3.4    /   DBili --      07-12        PT/INR - ( 15 Jul 2019 19:00 )   PT: 12.80 sec;   INR: 1.11 ratio         PTT - ( 15 Jul 2019 19:00 )  PTT:33.2 sec    Culture - Blood (collected 15 Jul 2019 19:00)  Source: .Blood None  Preliminary Report (17 Jul 2019 03:01):    No growth to date. Hepatology/GI follow up note: Pt seen and examined at bedside.     For questions and inquiries please page [phone].  For urgent matters or after 5pm and on weekends please page the fellow on call through the GI paging system.    78y Female seen for: Cholangitis    Subjective/Interval events:  Post ERCP with sphincterotomy and large CBD stone extraction  No bleeding over night  tolerating diet  2 loose BMs    Review of system  General:  (-) weight loss, (-) fevers  Eyes:  (-) visual changes  CV:  (-) chest pain  Resp: (-) SOB, (-) wheezing  GI: (-) abdominal pain,  (-) nausea, (-) vomiting, (-) dysphagia, (+) diarrhea, (-) constipation, (-) rectal bleeding, (-) melena, (-) hematemesis.  Neuro: (-) confusion, (-) weakness  Psych:  (-) Hallucinations  Heme:  (-) easy bruisability    Past medical/surgical Hx:  PAST MEDICAL & SURGICAL HISTORY:  Asthma  Stress incontinence  Hypertension  Hyperlipemia  Rheumatoid arthritis  S/P total knee replacement, right    Home Medications:  Last Order Reconciliation Date: 07-13-19 @ 01:28 (Admission Reconciliation)  amLODIPine 5 mg oral tablet: 1 tab(s) orally once a day  Arava 100 mg oral tablet:   atenolol 50 mg oral tablet: 1 tab(s) orally once a day  folic acid 1 mg oral tablet: 1 tab(s) orally once a day  oxybutynin 15 mg/24 hr oral tablet, extended release: 1 tab(s) orally once a day  pantoprazole 40 mg oral delayed release tablet: 1 tab(s) orally once a day  simvastatin 40 mg oral tablet: 1 tab(s) orally once a day (at bedtime)      Allergies:  penicillins (Rash)  sulfa drugs (Rash)      Current Medications:   amLODIPine   Tablet 5 milliGRAM(s) Oral daily  ATENolol  Tablet 50 milliGRAM(s) Oral daily  cefTRIAXone   IVPB 2000 milliGRAM(s) IV Intermittent every 24 hours  chlorhexidine 4% Liquid 1 Application(s) Topical <User Schedule>  enoxaparin Injectable 40 milliGRAM(s) SubCutaneous every 24 hours  folic acid 1 milliGRAM(s) Oral daily  metroNIDAZOLE  IVPB 500 milliGRAM(s) IV Intermittent every 8 hours  oxybutynin 5 milliGRAM(s) Oral every 12 hours  pantoprazole    Tablet 40 milliGRAM(s) Oral before breakfast        Physical exam:  T(C): 36.4 (07-17-19 @ 05:12), Max: 36.9 (07-16-19 @ 10:52)  HR: 68 (07-17-19 @ 05:12) (61 - 95)  BP: 151/67 (07-17-19 @ 05:12) (129/59 - 186/87)  RR: 28 (07-17-19 @ 05:12) (12 - 28)  SpO2: 99% (07-16-19 @ 12:15) (99% - 99%)    GENERAL: NAD  HEAD:  Atraumatic, Normocephalic  EYES: Sclera:NL  NECK: Supple, no JVD or thyromegaly  CHEST/LUNG: Good bilateral air entry  HEART: normal S1, S2. Regular  ABDOMEN: (-) distended, (-) tender, (-) rebound, (+) BS, (-)HSM  EXTREMITIES: (-) edema  NEUROLOGY: (-) asterixis  SKIN: (-) jaundice      Data:                        9.6    3.80  )-----------( 140      ( 16 Jul 2019 06:55 )             30.5     MCV   RDW   HGB trend:  9.6  07-16-19 @ 06:55  9.4  07-15-19 @ 08:28        WBC trend:  3.80  07-16-19 @ 06:55  3.86  07-15-19 @ 08:28    07-16    143  |  106  |  15  ----------------------------<  89  3.9   |  22  |  0.9    Ca    8.2<L>      16 Jul 2019 06:55    TPro  6.0  /  Alb  2.6<L>  /  TBili  1.1  /  DBili  0.6<H>  /  AST  40  /  ALT  83<H>  /  AlkPhos  350<H>  07-16    Liver panel trend:  TBili 1.1   /   AST 40   /   ALT 83   /   AlkP 350   /   Tptn 6.0   /   Alb 2.6    /   DBili 0.6      07-16  TBili 1.9   /   AST 38   /      /   AlkP 364   /   Tptn 5.5   /   Alb 2.7    /   DBili 1.4      07-15  TBili 2.5   /   AST 88   /      /   AlkP 389   /   Tptn 5.6   /   Alb 2.7    /   DBili 2.0      07-14  TBili 3.7   /      /      /   AlkP 451   /   Tptn 5.8   /   Alb 3.1    /   DBili 3.3      07-13  TBili 2.5   /      /      /   AlkP 566   /   Tptn 6.3   /   Alb 3.4    /   DBili --      07-12        PT/INR - ( 15 Jul 2019 19:00 )   PT: 12.80 sec;   INR: 1.11 ratio         PTT - ( 15 Jul 2019 19:00 )  PTT:33.2 sec    Culture - Blood (collected 15 Jul 2019 19:00)  Source: .Blood None  Preliminary Report (17 Jul 2019 03:01):    No growth to date. Hepatology/GI follow up note: Pt seen and examined at bedside.     For questions and inquiries please page [phone].  For urgent matters or after 5pm and on weekends please page the fellow on call through the GI paging system.    78y Female seen for: Cholangitis    Subjective/Interval events:  Post ERCP with sphincterotomy and large CBD stone extraction  No bleeding over night  tolerating diet (clear liquids)  2 loose BMs    Review of system  General:  (-) weight loss, (-) fevers  Eyes:  (-) visual changes  CV:  (-) chest pain  Resp: (-) SOB, (-) wheezing  GI: (-) abdominal pain,  (-) nausea, (-) vomiting, (-) dysphagia, (+) diarrhea, (-) constipation, (-) rectal bleeding, (-) melena, (-) hematemesis.  Neuro: (-) confusion, (-) weakness  Psych:  (-) Hallucinations  Heme:  (-) easy bruisability    Past medical/surgical Hx:  PAST MEDICAL & SURGICAL HISTORY:  Asthma  Stress incontinence  Hypertension  Hyperlipemia  Rheumatoid arthritis  S/P total knee replacement, right    Home Medications:  Last Order Reconciliation Date: 07-13-19 @ 01:28 (Admission Reconciliation)  amLODIPine 5 mg oral tablet: 1 tab(s) orally once a day  Arava 100 mg oral tablet:   atenolol 50 mg oral tablet: 1 tab(s) orally once a day  folic acid 1 mg oral tablet: 1 tab(s) orally once a day  oxybutynin 15 mg/24 hr oral tablet, extended release: 1 tab(s) orally once a day  pantoprazole 40 mg oral delayed release tablet: 1 tab(s) orally once a day  simvastatin 40 mg oral tablet: 1 tab(s) orally once a day (at bedtime)      Allergies:  penicillins (Rash)  sulfa drugs (Rash)      Current Medications:   amLODIPine   Tablet 5 milliGRAM(s) Oral daily  ATENolol  Tablet 50 milliGRAM(s) Oral daily  cefTRIAXone   IVPB 2000 milliGRAM(s) IV Intermittent every 24 hours  chlorhexidine 4% Liquid 1 Application(s) Topical <User Schedule>  enoxaparin Injectable 40 milliGRAM(s) SubCutaneous every 24 hours  folic acid 1 milliGRAM(s) Oral daily  metroNIDAZOLE  IVPB 500 milliGRAM(s) IV Intermittent every 8 hours  oxybutynin 5 milliGRAM(s) Oral every 12 hours  pantoprazole    Tablet 40 milliGRAM(s) Oral before breakfast        Physical exam:  T(C): 36.4 (07-17-19 @ 05:12), Max: 36.9 (07-16-19 @ 10:52)  HR: 68 (07-17-19 @ 05:12) (61 - 95)  BP: 151/67 (07-17-19 @ 05:12) (129/59 - 186/87)  RR: 28 (07-17-19 @ 05:12) (12 - 28)  SpO2: 99% (07-16-19 @ 12:15) (99% - 99%)    GENERAL: NAD  HEAD:  Atraumatic, Normocephalic  EYES: Sclera:NL  NECK: Supple, no JVD or thyromegaly  CHEST/LUNG: Good bilateral air entry  HEART: normal S1, S2. Regular  ABDOMEN: (-) distended, (-) tender, (-) rebound, (+) BS, (-)HSM  EXTREMITIES: (-) edema  NEUROLOGY: (-) asterixis  SKIN: (-) jaundice      Data:                        9.6    3.80  )-----------( 140      ( 16 Jul 2019 06:55 )             30.5     MCV   RDW   HGB trend:  9.6  07-16-19 @ 06:55  9.4  07-15-19 @ 08:28        WBC trend:  3.80  07-16-19 @ 06:55  3.86  07-15-19 @ 08:28    07-16    143  |  106  |  15  ----------------------------<  89  3.9   |  22  |  0.9    Ca    8.2<L>      16 Jul 2019 06:55    TPro  6.0  /  Alb  2.6<L>  /  TBili  1.1  /  DBili  0.6<H>  /  AST  40  /  ALT  83<H>  /  AlkPhos  350<H>  07-16    Liver panel trend:  TBili 1.1   /   AST 40   /   ALT 83   /   AlkP 350   /   Tptn 6.0   /   Alb 2.6    /   DBili 0.6      07-16  TBili 1.9   /   AST 38   /      /   AlkP 364   /   Tptn 5.5   /   Alb 2.7    /   DBili 1.4      07-15  TBili 2.5   /   AST 88   /      /   AlkP 389   /   Tptn 5.6   /   Alb 2.7    /   DBili 2.0      07-14  TBili 3.7   /      /      /   AlkP 451   /   Tptn 5.8   /   Alb 3.1    /   DBili 3.3      07-13  TBili 2.5   /      /      /   AlkP 566   /   Tptn 6.3   /   Alb 3.4    /   DBili --      07-12        PT/INR - ( 15 Jul 2019 19:00 )   PT: 12.80 sec;   INR: 1.11 ratio         PTT - ( 15 Jul 2019 19:00 )  PTT:33.2 sec    Culture - Blood (collected 15 Jul 2019 19:00)  Source: .Blood None  Preliminary Report (17 Jul 2019 03:01):    No growth to date.

## 2019-07-17 NOTE — PHARMACOTHERAPY INTERVENTION NOTE - COMMENTS
oxybutinin  10mg daily. I recommended on profile review to change to 10mg xl daily or 5mg q12h. dr haji changed to 5mg q12h

## 2019-07-17 NOTE — PROGRESS NOTE ADULT - ASSESSMENT
ASSESSMENT  78 F PMHx of asthma, DLD, HTN, RA on Arava, with known low WBC counts presents to ED for evaluation of fever/chills x 1 day associated with generalized malaise and weakness     IMPRESSION  #Ecoli bacteremia 2/2 Cholangitis with CBD stone +7/12, 7/15 BCX NG    7/16 s/p ERCP with sphincterotomy, and stone extraction    Severe Sepsis on admission T>101F, Pulse>90  #Obesity BMI (kg/m2): 31.9 (07-14-19 @ 12:59)  #PCN rash, Sulfa Rash    RECOMMENDATIONS  - Ceftriaxone 2 gm iv q24h  - Flagyl 500mg q8h IV  - on d/c cipro 500mg BID and flagyl 500mg BID x 7 days from stone extraction (end 7/22)  - GI following

## 2019-07-17 NOTE — PROGRESS NOTE ADULT - SUBJECTIVE AND OBJECTIVE BOX
Patient was seen and examined. Spoke with RN. Chart reviewed.  on po clears as per gi    No events overnight.  Vital Signs Last 24 Hrs  T(F): 97.7 (17 Jul 2019 13:47), Max: 97.9 (16 Jul 2019 19:40)  HR: 69 (17 Jul 2019 13:47) (68 - 71)  BP: 127/60 (17 Jul 2019 13:47) (127/60 - 166/70)  SpO2: 98% (17 Jul 2019 08:01) (98% - 98%)  MEDICATIONS  (STANDING):  amLODIPine   Tablet 5 milliGRAM(s) Oral daily  ATENolol  Tablet 50 milliGRAM(s) Oral daily  cefTRIAXone   IVPB 2000 milliGRAM(s) IV Intermittent every 24 hours  chlorhexidine 4% Liquid 1 Application(s) Topical <User Schedule>  enoxaparin Injectable 40 milliGRAM(s) SubCutaneous every 24 hours  folic acid 1 milliGRAM(s) Oral daily  metroNIDAZOLE  IVPB 500 milliGRAM(s) IV Intermittent every 8 hours  oxybutynin 5 milliGRAM(s) Oral every 12 hours  pantoprazole    Tablet 40 milliGRAM(s) Oral before breakfast    MEDICATIONS  (PRN):    Labs:                        9.6    3.80  )-----------( 140      ( 16 Jul 2019 06:55 )             30.5     16 Jul 2019 06:55    143    |  106    |  15     ----------------------------<  89     3.9     |  22     |  0.9      Ca    8.2        16 Jul 2019 06:55    TPro  6.0    /  Alb  2.6    /  TBili  1.1    /  DBili  0.6    /  AST  40     /  ALT  83     /  AlkPhos  350    16 Jul 2019 06:55    PT/INR - ( 15 Jul 2019 19:00 )   PT: 12.80 sec;   INR: 1.11 ratio         PTT - ( 15 Jul 2019 19:00 )  PTT:33.2 sec      Culture - Blood (collected 15 Jul 2019 19:00)  Source: .Blood None  Preliminary Report (17 Jul 2019 03:01):    No growth to date.        Radiology:    General: comfortable, NAD  Neurology: A&Ox3, nonfocal  Head:  Normocephalic, atraumatic  ENT:  Mucosa moist, no ulcerations  Neck:  Supple, no JVD,   Skin: no breakdowns (as per RN)  Resp: CTA B/L  CV: RRR, S1S2,   GI: Soft, NT, bowel sounds  MS: No edema, + peripheral pulses, FROM all 4 extremity

## 2019-07-17 NOTE — PROGRESS NOTE ADULT - ASSESSMENT
79 yo F Hx of HTN, RA, asthma, presented for fever and chills admitted for choledocholithiasis and cholangitis.  Post ERCP + sphincterotomy and stone extraction    1)Choledocholithiasis/Cholangitis  2) Pancreas divisum    rec:  - Continue PO for 3 days then DC  - May DC home 79 yo F Hx of HTN, RA, asthma, presented for fever and chills admitted for choledocholithiasis and cholangitis.  Post ERCP + sphincterotomy and stone extraction    1)Choledocholithiasis/Cholangitis  2) Pancreas divisum    rec:  - Continue ABx for 7 days then DC  - Advance diet  - Repeat CBC and CMP  - Monitor for pain 77 yo F Hx of HTN, RA, asthma, presented for fever and chills admitted for choledocholithiasis and cholangitis.  Post ERCP + sphincterotomy and stone extraction    1)Choledocholithiasis/Cholangitis  2) Pancreas divisum    rec:  - Continue ABx for 7 days then DC  - Advance diet to lactose free, low fat  - If diarrhea persists, check cdiff PCR  - Repeat CBC and CMP  - Monitor for pain

## 2019-07-18 ENCOUNTER — TRANSCRIPTION ENCOUNTER (OUTPATIENT)
Age: 79
End: 2019-07-18

## 2019-07-18 VITALS
HEART RATE: 66 BPM | RESPIRATION RATE: 18 BRPM | TEMPERATURE: 98 F | DIASTOLIC BLOOD PRESSURE: 60 MMHG | SYSTOLIC BLOOD PRESSURE: 132 MMHG

## 2019-07-18 PROCEDURE — 93306 TTE W/DOPPLER COMPLETE: CPT | Mod: 26

## 2019-07-18 PROCEDURE — 99231 SBSQ HOSP IP/OBS SF/LOW 25: CPT

## 2019-07-18 RX ORDER — METRONIDAZOLE 500 MG
1 TABLET ORAL
Qty: 12 | Refills: 0
Start: 2019-07-18 | End: 2019-07-21

## 2019-07-18 RX ADMIN — CEFTRIAXONE 100 MILLIGRAM(S): 500 INJECTION, POWDER, FOR SOLUTION INTRAMUSCULAR; INTRAVENOUS at 15:28

## 2019-07-18 RX ADMIN — Medication 100 MILLIGRAM(S): at 06:25

## 2019-07-18 RX ADMIN — PANTOPRAZOLE SODIUM 40 MILLIGRAM(S): 20 TABLET, DELAYED RELEASE ORAL at 06:25

## 2019-07-18 RX ADMIN — Medication 5 MILLIGRAM(S): at 06:25

## 2019-07-18 RX ADMIN — AMLODIPINE BESYLATE 5 MILLIGRAM(S): 2.5 TABLET ORAL at 06:25

## 2019-07-18 RX ADMIN — Medication 100 MILLIGRAM(S): at 15:33

## 2019-07-18 RX ADMIN — Medication 1 MILLIGRAM(S): at 11:37

## 2019-07-18 RX ADMIN — ENOXAPARIN SODIUM 40 MILLIGRAM(S): 100 INJECTION SUBCUTANEOUS at 06:25

## 2019-07-18 RX ADMIN — ATENOLOL 50 MILLIGRAM(S): 25 TABLET ORAL at 06:25

## 2019-07-18 RX ADMIN — Medication 5 MILLIGRAM(S): at 17:49

## 2019-07-18 NOTE — DISCHARGE NOTE NURSING/CASE MANAGEMENT/SOCIAL WORK - NSDCDPATPORTLINK_GEN_ALL_CORE
You can access the "[employer] Patient Portal, offered by [facility], by registering with the following website: [email or web address]

## 2019-07-18 NOTE — PROGRESS NOTE ADULT - PROVIDER SPECIALTY LIST ADULT
Gastroenterology
Infectious Disease
Internal Medicine
Infectious Disease
Infectious Disease
Internal Medicine

## 2019-07-18 NOTE — DISCHARGE NOTE PROVIDER - CARE PROVIDER_API CALL
Kareem Quintana)  Gastroenterology; Internal Medicine  [address] [place] [address]  Phone: [phone]  Fax: [phone]  Follow Up Time: 1 week

## 2019-07-18 NOTE — PROGRESS NOTE ADULT - ASSESSMENT
severe sepsis  choledocholithiasis  cholangitis  bacteremia  RA on arava  accel htn    advance po  continue iv abx today  id fu? po abx  change to po abx in am  fu bp today  anticipate dc in am on po abx as per gi

## 2019-07-18 NOTE — PROGRESS NOTE ADULT - SUBJECTIVE AND OBJECTIVE BOX
Patient was seen and examined. Spoke with RN. Chart reviewed.  joe po well,  id fu ? po abx  then dc home  d/w ho    No events overnight.  Vital Signs Last 24 Hrs  T(F): 98.1 (18 Jul 2019 14:31), Max: 98.1 (18 Jul 2019 14:31)  HR: 66 (18 Jul 2019 14:31) (66 - 75)  BP: 132/60 (18 Jul 2019 14:31) (132/60 - 148/68)  SpO2: 96% (18 Jul 2019 07:54) (96% - 96%)  MEDICATIONS  (STANDING):  amLODIPine   Tablet 5 milliGRAM(s) Oral daily  ATENolol  Tablet 50 milliGRAM(s) Oral daily  cefTRIAXone   IVPB 2000 milliGRAM(s) IV Intermittent every 24 hours  chlorhexidine 4% Liquid 1 Application(s) Topical <User Schedule>  enoxaparin Injectable 40 milliGRAM(s) SubCutaneous every 24 hours  folic acid 1 milliGRAM(s) Oral daily  metroNIDAZOLE  IVPB 500 milliGRAM(s) IV Intermittent every 8 hours  oxybutynin 5 milliGRAM(s) Oral every 12 hours  pantoprazole    Tablet 40 milliGRAM(s) Oral before breakfast    MEDICATIONS  (PRN):    Labs:                Culture - Blood (collected 15 Jul 2019 19:00)  Source: .Blood None  Preliminary Report (17 Jul 2019 03:01):    No growth to date.        Radiology:    General: comfortable, NAD  Neurology: A&Ox3, nonfocal  Head:  Normocephalic, atraumatic  ENT:  Mucosa moist, no ulcerations  Neck:  Supple, no JVD,   Skin: no breakdowns (as per RN)  Resp: CTA B/L  CV: RRR, S1S2,   GI: Soft, NT, bowel sounds  MS: No edema, + peripheral pulses, FROM all 4 extremity

## 2019-07-18 NOTE — PROGRESS NOTE ADULT - REASON FOR ADMISSION
Fever and chills

## 2019-07-18 NOTE — DISCHARGE NOTE PROVIDER - NSDCCPCAREPLAN_GEN_ALL_CORE_FT
PRINCIPAL DISCHARGE DIAGNOSIS  Diagnosis: Cholangitis  Assessment and Plan of Treatment: you had an infection in the gallbladder secondery to a stone. We had performed an ERCP where we went in with a tube and took the stone  out and opened the sphincter

## 2019-07-18 NOTE — PROGRESS NOTE ADULT - ASSESSMENT
Patient is a 77 y/o PMHx of asthma, DLD, HTN, RA that presented to ED for evaluation of fever/chills  day associated with generalized malaise and weakness. Patient found to have evidence of Cholangitis and had ERCP on 7/16 ( stone extraction and sphincterotomy). Patient feels well today and without pain. Currently no complaints.      Choledocholithiasis / E Coli bacteremia/ S/P successful ERCP with stone extraction and sphincterotomy   - If she is admitted would recommend CMP and CBC tomorrow  - Finish course of Antibiotic, would transition to PO based on Susceptibility as in EMR ( E. Coli)   - Will need follow up on discharge with Dr Richter [phone], non-urgent   - Recall GI if needed

## 2019-07-18 NOTE — PROGRESS NOTE ADULT - SUBJECTIVE AND OBJECTIVE BOX
Patient is a 79 y/o PMHx of asthma, DLD, HTN, RA that presented to ED for evaluation of fever/chills  day associated with generalized malaise and weakness. Patient found to have evidence of Cholangitis and had ERCP on 7/16 ( stone extraction and sphincterotomy). Patient feels well today and without pain. Currently no complaints.        Vital Signs:  T(F): 98.1  HR: 66   BP: 132/60   RR: 18   SpO2: 96%   PHYSICAL EXAM:  GENERAL: NAD, well-appearing  CHEST/LUNG: Clear to auscultation bilaterally  HEART: Regular rate and rhythm  ABDOMEN: Soft, Nontender, Nondistended  EXTREMITIES:  No clubbing, cyanosis, or edema

## 2019-07-18 NOTE — DISCHARGE NOTE PROVIDER - HOSPITAL COURSE
A 78 F patient with PMHx of asthma, DLD, HTN, RA on Arava with known low WBCs count presents to ED for Evaluation of fever and chills for 1 day and generalized mailaise . Pt states highest temp at home of 105. A LFT was performed as a part of her work up despite the lack of any of the abdominal symptoms and was found to have ALP of 599, AST and ALT of 388 and 266 respectively. A blood culture was performed and a diagnosis of Cholangitis was made. She was scheduled for ERCP and was started on IV antibiotics and echo for the heart. ERCP was performed successfully with a stone extraction and sphincterectomy. The patient has no complaints and the LFT are normalizing.         We are discharging her on PO Abx cipro and flagyl.

## 2019-07-19 RX ORDER — CIPROFLOXACIN LACTATE 400MG/40ML
1 VIAL (ML) INTRAVENOUS
Qty: 8 | Refills: 0
Start: 2019-07-19 | End: 2019-07-22

## 2019-07-21 LAB
CULTURE RESULTS: SIGNIFICANT CHANGE UP
SPECIMEN SOURCE: SIGNIFICANT CHANGE UP

## 2019-07-22 LAB
CULTURE RESULTS: SIGNIFICANT CHANGE UP
SPECIMEN SOURCE: SIGNIFICANT CHANGE UP

## 2019-07-25 DIAGNOSIS — Z96.651 PRESENCE OF RIGHT ARTIFICIAL KNEE JOINT: ICD-10-CM

## 2019-07-25 DIAGNOSIS — E78.5 HYPERLIPIDEMIA, UNSPECIFIED: ICD-10-CM

## 2019-07-25 DIAGNOSIS — Q45.3 OTHER CONGENITAL MALFORMATIONS OF PANCREAS AND PANCREATIC DUCT: ICD-10-CM

## 2019-07-25 DIAGNOSIS — I10 ESSENTIAL (PRIMARY) HYPERTENSION: ICD-10-CM

## 2019-07-25 DIAGNOSIS — Z88.0 ALLERGY STATUS TO PENICILLIN: ICD-10-CM

## 2019-07-25 DIAGNOSIS — I27.20 PULMONARY HYPERTENSION, UNSPECIFIED: ICD-10-CM

## 2019-07-25 DIAGNOSIS — E66.9 OBESITY, UNSPECIFIED: ICD-10-CM

## 2019-07-25 DIAGNOSIS — A41.51 SEPSIS DUE TO ESCHERICHIA COLI [E. COLI]: ICD-10-CM

## 2019-07-25 DIAGNOSIS — N39.3 STRESS INCONTINENCE (FEMALE) (MALE): ICD-10-CM

## 2019-07-25 DIAGNOSIS — K80.31 CALCULUS OF BILE DUCT WITH CHOLANGITIS, UNSPECIFIED, WITH OBSTRUCTION: ICD-10-CM

## 2019-07-25 DIAGNOSIS — R32 UNSPECIFIED URINARY INCONTINENCE: ICD-10-CM

## 2019-07-25 DIAGNOSIS — K21.9 GASTRO-ESOPHAGEAL REFLUX DISEASE WITHOUT ESOPHAGITIS: ICD-10-CM

## 2019-07-25 DIAGNOSIS — M06.9 RHEUMATOID ARTHRITIS, UNSPECIFIED: ICD-10-CM

## 2019-07-25 DIAGNOSIS — E66.01 MORBID (SEVERE) OBESITY DUE TO EXCESS CALORIES: ICD-10-CM

## 2019-07-25 DIAGNOSIS — R65.20 SEVERE SEPSIS WITHOUT SEPTIC SHOCK: ICD-10-CM

## 2019-07-25 DIAGNOSIS — J45.909 UNSPECIFIED ASTHMA, UNCOMPLICATED: ICD-10-CM

## 2019-07-25 DIAGNOSIS — R74.0 NONSPECIFIC ELEVATION OF LEVELS OF TRANSAMINASE AND LACTIC ACID DEHYDROGENASE [LDH]: ICD-10-CM

## 2019-07-25 DIAGNOSIS — Z88.2 ALLERGY STATUS TO SULFONAMIDES: ICD-10-CM

## 2019-07-25 DIAGNOSIS — K80.32 CALCULUS OF BILE DUCT WITH ACUTE CHOLANGITIS WITHOUT OBSTRUCTION: ICD-10-CM

## 2019-11-07 PROBLEM — M06.9 RHEUMATOID ARTHRITIS, UNSPECIFIED: Chronic | Status: ACTIVE | Noted: 2019-07-13

## 2019-11-07 PROBLEM — J45.909 UNSPECIFIED ASTHMA, UNCOMPLICATED: Chronic | Status: ACTIVE | Noted: 2019-07-13

## 2019-11-07 PROBLEM — E78.5 HYPERLIPIDEMIA, UNSPECIFIED: Chronic | Status: ACTIVE | Noted: 2019-07-13

## 2019-11-07 PROBLEM — I10 ESSENTIAL (PRIMARY) HYPERTENSION: Chronic | Status: ACTIVE | Noted: 2019-07-13

## 2019-11-07 PROBLEM — N39.3 STRESS INCONTINENCE (FEMALE) (MALE): Chronic | Status: ACTIVE | Noted: 2019-07-13

## 2019-11-29 ENCOUNTER — FORM ENCOUNTER (OUTPATIENT)
Age: 79
End: 2019-11-29

## 2019-11-30 ENCOUNTER — OUTPATIENT (OUTPATIENT)
Dept: OUTPATIENT SERVICES | Facility: HOSPITAL | Age: 79
LOS: 1 days | Discharge: HOME | End: 2019-11-30
Payer: MEDICARE

## 2019-11-30 DIAGNOSIS — Z12.31 ENCOUNTER FOR SCREENING MAMMOGRAM FOR MALIGNANT NEOPLASM OF BREAST: ICD-10-CM

## 2019-11-30 DIAGNOSIS — Z96.651 PRESENCE OF RIGHT ARTIFICIAL KNEE JOINT: Chronic | ICD-10-CM

## 2019-11-30 PROCEDURE — 77063 BREAST TOMOSYNTHESIS BI: CPT | Mod: 26

## 2019-11-30 PROCEDURE — 77067 SCR MAMMO BI INCL CAD: CPT | Mod: 26

## 2019-12-09 ENCOUNTER — APPOINTMENT (OUTPATIENT)
Dept: OBGYN | Facility: CLINIC | Age: 79
End: 2019-12-09

## 2021-01-07 ENCOUNTER — RESULT REVIEW (OUTPATIENT)
Age: 81
End: 2021-01-07

## 2021-01-07 ENCOUNTER — OUTPATIENT (OUTPATIENT)
Dept: OUTPATIENT SERVICES | Facility: HOSPITAL | Age: 81
LOS: 1 days | Discharge: HOME | End: 2021-01-07
Payer: MEDICARE

## 2021-01-07 DIAGNOSIS — Z12.31 ENCOUNTER FOR SCREENING MAMMOGRAM FOR MALIGNANT NEOPLASM OF BREAST: ICD-10-CM

## 2021-01-07 DIAGNOSIS — Z96.651 PRESENCE OF RIGHT ARTIFICIAL KNEE JOINT: Chronic | ICD-10-CM

## 2021-01-07 PROCEDURE — 77067 SCR MAMMO BI INCL CAD: CPT | Mod: 26

## 2021-01-07 PROCEDURE — 77063 BREAST TOMOSYNTHESIS BI: CPT | Mod: 26

## 2021-01-14 ENCOUNTER — OUTPATIENT (OUTPATIENT)
Dept: OUTPATIENT SERVICES | Facility: HOSPITAL | Age: 81
LOS: 1 days | Discharge: HOME | End: 2021-01-14
Payer: MEDICARE

## 2021-01-14 ENCOUNTER — RESULT REVIEW (OUTPATIENT)
Age: 81
End: 2021-01-14

## 2021-01-14 DIAGNOSIS — R92.2 INCONCLUSIVE MAMMOGRAM: ICD-10-CM

## 2021-01-14 DIAGNOSIS — Z96.651 PRESENCE OF RIGHT ARTIFICIAL KNEE JOINT: Chronic | ICD-10-CM

## 2021-01-14 PROCEDURE — 76641 ULTRASOUND BREAST COMPLETE: CPT | Mod: 26,50

## 2021-03-18 NOTE — PRE-OP CHECKLIST - TAMPON REMOVED
Outpatient IV Medication Monitoring Note    Artemio's TPN formula, labs, and nutritional status were reviewed today.       Based on the results the following changes are recommended to the TPN:  decreased vitamin K      This was discussed with Amisha Senior MD and communicated to [facility].    Artemio will be in clinic Friday 3/19 for labs and reassessment.      The following reflects the new TPN formula.  Dosing Weight:  64.5 kg  Volume:  1800 mL, cycled over 12 hours  Protein:  100 gm  Dextrose:  236 gm  Lipids:  240 mL/day     Sodium:  80 mEq/day  Potassium: 25 mEq/day   Calcium:  10 mEq/day  Magnesium:  45 mEq/day  Phosphorus:  zero  Chloride:Acetate ratio:  1:2  Trace elements with Selenium:  standard  Multivitamins:  standard  Vitamin K:  5 mg (decreased from 10 mg)        Pharmacy will continue to follow.   Sherice Henson, PharmD, BCPPS  
n/a

## 2022-05-26 ENCOUNTER — APPOINTMENT (OUTPATIENT)
Age: 82
End: 2022-05-26
Payer: MEDICARE

## 2022-05-26 VITALS
DIASTOLIC BLOOD PRESSURE: 70 MMHG | SYSTOLIC BLOOD PRESSURE: 120 MMHG | OXYGEN SATURATION: 97 % | HEART RATE: 79 BPM | RESPIRATION RATE: 14 BRPM | WEIGHT: 190 LBS

## 2022-05-26 DIAGNOSIS — J45.909 UNSPECIFIED ASTHMA, UNCOMPLICATED: ICD-10-CM

## 2022-05-26 DIAGNOSIS — Z86.39 PERSONAL HISTORY OF OTHER ENDOCRINE, NUTRITIONAL AND METABOLIC DISEASE: ICD-10-CM

## 2022-05-26 DIAGNOSIS — Z86.79 PERSONAL HISTORY OF OTHER DISEASES OF THE CIRCULATORY SYSTEM: ICD-10-CM

## 2022-05-26 PROCEDURE — 99214 OFFICE O/P EST MOD 30 MIN: CPT

## 2022-05-26 RX ORDER — ALBUTEROL SULFATE 90 UG/1
108 (90 BASE) INHALANT RESPIRATORY (INHALATION)
Qty: 1 | Refills: 0 | Status: ACTIVE | COMMUNITY
Start: 2022-05-26

## 2022-05-26 NOTE — PHYSICAL EXAM
[No Acute Distress] : no acute distress [Normal Oropharynx] : normal oropharynx [Normal Appearance] : normal appearance [No Neck Mass] : no neck mass [Normal Rate/Rhythm] : normal rate/rhythm [Normal S1, S2] : normal s1, s2 [No Resp Distress] : no resp distress [Clear to Auscultation Bilaterally] : clear to auscultation bilaterally [No Abnormalities] : no abnormalities [No Clubbing] : no clubbing [No Cyanosis] : no cyanosis [No Edema] : no edema [FROM] : FROM [No Focal Deficits] : no focal deficits [Oriented x3] : oriented x3 [Normal Affect] : normal affect [TextBox_54] : systolic murmur

## 2022-05-27 ENCOUNTER — RESULT REVIEW (OUTPATIENT)
Age: 82
End: 2022-05-27

## 2022-05-27 ENCOUNTER — OUTPATIENT (OUTPATIENT)
Dept: OUTPATIENT SERVICES | Facility: HOSPITAL | Age: 82
LOS: 1 days | Discharge: HOME | End: 2022-05-27
Payer: MEDICARE

## 2022-05-27 DIAGNOSIS — Z12.31 ENCOUNTER FOR SCREENING MAMMOGRAM FOR MALIGNANT NEOPLASM OF BREAST: ICD-10-CM

## 2022-05-27 DIAGNOSIS — Z96.651 PRESENCE OF RIGHT ARTIFICIAL KNEE JOINT: Chronic | ICD-10-CM

## 2022-05-27 PROCEDURE — 77063 BREAST TOMOSYNTHESIS BI: CPT | Mod: 26

## 2022-05-27 PROCEDURE — 77067 SCR MAMMO BI INCL CAD: CPT | Mod: 26

## 2022-12-07 ENCOUNTER — APPOINTMENT (OUTPATIENT)
Age: 82
End: 2022-12-07

## 2023-06-15 ENCOUNTER — OUTPATIENT (OUTPATIENT)
Dept: OUTPATIENT SERVICES | Facility: HOSPITAL | Age: 83
LOS: 1 days | End: 2023-06-15
Payer: MEDICARE

## 2023-06-15 DIAGNOSIS — Z96.651 PRESENCE OF RIGHT ARTIFICIAL KNEE JOINT: Chronic | ICD-10-CM

## 2023-06-15 DIAGNOSIS — Z12.31 ENCOUNTER FOR SCREENING MAMMOGRAM FOR MALIGNANT NEOPLASM OF BREAST: ICD-10-CM

## 2023-06-15 PROCEDURE — 77063 BREAST TOMOSYNTHESIS BI: CPT | Mod: 26

## 2023-06-15 PROCEDURE — 77067 SCR MAMMO BI INCL CAD: CPT

## 2023-06-15 PROCEDURE — 77067 SCR MAMMO BI INCL CAD: CPT | Mod: 26

## 2023-06-15 PROCEDURE — 77063 BREAST TOMOSYNTHESIS BI: CPT

## 2023-06-16 DIAGNOSIS — Z12.31 ENCOUNTER FOR SCREENING MAMMOGRAM FOR MALIGNANT NEOPLASM OF BREAST: ICD-10-CM

## 2023-08-31 ENCOUNTER — APPOINTMENT (OUTPATIENT)
Dept: PULMONOLOGY | Facility: CLINIC | Age: 83
End: 2023-08-31

## 2023-10-11 ENCOUNTER — APPOINTMENT (OUTPATIENT)
Dept: UROLOGY | Facility: CLINIC | Age: 83
End: 2023-10-11

## 2023-11-09 ENCOUNTER — APPOINTMENT (OUTPATIENT)
Dept: UROGYNECOLOGY | Facility: CLINIC | Age: 83
End: 2023-11-09
Payer: MEDICARE

## 2023-11-09 ENCOUNTER — LABORATORY RESULT (OUTPATIENT)
Age: 83
End: 2023-11-09

## 2023-11-09 VITALS
SYSTOLIC BLOOD PRESSURE: 173 MMHG | HEART RATE: 64 BPM | WEIGHT: 190 LBS | HEIGHT: 67 IN | BODY MASS INDEX: 29.82 KG/M2 | DIASTOLIC BLOOD PRESSURE: 76 MMHG

## 2023-11-09 DIAGNOSIS — Z82.3 FAMILY HISTORY OF STROKE: ICD-10-CM

## 2023-11-09 DIAGNOSIS — Z86.79 PERSONAL HISTORY OF OTHER DISEASES OF THE CIRCULATORY SYSTEM: ICD-10-CM

## 2023-11-09 DIAGNOSIS — Z82.49 FAMILY HISTORY OF ISCHEMIC HEART DISEASE AND OTHER DISEASES OF THE CIRCULATORY SYSTEM: ICD-10-CM

## 2023-11-09 DIAGNOSIS — Z86.39 PERSONAL HISTORY OF OTHER ENDOCRINE, NUTRITIONAL AND METABOLIC DISEASE: ICD-10-CM

## 2023-11-09 DIAGNOSIS — Z87.898 PERSONAL HISTORY OF OTHER SPECIFIED CONDITIONS: ICD-10-CM

## 2023-11-09 DIAGNOSIS — R91.1 SOLITARY PULMONARY NODULE: ICD-10-CM

## 2023-11-09 DIAGNOSIS — Z80.52 FAMILY HISTORY OF MALIGNANT NEOPLASM OF BLADDER: ICD-10-CM

## 2023-11-09 DIAGNOSIS — N90.89 OTHER SPECIFIED NONINFLAMMATORY DISORDERS OF VULVA AND PERINEUM: ICD-10-CM

## 2023-11-09 DIAGNOSIS — N39.41 URGE INCONTINENCE: ICD-10-CM

## 2023-11-09 DIAGNOSIS — M19.90 UNSPECIFIED OSTEOARTHRITIS, UNSPECIFIED SITE: ICD-10-CM

## 2023-11-09 PROCEDURE — 51701 INSERT BLADDER CATHETER: CPT

## 2023-11-09 PROCEDURE — 99205 OFFICE O/P NEW HI 60 MIN: CPT | Mod: 25

## 2023-11-09 RX ORDER — OXYBUTYNIN CHLORIDE 15 MG/1
15 TABLET, EXTENDED RELEASE ORAL
Refills: 0 | Status: ACTIVE | COMMUNITY

## 2023-11-09 RX ORDER — ASPIRIN 81 MG
81 TABLET, DELAYED RELEASE (ENTERIC COATED) ORAL
Refills: 0 | Status: ACTIVE | COMMUNITY

## 2023-11-09 RX ORDER — CHROMIUM 200 MCG
TABLET ORAL
Refills: 0 | Status: ACTIVE | COMMUNITY

## 2023-11-09 RX ORDER — CLOTRIMAZOLE AND BETAMETHASONE DIPROPIONATE 10; .5 MG/G; MG/G
1-0.05 CREAM TOPICAL
Qty: 1 | Refills: 1 | Status: ACTIVE | COMMUNITY
Start: 2023-11-09 | End: 1900-01-01

## 2023-11-09 RX ORDER — LOSARTAN POTASSIUM 100 MG/1
100 TABLET, FILM COATED ORAL
Refills: 0 | Status: ACTIVE | COMMUNITY

## 2023-11-09 RX ORDER — OXYBUTYNIN CHLORIDE 15 MG/1
15 TABLET, EXTENDED RELEASE ORAL
Qty: 90 | Refills: 3 | Status: COMPLETED | COMMUNITY
Start: 2018-04-25 | End: 2023-11-09

## 2023-11-09 RX ORDER — PANTOPRAZOLE SODIUM 40 MG/1
40 GRANULE, DELAYED RELEASE ORAL
Refills: 0 | Status: ACTIVE | COMMUNITY

## 2023-11-09 RX ORDER — ATENOLOL 50 MG/1
50 TABLET ORAL
Refills: 0 | Status: ACTIVE | COMMUNITY

## 2023-11-10 RX ORDER — TROSPIUM CHLORIDE 20 MG/1
20 TABLET, FILM COATED ORAL
Qty: 60 | Refills: 2 | Status: DISCONTINUED | COMMUNITY
Start: 2023-11-09 | End: 2023-11-10

## 2023-11-13 LAB — URINE CULTURE <10: NORMAL

## 2023-11-20 ENCOUNTER — INPATIENT (INPATIENT)
Facility: HOSPITAL | Age: 83
LOS: 9 days | Discharge: NURSING, REHAB OR LONG-TERM CARE | DRG: 65 | End: 2023-11-30
Attending: PSYCHIATRY & NEUROLOGY | Admitting: PSYCHIATRY & NEUROLOGY
Payer: MEDICARE

## 2023-11-20 VITALS
OXYGEN SATURATION: 93 % | DIASTOLIC BLOOD PRESSURE: 84 MMHG | HEART RATE: 96 BPM | TEMPERATURE: 98 F | WEIGHT: 190.04 LBS | SYSTOLIC BLOOD PRESSURE: 216 MMHG | RESPIRATION RATE: 18 BRPM

## 2023-11-20 DIAGNOSIS — M81.0 AGE-RELATED OSTEOPOROSIS WITHOUT CURRENT PATHOLOGICAL FRACTURE: ICD-10-CM

## 2023-11-20 DIAGNOSIS — N39.3 STRESS INCONTINENCE (FEMALE) (MALE): ICD-10-CM

## 2023-11-20 DIAGNOSIS — R20.0 ANESTHESIA OF SKIN: ICD-10-CM

## 2023-11-20 DIAGNOSIS — E78.5 HYPERLIPIDEMIA, UNSPECIFIED: ICD-10-CM

## 2023-11-20 DIAGNOSIS — I65.23 OCCLUSION AND STENOSIS OF BILATERAL CAROTID ARTERIES: ICD-10-CM

## 2023-11-20 DIAGNOSIS — G37.89 OTHER SPECIFIED DEMYELINATING DISEASES OF CENTRAL NERVOUS SYSTEM: ICD-10-CM

## 2023-11-20 DIAGNOSIS — Z88.0 ALLERGY STATUS TO PENICILLIN: ICD-10-CM

## 2023-11-20 DIAGNOSIS — I63.512 CEREBRAL INFARCTION DUE TO UNSPECIFIED OCCLUSION OR STENOSIS OF LEFT MIDDLE CEREBRAL ARTERY: ICD-10-CM

## 2023-11-20 DIAGNOSIS — Z88.2 ALLERGY STATUS TO SULFONAMIDES: ICD-10-CM

## 2023-11-20 DIAGNOSIS — R29.703 NIHSS SCORE 3: ICD-10-CM

## 2023-11-20 DIAGNOSIS — R26.89 OTHER ABNORMALITIES OF GAIT AND MOBILITY: ICD-10-CM

## 2023-11-20 DIAGNOSIS — Z96.651 PRESENCE OF RIGHT ARTIFICIAL KNEE JOINT: Chronic | ICD-10-CM

## 2023-11-20 DIAGNOSIS — M19.072 PRIMARY OSTEOARTHRITIS, LEFT ANKLE AND FOOT: ICD-10-CM

## 2023-11-20 DIAGNOSIS — I27.20 PULMONARY HYPERTENSION, UNSPECIFIED: ICD-10-CM

## 2023-11-20 DIAGNOSIS — E53.8 DEFICIENCY OF OTHER SPECIFIED B GROUP VITAMINS: ICD-10-CM

## 2023-11-20 DIAGNOSIS — M06.9 RHEUMATOID ARTHRITIS, UNSPECIFIED: ICD-10-CM

## 2023-11-20 DIAGNOSIS — R27.0 ATAXIA, UNSPECIFIED: ICD-10-CM

## 2023-11-20 DIAGNOSIS — I10 ESSENTIAL (PRIMARY) HYPERTENSION: ICD-10-CM

## 2023-11-20 DIAGNOSIS — J45.909 UNSPECIFIED ASTHMA, UNCOMPLICATED: ICD-10-CM

## 2023-11-20 DIAGNOSIS — I35.0 NONRHEUMATIC AORTIC (VALVE) STENOSIS: ICD-10-CM

## 2023-11-20 DIAGNOSIS — Z96.651 PRESENCE OF RIGHT ARTIFICIAL KNEE JOINT: ICD-10-CM

## 2023-11-20 LAB
ALBUMIN SERPL ELPH-MCNC: 4.1 G/DL — SIGNIFICANT CHANGE UP (ref 3.5–5.2)
ALBUMIN SERPL ELPH-MCNC: 4.1 G/DL — SIGNIFICANT CHANGE UP (ref 3.5–5.2)
ALP SERPL-CCNC: 70 U/L — SIGNIFICANT CHANGE UP (ref 30–115)
ALP SERPL-CCNC: 70 U/L — SIGNIFICANT CHANGE UP (ref 30–115)
ALT FLD-CCNC: 12 U/L — SIGNIFICANT CHANGE UP (ref 0–41)
ALT FLD-CCNC: 12 U/L — SIGNIFICANT CHANGE UP (ref 0–41)
ANION GAP SERPL CALC-SCNC: 11 MMOL/L — SIGNIFICANT CHANGE UP (ref 7–14)
ANION GAP SERPL CALC-SCNC: 11 MMOL/L — SIGNIFICANT CHANGE UP (ref 7–14)
APPEARANCE UR: CLEAR — SIGNIFICANT CHANGE UP
APPEARANCE UR: CLEAR — SIGNIFICANT CHANGE UP
APTT BLD: 29.8 SEC — SIGNIFICANT CHANGE UP (ref 27–39.2)
APTT BLD: 29.8 SEC — SIGNIFICANT CHANGE UP (ref 27–39.2)
AST SERPL-CCNC: 20 U/L — SIGNIFICANT CHANGE UP (ref 0–41)
AST SERPL-CCNC: 20 U/L — SIGNIFICANT CHANGE UP (ref 0–41)
BACTERIA # UR AUTO: NEGATIVE /HPF — SIGNIFICANT CHANGE UP
BACTERIA # UR AUTO: NEGATIVE /HPF — SIGNIFICANT CHANGE UP
BASE EXCESS BLDV CALC-SCNC: 2.9 MMOL/L — SIGNIFICANT CHANGE UP (ref -2–3)
BASE EXCESS BLDV CALC-SCNC: 2.9 MMOL/L — SIGNIFICANT CHANGE UP (ref -2–3)
BASOPHILS # BLD AUTO: 0.03 K/UL — SIGNIFICANT CHANGE UP (ref 0–0.2)
BASOPHILS # BLD AUTO: 0.03 K/UL — SIGNIFICANT CHANGE UP (ref 0–0.2)
BASOPHILS NFR BLD AUTO: 0.7 % — SIGNIFICANT CHANGE UP (ref 0–1)
BASOPHILS NFR BLD AUTO: 0.7 % — SIGNIFICANT CHANGE UP (ref 0–1)
BILIRUB SERPL-MCNC: 0.6 MG/DL — SIGNIFICANT CHANGE UP (ref 0.2–1.2)
BILIRUB SERPL-MCNC: 0.6 MG/DL — SIGNIFICANT CHANGE UP (ref 0.2–1.2)
BILIRUB UR-MCNC: NEGATIVE — SIGNIFICANT CHANGE UP
BILIRUB UR-MCNC: NEGATIVE — SIGNIFICANT CHANGE UP
BUN SERPL-MCNC: 19 MG/DL — SIGNIFICANT CHANGE UP (ref 10–20)
BUN SERPL-MCNC: 19 MG/DL — SIGNIFICANT CHANGE UP (ref 10–20)
CA-I SERPL-SCNC: 1.22 MMOL/L — SIGNIFICANT CHANGE UP (ref 1.15–1.33)
CA-I SERPL-SCNC: 1.22 MMOL/L — SIGNIFICANT CHANGE UP (ref 1.15–1.33)
CALCIUM SERPL-MCNC: 9.5 MG/DL — SIGNIFICANT CHANGE UP (ref 8.4–10.5)
CALCIUM SERPL-MCNC: 9.5 MG/DL — SIGNIFICANT CHANGE UP (ref 8.4–10.5)
CAST: 0 /LPF — SIGNIFICANT CHANGE UP (ref 0–4)
CAST: 0 /LPF — SIGNIFICANT CHANGE UP (ref 0–4)
CHLORIDE SERPL-SCNC: 103 MMOL/L — SIGNIFICANT CHANGE UP (ref 98–110)
CHLORIDE SERPL-SCNC: 103 MMOL/L — SIGNIFICANT CHANGE UP (ref 98–110)
CO2 SERPL-SCNC: 27 MMOL/L — SIGNIFICANT CHANGE UP (ref 17–32)
CO2 SERPL-SCNC: 27 MMOL/L — SIGNIFICANT CHANGE UP (ref 17–32)
COLOR SPEC: YELLOW — SIGNIFICANT CHANGE UP
COLOR SPEC: YELLOW — SIGNIFICANT CHANGE UP
CREAT SERPL-MCNC: 1.3 MG/DL — SIGNIFICANT CHANGE UP (ref 0.7–1.5)
CREAT SERPL-MCNC: 1.3 MG/DL — SIGNIFICANT CHANGE UP (ref 0.7–1.5)
DIFF PNL FLD: NEGATIVE — SIGNIFICANT CHANGE UP
DIFF PNL FLD: NEGATIVE — SIGNIFICANT CHANGE UP
EGFR: 41 ML/MIN/1.73M2 — LOW
EGFR: 41 ML/MIN/1.73M2 — LOW
EOSINOPHIL # BLD AUTO: 0.04 K/UL — SIGNIFICANT CHANGE UP (ref 0–0.7)
EOSINOPHIL # BLD AUTO: 0.04 K/UL — SIGNIFICANT CHANGE UP (ref 0–0.7)
EOSINOPHIL NFR BLD AUTO: 1 % — SIGNIFICANT CHANGE UP (ref 0–8)
EOSINOPHIL NFR BLD AUTO: 1 % — SIGNIFICANT CHANGE UP (ref 0–8)
GAS PNL BLDV: 139 MMOL/L — SIGNIFICANT CHANGE UP (ref 136–145)
GAS PNL BLDV: 139 MMOL/L — SIGNIFICANT CHANGE UP (ref 136–145)
GAS PNL BLDV: SIGNIFICANT CHANGE UP
GAS PNL BLDV: SIGNIFICANT CHANGE UP
GLUCOSE SERPL-MCNC: 101 MG/DL — HIGH (ref 70–99)
GLUCOSE SERPL-MCNC: 101 MG/DL — HIGH (ref 70–99)
GLUCOSE UR QL: NEGATIVE MG/DL — SIGNIFICANT CHANGE UP
GLUCOSE UR QL: NEGATIVE MG/DL — SIGNIFICANT CHANGE UP
HCO3 BLDV-SCNC: 28 MMOL/L — SIGNIFICANT CHANGE UP (ref 22–29)
HCO3 BLDV-SCNC: 28 MMOL/L — SIGNIFICANT CHANGE UP (ref 22–29)
HCT VFR BLD CALC: 36.2 % — LOW (ref 37–47)
HCT VFR BLD CALC: 36.2 % — LOW (ref 37–47)
HCT VFR BLDA CALC: 35 % — LOW (ref 39–51)
HCT VFR BLDA CALC: 35 % — LOW (ref 39–51)
HGB BLD CALC-MCNC: 11.6 G/DL — LOW (ref 12.6–17.4)
HGB BLD CALC-MCNC: 11.6 G/DL — LOW (ref 12.6–17.4)
HGB BLD-MCNC: 11.2 G/DL — LOW (ref 12–16)
HGB BLD-MCNC: 11.2 G/DL — LOW (ref 12–16)
IMM GRANULOCYTES NFR BLD AUTO: 0.5 % — HIGH (ref 0.1–0.3)
IMM GRANULOCYTES NFR BLD AUTO: 0.5 % — HIGH (ref 0.1–0.3)
INR BLD: 1.01 RATIO — SIGNIFICANT CHANGE UP (ref 0.65–1.3)
INR BLD: 1.01 RATIO — SIGNIFICANT CHANGE UP (ref 0.65–1.3)
KETONES UR-MCNC: NEGATIVE MG/DL — SIGNIFICANT CHANGE UP
KETONES UR-MCNC: NEGATIVE MG/DL — SIGNIFICANT CHANGE UP
LACTATE BLDV-MCNC: 0.9 MMOL/L — SIGNIFICANT CHANGE UP (ref 0.5–2)
LACTATE BLDV-MCNC: 0.9 MMOL/L — SIGNIFICANT CHANGE UP (ref 0.5–2)
LEUKOCYTE ESTERASE UR-ACNC: ABNORMAL
LEUKOCYTE ESTERASE UR-ACNC: ABNORMAL
LYMPHOCYTES # BLD AUTO: 0.75 K/UL — LOW (ref 1.2–3.4)
LYMPHOCYTES # BLD AUTO: 0.75 K/UL — LOW (ref 1.2–3.4)
LYMPHOCYTES # BLD AUTO: 18.6 % — LOW (ref 20.5–51.1)
LYMPHOCYTES # BLD AUTO: 18.6 % — LOW (ref 20.5–51.1)
MCHC RBC-ENTMCNC: 30.9 G/DL — LOW (ref 32–37)
MCHC RBC-ENTMCNC: 30.9 G/DL — LOW (ref 32–37)
MCHC RBC-ENTMCNC: 31.8 PG — HIGH (ref 27–31)
MCHC RBC-ENTMCNC: 31.8 PG — HIGH (ref 27–31)
MCV RBC AUTO: 102.8 FL — HIGH (ref 81–99)
MCV RBC AUTO: 102.8 FL — HIGH (ref 81–99)
MONOCYTES # BLD AUTO: 0.38 K/UL — SIGNIFICANT CHANGE UP (ref 0.1–0.6)
MONOCYTES # BLD AUTO: 0.38 K/UL — SIGNIFICANT CHANGE UP (ref 0.1–0.6)
MONOCYTES NFR BLD AUTO: 9.4 % — HIGH (ref 1.7–9.3)
MONOCYTES NFR BLD AUTO: 9.4 % — HIGH (ref 1.7–9.3)
NEUTROPHILS # BLD AUTO: 2.81 K/UL — SIGNIFICANT CHANGE UP (ref 1.4–6.5)
NEUTROPHILS # BLD AUTO: 2.81 K/UL — SIGNIFICANT CHANGE UP (ref 1.4–6.5)
NEUTROPHILS NFR BLD AUTO: 69.8 % — SIGNIFICANT CHANGE UP (ref 42.2–75.2)
NEUTROPHILS NFR BLD AUTO: 69.8 % — SIGNIFICANT CHANGE UP (ref 42.2–75.2)
NITRITE UR-MCNC: NEGATIVE — SIGNIFICANT CHANGE UP
NITRITE UR-MCNC: NEGATIVE — SIGNIFICANT CHANGE UP
NRBC # BLD: 0 /100 WBCS — SIGNIFICANT CHANGE UP (ref 0–0)
NRBC # BLD: 0 /100 WBCS — SIGNIFICANT CHANGE UP (ref 0–0)
PCO2 BLDV: 47 MMHG — HIGH (ref 39–42)
PCO2 BLDV: 47 MMHG — HIGH (ref 39–42)
PH BLDV: 7.39 — SIGNIFICANT CHANGE UP (ref 7.32–7.43)
PH BLDV: 7.39 — SIGNIFICANT CHANGE UP (ref 7.32–7.43)
PH UR: 6 — SIGNIFICANT CHANGE UP (ref 5–8)
PH UR: 6 — SIGNIFICANT CHANGE UP (ref 5–8)
PLATELET # BLD AUTO: 166 K/UL — SIGNIFICANT CHANGE UP (ref 130–400)
PLATELET # BLD AUTO: 166 K/UL — SIGNIFICANT CHANGE UP (ref 130–400)
PMV BLD: 11.2 FL — HIGH (ref 7.4–10.4)
PMV BLD: 11.2 FL — HIGH (ref 7.4–10.4)
PO2 BLDV: 39 MMHG — SIGNIFICANT CHANGE UP
PO2 BLDV: 39 MMHG — SIGNIFICANT CHANGE UP
POTASSIUM BLDV-SCNC: 4.4 MMOL/L — SIGNIFICANT CHANGE UP (ref 3.5–5.1)
POTASSIUM BLDV-SCNC: 4.4 MMOL/L — SIGNIFICANT CHANGE UP (ref 3.5–5.1)
POTASSIUM SERPL-MCNC: 4.7 MMOL/L — SIGNIFICANT CHANGE UP (ref 3.5–5)
POTASSIUM SERPL-MCNC: 4.7 MMOL/L — SIGNIFICANT CHANGE UP (ref 3.5–5)
POTASSIUM SERPL-SCNC: 4.7 MMOL/L — SIGNIFICANT CHANGE UP (ref 3.5–5)
POTASSIUM SERPL-SCNC: 4.7 MMOL/L — SIGNIFICANT CHANGE UP (ref 3.5–5)
PROT SERPL-MCNC: 7 G/DL — SIGNIFICANT CHANGE UP (ref 6–8)
PROT SERPL-MCNC: 7 G/DL — SIGNIFICANT CHANGE UP (ref 6–8)
PROT UR-MCNC: SIGNIFICANT CHANGE UP MG/DL
PROT UR-MCNC: SIGNIFICANT CHANGE UP MG/DL
PROTHROM AB SERPL-ACNC: 11.5 SEC — SIGNIFICANT CHANGE UP (ref 9.95–12.87)
PROTHROM AB SERPL-ACNC: 11.5 SEC — SIGNIFICANT CHANGE UP (ref 9.95–12.87)
RBC # BLD: 3.52 M/UL — LOW (ref 4.2–5.4)
RBC # BLD: 3.52 M/UL — LOW (ref 4.2–5.4)
RBC # FLD: 13.2 % — SIGNIFICANT CHANGE UP (ref 11.5–14.5)
RBC # FLD: 13.2 % — SIGNIFICANT CHANGE UP (ref 11.5–14.5)
RBC CASTS # UR COMP ASSIST: 1 /HPF — SIGNIFICANT CHANGE UP (ref 0–4)
RBC CASTS # UR COMP ASSIST: 1 /HPF — SIGNIFICANT CHANGE UP (ref 0–4)
SAO2 % BLDV: 68.1 % — SIGNIFICANT CHANGE UP
SAO2 % BLDV: 68.1 % — SIGNIFICANT CHANGE UP
SODIUM SERPL-SCNC: 141 MMOL/L — SIGNIFICANT CHANGE UP (ref 135–146)
SODIUM SERPL-SCNC: 141 MMOL/L — SIGNIFICANT CHANGE UP (ref 135–146)
SP GR SPEC: 1.01 — SIGNIFICANT CHANGE UP (ref 1–1.03)
SP GR SPEC: 1.01 — SIGNIFICANT CHANGE UP (ref 1–1.03)
SQUAMOUS # UR AUTO: 1 /HPF — SIGNIFICANT CHANGE UP (ref 0–5)
SQUAMOUS # UR AUTO: 1 /HPF — SIGNIFICANT CHANGE UP (ref 0–5)
TROPONIN T SERPL-MCNC: <0.01 NG/ML — SIGNIFICANT CHANGE UP
TROPONIN T SERPL-MCNC: <0.01 NG/ML — SIGNIFICANT CHANGE UP
UROBILINOGEN FLD QL: 0.2 MG/DL — SIGNIFICANT CHANGE UP (ref 0.2–1)
UROBILINOGEN FLD QL: 0.2 MG/DL — SIGNIFICANT CHANGE UP (ref 0.2–1)
WBC # BLD: 4.03 K/UL — LOW (ref 4.8–10.8)
WBC # BLD: 4.03 K/UL — LOW (ref 4.8–10.8)
WBC # FLD AUTO: 4.03 K/UL — LOW (ref 4.8–10.8)
WBC # FLD AUTO: 4.03 K/UL — LOW (ref 4.8–10.8)
WBC UR QL: 12 /HPF — HIGH (ref 0–5)
WBC UR QL: 12 /HPF — HIGH (ref 0–5)

## 2023-11-20 PROCEDURE — 84550 ASSAY OF BLOOD/URIC ACID: CPT

## 2023-11-20 PROCEDURE — 86901 BLOOD TYPING SEROLOGIC RH(D): CPT

## 2023-11-20 PROCEDURE — 86780 TREPONEMA PALLIDUM: CPT

## 2023-11-20 PROCEDURE — C1894: CPT

## 2023-11-20 PROCEDURE — 76937 US GUIDE VASCULAR ACCESS: CPT

## 2023-11-20 PROCEDURE — 84446 ASSAY OF VITAMIN E: CPT

## 2023-11-20 PROCEDURE — 72156 MRI NECK SPINE W/O & W/DYE: CPT | Mod: MA

## 2023-11-20 PROCEDURE — 82728 ASSAY OF FERRITIN: CPT

## 2023-11-20 PROCEDURE — C1760: CPT

## 2023-11-20 PROCEDURE — 70450 CT HEAD/BRAIN W/O DYE: CPT | Mod: 26,MA,XU

## 2023-11-20 PROCEDURE — 86900 BLOOD TYPING SEROLOGIC ABO: CPT

## 2023-11-20 PROCEDURE — 84165 PROTEIN E-PHORESIS SERUM: CPT

## 2023-11-20 PROCEDURE — 84100 ASSAY OF PHOSPHORUS: CPT

## 2023-11-20 PROCEDURE — 73700 CT LOWER EXTREMITY W/O DYE: CPT | Mod: LT

## 2023-11-20 PROCEDURE — A9579: CPT

## 2023-11-20 PROCEDURE — 94150 VITAL CAPACITY TEST: CPT

## 2023-11-20 PROCEDURE — 85025 COMPLETE CBC W/AUTO DIFF WBC: CPT

## 2023-11-20 PROCEDURE — 93010 ELECTROCARDIOGRAM REPORT: CPT

## 2023-11-20 PROCEDURE — 87389 HIV-1 AG W/HIV-1&-2 AB AG IA: CPT

## 2023-11-20 PROCEDURE — 84425 ASSAY OF VITAMIN B-1: CPT

## 2023-11-20 PROCEDURE — 97530 THERAPEUTIC ACTIVITIES: CPT | Mod: GP

## 2023-11-20 PROCEDURE — 82550 ASSAY OF CK (CPK): CPT

## 2023-11-20 PROCEDURE — 97116 GAIT TRAINING THERAPY: CPT | Mod: GP

## 2023-11-20 PROCEDURE — 73600 X-RAY EXAM OF ANKLE: CPT | Mod: LT

## 2023-11-20 PROCEDURE — 73590 X-RAY EXAM OF LOWER LEG: CPT | Mod: LT

## 2023-11-20 PROCEDURE — C1769: CPT

## 2023-11-20 PROCEDURE — 73630 X-RAY EXAM OF FOOT: CPT | Mod: LT

## 2023-11-20 PROCEDURE — 80048 BASIC METABOLIC PNL TOTAL CA: CPT

## 2023-11-20 PROCEDURE — 97166 OT EVAL MOD COMPLEX 45 MIN: CPT | Mod: GO

## 2023-11-20 PROCEDURE — 80061 LIPID PANEL: CPT

## 2023-11-20 PROCEDURE — 97110 THERAPEUTIC EXERCISES: CPT | Mod: GP

## 2023-11-20 PROCEDURE — 36223 PLACE CATH CAROTID/INOM ART: CPT | Mod: 50

## 2023-11-20 PROCEDURE — C1887: CPT

## 2023-11-20 PROCEDURE — 71045 X-RAY EXAM CHEST 1 VIEW: CPT | Mod: 26

## 2023-11-20 PROCEDURE — 85652 RBC SED RATE AUTOMATED: CPT

## 2023-11-20 PROCEDURE — 83921 ORGANIC ACID SINGLE QUANT: CPT

## 2023-11-20 PROCEDURE — 36415 COLL VENOUS BLD VENIPUNCTURE: CPT

## 2023-11-20 PROCEDURE — 93306 TTE W/DOPPLER COMPLETE: CPT

## 2023-11-20 PROCEDURE — 84155 ASSAY OF PROTEIN SERUM: CPT

## 2023-11-20 PROCEDURE — 82746 ASSAY OF FOLIC ACID SERUM: CPT

## 2023-11-20 PROCEDURE — 86850 RBC ANTIBODY SCREEN: CPT

## 2023-11-20 PROCEDURE — 83516 IMMUNOASSAY NONANTIBODY: CPT

## 2023-11-20 PROCEDURE — 97163 PT EVAL HIGH COMPLEX 45 MIN: CPT | Mod: GP

## 2023-11-20 PROCEDURE — 86334 IMMUNOFIX E-PHORESIS SERUM: CPT

## 2023-11-20 PROCEDURE — 86140 C-REACTIVE PROTEIN: CPT

## 2023-11-20 PROCEDURE — 83036 HEMOGLOBIN GLYCOSYLATED A1C: CPT

## 2023-11-20 PROCEDURE — 73030 X-RAY EXAM OF SHOULDER: CPT | Mod: 50

## 2023-11-20 PROCEDURE — 99285 EMERGENCY DEPT VISIT HI MDM: CPT

## 2023-11-20 PROCEDURE — 84630 ASSAY OF ZINC: CPT

## 2023-11-20 PROCEDURE — 86341 ISLET CELL ANTIBODY: CPT

## 2023-11-20 PROCEDURE — 97535 SELF CARE MNGMENT TRAINING: CPT | Mod: GO

## 2023-11-20 PROCEDURE — 70498 CT ANGIOGRAPHY NECK: CPT | Mod: 26,MA

## 2023-11-20 PROCEDURE — 82525 ASSAY OF COPPER: CPT

## 2023-11-20 PROCEDURE — 70496 CT ANGIOGRAPHY HEAD: CPT | Mod: 26,MA

## 2023-11-20 PROCEDURE — 85027 COMPLETE CBC AUTOMATED: CPT

## 2023-11-20 PROCEDURE — 83090 ASSAY OF HOMOCYSTEINE: CPT

## 2023-11-20 PROCEDURE — 82607 VITAMIN B-12: CPT

## 2023-11-20 PROCEDURE — 83735 ASSAY OF MAGNESIUM: CPT

## 2023-11-20 PROCEDURE — 84443 ASSAY THYROID STIM HORMONE: CPT

## 2023-11-20 PROCEDURE — 70553 MRI BRAIN STEM W/O & W/DYE: CPT | Mod: MA

## 2023-11-20 RX ORDER — SIMVASTATIN 20 MG/1
20 TABLET, FILM COATED ORAL AT BEDTIME
Refills: 0 | Status: DISCONTINUED | OUTPATIENT
Start: 2023-11-20 | End: 2023-11-21

## 2023-11-20 RX ORDER — LOSARTAN POTASSIUM 100 MG/1
100 TABLET, FILM COATED ORAL DAILY
Refills: 0 | Status: DISCONTINUED | OUTPATIENT
Start: 2023-11-20 | End: 2023-11-30

## 2023-11-20 RX ORDER — ASPIRIN/CALCIUM CARB/MAGNESIUM 324 MG
81 TABLET ORAL DAILY
Refills: 0 | Status: DISCONTINUED | OUTPATIENT
Start: 2023-11-20 | End: 2023-11-30

## 2023-11-20 RX ORDER — THIAMINE MONONITRATE (VIT B1) 100 MG
500 TABLET ORAL ONCE
Refills: 0 | Status: COMPLETED | OUTPATIENT
Start: 2023-11-20 | End: 2023-11-20

## 2023-11-20 RX ORDER — PANTOPRAZOLE SODIUM 20 MG/1
40 TABLET, DELAYED RELEASE ORAL
Refills: 0 | Status: DISCONTINUED | OUTPATIENT
Start: 2023-11-20 | End: 2023-11-30

## 2023-11-20 RX ORDER — LEFLUNOMIDE 10 MG/1
0 TABLET ORAL
Qty: 0 | Refills: 0 | DISCHARGE

## 2023-11-20 RX ORDER — FOLIC ACID 0.8 MG
1 TABLET ORAL DAILY
Refills: 0 | Status: DISCONTINUED | OUTPATIENT
Start: 2023-11-20 | End: 2023-11-30

## 2023-11-20 RX ORDER — ATENOLOL 25 MG/1
50 TABLET ORAL DAILY
Refills: 0 | Status: DISCONTINUED | OUTPATIENT
Start: 2023-11-20 | End: 2023-11-30

## 2023-11-20 RX ORDER — SIMVASTATIN 20 MG/1
1 TABLET, FILM COATED ORAL
Qty: 0 | Refills: 0 | DISCHARGE

## 2023-11-20 RX ORDER — AMLODIPINE BESYLATE 2.5 MG/1
1 TABLET ORAL
Qty: 0 | Refills: 0 | DISCHARGE

## 2023-11-20 RX ORDER — OXYBUTYNIN CHLORIDE 5 MG
5 TABLET ORAL
Refills: 0 | Status: DISCONTINUED | OUTPATIENT
Start: 2023-11-20 | End: 2023-11-30

## 2023-11-20 RX ORDER — ALBUTEROL 90 UG/1
2 AEROSOL, METERED ORAL EVERY 6 HOURS
Refills: 0 | Status: DISCONTINUED | OUTPATIENT
Start: 2023-11-20 | End: 2023-11-30

## 2023-11-20 RX ORDER — OXYBUTYNIN CHLORIDE 5 MG
1 TABLET ORAL
Qty: 0 | Refills: 0 | DISCHARGE

## 2023-11-20 RX ADMIN — Medication 105 MILLIGRAM(S): at 18:08

## 2023-11-20 NOTE — ED PROVIDER NOTE - WR ORDER STATUS 1
Performed Additional Notes: - favoring secondary TE Render Risk Assessment In Note?: no Detail Level: Simple

## 2023-11-20 NOTE — H&P ADULT - NSHPSOCIALHISTORY_GEN_ALL_CORE
Patient denies smoking alcohol or drug use  Patient lives at home with daughter and son in law  She walks with the help of a rollator at baseline and able to perform her ADLs with some assiatnce

## 2023-11-20 NOTE — H&P ADULT - SENSORY
Symmetric to pain , light and crude touch throughout   Absent vibration and proprioception in the lower extremities  No sensory level or saddle anesthesia

## 2023-11-20 NOTE — H&P ADULT - HISTORY OF PRESENT ILLNESS
Patient is a 84 yo female with pmhx of rheumatoid arthritis, HTN, DLD , walks with the help of a walker (rollator) and able to perform ADLs with some assistance presenting today for 3 days of distal numbness and imbalance. Patient states that she got flu shot this past Friday and then on Saturday she cleaned her house and felt extremely tired with right arm pain and rested on a couch then tried getting up after some time but fell back on the couch and thereon, she noted that she had problems with balance and also experienced distal numbness both on her foot and hands. She denies any progression of her sensory symptoms but reports that her balance is worse since onset. She denies any fever chills diarrhoea, focal weakness, speech visual deficits, problems breathing, neck or back pain , swallowing. Patient has baseline urinary incontinence.

## 2023-11-20 NOTE — ED PROVIDER NOTE - CARE PLAN
1 Principal Discharge DX:	Extremity numbness  Secondary Diagnosis:	Weakness of both upper extremities

## 2023-11-20 NOTE — H&P ADULT - MOTOR
There are no tremors, fasciculations or abnormal involuntary movements.  UE exam pain limited but no rigidity noted, normal muscle tone and bulk  LE s noted with marked lymphedema , no rigidity noted    F-N: No obvious incoordination  HKS: Unable to test  RUE 4-/5  LUE 4+/5   (drift noted , exam pain limited)    RLE    LLE  5/5

## 2023-11-20 NOTE — ED PROVIDER NOTE - PHYSICAL EXAMINATION
CONSTITUTIONAL: Well-developed; well-nourished; in no acute distress.   SKIN: warm, dry  HEAD: Normocephalic; atraumatic.  EYES: PERRL, EOMI, no conjunctival erythema  ENT: No nasal discharge; airway clear.  NECK: Supple; non tender.  CARD: S1, S2 normal; no murmurs, gallops, or rubs. Regular rate and rhythm.   RESP: No wheezes, rales or rhonchi.  ABD: soft ntnd  EXT: Normal ROM.  No clubbing, cyanosis or edema.   NEURO: Alert, oriented, dec sensation from knees down bilaterally, as well as bilaterally palms. strength intact in all four extremities initially. FTN bilaterally. CN 2-12 intact.   PSYCH: Cooperative, appropriate.

## 2023-11-20 NOTE — H&P ADULT - MENTAL STATUS
Mental Status: The patient a/o to time, place and situation. The patient was able to give a detailed history. Speech is fluent with good repetition.

## 2023-11-20 NOTE — ED PROVIDER NOTE - CLINICAL SUMMARY MEDICAL DECISION MAKING FREE TEXT BOX
82 yo F w/ hx of asthma, DLD, HTN, chronic incontinence, arthritis p/w decreased sensation to hands and bilateral LEs from knees down. endorses generalized weakness, denies chest pain, sob, fevers. symptoms started on Saturday- she received her flu shot on Friday. exam as documented. labs with elevated MCV, mild anemia. otherwise within normal limits. CT head with chronic infarct. neurology consult appreciated. admitted for further management,

## 2023-11-20 NOTE — ED PROVIDER NOTE - PROGRESS NOTE DETAILS
DC: neurology evaluated patient. MRs ordered. NIF normal per respiratory,   upper extremities weaker on reassessment. NP Minos aware.

## 2023-11-20 NOTE — H&P ADULT - CRANIAL NERVE
CRANIAL NERVES:    II: Visual fields full to confrontation.    III, IV, VI: Extraocular movements full throughout, without nystagmus. No ptosis. Pupils equal, round and react briskly to light and accommodation.    V: Normal sensation to light touch and pinprick bilaterally on the face    VII: no facial droop noted    VIII: Baseline right ear hard of hearing    IX & X: Palate elevates symmetrically bilaterally    XI: shoulder shrug pain limited    XII: Tongue midline without atrophy or fasciculations. No dysarthria.

## 2023-11-20 NOTE — H&P ADULT - GAIT/BALANCE
Patient unable to ambulate currently  DTR: 1+/4 UE              0/4 Bilateral lower extremity (h/o right knee replacement)              Bilateral upgoing toes

## 2023-11-20 NOTE — H&P ADULT - ASSESSMENT
84 yo f with pmhx of rheumatoid arthritis, HTN, DLD , walks with the help of a walker (rollator) and able to perform ADLs with some assistance presenting today for 3 days of distal numbness and imbalance. Patient states that she got flu shot this past Friday and then on Saturday she cleaned her house and felt extremely tired with right arm pain and rested on a couch then tried getting up after some time but fell back on the couch and thereon, she noted that she had problems with balance and also experienced distal numbness both on her foot and hands. She denies any progression of her sensory symptoms but reports that her balance is worse since onset. She denies any fever chills diarrhoea, focal weakness, speech visual deficits, problems breathing, neck or back pain , swallowing. Patient has baseline urinary incontinence. On exam patient has no sensory levels, no focal sensory or motor deficits, no bulbar symptoms and no shortness of breath. Has bilateral UE pain and weakness and diminished reflexes. NIF/VC (-40/2LITERS). Patient is currently not in any acute distress.    # Imbalance and distal numbness x3 days    DDX:  -AIDP  -C spine pathology  -Posterior circulation stroke  -Peripheral neuropathy due to Vitamin deficiencies         Neurological:  - Telemonitoring  - MRI Brain and C spine w w/o contrast  - NIF/VC Q 8 Hrs (call for NIF >-20)  - Neuro Checks q 8 hrs  - Check orthostatic BP (please document corresponding heart rates)  - Check ESR , CRP, Vitamin b12, folate levels, HBAIC, Lipid profile, Lyme antibody,TSH, IgA LEVEL, Ganglioside antibodies including anti GM1, GM2, GD1a, GD1b, and GQ1b.  -Obtain x ray right shoulder  -No LP as of now  - IVIG after imaging   - PT/OT Evaluation    Cardiovascular:  -Telemonitoring  -Maintain normotension and Normovolemia  -Obtain a baseline 12 lead EKG    Pulmonary:  -Monitor closely for respiratory decline  -Keep 02 sat >94%    GI:  -DASH diet  -IV fluids-None      :  Monitor electrolytes and replete as needed      -ID:  Currently afebrile, No concern for infection at this time.    Hematology:  - Initiate sequential stocking  - Heparin sq q 8 hrs for dvt prophylaxis      Code Status: Full code  Case was discussed with neuro attending on call Dr Sunitha Liao   82 yo f with pmhx of rheumatoid arthritis, HTN, DLD , walks with the help of a walker (rollator) and able to perform ADLs with some assistance presenting today for 3 days of distal numbness and imbalance. Patient states that she got flu shot this past Friday and then on Saturday she cleaned her house and felt extremely tired with right arm pain and rested on a couch then tried getting up after some time but fell back on the couch and thereon, she noted that she had problems with balance and also experienced distal numbness both on her foot and hands. She denies any progression of her sensory symptoms but reports that her balance is worse since onset. She denies any fever chills diarrhoea, focal weakness, speech visual deficits, problems breathing, neck or back pain , swallowing. Patient has baseline urinary incontinence. On exam patient has no sensory levels, no focal sensory or motor deficits, no bulbar symptoms and no shortness of breath. Has bilateral UE pain and weakness and diminished reflexes. NIF/VC (-40/2LITERS). Patient is currently not in any acute distress.    # Imbalance and distal numbness x3 days    DDX:  -C spine pathology  -Peripheral neuropathy due to Vitamin deficiencies   -Acute inflammatory demyelinating polyneuropathy  -Posterior circulation stroke      Neurological:  - Telemonitoring  - MRI Brain and C spine w w/o contrast  - NIF/VC Q 8 Hrs (call for NIF >-20)  - Neuro Checks q 8 hrs  - Check orthostatic BP (please document corresponding heart rates)  - Check ESR , CRP, Vitamin b12, folate levels, HBAIC, Lipid profile, Lyme antibody,TSH, IgA LEVEL, Ganglioside antibodies including anti GM1, GM2, GD1a, GD1b, and GQ1b.  -Obtain x ray right shoulder  -No LP as of now  - IVIG after imaging   - PT/OT Evaluation    Cardiovascular:  -Telemonitoring  -Maintain normotension and Normovolemia  -Obtain a baseline 12 lead EKG    Pulmonary:  -Monitor closely for respiratory decline  -Keep 02 sat >94%    GI:  -DASH diet  -IV fluids-None      :  Monitor electrolytes and replete as needed      -ID:  Currently afebrile, No concern for infection at this time.    Hematology:  - Initiate sequential stocking  - Heparin sq q 8 hrs for dvt prophylaxis      Code Status: Full code  Case was discussed with neuro attending on call Dr Sunitha Liao   84 yo f with pmhx of rheumatoid arthritis, HTN, DLD , walks with the help of a walker (rollator) and able to perform ADLs with some assistance presenting today for 3 days of distal numbness and imbalance. Patient states that she got flu shot this past Friday and then on Saturday she cleaned her house and felt extremely tired with right arm pain and rested on a couch then tried getting up after some time but fell back on the couch and thereon, she noted that she had problems with balance and also experienced distal numbness both on her foot and hands. She denies any progression of her sensory symptoms but reports that her balance is worse since onset. She denies any fever chills diarrhoea, focal weakness, speech visual deficits, problems breathing, neck or back pain , swallowing. Patient has baseline urinary incontinence. On exam patient has no sensory levels, no focal sensory or motor deficits, no bulbar symptoms and no shortness of breath. Has bilateral UE pain and weakness and diminished reflexes. NIF/VC (-40/2LITERS). Patient is currently not in any acute distress.    # Imbalance and distal numbness x3 days    DDX:  -C spine pathology  -Peripheral neuropathy due to Vitamin deficiencies   -Acute inflammatory demyelinating polyneuropathy  -Posterior circulation stroke        Neurological:  -CTH: Chronic right occipital stroke  -CTA H/N:Marked calcification at the right and left common carotid artery bifurcations with severe stenosis at the level of the right carotid bulb, and the left CC  bifurcation and at the left carotid bulb.No LVO or aneurysm  - Telemonitoring  - MRI Brain and C spine w w/o contrast  - NIF/VC Q 8 Hrs (call for NIF >-20)  - Neuro Checks q 8 hrs  - Continue ASA 81 mg q daily  -Start Lipitor 40mg q daily  - Check orthostatic BP (please document corresponding heart rates)  - Check ESR , CRP, Vitamin b12, folate levels, HBAIC, Lipid profile, Lyme antibody,TSH, IgA LEVEL, Ganglioside antibodies including anti GM1, GM2, GD1a, GD1b, and GQ1b.  -Obtain x ray right shoulder  - No LP as of now  - IVIG after imaging   - PT/OT Evaluation  - Vascular evaluation for above CTA findings    Cardiovascular:  -Telemonitoring  -Maintain normotension and Normovolemia  -Obtain a baseline 12 lead EKG    Pulmonary:  -Monitor closely for respiratory decline  -Keep 02 sat >94%    GI:  -DASH diet  -IV fluids-None      :  Monitor electrolytes and replete as needed      -ID:  Currently afebrile, No concern for infection at this time.    Hematology:  - Initiate sequential stocking  - Heparin sq q 8 hrs for dvt prophylaxis      Code Status: Full code  Case was discussed with neuro attending on call Dr Sunitha Liao   82 yo f with pmhx of rheumatoid arthritis, HTN, DLD , walks with the help of a walker (rollator) and able to perform ADLs with some assistance presenting today for 3 days of distal numbness and imbalance. Patient states that she got flu shot this past Friday and then on Saturday she cleaned her house and felt extremely tired with right arm pain and rested on a couch then tried getting up after some time but fell back on the couch and thereon, she noted that she had problems with balance and also experienced distal numbness both on her foot and hands. She denies any progression of her sensory symptoms but reports that her balance is worse since onset. She denies any fever chills diarrhoea, focal weakness, speech visual deficits, problems breathing, neck or back pain , swallowing. Patient has baseline urinary incontinence. On exam patient has no sensory levels, no focal sensory or motor deficits, no bulbar symptoms and no shortness of breath. Has bilateral UE pain and weakness and diminished reflexes. NIF/VC (-40/2LITERS). Patient is currently not in any acute distress.    # Imbalance and distal numbness x3 days    DDX:  -C spine pathology  -Peripheral neuropathy due to Vitamin deficiencies   -Acute inflammatory demyelinating polyneuropathy  -Posterior circulation stroke        Neurological:  -CTH: Chronic right occipital stroke  -CTA H/N:Marked calcification at the right and left common carotid artery bifurcations with severe stenosis at the level of the right carotid bulb, and the left CC  bifurcation and at the left carotid bulb.No LVO or aneurysm  - Telemonitoring  - MRI Brain and C spine w w/o contrast  - NIF/VC Q 8 Hrs (call for NIF >-20)  - Neuro Checks q 8 hrs  - Continue ASA 81 mg q daily  -Start Lipitor 40mg q daily  - Check orthostatic BP (please document corresponding heart rates)  - Check ESR , CRP, Vitamin b12, folate levels, HbA1C, Lipid profile, Lyme antibody,TSH, IgA LEVEL, Ganglioside antibodies including anti GM1, GM2, GD1a, GD1b, and GQ1b, SPEP, UPEP, Vitb12, VitB1, Vit-E, Homocystiene, Methylmalonic acid.   -Obtain x ray right shoulder  - No LP as of now  - IVIG after imaging   - PT/OT Evaluation  - Vascular evaluation for above CTA findings    Cardiovascular:  -Telemonitoring  -Maintain normotension and Normovolemia  -Obtain a baseline 12 lead EKG  - H/O Heart murmur: follows with Dr. Patel, supposed to have TTE done on 11/22, need sto be informed    Pulmonary:  -Monitor closely for respiratory decline  -Keep 02 sat >94%    GI:  -DASH diet  -IV fluids-None      :  Monitor electrolytes and replete as needed      -ID:  Abnormal UA but not symptomatic, Currently afebrile, No concern for infection at this time.    Hematology:  - Initiate sequential stocking  - Heparin sq q 8 hrs for dvt prophylaxis      Code Status: Full code  Case was discussed with neuro attending on call Dr Sunitha Liao

## 2023-11-20 NOTE — H&P ADULT - NSHPREVIEWOFSYSTEMS_GEN_ALL_CORE
CONSTITUTIONAL: No acute distress    SKIN: Denies any skin related problems    HEAD: Denies headache, or headache    EYES: Denies eye pain , Blurry vision, or double vision    ENT: Denies ear pain, ear discharge, or tinnitus    NECK: Denies neck pain or radicular pain    CARD: Denies chest pain    RESP: Denies problems with breathing or chest pain.    ABD: Denies abdominal pain nausea or vomiting.    EXT: Reports pain difficulty with ROM of the upper extremities    NEURO: Ambulates with a rollator at baseline    PSYCH: Denies any anxiety, depression or other complaints

## 2023-11-21 LAB
A1C WITH ESTIMATED AVERAGE GLUCOSE RESULT: 5.3 % — SIGNIFICANT CHANGE UP (ref 4–5.6)
A1C WITH ESTIMATED AVERAGE GLUCOSE RESULT: 5.3 % — SIGNIFICANT CHANGE UP (ref 4–5.6)
ANION GAP SERPL CALC-SCNC: 12 MMOL/L — SIGNIFICANT CHANGE UP (ref 7–14)
ANION GAP SERPL CALC-SCNC: 12 MMOL/L — SIGNIFICANT CHANGE UP (ref 7–14)
BASOPHILS # BLD AUTO: 0.03 K/UL — SIGNIFICANT CHANGE UP (ref 0–0.2)
BASOPHILS # BLD AUTO: 0.03 K/UL — SIGNIFICANT CHANGE UP (ref 0–0.2)
BASOPHILS NFR BLD AUTO: 0.8 % — SIGNIFICANT CHANGE UP (ref 0–1)
BASOPHILS NFR BLD AUTO: 0.8 % — SIGNIFICANT CHANGE UP (ref 0–1)
BUN SERPL-MCNC: 16 MG/DL — SIGNIFICANT CHANGE UP (ref 10–20)
BUN SERPL-MCNC: 16 MG/DL — SIGNIFICANT CHANGE UP (ref 10–20)
CALCIUM SERPL-MCNC: 9.3 MG/DL — SIGNIFICANT CHANGE UP (ref 8.4–10.5)
CALCIUM SERPL-MCNC: 9.3 MG/DL — SIGNIFICANT CHANGE UP (ref 8.4–10.5)
CHLORIDE SERPL-SCNC: 104 MMOL/L — SIGNIFICANT CHANGE UP (ref 98–110)
CHLORIDE SERPL-SCNC: 104 MMOL/L — SIGNIFICANT CHANGE UP (ref 98–110)
CHOLEST SERPL-MCNC: 181 MG/DL — SIGNIFICANT CHANGE UP
CHOLEST SERPL-MCNC: 181 MG/DL — SIGNIFICANT CHANGE UP
CK SERPL-CCNC: 84 U/L — SIGNIFICANT CHANGE UP (ref 0–225)
CK SERPL-CCNC: 84 U/L — SIGNIFICANT CHANGE UP (ref 0–225)
CO2 SERPL-SCNC: 29 MMOL/L — SIGNIFICANT CHANGE UP (ref 17–32)
CO2 SERPL-SCNC: 29 MMOL/L — SIGNIFICANT CHANGE UP (ref 17–32)
CREAT SERPL-MCNC: 1.2 MG/DL — SIGNIFICANT CHANGE UP (ref 0.7–1.5)
CREAT SERPL-MCNC: 1.2 MG/DL — SIGNIFICANT CHANGE UP (ref 0.7–1.5)
CRP SERPL-MCNC: <3 MG/L — SIGNIFICANT CHANGE UP
CRP SERPL-MCNC: <3 MG/L — SIGNIFICANT CHANGE UP
EGFR: 45 ML/MIN/1.73M2 — LOW
EGFR: 45 ML/MIN/1.73M2 — LOW
EOSINOPHIL # BLD AUTO: 0.07 K/UL — SIGNIFICANT CHANGE UP (ref 0–0.7)
EOSINOPHIL # BLD AUTO: 0.07 K/UL — SIGNIFICANT CHANGE UP (ref 0–0.7)
EOSINOPHIL NFR BLD AUTO: 1.8 % — SIGNIFICANT CHANGE UP (ref 0–8)
EOSINOPHIL NFR BLD AUTO: 1.8 % — SIGNIFICANT CHANGE UP (ref 0–8)
ERYTHROCYTE [SEDIMENTATION RATE] IN BLOOD: 57 MM/HR — HIGH (ref 0–20)
ERYTHROCYTE [SEDIMENTATION RATE] IN BLOOD: 57 MM/HR — HIGH (ref 0–20)
ESTIMATED AVERAGE GLUCOSE: 105 MG/DL — SIGNIFICANT CHANGE UP (ref 68–114)
ESTIMATED AVERAGE GLUCOSE: 105 MG/DL — SIGNIFICANT CHANGE UP (ref 68–114)
GLUCOSE SERPL-MCNC: 87 MG/DL — SIGNIFICANT CHANGE UP (ref 70–99)
GLUCOSE SERPL-MCNC: 87 MG/DL — SIGNIFICANT CHANGE UP (ref 70–99)
HCT VFR BLD CALC: 35.5 % — LOW (ref 37–47)
HCT VFR BLD CALC: 35.5 % — LOW (ref 37–47)
HDLC SERPL-MCNC: 56 MG/DL — SIGNIFICANT CHANGE UP
HDLC SERPL-MCNC: 56 MG/DL — SIGNIFICANT CHANGE UP
HGB BLD-MCNC: 11.1 G/DL — LOW (ref 12–16)
HGB BLD-MCNC: 11.1 G/DL — LOW (ref 12–16)
IMM GRANULOCYTES NFR BLD AUTO: 0.3 % — SIGNIFICANT CHANGE UP (ref 0.1–0.3)
IMM GRANULOCYTES NFR BLD AUTO: 0.3 % — SIGNIFICANT CHANGE UP (ref 0.1–0.3)
LIPID PNL WITH DIRECT LDL SERPL: 107 MG/DL — HIGH
LIPID PNL WITH DIRECT LDL SERPL: 107 MG/DL — HIGH
LYMPHOCYTES # BLD AUTO: 0.56 K/UL — LOW (ref 1.2–3.4)
LYMPHOCYTES # BLD AUTO: 0.56 K/UL — LOW (ref 1.2–3.4)
LYMPHOCYTES # BLD AUTO: 14.7 % — LOW (ref 20.5–51.1)
LYMPHOCYTES # BLD AUTO: 14.7 % — LOW (ref 20.5–51.1)
MCHC RBC-ENTMCNC: 31.3 G/DL — LOW (ref 32–37)
MCHC RBC-ENTMCNC: 31.3 G/DL — LOW (ref 32–37)
MCHC RBC-ENTMCNC: 32.1 PG — HIGH (ref 27–31)
MCHC RBC-ENTMCNC: 32.1 PG — HIGH (ref 27–31)
MCV RBC AUTO: 102.6 FL — HIGH (ref 81–99)
MCV RBC AUTO: 102.6 FL — HIGH (ref 81–99)
MONOCYTES # BLD AUTO: 0.36 K/UL — SIGNIFICANT CHANGE UP (ref 0.1–0.6)
MONOCYTES # BLD AUTO: 0.36 K/UL — SIGNIFICANT CHANGE UP (ref 0.1–0.6)
MONOCYTES NFR BLD AUTO: 9.5 % — HIGH (ref 1.7–9.3)
MONOCYTES NFR BLD AUTO: 9.5 % — HIGH (ref 1.7–9.3)
NEUTROPHILS # BLD AUTO: 2.77 K/UL — SIGNIFICANT CHANGE UP (ref 1.4–6.5)
NEUTROPHILS # BLD AUTO: 2.77 K/UL — SIGNIFICANT CHANGE UP (ref 1.4–6.5)
NEUTROPHILS NFR BLD AUTO: 72.9 % — SIGNIFICANT CHANGE UP (ref 42.2–75.2)
NEUTROPHILS NFR BLD AUTO: 72.9 % — SIGNIFICANT CHANGE UP (ref 42.2–75.2)
NON HDL CHOLESTEROL: 125 MG/DL — SIGNIFICANT CHANGE UP
NON HDL CHOLESTEROL: 125 MG/DL — SIGNIFICANT CHANGE UP
NRBC # BLD: 0 /100 WBCS — SIGNIFICANT CHANGE UP (ref 0–0)
NRBC # BLD: 0 /100 WBCS — SIGNIFICANT CHANGE UP (ref 0–0)
PLATELET # BLD AUTO: 175 K/UL — SIGNIFICANT CHANGE UP (ref 130–400)
PLATELET # BLD AUTO: 175 K/UL — SIGNIFICANT CHANGE UP (ref 130–400)
PMV BLD: 10.8 FL — HIGH (ref 7.4–10.4)
PMV BLD: 10.8 FL — HIGH (ref 7.4–10.4)
POTASSIUM SERPL-MCNC: 4.1 MMOL/L — SIGNIFICANT CHANGE UP (ref 3.5–5)
POTASSIUM SERPL-MCNC: 4.1 MMOL/L — SIGNIFICANT CHANGE UP (ref 3.5–5)
POTASSIUM SERPL-SCNC: 4.1 MMOL/L — SIGNIFICANT CHANGE UP (ref 3.5–5)
POTASSIUM SERPL-SCNC: 4.1 MMOL/L — SIGNIFICANT CHANGE UP (ref 3.5–5)
RBC # BLD: 3.46 M/UL — LOW (ref 4.2–5.4)
RBC # BLD: 3.46 M/UL — LOW (ref 4.2–5.4)
RBC # FLD: 13.2 % — SIGNIFICANT CHANGE UP (ref 11.5–14.5)
RBC # FLD: 13.2 % — SIGNIFICANT CHANGE UP (ref 11.5–14.5)
SODIUM SERPL-SCNC: 145 MMOL/L — SIGNIFICANT CHANGE UP (ref 135–146)
SODIUM SERPL-SCNC: 145 MMOL/L — SIGNIFICANT CHANGE UP (ref 135–146)
T PALLIDUM AB TITR SER: NEGATIVE — SIGNIFICANT CHANGE UP
T PALLIDUM AB TITR SER: NEGATIVE — SIGNIFICANT CHANGE UP
TRIGL SERPL-MCNC: 89 MG/DL — SIGNIFICANT CHANGE UP
TRIGL SERPL-MCNC: 89 MG/DL — SIGNIFICANT CHANGE UP
TSH SERPL-MCNC: 2.19 UIU/ML — SIGNIFICANT CHANGE UP (ref 0.27–4.2)
TSH SERPL-MCNC: 2.19 UIU/ML — SIGNIFICANT CHANGE UP (ref 0.27–4.2)
VIT B12 SERPL-MCNC: 413 PG/ML — SIGNIFICANT CHANGE UP (ref 232–1245)
VIT B12 SERPL-MCNC: 413 PG/ML — SIGNIFICANT CHANGE UP (ref 232–1245)
WBC # BLD: 3.8 K/UL — LOW (ref 4.8–10.8)
WBC # BLD: 3.8 K/UL — LOW (ref 4.8–10.8)
WBC # FLD AUTO: 3.8 K/UL — LOW (ref 4.8–10.8)
WBC # FLD AUTO: 3.8 K/UL — LOW (ref 4.8–10.8)

## 2023-11-21 PROCEDURE — 93306 TTE W/DOPPLER COMPLETE: CPT | Mod: 26

## 2023-11-21 PROCEDURE — 70553 MRI BRAIN STEM W/O & W/DYE: CPT | Mod: 26

## 2023-11-21 PROCEDURE — 72156 MRI NECK SPINE W/O & W/DYE: CPT | Mod: 26

## 2023-11-21 PROCEDURE — 99223 1ST HOSP IP/OBS HIGH 75: CPT

## 2023-11-21 PROCEDURE — 73030 X-RAY EXAM OF SHOULDER: CPT | Mod: 26,50

## 2023-11-21 RX ORDER — CLOPIDOGREL BISULFATE 75 MG/1
75 TABLET, FILM COATED ORAL DAILY
Refills: 0 | Status: DISCONTINUED | OUTPATIENT
Start: 2023-11-21 | End: 2023-11-24

## 2023-11-21 RX ORDER — HEPARIN SODIUM 5000 [USP'U]/ML
5000 INJECTION INTRAVENOUS; SUBCUTANEOUS EVERY 12 HOURS
Refills: 0 | Status: DISCONTINUED | OUTPATIENT
Start: 2023-11-21 | End: 2023-11-30

## 2023-11-21 RX ORDER — ATORVASTATIN CALCIUM 80 MG/1
80 TABLET, FILM COATED ORAL AT BEDTIME
Refills: 0 | Status: DISCONTINUED | OUTPATIENT
Start: 2023-11-21 | End: 2023-11-30

## 2023-11-21 RX ADMIN — LOSARTAN POTASSIUM 100 MILLIGRAM(S): 100 TABLET, FILM COATED ORAL at 05:55

## 2023-11-21 RX ADMIN — Medication 81 MILLIGRAM(S): at 11:35

## 2023-11-21 RX ADMIN — PANTOPRAZOLE SODIUM 40 MILLIGRAM(S): 20 TABLET, DELAYED RELEASE ORAL at 06:24

## 2023-11-21 RX ADMIN — Medication 5 MILLIGRAM(S): at 05:51

## 2023-11-21 RX ADMIN — Medication 5 MILLIGRAM(S): at 17:42

## 2023-11-21 RX ADMIN — CLOPIDOGREL BISULFATE 75 MILLIGRAM(S): 75 TABLET, FILM COATED ORAL at 21:56

## 2023-11-21 RX ADMIN — ATENOLOL 50 MILLIGRAM(S): 25 TABLET ORAL at 05:51

## 2023-11-21 RX ADMIN — ATORVASTATIN CALCIUM 80 MILLIGRAM(S): 80 TABLET, FILM COATED ORAL at 21:56

## 2023-11-21 RX ADMIN — HEPARIN SODIUM 5000 UNIT(S): 5000 INJECTION INTRAVENOUS; SUBCUTANEOUS at 17:42

## 2023-11-21 RX ADMIN — Medication 1 MILLIGRAM(S): at 11:35

## 2023-11-21 NOTE — PROGRESS NOTE ADULT - ASSESSMENT
82 yo f with pmhx of OA, HTN, DLD , walks with the help of a walker (rollator) and able to perform ADLs with some assistance presenting today for 3 days of acute onset distal numbness and imbalance. Pt denies any progression of her sensory symptoms but reports that her balance is worse since onset. Found to have an acute L thalamic lacunar infarct on admission. As a whole, symptoms do not match stroke localization. 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           Neurological:  -CTH: Chronic right occipital stroke  -CTA H/N:Marked calcification at the right and left common carotid artery bifurcations with severe stenosis at the level of the right carotid bulb, and the left CC  bifurcation and at the left carotid bulb.No LVO or aneurysm  - MRI Brain: Left thalamic acute lacunar infarct. No acute hemorrhage. Transferred to stroke service to complete workup.  - Telemonitoring  - NIF/VC Q 8 Hrs (call for NIF >-20)  - Neuro Checks q 8 hrs  - Continue ASA 81 mg q daily  - TTE pending  - Started on atrovastatin 80mg once daily and Plavix 75mg qd  - Check orthostatic BP (please document corresponding heart rates)  - ESR 57 , CRP <3.0, Vitamin b12, folate levels, HbA1C 5.3, , Lyme antibody, TSH 2.19, IgA LEVEL, Ganglioside antibodies including anti GM1, GM2, GD1a, GD1b, and GQ1b, SPEP, UPEP, Vitb12, VitB1, Vit-E, Homocystiene, Methylmalonic acid. Copper, Zinc, HIV, RPR   - Obtain x ray right shoulder  - IVIG and LP on hold for now until better characterization of the possible diagnoses.  - PT/OT Evaluation      Cardiovascular:  -Telemonitoring  -Maintain normotension and Normovolemia  - H/O Heart murmur: follows with Dr. Patel, supposed to have TTE done on 11/22, need sto be informed    Pulmonary:  -Monitor closely for respiratory decline  -Keep 02 sat >94%    GI:  -DASH diet  -IV fluids-None      :  Monitor electrolytes and replete as needed      -ID:  Abnormal UA but not symptomatic, Currently afebrile, No concern for infection at this time.    Hematology:  - Initiate sequential stocking  - Heparin sq q 8 hrs for dvt prophylaxis

## 2023-11-21 NOTE — PROGRESS NOTE ADULT - SUBJECTIVE AND OBJECTIVE BOX
Neurology Progress Note    Interval History:    No acute events over night. Patient reports that her legs feel stronger than before.     HPI:  Patient is a 82 yo female with pmhx of osteoarthritis pt reported being dx with RA and taking rasburicase for 7 years, but recently was re-evaluated by rheum and told she has OA instead), HTN, DLD , walks with the help of a walker (rollator) and able to perform ADLs with some assistance presenting yesterday for 3 days of distal numbness and imbalance. Patient states that she got flu shot this past Friday and then on Saturday she cleaned her house and felt extremely tired with right arm pain and rested on a couch then tried getting up after some time but fell back on the couch and thereon, she noted that she had problems with balance and also experienced distal numbness both on her foot and hands. She denies any progression of her sensory symptoms but reports that her balance is worse since onset. She denies any fever chills diarrhoea, focal weakness, speech visual deficits, problems breathing, neck or back pain , swallowing. Patient has baseline urinary incontinence.        PAST MEDICAL & SURGICAL HISTORY:  Rheumatoid arthritis    Hyperlipemia    Hypertension    Stress incontinence    Asthma    S/P total knee replacement, right            Medications:  albuterol    90 MICROgram(s) HFA Inhaler 2 Puff(s) Inhalation every 6 hours PRN  aspirin enteric coated 81 milliGRAM(s) Oral daily  atenolol  Tablet 50 milliGRAM(s) Oral daily  folic acid 1 milliGRAM(s) Oral daily  losartan 100 milliGRAM(s) Oral daily  oxybutynin 5 milliGRAM(s) Oral two times a day  pantoprazole    Tablet 40 milliGRAM(s) Oral before breakfast  simvastatin 20 milliGRAM(s) Oral at bedtime      Vital Signs Last 24 Hrs  T(C): 37 (21 Nov 2023 16:04), Max: 37 (21 Nov 2023 16:04)  T(F): 98.6 (21 Nov 2023 16:04), Max: 98.6 (21 Nov 2023 16:04)  HR: 61 (21 Nov 2023 16:04) (61 - 96)  BP: 161/70 (21 Nov 2023 16:04) (136/68 - 216/84)  BP(mean): 94 (21 Nov 2023 07:33) (94 - 111)  RR: 18 (21 Nov 2023 16:04) (18 - 18)  SpO2: 96% (21 Nov 2023 16:04) (93% - 100%)    Parameters below as of 21 Nov 2023 16:04  Patient On (Oxygen Delivery Method): room air        Neurological Exam:   Mental status: Awake, alert and oriented x3.  Recent and remote memory intact.  Naming, repetition and comprehension intact.  Attention/concentration intact.  No dysarthria, no aphasia.  Fund of knowledge appropriate.    Cranial nerves: Pupils equally round and reactive to light, visual fields full, no nystagmus, extraocular muscles intact, V1 through V3 intact bilaterally and symmetric, face symmetric, hearing intact to finger rub, palate elevation symmetric, tongue was midline.  Motor:  L mild pronator drift. 4+/5 R arm flexion and extension, 4/5 L arm flexion an extension. 4+/5 R hip flexion 4 L hip flexion. 4+/5 R knee flexion 4/5 L knee flexion. 4+ R Dorsiflexion R 5/5 plantar flexion. 4 L Dorsiflexion L 5/5 plantar flexion.   Sensation: Intact to light touch, proprioception, and pinprick on b/l UE. Absent pinprick on LUE from toe to mid shin. Severely decrease temp sensation on b/l feet, Absent vibration on b/l LE. No propioception on LLE, diminshed propioception in RLE  Coordination: No dysmetria on finger-to-nose and heel-to-shin.  No dysdiadokinesia.  Reflexes: 2+ in bilateral UE/LE, Upgoing toes bilaterally. (-) Garcia. 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   Gait: deferred    Labs:  CBC Full  -  ( 21 Nov 2023 08:32 )  WBC Count : 3.80 K/uL  RBC Count : 3.46 M/uL  Hemoglobin : 11.1 g/dL  Hematocrit : 35.5 %  Platelet Count - Automated : 175 K/uL  Mean Cell Volume : 102.6 fL  Mean Cell Hemoglobin : 32.1 pg  Mean Cell Hemoglobin Concentration : 31.3 g/dL  Auto Neutrophil # : 2.77 K/uL  Auto Lymphocyte # : 0.56 K/uL  Auto Monocyte # : 0.36 K/uL  Auto Eosinophil # : 0.07 K/uL  Auto Basophil # : 0.03 K/uL  Auto Neutrophil % : 72.9 %  Auto Lymphocyte % : 14.7 %  Auto Monocyte % : 9.5 %  Auto Eosinophil % : 1.8 %  Auto Basophil % : 0.8 %    11-21    145  |  104  |  16  ----------------------------<  87  4.1   |  29  |  1.2    Ca    9.3      21 Nov 2023 08:32    TPro  7.0  /  Alb  4.1  /  TBili  0.6  /  DBili  x   /  AST  20  /  ALT  12  /  AlkPhos  70  11-20    LIVER FUNCTIONS - ( 20 Nov 2023 18:08 )  Alb: 4.1 g/dL / Pro: 7.0 g/dL / ALK PHOS: 70 U/L / ALT: 12 U/L / AST: 20 U/L / GGT: x           PT/INR - ( 20 Nov 2023 18:08 )   PT: 11.50 sec;   INR: 1.01 ratio         PTT - ( 20 Nov 2023 18:08 )  PTT:29.8 sec  Urinalysis Basic - ( 21 Nov 2023 08:32 )    Color: x / Appearance: x / SG: x / pH: x  Gluc: 87 mg/dL / Ketone: x  / Bili: x / Urobili: x   Blood: x / Protein: x / Nitrite: x   Leuk Esterase: x / RBC: x / WBC x   Sq Epi: x / Non Sq Epi: x / Bacteria: x        RADIOLOGY    < from: MR Cervical Spine w/wo IV Cont (11.21.23 @ 11:15) >    IMPRESSION:    1.  Multilevel degenerative changes with moderate/severe spinal stenosis   at C5-6 and C6-7. Mild spinal cord flattening at both levels, without   associated spinal cord signal abnormality.    2.  Multilevel moderate/severe neural foraminal stenosis.      < end of copied text >  < from: MR Head w/wo IV Cont (11.21.23 @ 11:15) >    IMPRESSION:  Left thalamic acute lacunar infarct. No acute hemorrhage.    < end of copied text >  < from: CT Angio Neck w/ IV Cont (11.20.23 @ 19:54) >  CTA:    Marked calcification at the right and left common carotid artery   bifurcations with severe stenosis at the level of the right carotid bulb,   and the left CC bifurcation and at the left carotid bulb.    No evidence of major vascular occlusion or aneurysm.    < end of copied text >

## 2023-11-22 ENCOUNTER — TRANSCRIPTION ENCOUNTER (OUTPATIENT)
Age: 83
End: 2023-11-22

## 2023-11-22 LAB
ANION GAP SERPL CALC-SCNC: 14 MMOL/L — SIGNIFICANT CHANGE UP (ref 7–14)
ANION GAP SERPL CALC-SCNC: 14 MMOL/L — SIGNIFICANT CHANGE UP (ref 7–14)
BASOPHILS # BLD AUTO: 0.04 K/UL — SIGNIFICANT CHANGE UP (ref 0–0.2)
BASOPHILS # BLD AUTO: 0.04 K/UL — SIGNIFICANT CHANGE UP (ref 0–0.2)
BASOPHILS NFR BLD AUTO: 1 % — SIGNIFICANT CHANGE UP (ref 0–1)
BASOPHILS NFR BLD AUTO: 1 % — SIGNIFICANT CHANGE UP (ref 0–1)
BUN SERPL-MCNC: 18 MG/DL — SIGNIFICANT CHANGE UP (ref 10–20)
BUN SERPL-MCNC: 18 MG/DL — SIGNIFICANT CHANGE UP (ref 10–20)
CALCIUM SERPL-MCNC: 8.9 MG/DL — SIGNIFICANT CHANGE UP (ref 8.4–10.5)
CALCIUM SERPL-MCNC: 8.9 MG/DL — SIGNIFICANT CHANGE UP (ref 8.4–10.5)
CHLORIDE SERPL-SCNC: 102 MMOL/L — SIGNIFICANT CHANGE UP (ref 98–110)
CHLORIDE SERPL-SCNC: 102 MMOL/L — SIGNIFICANT CHANGE UP (ref 98–110)
CO2 SERPL-SCNC: 26 MMOL/L — SIGNIFICANT CHANGE UP (ref 17–32)
CO2 SERPL-SCNC: 26 MMOL/L — SIGNIFICANT CHANGE UP (ref 17–32)
CREAT SERPL-MCNC: 1.2 MG/DL — SIGNIFICANT CHANGE UP (ref 0.7–1.5)
CREAT SERPL-MCNC: 1.2 MG/DL — SIGNIFICANT CHANGE UP (ref 0.7–1.5)
CULTURE RESULTS: SIGNIFICANT CHANGE UP
CULTURE RESULTS: SIGNIFICANT CHANGE UP
EGFR: 45 ML/MIN/1.73M2 — LOW
EGFR: 45 ML/MIN/1.73M2 — LOW
EOSINOPHIL # BLD AUTO: 0.18 K/UL — SIGNIFICANT CHANGE UP (ref 0–0.7)
EOSINOPHIL # BLD AUTO: 0.18 K/UL — SIGNIFICANT CHANGE UP (ref 0–0.7)
EOSINOPHIL NFR BLD AUTO: 4.5 % — SIGNIFICANT CHANGE UP (ref 0–8)
EOSINOPHIL NFR BLD AUTO: 4.5 % — SIGNIFICANT CHANGE UP (ref 0–8)
FERRITIN SERPL-MCNC: 293 NG/ML — SIGNIFICANT CHANGE UP (ref 13–330)
FERRITIN SERPL-MCNC: 293 NG/ML — SIGNIFICANT CHANGE UP (ref 13–330)
FOLATE SERPL-MCNC: >20 NG/ML — SIGNIFICANT CHANGE UP
FOLATE SERPL-MCNC: >20 NG/ML — SIGNIFICANT CHANGE UP
GLUCOSE SERPL-MCNC: 113 MG/DL — HIGH (ref 70–99)
GLUCOSE SERPL-MCNC: 113 MG/DL — HIGH (ref 70–99)
HCT VFR BLD CALC: 36.8 % — LOW (ref 37–47)
HCT VFR BLD CALC: 36.8 % — LOW (ref 37–47)
HCYS SERPL-MCNC: 10.9 UMOL/L — SIGNIFICANT CHANGE UP
HCYS SERPL-MCNC: 10.9 UMOL/L — SIGNIFICANT CHANGE UP
HGB BLD-MCNC: 11.2 G/DL — LOW (ref 12–16)
HGB BLD-MCNC: 11.2 G/DL — LOW (ref 12–16)
HIV 1+2 AB+HIV1 P24 AG SERPL QL IA: SIGNIFICANT CHANGE UP
HIV 1+2 AB+HIV1 P24 AG SERPL QL IA: SIGNIFICANT CHANGE UP
IMM GRANULOCYTES NFR BLD AUTO: 0.3 % — SIGNIFICANT CHANGE UP (ref 0.1–0.3)
IMM GRANULOCYTES NFR BLD AUTO: 0.3 % — SIGNIFICANT CHANGE UP (ref 0.1–0.3)
LYMPHOCYTES # BLD AUTO: 0.79 K/UL — LOW (ref 1.2–3.4)
LYMPHOCYTES # BLD AUTO: 0.79 K/UL — LOW (ref 1.2–3.4)
LYMPHOCYTES # BLD AUTO: 19.8 % — LOW (ref 20.5–51.1)
LYMPHOCYTES # BLD AUTO: 19.8 % — LOW (ref 20.5–51.1)
MAGNESIUM SERPL-MCNC: 1.9 MG/DL — SIGNIFICANT CHANGE UP (ref 1.8–2.4)
MAGNESIUM SERPL-MCNC: 1.9 MG/DL — SIGNIFICANT CHANGE UP (ref 1.8–2.4)
MCHC RBC-ENTMCNC: 30.4 G/DL — LOW (ref 32–37)
MCHC RBC-ENTMCNC: 30.4 G/DL — LOW (ref 32–37)
MCHC RBC-ENTMCNC: 31.3 PG — HIGH (ref 27–31)
MCHC RBC-ENTMCNC: 31.3 PG — HIGH (ref 27–31)
MCV RBC AUTO: 102.8 FL — HIGH (ref 81–99)
MCV RBC AUTO: 102.8 FL — HIGH (ref 81–99)
MONOCYTES # BLD AUTO: 0.38 K/UL — SIGNIFICANT CHANGE UP (ref 0.1–0.6)
MONOCYTES # BLD AUTO: 0.38 K/UL — SIGNIFICANT CHANGE UP (ref 0.1–0.6)
MONOCYTES NFR BLD AUTO: 9.5 % — HIGH (ref 1.7–9.3)
MONOCYTES NFR BLD AUTO: 9.5 % — HIGH (ref 1.7–9.3)
NEUTROPHILS # BLD AUTO: 2.58 K/UL — SIGNIFICANT CHANGE UP (ref 1.4–6.5)
NEUTROPHILS # BLD AUTO: 2.58 K/UL — SIGNIFICANT CHANGE UP (ref 1.4–6.5)
NEUTROPHILS NFR BLD AUTO: 64.9 % — SIGNIFICANT CHANGE UP (ref 42.2–75.2)
NEUTROPHILS NFR BLD AUTO: 64.9 % — SIGNIFICANT CHANGE UP (ref 42.2–75.2)
NRBC # BLD: 0 /100 WBCS — SIGNIFICANT CHANGE UP (ref 0–0)
NRBC # BLD: 0 /100 WBCS — SIGNIFICANT CHANGE UP (ref 0–0)
PLATELET # BLD AUTO: 142 K/UL — SIGNIFICANT CHANGE UP (ref 130–400)
PLATELET # BLD AUTO: 142 K/UL — SIGNIFICANT CHANGE UP (ref 130–400)
PMV BLD: 11.8 FL — HIGH (ref 7.4–10.4)
PMV BLD: 11.8 FL — HIGH (ref 7.4–10.4)
POTASSIUM SERPL-MCNC: 4.2 MMOL/L — SIGNIFICANT CHANGE UP (ref 3.5–5)
POTASSIUM SERPL-MCNC: 4.2 MMOL/L — SIGNIFICANT CHANGE UP (ref 3.5–5)
POTASSIUM SERPL-SCNC: 4.2 MMOL/L — SIGNIFICANT CHANGE UP (ref 3.5–5)
POTASSIUM SERPL-SCNC: 4.2 MMOL/L — SIGNIFICANT CHANGE UP (ref 3.5–5)
PROT SERPL-MCNC: 6.3 G/DL — SIGNIFICANT CHANGE UP (ref 6–8.3)
PROT SERPL-MCNC: 6.3 G/DL — SIGNIFICANT CHANGE UP (ref 6–8.3)
RBC # BLD: 3.58 M/UL — LOW (ref 4.2–5.4)
RBC # BLD: 3.58 M/UL — LOW (ref 4.2–5.4)
RBC # FLD: 13.2 % — SIGNIFICANT CHANGE UP (ref 11.5–14.5)
RBC # FLD: 13.2 % — SIGNIFICANT CHANGE UP (ref 11.5–14.5)
SODIUM SERPL-SCNC: 142 MMOL/L — SIGNIFICANT CHANGE UP (ref 135–146)
SODIUM SERPL-SCNC: 142 MMOL/L — SIGNIFICANT CHANGE UP (ref 135–146)
SPECIMEN SOURCE: SIGNIFICANT CHANGE UP
SPECIMEN SOURCE: SIGNIFICANT CHANGE UP
T PALLIDUM AB TITR SER: NEGATIVE — SIGNIFICANT CHANGE UP
T PALLIDUM AB TITR SER: NEGATIVE — SIGNIFICANT CHANGE UP
WBC # BLD: 3.98 K/UL — LOW (ref 4.8–10.8)
WBC # BLD: 3.98 K/UL — LOW (ref 4.8–10.8)
WBC # FLD AUTO: 3.98 K/UL — LOW (ref 4.8–10.8)
WBC # FLD AUTO: 3.98 K/UL — LOW (ref 4.8–10.8)

## 2023-11-22 PROCEDURE — 99232 SBSQ HOSP IP/OBS MODERATE 35: CPT

## 2023-11-22 RX ADMIN — PANTOPRAZOLE SODIUM 40 MILLIGRAM(S): 20 TABLET, DELAYED RELEASE ORAL at 06:41

## 2023-11-22 RX ADMIN — CLOPIDOGREL BISULFATE 75 MILLIGRAM(S): 75 TABLET, FILM COATED ORAL at 12:35

## 2023-11-22 RX ADMIN — Medication 1 MILLIGRAM(S): at 12:35

## 2023-11-22 RX ADMIN — Medication 5 MILLIGRAM(S): at 18:17

## 2023-11-22 RX ADMIN — HEPARIN SODIUM 5000 UNIT(S): 5000 INJECTION INTRAVENOUS; SUBCUTANEOUS at 18:18

## 2023-11-22 RX ADMIN — HEPARIN SODIUM 5000 UNIT(S): 5000 INJECTION INTRAVENOUS; SUBCUTANEOUS at 06:41

## 2023-11-22 RX ADMIN — ATORVASTATIN CALCIUM 80 MILLIGRAM(S): 80 TABLET, FILM COATED ORAL at 21:06

## 2023-11-22 RX ADMIN — LOSARTAN POTASSIUM 100 MILLIGRAM(S): 100 TABLET, FILM COATED ORAL at 06:40

## 2023-11-22 RX ADMIN — ATENOLOL 50 MILLIGRAM(S): 25 TABLET ORAL at 06:41

## 2023-11-22 RX ADMIN — Medication 81 MILLIGRAM(S): at 12:35

## 2023-11-22 NOTE — PHYSICAL THERAPY INITIAL EVALUATION ADULT - ADDITIONAL COMMENTS
Pt lives in house with no steps to enter, no steps inside. Pt has home health aide or family present 24/7. Pt ambulates with assistance of aide or family member instead of assistive device. Pt family reports approx 3 falls in the last few months and worsening pt balance.
as per pt, she was independent with independent and ambulation prior to this admission

## 2023-11-22 NOTE — DISCHARGE NOTE NURSING/CASE MANAGEMENT/SOCIAL WORK - PATIENT PORTAL LINK FT
You can access the FollowMyHealth Patient Portal offered by [facility] by registering at the following website: [email or web address]. By joining [employer]’s FollowMyHealth portal, you will also be able to view your health information using other applications (apps) compatible with our system.

## 2023-11-22 NOTE — PHYSICAL THERAPY INITIAL EVALUATION ADULT - SPECIFY REASON(S)
1315 pm PT attempted (2nd attempt) to see pt for eval however, pt + bedpan at bedside, unable to participate with therapy at this time . Will f/u.
1000 am PT attempted to see pt, however upon PT set up, pt needed to use the bedpan. Pt was assisted in bed,  PCA in ED was notified. Will f/u to complete the eval.

## 2023-11-22 NOTE — OCCUPATIONAL THERAPY INITIAL EVALUATION ADULT - PERTINENT HX OF CURRENT PROBLEM, REHAB EVAL
84 yo f with pmhx of OA, HTN, DLD , walks with the help of a walker (rollator) and able to perform ADLs with some assistance presenting today for 3 days of acute onset distal numbness and imbalance. Pt denies any progression of her sensory symptoms but reports that her balance is worse since onset. Found to have an acute L thalamic lacunar infarct on admission. As a whole, symptoms do not match stroke localization. 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

## 2023-11-22 NOTE — OCCUPATIONAL THERAPY INITIAL EVALUATION ADULT - ADL RETRAINING, OT EVAL
In one week, pt will demonstrate UB dressing with mod assist with use of AE as needed. In one week, pt will demonstrate son/doff pants with partial assist. In one week, pt will demonstrate don/doff socks with supervision with set up with use of AE as needed.

## 2023-11-22 NOTE — PROGRESS NOTE ADULT - ASSESSMENT
** INCOMPLETE NOTE ***    84 yo f with pmhx of OA, HTN, DLD , walks with the help of a walker (rollator) and able to perform ADLs with some assistance presenting today for 3 days of acute onset distal numbness and imbalance. Pt denies any progression of her sensory symptoms but reports that her balance is worse since onset. Found to have an acute L thalamic lacunar infarct on admission. As a whole, symptoms do not match stroke localization. 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           Neurological:  -CTH: Chronic right occipital stroke  -CTA H/N:Marked calcification at the right and left common carotid artery bifurcations with severe stenosis at the level of the right carotid bulb, and the left CC  bifurcation and at the left carotid bulb.No LVO or aneurysm  - MRI Brain: Left thalamic acute lacunar infarct. No acute hemorrhage. Transferred to stroke service to complete workup.  - Telemonitoring  - NIF/VC Q 8 Hrs (call for NIF >-20)  - Neuro Checks q 8 hrs  - Continue ASA 81 mg q daily  - TTE pending  - Started on atrovastatin 80mg once daily and Plavix 75mg qd  - Check orthostatic BP (please document corresponding heart rates)  - ESR 57 , CRP <3.0, Vitamin b12, folate levels, HbA1C 5.3, , Lyme antibody, TSH 2.19, IgA LEVEL, Ganglioside antibodies including anti GM1, GM2, GD1a, GD1b, and GQ1b, SPEP, UPEP, Vitb12, VitB1, Vit-E, Homocystiene, Methylmalonic acid. Copper, Zinc, HIV, RPR   - Obtain x ray right shoulder  - IVIG and LP on hold for now until better characterization of the possible diagnoses.  - PT/OT Evaluation      Cardiovascular:  -Telemonitoring  -Maintain normotension and Normovolemia  - H/O Heart murmur: follows with Dr. Patel, supposed to have TTE done on 11/22, need sto be informed    Pulmonary:  -Monitor closely for respiratory decline  -Keep 02 sat >94%    GI:  -DASH diet  -IV fluids-None      :  Monitor electrolytes and replete as needed      -ID:  Abnormal UA but not symptomatic, Currently afebrile, No concern for infection at this time.    Hematology:  - Initiate sequential stocking  - Heparin sq q 8 hrs for dvt prophylaxis         82 yo f with pmhx of OA, HTN, DLD , walks with the help of a walker (rollator) and able to perform ADLs with some assistance presenting today for 3 days of acute onset distal numbness and imbalance. Pt denies any progression of her sensory symptoms but reports that her balance is worse since onset. Found to have an acute L thalamic lacunar infarct on admission. As a whole, symptoms do not match stroke localization. 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       Neurological:  -CTH: Chronic right occipital stroke  -CTA H/N:Marked calcification at the right and left common carotid artery bifurcations with severe stenosis at the level of the right carotid bulb, and the left CC  bifurcation and at the left carotid bulb. No LVO or aneurysm  - NI consulted to evaluate carotid stenosis. f/u recs  - MRI Brain: Left thalamic acute lacunar infarct. No acute hemorrhage. Transferred to stroke service to complete workup.  - Telemonitoring  - NIF/VC Q 8 Hrs (call for NIF >-20)  - Neuro Checks q 8 hrs  - Continue ASA 81 mg once daily and Plavix 75mg once daily. Patient will stay on that regimen for 90 days.  - TTE showing 50-55% EF, with moderate to severe Aortic stenosis.   - Started on atorvastatin 80mg once daily and Plavix 75mg qd  - Check orthostatic BP (please document corresponding heart rates)  - ESR 57 , CRP <3.0, Vitamin b12, folate >20, HbA1C 5.3, , Lyme antibody, TSH 2.19, IgA LEVEL, Ganglioside antibodies including anti GM1, GM2, GD1a, GD1b, and GQ1b, SPEP, UPEP, Vitb12 413, VitB1, Vit-E, Homocysteine, Methylmalonic acid. Copper, Zinc, HIV (-), RPR (-)   - Obtain x ray right shoulder (pending)  - IVIG and LP on hold for now until better characterization of the possible diagnoses.  - PT/OT Evaluation Pending (attempted but patient using bedpan, will return). Anticipated d/c to 4A (pending approval)      Cardiovascular:  -Telemonitoring  -Maintain normotension and Normovolemia  - H/O Heart murmur: follows with Dr. Patel, supposed to have TTE done on 11/22, need sto be informed  - Will Attempt to reach pt's outp cardiologist to obtain past TTE studies.      Pulmonary:  -Monitor closely for respiratory decline  -Keep 02 sat >94%    GI:  -DASH diet  -IV fluids-None      :  Monitor electrolytes and replete as needed      -ID:  Abnormal UA but not symptomatic, Currently afebrile, No concern for infection at this time.    Hematology:  - c/w sequential stocking  - Heparin sq q 8 hrs for dvt prophylaxis

## 2023-11-22 NOTE — PHYSICAL THERAPY INITIAL EVALUATION ADULT - PERTINENT HX OF CURRENT PROBLEM, REHAB EVAL
84 yo female with pmhx of rheumatoid arthritis, HTN, DLD , walks with the help of a walker (rollator) and able to perform ADLs with some assistance presenting today for 3 days of distal numbness and imbalance. Patient states that she got flu shot this past Friday and then on Saturday she cleaned her house and felt extremely tired with right arm pain and rested on a couch then tried getting up after some time but fell back on the couch and thereon, she noted that she had problems with balance and also experienced distal numbness both on her foot and hands. She denies any progression of her sensory symptoms but reports that her balance is worse since onset. She denies any fever chills diarrhoea, focal weakness, speech visual deficits, problems breathing, neck or back pain , swallowing. Patient has baseline urinary incontinence.

## 2023-11-22 NOTE — DISCHARGE NOTE PROVIDER - HOSPITAL COURSE
Patient is a 84 yo female with pmhx of rheumatoid arthritis?, HTN, DLD , walks with the help of a walker (rollator) and able to perform ADLs with some assistance presenting today for 3 days of distal numbness and imbalance. Patient states that she got flu shot this past Friday and then on Saturday she cleaned her house and felt extremely tired with right arm pain and rested on a couch then tried getting up after some time but fell back on the couch and thereon, she noted that she had problems with balance and also experienced distal numbness both on her foot and hands. She denies any progression of her sensory symptoms but reports that her balance is worse since onset. She denies any fever chills diarrhoea, focal weakness, speech visual deficits, problems breathing, neck or back pain , swallowing. Patient has baseline urinary incontinence.       Hospital Course:   Found to have an acute L thalamic lacunar infarct on admission. As a whole, symptoms do not match stroke localization. 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       Neurological:  -CTH: Chronic right occipital stroke  -CTA H/N:Marked calcification at the right and left common carotid artery bifurcations with severe stenosis at the level of the right carotid bulb, and the left CC  bifurcation and at the left carotid bulb. No LVO or aneurysm  - NI consulted to evaluate carotid stenosis.   - MRI Brain: Left thalamic acute lacunar infarct. No acute hemorrhage.   - On ASA 81 mg once daily, started  Plavix 75mg once daily. Patient will stay on that regimen for 90 days.  - TTE showing 50-55% EF, with moderate to severe Aortic stenosis.   - Started on atorvastatin 80mg once daily  - Unlikely to be subacute combined degeneration given B12 levels >400  -  IgA LEVEL, Ganglioside antibodies including anti GM1, GM2, GD1a, GD1b, and GQ1b, SPEP, UPEP, Vitb12 413, VitB1, Vit-E, Homocysteine, Methylmalonic acid. Copper, Zinc, still pending   - HIV (-), RPR (-)   - PT recommends rehab      Cardiovascular:  - H/O Heart murmur: follows with Dr. Patel, supposed to have TTE done on 11/22, need to be informed  - Will Attempt to reach pt's outp cardiologist to obtain past TTE studies.      Pulmonary:  -Monitor closely for respiratory decline           Patient is a 82 yo female with pmhx of rheumatoid arthritis?, HTN, DLD , walks with the help of a walker (rollator) and able to perform ADLs with some assistance presenting today for 3 days of distal numbness and imbalance. Patient states that she got flu shot this past Friday and then on Saturday she cleaned her house and felt extremely tired with right arm pain and rested on a couch then tried getting up after some time but fell back on the couch and thereon, she noted that she had problems with balance and also experienced distal numbness both on her foot and hands. She denies any progression of her sensory symptoms but reports that her balance is worse since onset. She denies any fever chills diarrhoea, focal weakness, speech visual deficits, problems breathing, neck or back pain , swallowing. Patient has baseline urinary incontinence.       Hospital Course:   Found to have an acute L thalamic lacunar infarct on admission. As a whole, symptoms do not match stroke localization. 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       Neurological:  -CTH: Chronic right occipital stroke  -CTA H/N:Marked calcification at the right and left common carotid artery bifurcations with severe stenosis at the level of the right carotid bulb, and the left CC  bifurcation and at the left carotid bulb. No LVO or aneurysm  - NI consulted to evaluate carotid stenosis.   - MRI Brain: Left thalamic acute lacunar infarct. No acute hemorrhage.   - On ASA 81 mg once daily, started  Plavix 75mg once daily. Patient will stay on that regimen for 90 days.  - TTE showing 50-55% EF, with moderate to severe Aortic stenosis.   - Started on atorvastatin 80mg once daily  - Unlikely to be subacute combined degeneration given B12 levels >400  -  IgA LEVEL, Ganglioside antibodies including anti GM1, GM2, GD1a, GD1b, and GQ1b, SPEP, UPEP, Vitb12 413, VitB1, Vit-E, Homocysteine, Methylmalonic acid. Copper, Zinc, still pending   - HIV (-), RPR (-)   - PT recommends rehab      Cardiovascular:  - H/O Heart murmur: follows with Dr. Patel, supposed to have TTE done on 11/22, need to be informed  - Will Attempt to reach pt's outp cardiologist to obtain past TTE studies.      Pulmonary:  -Monitor closely for respiratory decline           PT p/w acute progressive ataxia with L > R sensory loss predominantly proprioception and vibratory involving arm and leg found to have acute L capsular lacunar infarct thromboembolic from symptomatic severe L ICA stenosis currently on medical management awaiting DSA/possible stenting 11/28/23 with neuroendovascular team.  Currently improving numbness found to have GM2ab + of unclear clinical significance. Patient has baseline urinary incontinence.  Plavix switched to Brilinta 90mg twice daily (per neuroendovascular request) given PRU result of 278.    Hospital Course:   Found to have an acute L thalamic lacunar infarct on admission. As a whole, symptoms do not match stroke localization. 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     Workup done in-house:  CTH:  A focal 2 cm hypodensity is noted in the right posterior occipital region consistent with chronic infarction.  No evidence of acute intracranial hemorrhage, acute territorial infarct, mass or midline shift.  CTA:  Marked calcification at the right and left common carotid artery bifurcations with severe stenosis at the level of the right carotid bulb, and the left CC bifurcation and at the left carotid bulb.  No evidence of major vascular occlusion or aneurysm.  MRI:  Left thalamic acute lacunar infarct.  No acute hemorrhage.  MR C-spine: 1.  Multilevel degenerative changes with moderate/severe spinal stenosis at C5-6 and C6-7. Mild spinal cord flattening at both levels, without associated spinal cord signal abnormality.  2.  Multilevel moderate/severe neural foraminal stenosis.  CT Left Ankle: 1. Osteopenia without acute osseous abnormality. 2. Chronic/degenerative change throughout the foot/ankle.    TTE with bubble:  1. Low-normal global left ventricular systolic function.   2. Left ventricular ejection fraction, by visual estimation, is 50 to 55%.   3. Spectral Doppler shows pseudonormal pattern of left ventricular myocardial filling (Grade II diastolic dysfunction).   4. Normal right ventricular size and function.   5. Mildly enlarged left atrium.   6. Mildly enlarged right atrium.   7. Moderate to severe aortic valve stenosis (Vmax 3.37 m/s, PG/MG 45/27 mmHg, [place] 0.85 cm2 and DVI 0.27. Suspect LFLG severe AS (SVi 27.8 mL/m2) vs moderate AS with inaccurate LVOT VTI.   8. Mild mitral valve regurgitation.   9. Adequate TR velocity was not obtained to accurately assess RVSP.    TTE from July 2019:   1. Left ventricular ejection fraction, by visual estimation, is 50 to 55%.   2. Mildly increased LV wall thickness.   3. Spectral Doppler shows impaired relaxation pattern of left ventricular myocardial filling (Grade I diastolic dysfunction).   4. Mitral annular calcification.   5. Thickening and calcification of the anterior and posterior mitral valve leaflets.   6. Aortic valve thickening with decreased leaflet opening.   7. Estimated pulmonary artery systolic pressure is 41.6 mmHg assuming a right atrial pressure of 3 mmHg, which is consistent with mild pulmonary hypertension.   8. The aortic valve mean gradient is 14.9 mmHg consistent with mild aortic stenosis.    Physical exam at time of discharge:  Mental status: Awake, alert and oriented in person time and place. Speech is fluent with intact naming, repetition and comprehension.  Attention/concentration intact.  No dysarthria, no aphasia. Follows commands   Cranial nerves: Pupils equally round and reactive to light, visual fields full, no nystagmus, no visual problems, extraocular muscles intact. Sensation V1 through V3 intact bilaterally and symmetric. Face symmetric with normal eye closure and smile, hearing is bilaterally intact to finger rub, uvula is midline and soft palate rises symmetrically, tongue was midline.  Motor:  Normal bulk and tone, no abnormal movements strength grading is LLE 5/5 LUE 5/5 RUE 5/5 RLE 5/5.   strength 5/5. Pain when she riases her right hand by shoulder osteoarthitis  Sensation: Intact to light touch, proprioception, vibration and pinprick.   Coordination: No dysmetria on finger-to-nose and heel-to-shin.  No dysdiadokinesia.  Reflexes: 2+ in bilateral UE/LE (although LE difficult to assess 2/2 edema), downgoing toes bilaterally. (-) Garcia.

## 2023-11-22 NOTE — OCCUPATIONAL THERAPY INITIAL EVALUATION ADULT - EDEMA, REHAB EVAL
Contacted wife that the patient should come in this week for an INR with starting prednisone. She stated she did not  the Rx and will be doing that today. The patient will go in for an INR on Friday 12/8 to see if Warfarin needs to be decreased while finishing the prednisone.  
No

## 2023-11-22 NOTE — DISCHARGE NOTE PROVIDER - PROVIDER TOKENS
PROVIDER:[TOKEN:[[id number]]] PROVIDER:[TOKEN:[[id number]]] PROVIDER:[TOKEN:[[id number]]]

## 2023-11-22 NOTE — OCCUPATIONAL THERAPY INITIAL EVALUATION ADULT - ADDITIONAL COMMENTS
As per pt report, resides on 1 level in private house with mandatory steps to enter. Owns and uses chiar lift.

## 2023-11-22 NOTE — OCCUPATIONAL THERAPY INITIAL EVALUATION ADULT - RANGE OF MOTION EXAMINATION
AROM: R UE: shoulder flexion 0-20*,distal WFL. L UE: shoulder flex 0-40*, distal WFL./deficits as listed below

## 2023-11-22 NOTE — DISCHARGE NOTE PROVIDER - NSDCMRMEDTOKEN_GEN_ALL_CORE_FT
Albuterol (Eqv-ProAir HFA) 90 mcg/inh inhalation aerosol: 2 puff(s) inhaled every 6 hours as needed for  bronchospasm  aspirin 81 mg oral delayed release tablet: 1 tab(s) orally once a day  atenolol 50 mg oral tablet: 1 tab(s) orally once a day  carbonyl iron 45 mg oral tablet: 1 tab(s) orally once a day  fesoterodine 4 mg oral tablet, extended release: 1 tab(s) orally once a day  folic acid 1 mg oral tablet: 1 tab(s) orally once a day  losartan 100 mg oral tablet: 1 tab(s) orally once a day (at bedtime)  pantoprazole 40 mg oral delayed release tablet: 1 tab(s) orally once a day  simvastatin 20 mg oral tablet: 1 tab(s) orally once a day (at bedtime)   Albuterol (Eqv-ProAir HFA) 90 mcg/inh inhalation aerosol: 2 puff(s) inhaled every 6 hours as needed for  bronchospasm  aspirin 81 mg oral delayed release tablet: 1 tab(s) orally once a day  atenolol 50 mg oral tablet: 1 tab(s) orally once a day  atorvastatin 80 mg oral tablet: 1 tab(s) orally once a day (at bedtime)  carbonyl iron 45 mg oral tablet: 1 tab(s) orally once a day  cyanocobalamin 1000 mcg oral tablet: 1 tab(s) orally every 24 hours  fesoterodine 4 mg oral tablet, extended release: 1 tab(s) orally once a day  folic acid 1 mg oral tablet: 1 tab(s) orally once a day  losartan 100 mg oral tablet: 1 tab(s) orally once a day (at bedtime)  pantoprazole 40 mg oral delayed release tablet: 1 tab(s) orally once a day  ticagrelor 90 mg oral tablet: 1 tab(s) orally every 12 hours

## 2023-11-22 NOTE — DISCHARGE NOTE PROVIDER - CARE PROVIDER_API CALL
Rachele Villegas  Neurology  [address] [place] [id number]  Phone: [phone]  Fax: [phone]  Follow Up Time:    Rachele Villegas  Neurology  [address] [place] [id number]  Phone: [phone]  Fax: [phone]  Follow Up Time:    Rachele Villegas  Neurology  [address] [place] [id number]  Phone: [phone]  Fax: [phone]  Follow Up Time:

## 2023-11-22 NOTE — OCCUPATIONAL THERAPY INITIAL EVALUATION ADULT - SHORT TERM MEMORY, REHAB EVAL
WFL. Recalled 3/3 items s/p immediate recall. Recalled 2/3 items s/p 5 minute delay, despite provision of general vc for 3rd item. s/p 20 minute delay, recalled 3/3 items. (Pt independently stated list of items , not on command.)

## 2023-11-22 NOTE — DISCHARGE NOTE NURSING/CASE MANAGEMENT/SOCIAL WORK - NSDCPEFALRISK_GEN_ALL_CORE
For information on Fall & Injury Prevention, visit: [email or web address] OR  [email or web address] OR  https://www.cdc.gov/steadi/patient.html

## 2023-11-22 NOTE — PHYSICAL THERAPY INITIAL EVALUATION ADULT - GENERAL OBSERVATIONS, REHAB EVAL
Pt attempted for PT IE, vEEG arrived to bedside to begin test. Prior level of function and hx provided by family at bedside, see below. Family educated on role of PT, importance of OOB mobility and POC. PT to f/u at next available session.
Pt off unit at this time, PT to f/u at next available session.
[id number] pm Chart reviewed. Pt. seen semirecline in bed , in No apparent distress , +tele , IV lock, Prima fit device , Pt. alert and oriented X 4, Pt. agreed to activity/therapy. Pt c/o generalized weakness.

## 2023-11-22 NOTE — OCCUPATIONAL THERAPY INITIAL EVALUATION ADULT - VISUAL ACUITY
Per DS. Please refer the pt to     Dr. Rad Wilkinson for Endo [phone]  Located in [facility] in [address] [facility]    Pt advised of recommendations- verbalized understanding.
Pt would like a couple names of thyroid doctors.  ,    Please return call to [phone]
+ Rx for reading

## 2023-11-22 NOTE — PROGRESS NOTE ADULT - SUBJECTIVE AND OBJECTIVE BOX
Neurology Progress Note    Interval History:    No acute events over night. Patient reports that her legs feel stronger than before.     HPI:  Patient is a 84 yo female with pmhx of osteoarthritis pt reported being dx with RA and taking rasburicase for 7 years, but recently was re-evaluated by rheum and told she has OA instead), HTN, DLD , walks with the help of a walker (rollator) and able to perform ADLs with some assistance presenting yesterday for 3 days of distal numbness and imbalance. Patient states that she got flu shot this past Friday and then on Saturday she cleaned her house and felt extremely tired with right arm pain and rested on a couch then tried getting up after some time but fell back on the couch and thereon, she noted that she had problems with balance and also experienced distal numbness both on her foot and hands. She denies any progression of her sensory symptoms but reports that her balance is worse since onset. She denies any fever chills diarrhoea, focal weakness, speech visual deficits, problems breathing, neck or back pain , swallowing. Patient has baseline urinary incontinence.        PAST MEDICAL & SURGICAL HISTORY:  Rheumatoid arthritis    Hyperlipemia    Hypertension    Stress incontinence    Asthma    S/P total knee replacement, right            Medications:  albuterol    90 MICROgram(s) HFA Inhaler 2 Puff(s) Inhalation every 6 hours PRN  aspirin enteric coated 81 milliGRAM(s) Oral daily  atenolol  Tablet 50 milliGRAM(s) Oral daily  folic acid 1 milliGRAM(s) Oral daily  losartan 100 milliGRAM(s) Oral daily  oxybutynin 5 milliGRAM(s) Oral two times a day  pantoprazole    Tablet 40 milliGRAM(s) Oral before breakfast  simvastatin 20 milliGRAM(s) Oral at bedtime      Vital Signs Last 24 Hrs  T(C): 37 (21 Nov 2023 16:04), Max: 37 (21 Nov 2023 16:04)  T(F): 98.6 (21 Nov 2023 16:04), Max: 98.6 (21 Nov 2023 16:04)  HR: 61 (21 Nov 2023 16:04) (61 - 96)  BP: 161/70 (21 Nov 2023 16:04) (136/68 - 216/84)  BP(mean): 94 (21 Nov 2023 07:33) (94 - 111)  RR: 18 (21 Nov 2023 16:04) (18 - 18)  SpO2: 96% (21 Nov 2023 16:04) (93% - 100%)    Parameters below as of 21 Nov 2023 16:04  Patient On (Oxygen Delivery Method): room air        Neurological Exam:   Mental status: Awake, alert and oriented x3.  Recent and remote memory intact.  Naming, repetition and comprehension intact.  Attention/concentration intact.  No dysarthria, no aphasia.  Fund of knowledge appropriate.    Cranial nerves: Pupils equally round and reactive to light, visual fields full, no nystagmus, extraocular muscles intact, V1 through V3 intact bilaterally and symmetric, face symmetric, hearing intact to finger rub, palate elevation symmetric, tongue was midline.  Motor:  L mild pronator drift. 4+/5 R arm flexion and extension, 4/5 L arm flexion an extension. 4+/5 R hip flexion 4 L hip flexion. 4+/5 R knee flexion 4/5 L knee flexion. 4+ R Dorsiflexion R 5/5 plantar flexion. 4 L Dorsiflexion L 5/5 plantar flexion.   Sensation: Intact to light touch, proprioception, and pinprick on b/l UE. Absent pinprick on LUE from toe to mid shin. Severely decrease temp sensation on b/l feet, Absent vibration on b/l LE. No propioception on LLE, diminshed propioception in RLE  Coordination: No dysmetria on finger-to-nose and heel-to-shin.  No dysdiadokinesia.  Reflexes: 2+ in bilateral UE/LE, Upgoing toes bilaterally. (-) Garcia. 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   Gait: deferred    Labs:  CBC Full  -  ( 21 Nov 2023 08:32 )  WBC Count : 3.80 K/uL  RBC Count : 3.46 M/uL  Hemoglobin : 11.1 g/dL  Hematocrit : 35.5 %  Platelet Count - Automated : 175 K/uL  Mean Cell Volume : 102.6 fL  Mean Cell Hemoglobin : 32.1 pg  Mean Cell Hemoglobin Concentration : 31.3 g/dL  Auto Neutrophil # : 2.77 K/uL  Auto Lymphocyte # : 0.56 K/uL  Auto Monocyte # : 0.36 K/uL  Auto Eosinophil # : 0.07 K/uL  Auto Basophil # : 0.03 K/uL  Auto Neutrophil % : 72.9 %  Auto Lymphocyte % : 14.7 %  Auto Monocyte % : 9.5 %  Auto Eosinophil % : 1.8 %  Auto Basophil % : 0.8 %    11-21    145  |  104  |  16  ----------------------------<  87  4.1   |  29  |  1.2    Ca    9.3      21 Nov 2023 08:32    TPro  7.0  /  Alb  4.1  /  TBili  0.6  /  DBili  x   /  AST  20  /  ALT  12  /  AlkPhos  70  11-20    LIVER FUNCTIONS - ( 20 Nov 2023 18:08 )  Alb: 4.1 g/dL / Pro: 7.0 g/dL / ALK PHOS: 70 U/L / ALT: 12 U/L / AST: 20 U/L / GGT: x           PT/INR - ( 20 Nov 2023 18:08 )   PT: 11.50 sec;   INR: 1.01 ratio         PTT - ( 20 Nov 2023 18:08 )  PTT:29.8 sec  Urinalysis Basic - ( 21 Nov 2023 08:32 )    Color: x / Appearance: x / SG: x / pH: x  Gluc: 87 mg/dL / Ketone: x  / Bili: x / Urobili: x   Blood: x / Protein: x / Nitrite: x   Leuk Esterase: x / RBC: x / WBC x   Sq Epi: x / Non Sq Epi: x / Bacteria: x        RADIOLOGY    < from: MR Cervical Spine w/wo IV Cont (11.21.23 @ 11:15) >    IMPRESSION:    1.  Multilevel degenerative changes with moderate/severe spinal stenosis   at C5-6 and C6-7. Mild spinal cord flattening at both levels, without   associated spinal cord signal abnormality.    2.  Multilevel moderate/severe neural foraminal stenosis.      < end of copied text >  < from: MR Head w/wo IV Cont (11.21.23 @ 11:15) >    IMPRESSION:  Left thalamic acute lacunar infarct. No acute hemorrhage.    < end of copied text >  < from: CT Angio Neck w/ IV Cont (11.20.23 @ 19:54) >  CTA:    Marked calcification at the right and left common carotid artery   bifurcations with severe stenosis at the level of the right carotid bulb,   and the left CC bifurcation and at the left carotid bulb.    No evidence of major vascular occlusion or aneurysm.    < end of copied text >

## 2023-11-22 NOTE — DISCHARGE NOTE PROVIDER - NSDCCPCAREPLAN_GEN_ALL_CORE_FT
PRINCIPAL DISCHARGE DIAGNOSIS  Diagnosis: Extremity numbness  Assessment and Plan of Treatment: You were admited for the evaluation of new onset weakness and numbness. While the hospital an Imaging of your brain showed evidence of an acute stroke on the left side of your brain, severe stenosis of your right carotid artery.      SECONDARY DISCHARGE DIAGNOSES  Diagnosis: Weakness of both upper extremities  Assessment and Plan of Treatment:      PRINCIPAL DISCHARGE DIAGNOSIS  Diagnosis: Ischemic stroke  Assessment and Plan of Treatment: During this hospital admission, you had an ischemic stroke. During an ischemic stroke, blood stops flowing to part of your brain because of a blockage in the blood vessel. This can damage areas in the brain that control other parts of the body.  Please take your aspirin and Brilinta for blood thinning and Atorvastatin for cholesterol medication/blood vessel protection as prescribed to prevent further strokes. Do not skip doses and do not run low on your medication. If you run low on your medication, please contact your doctor.  You will follow up outpatient with the stroke Nurse Practitioner as scheduled below.  Doing your regular tasks may be difficult after you've had a stroke, but you can learn new ways to manage your daily activities. In fact, doing daily activities may help you to regain muscle strength. Be patient, give yourself time to adjust, and appreciate the progress you make. For example, when showering or bathing, test the water temperature with a hand or foot that was not affected by the stroke, use grab bars, a shower seat, a hand-held showerhead, etc. It is normal to feel fatigue after a stroke, while some days may be worse than others, you will continue to improve.  Call 911 right away if you have any of the following symptoms of another stroke:  B: Balance: Sudden: Dizziness, loss of balance, or a sense of falling, difficulty with coordinating movement  E: Eyes: Sudden double vision or trouble seeing in one or both eyes  F: Face: Sudden uneven face  A: Arms (Legs): Sudden weakness, tingling, or loss of feeling on one side of your face or body  S: Speech: Sudden trouble talking or slurred speech, sudden difficulty understanding others  T: Time: Please call 911 right away and go to the emergency room  •Sudden, severe headache  •Blackouts or seizures

## 2023-11-23 LAB
% ALBUMIN: 54 % — SIGNIFICANT CHANGE UP
% ALBUMIN: 54 % — SIGNIFICANT CHANGE UP
% ALPHA 1: 4.7 % — SIGNIFICANT CHANGE UP
% ALPHA 1: 4.7 % — SIGNIFICANT CHANGE UP
% ALPHA 2: 13.9 % — SIGNIFICANT CHANGE UP
% ALPHA 2: 13.9 % — SIGNIFICANT CHANGE UP
% BETA: 10.6 % — SIGNIFICANT CHANGE UP
% BETA: 10.6 % — SIGNIFICANT CHANGE UP
% GAMMA: 16.8 % — SIGNIFICANT CHANGE UP
% GAMMA: 16.8 % — SIGNIFICANT CHANGE UP
ALBUMIN SERPL ELPH-MCNC: 3.4 G/DL — LOW (ref 3.6–5.5)
ALBUMIN SERPL ELPH-MCNC: 3.4 G/DL — LOW (ref 3.6–5.5)
ALBUMIN/GLOB SERPL ELPH: 1.2 RATIO — SIGNIFICANT CHANGE UP
ALBUMIN/GLOB SERPL ELPH: 1.2 RATIO — SIGNIFICANT CHANGE UP
ALPHA1 GLOB SERPL ELPH-MCNC: 0.3 G/DL — SIGNIFICANT CHANGE UP (ref 0.1–0.4)
ALPHA1 GLOB SERPL ELPH-MCNC: 0.3 G/DL — SIGNIFICANT CHANGE UP (ref 0.1–0.4)
ALPHA2 GLOB SERPL ELPH-MCNC: 0.9 G/DL — SIGNIFICANT CHANGE UP (ref 0.5–1)
ALPHA2 GLOB SERPL ELPH-MCNC: 0.9 G/DL — SIGNIFICANT CHANGE UP (ref 0.5–1)
ANION GAP SERPL CALC-SCNC: 11 MMOL/L — SIGNIFICANT CHANGE UP (ref 7–14)
ANION GAP SERPL CALC-SCNC: 11 MMOL/L — SIGNIFICANT CHANGE UP (ref 7–14)
B-GLOBULIN SERPL ELPH-MCNC: 0.7 G/DL — SIGNIFICANT CHANGE UP (ref 0.5–1)
B-GLOBULIN SERPL ELPH-MCNC: 0.7 G/DL — SIGNIFICANT CHANGE UP (ref 0.5–1)
BASOPHILS # BLD AUTO: 0.04 K/UL — SIGNIFICANT CHANGE UP (ref 0–0.2)
BASOPHILS # BLD AUTO: 0.04 K/UL — SIGNIFICANT CHANGE UP (ref 0–0.2)
BASOPHILS NFR BLD AUTO: 1.1 % — HIGH (ref 0–1)
BASOPHILS NFR BLD AUTO: 1.1 % — HIGH (ref 0–1)
BUN SERPL-MCNC: 19 MG/DL — SIGNIFICANT CHANGE UP (ref 10–20)
BUN SERPL-MCNC: 19 MG/DL — SIGNIFICANT CHANGE UP (ref 10–20)
CALCIUM SERPL-MCNC: 8.9 MG/DL — SIGNIFICANT CHANGE UP (ref 8.4–10.5)
CALCIUM SERPL-MCNC: 8.9 MG/DL — SIGNIFICANT CHANGE UP (ref 8.4–10.5)
CHLORIDE SERPL-SCNC: 105 MMOL/L — SIGNIFICANT CHANGE UP (ref 98–110)
CHLORIDE SERPL-SCNC: 105 MMOL/L — SIGNIFICANT CHANGE UP (ref 98–110)
CO2 SERPL-SCNC: 27 MMOL/L — SIGNIFICANT CHANGE UP (ref 17–32)
CO2 SERPL-SCNC: 27 MMOL/L — SIGNIFICANT CHANGE UP (ref 17–32)
CREAT SERPL-MCNC: 1.1 MG/DL — SIGNIFICANT CHANGE UP (ref 0.7–1.5)
CREAT SERPL-MCNC: 1.1 MG/DL — SIGNIFICANT CHANGE UP (ref 0.7–1.5)
EGFR: 50 ML/MIN/1.73M2 — LOW
EGFR: 50 ML/MIN/1.73M2 — LOW
EOSINOPHIL # BLD AUTO: 0.15 K/UL — SIGNIFICANT CHANGE UP (ref 0–0.7)
EOSINOPHIL # BLD AUTO: 0.15 K/UL — SIGNIFICANT CHANGE UP (ref 0–0.7)
EOSINOPHIL NFR BLD AUTO: 4.3 % — SIGNIFICANT CHANGE UP (ref 0–8)
EOSINOPHIL NFR BLD AUTO: 4.3 % — SIGNIFICANT CHANGE UP (ref 0–8)
GAMMA GLOBULIN: 1.1 G/DL — SIGNIFICANT CHANGE UP (ref 0.6–1.6)
GAMMA GLOBULIN: 1.1 G/DL — SIGNIFICANT CHANGE UP (ref 0.6–1.6)
GLUCOSE SERPL-MCNC: 99 MG/DL — SIGNIFICANT CHANGE UP (ref 70–99)
GLUCOSE SERPL-MCNC: 99 MG/DL — SIGNIFICANT CHANGE UP (ref 70–99)
HCT VFR BLD CALC: 36.8 % — LOW (ref 37–47)
HCT VFR BLD CALC: 36.8 % — LOW (ref 37–47)
HGB BLD-MCNC: 11.3 G/DL — LOW (ref 12–16)
HGB BLD-MCNC: 11.3 G/DL — LOW (ref 12–16)
IMM GRANULOCYTES NFR BLD AUTO: 0.3 % — SIGNIFICANT CHANGE UP (ref 0.1–0.3)
IMM GRANULOCYTES NFR BLD AUTO: 0.3 % — SIGNIFICANT CHANGE UP (ref 0.1–0.3)
INTERPRETATION SERPL IFE-IMP: SIGNIFICANT CHANGE UP
INTERPRETATION SERPL IFE-IMP: SIGNIFICANT CHANGE UP
LYMPHOCYTES # BLD AUTO: 0.73 K/UL — LOW (ref 1.2–3.4)
LYMPHOCYTES # BLD AUTO: 0.73 K/UL — LOW (ref 1.2–3.4)
LYMPHOCYTES # BLD AUTO: 20.7 % — SIGNIFICANT CHANGE UP (ref 20.5–51.1)
LYMPHOCYTES # BLD AUTO: 20.7 % — SIGNIFICANT CHANGE UP (ref 20.5–51.1)
MAGNESIUM SERPL-MCNC: 2 MG/DL — SIGNIFICANT CHANGE UP (ref 1.8–2.4)
MAGNESIUM SERPL-MCNC: 2 MG/DL — SIGNIFICANT CHANGE UP (ref 1.8–2.4)
MCHC RBC-ENTMCNC: 30.7 G/DL — LOW (ref 32–37)
MCHC RBC-ENTMCNC: 30.7 G/DL — LOW (ref 32–37)
MCHC RBC-ENTMCNC: 31.6 PG — HIGH (ref 27–31)
MCHC RBC-ENTMCNC: 31.6 PG — HIGH (ref 27–31)
MCV RBC AUTO: 102.8 FL — HIGH (ref 81–99)
MCV RBC AUTO: 102.8 FL — HIGH (ref 81–99)
MONOCYTES # BLD AUTO: 0.41 K/UL — SIGNIFICANT CHANGE UP (ref 0.1–0.6)
MONOCYTES # BLD AUTO: 0.41 K/UL — SIGNIFICANT CHANGE UP (ref 0.1–0.6)
MONOCYTES NFR BLD AUTO: 11.6 % — HIGH (ref 1.7–9.3)
MONOCYTES NFR BLD AUTO: 11.6 % — HIGH (ref 1.7–9.3)
NEUTROPHILS # BLD AUTO: 2.18 K/UL — SIGNIFICANT CHANGE UP (ref 1.4–6.5)
NEUTROPHILS # BLD AUTO: 2.18 K/UL — SIGNIFICANT CHANGE UP (ref 1.4–6.5)
NEUTROPHILS NFR BLD AUTO: 62 % — SIGNIFICANT CHANGE UP (ref 42.2–75.2)
NEUTROPHILS NFR BLD AUTO: 62 % — SIGNIFICANT CHANGE UP (ref 42.2–75.2)
NRBC # BLD: 0 /100 WBCS — SIGNIFICANT CHANGE UP (ref 0–0)
NRBC # BLD: 0 /100 WBCS — SIGNIFICANT CHANGE UP (ref 0–0)
PLATELET # BLD AUTO: 173 K/UL — SIGNIFICANT CHANGE UP (ref 130–400)
PLATELET # BLD AUTO: 173 K/UL — SIGNIFICANT CHANGE UP (ref 130–400)
PMV BLD: 11 FL — HIGH (ref 7.4–10.4)
PMV BLD: 11 FL — HIGH (ref 7.4–10.4)
POTASSIUM SERPL-MCNC: 4.2 MMOL/L — SIGNIFICANT CHANGE UP (ref 3.5–5)
POTASSIUM SERPL-MCNC: 4.2 MMOL/L — SIGNIFICANT CHANGE UP (ref 3.5–5)
POTASSIUM SERPL-SCNC: 4.2 MMOL/L — SIGNIFICANT CHANGE UP (ref 3.5–5)
POTASSIUM SERPL-SCNC: 4.2 MMOL/L — SIGNIFICANT CHANGE UP (ref 3.5–5)
PROT PATTERN SERPL ELPH-IMP: SIGNIFICANT CHANGE UP
PROT PATTERN SERPL ELPH-IMP: SIGNIFICANT CHANGE UP
PROT SERPL-MCNC: 6.3 G/DL — SIGNIFICANT CHANGE UP (ref 6–8.3)
PROT SERPL-MCNC: 6.3 G/DL — SIGNIFICANT CHANGE UP (ref 6–8.3)
RBC # BLD: 3.58 M/UL — LOW (ref 4.2–5.4)
RBC # BLD: 3.58 M/UL — LOW (ref 4.2–5.4)
RBC # FLD: 13 % — SIGNIFICANT CHANGE UP (ref 11.5–14.5)
RBC # FLD: 13 % — SIGNIFICANT CHANGE UP (ref 11.5–14.5)
SODIUM SERPL-SCNC: 143 MMOL/L — SIGNIFICANT CHANGE UP (ref 135–146)
SODIUM SERPL-SCNC: 143 MMOL/L — SIGNIFICANT CHANGE UP (ref 135–146)
WBC # BLD: 3.52 K/UL — LOW (ref 4.8–10.8)
WBC # BLD: 3.52 K/UL — LOW (ref 4.8–10.8)
WBC # FLD AUTO: 3.52 K/UL — LOW (ref 4.8–10.8)
WBC # FLD AUTO: 3.52 K/UL — LOW (ref 4.8–10.8)

## 2023-11-23 PROCEDURE — 99232 SBSQ HOSP IP/OBS MODERATE 35: CPT

## 2023-11-23 RX ADMIN — Medication 5 MILLIGRAM(S): at 17:06

## 2023-11-23 RX ADMIN — PANTOPRAZOLE SODIUM 40 MILLIGRAM(S): 20 TABLET, DELAYED RELEASE ORAL at 05:06

## 2023-11-23 RX ADMIN — ATORVASTATIN CALCIUM 80 MILLIGRAM(S): 80 TABLET, FILM COATED ORAL at 22:17

## 2023-11-23 RX ADMIN — LOSARTAN POTASSIUM 100 MILLIGRAM(S): 100 TABLET, FILM COATED ORAL at 05:06

## 2023-11-23 RX ADMIN — Medication 81 MILLIGRAM(S): at 11:13

## 2023-11-23 RX ADMIN — ATENOLOL 50 MILLIGRAM(S): 25 TABLET ORAL at 05:06

## 2023-11-23 RX ADMIN — Medication 1 MILLIGRAM(S): at 11:13

## 2023-11-23 RX ADMIN — CLOPIDOGREL BISULFATE 75 MILLIGRAM(S): 75 TABLET, FILM COATED ORAL at 11:13

## 2023-11-23 RX ADMIN — Medication 5 MILLIGRAM(S): at 05:06

## 2023-11-23 RX ADMIN — HEPARIN SODIUM 5000 UNIT(S): 5000 INJECTION INTRAVENOUS; SUBCUTANEOUS at 17:07

## 2023-11-23 RX ADMIN — HEPARIN SODIUM 5000 UNIT(S): 5000 INJECTION INTRAVENOUS; SUBCUTANEOUS at 05:09

## 2023-11-23 NOTE — PROGRESS NOTE ADULT - SUBJECTIVE AND OBJECTIVE BOX
Neurology Progress Note    Interval History:     Patient was seen and examined, no acute events over night.     Medications:  albuterol    90 MICROgram(s) HFA Inhaler 2 Puff(s) Inhalation every 6 hours PRN  aspirin enteric coated 81 milliGRAM(s) Oral daily  atenolol  Tablet 50 milliGRAM(s) Oral daily  atorvastatin 80 milliGRAM(s) Oral at bedtime  clopidogrel Tablet 75 milliGRAM(s) Oral daily  folic acid 1 milliGRAM(s) Oral daily  heparin   Injectable 5000 Unit(s) SubCutaneous every 12 hours  losartan 100 milliGRAM(s) Oral daily  oxybutynin 5 milliGRAM(s) Oral two times a day  pantoprazole    Tablet 40 milliGRAM(s) Oral before breakfast      Vital Signs Last 24 Hrs  T(C): 36.9 (23 Nov 2023 12:47), Max: 36.9 (22 Nov 2023 21:54)  T(F): 98.4 (23 Nov 2023 12:47), Max: 98.4 (22 Nov 2023 21:54)  HR: 60 (23 Nov 2023 12:47) (59 - 66)  BP: 170/74 (23 Nov 2023 12:47) (137/64 - 189/83)  BP(mean): 92 (23 Nov 2023 04:59) (92 - 92)  RR: 18 (23 Nov 2023 04:59) (18 - 18)  SpO2: 98% (23 Nov 2023 04:59) (97% - 98%)    Parameters below as of 23 Nov 2023 04:59  Patient On (Oxygen Delivery Method): room air        Neurological Exam:   Mental status: Awake, alert and oriented x3.  Recent and remote memory intact.  Naming, repetition and comprehension intact.  Attention/concentration intact.  No dysarthria, no aphasia.  Fund of knowledge appropriate.    Cranial nerves: Pupils equally round and reactive to light, visual fields full, no nystagmus, extraocular muscles intact, V1 through V3 intact bilaterally and symmetric, face symmetric, hearing intact to finger rub, palate elevation symmetric, tongue was midline.  Motor:  L mild pronator drift. 4+/5 R arm flexion and extension, 4/5 L arm flexion an extension. 4+/5 R hip flexion 4 L hip flexion. 4+/5 R knee flexion 4/5 L knee flexion. 4+ R Dorsiflexion R 5/5 plantar flexion. 4 L Dorsiflexion L 5/5 plantar flexion.   Sensation: Intact to light touch, proprioception, and pinprick on b/l UE. Absent pinprick on LUE from toe to mid shin. Severely decrease temp sensation on b/l feet, Absent vibration on b/l LE. No propioception on LLE, diminshed propioception in RLE  Coordination: No dysmetria on finger-to-nose and heel-to-shin.  No dysdiadokinesia.  Reflexes: 2+ in bilateral UE/LE, Upgoing toes bilaterally. (-) Garcia. 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   Gait: deferred      Labs:  CBC Full  -  ( 23 Nov 2023 06:44 )  WBC Count : 3.52 K/uL  RBC Count : 3.58 M/uL  Hemoglobin : 11.3 g/dL  Hematocrit : 36.8 %  Platelet Count - Automated : 173 K/uL  Mean Cell Volume : 102.8 fL  Mean Cell Hemoglobin : 31.6 pg  Mean Cell Hemoglobin Concentration : 30.7 g/dL  Auto Neutrophil # : 2.18 K/uL  Auto Lymphocyte # : 0.73 K/uL  Auto Monocyte # : 0.41 K/uL  Auto Eosinophil # : 0.15 K/uL  Auto Basophil # : 0.04 K/uL  Auto Neutrophil % : 62.0 %  Auto Lymphocyte % : 20.7 %  Auto Monocyte % : 11.6 %  Auto Eosinophil % : 4.3 %  Auto Basophil % : 1.1 %    11-23    143  |  105  |  19  ----------------------------<  99  4.2   |  27  |  1.1    Ca    8.9      23 Nov 2023 06:44  Mg     2.0     11-23    Urinalysis Basic - ( 23 Nov 2023 06:44 )    Color: x / Appearance: x / SG: x / pH: x  Gluc: 99 mg/dL / Ketone: x  / Bili: x / Urobili: x   Blood: x / Protein: x / Nitrite: x   Leuk Esterase: x / RBC: x / WBC x   Sq Epi: x / Non Sq Epi: x / Bacteria: x        RADIOLOGY & ADDITIONAL TESTS:

## 2023-11-23 NOTE — PROVIDER CONTACT NOTE (OTHER) - BACKGROUND
admitted for stroke. PMH: RA, asthma, HTN, HLD. Per pt, prior to stroke, pt has had difficultly lifting her upper extremities due to the RA, verbalized she has poor ROM upper extremities (baseline)

## 2023-11-23 NOTE — PROVIDER CONTACT NOTE (OTHER) - SITUATION
Patient NIH assessment completed and score increased from prior assessment. Patient has some effort against gravity b/l upper extremities

## 2023-11-23 NOTE — PROGRESS NOTE ADULT - ASSESSMENT
82 yo f with pmhx of OA, HTN, DLD , walks with the help of a walker (rollator) and able to perform ADLs with some assistance presenting today for 3 days of acute onset distal numbness and imbalance. Pt denies any progression of her sensory symptoms but reports that her balance is worse since onset. Found to have an acute L thalamic lacunar infarct on admission. As a whole, symptoms do not match stroke localization. 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       Neurological:  -CTH: Chronic right occipital stroke  -CTA H/N:Marked calcification at the right and left common carotid artery bifurcations with severe stenosis at the level of the right carotid bulb, and the left CC  bifurcation and at the left carotid bulb. No LVO or aneurysm  - NI consulted to evaluate carotid stenosis: planning tx stenting procedure on Tuesday.   - MRI Brain: Left thalamic acute lacunar infarct. No acute hemorrhage. Transferred to stroke service to complete workup.  - Telemonitoring  - NIF/VC Q 8 Hrs (call for NIF >-20)  - Neuro Checks q 8 hrs  - Continue ASA 81 mg once daily and Plavix 75mg once daily. Patient will stay on that regimen for 90 days.  - TTE showing 50-55% EF, with moderate to severe Aortic stenosis.   - Started on atorvastatin 80mg once daily and Plavix 75mg qd  - Check orthostatic BP (please document corresponding heart rates)  - ESR 57 , CRP <3.0, Vitamin b12, folate >20, HbA1C 5.3, , Lyme antibody, TSH 2.19, IgA LEVEL, Ganglioside antibodies including anti GM1, GM2, GD1a, GD1b, and GQ1b, SPEP, UPEP, Vitb12 413, VitB1, Vit-E, Homocysteine, Methylmalonic acid. Copper, Zinc, HIV (-), RPR (-)   - Obtain x ray right shoulder (pending)  - IVIG and LP on hold for now until better characterization of the possible diagnoses.  - PT/OT Evaluation Pending (attempted but patient using bedpan, will return). Anticipated d/c to 4A (pending approval)      Cardiovascular:  -Telemonitoring  -Maintain normotension and Normovolemia  - H/O Heart murmur: follows with Dr. Patel, supposed to have TTE done on 11/22, need sto be informed  - Will Attempt to reach pt's outp cardiologist to obtain past TTE studies.      Pulmonary:  -Monitor closely for respiratory decline  -Keep 02 sat >94%    GI:  -DASH diet  -IV fluids-None    :  Monitor electrolytes and replete as needed    -ID:  Abnormal UA but not symptomatic, Currently afebrile, No concern for infection at this time.    Hematology:  - c/w sequential stocking  - Heparin sq q 8 hrs for dvt prophylaxis   84 yo f with pmhx of OA, HTN, DLD , walks with the help of a walker (rollator) and able to perform ADLs with some assistance presenting today for 3 days of acute onset distal numbness and imbalance. Pt denies any progression of her sensory symptoms but reports that her balance is worse since onset. Found to have an acute L thalamic lacunar infarct on admission. As a whole, symptoms do not match stroke localization. 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     Neurological:  -CTH: Chronic right occipital stroke  -CTA H/N:Marked calcification at the right and left common carotid artery bifurcations with severe stenosis at the level of the right carotid bulb, and the left CC  bifurcation and at the left carotid bulb. No LVO or aneurysm  - NI consulted to evaluate carotid stenosis: planning tx stenting procedure on Tuesday.   - MRI Brain: Left thalamic acute lacunar infarct. No acute hemorrhage. Transferred to stroke service to complete workup.  - Telemonitoring  - Neuro Checks q 8 hrs  - Continue ASA 81 mg once daily and Plavix 75mg once daily. Patient will stay on that regimen for 90 days.  - TTE showing 50-55% EF, with moderate to severe Aortic stenosis.   - Started on atorvastatin 80mg once daily and Plavix 75mg qd  - Check orthostatic BP (please document corresponding heart rates)  - ESR 57 , CRP <3.0, Vitamin b12, folate >20, HbA1C 5.3, , Lyme antibody, TSH 2.19, IgA LEVEL, Ganglioside antibodies including anti GM1, GM2, GD1a, GD1b, and GQ1b, SPEP, UPEP, Vitb12 413, VitB1, Vit-E, Homocysteine, Methylmalonic acid. Copper, Zinc, HIV (-), RPR (-)   - Obtain x ray right shoulder (pending)  - PT/OT Evaluation Pending (attempted but patient using bedpan, will return). Anticipated d/c to 4A (pending approval)    Cardiovascular:  -Telemonitoring  -Maintain normotension and Normovolemia  - H/O Heart murmur: follows with Dr. Patel, supposed to have TTE done on 11/22, need sto be informed  - Will Attempt to reach pt's outp cardiologist to obtain past TTE studies.      Pulmonary:  -Monitor closely for respiratory decline  -Keep 02 sat >94%    GI:  -DASH diet  -IV fluids-None    :  Monitor electrolytes and replete as needed    -ID:  Abnormal UA but not symptomatic, Currently afebrile, No concern for infection at this time.    Hematology:  - c/w sequential stocking  - Heparin sq q 8 hrs for dvt prophylaxis

## 2023-11-24 LAB
GD1A GANGL IGG+IGM SER IA-ACNC: SIGNIFICANT CHANGE UP IV (ref 0–50)
GD1A GANGL IGG+IGM SER IA-ACNC: SIGNIFICANT CHANGE UP IV (ref 0–50)
GD1B GANGL IGG+IGM SER IA-ACNC: 47 IV — SIGNIFICANT CHANGE UP (ref 0–50)
GD1B GANGL IGG+IGM SER IA-ACNC: 47 IV — SIGNIFICANT CHANGE UP (ref 0–50)
GM1 ASIALO IGG+IGM SER IA-ACNC: 9 IV — SIGNIFICANT CHANGE UP (ref 0–50)
GM1 ASIALO IGG+IGM SER IA-ACNC: 9 IV — SIGNIFICANT CHANGE UP (ref 0–50)
GM1 GANGL IGG+IGM SER-ACNC: 40 IV — SIGNIFICANT CHANGE UP (ref 0–50)
GM1 GANGL IGG+IGM SER-ACNC: 40 IV — SIGNIFICANT CHANGE UP (ref 0–50)
GM2 GANGL IGG+IGM SER IA-ACNC: 85 IV — HIGH (ref 0–50)
GM2 GANGL IGG+IGM SER IA-ACNC: 85 IV — HIGH (ref 0–50)
GQ1B GANGL IGG+IGM SER IA-ACNC: 9 IV — SIGNIFICANT CHANGE UP (ref 0–50)
GQ1B GANGL IGG+IGM SER IA-ACNC: 9 IV — SIGNIFICANT CHANGE UP (ref 0–50)

## 2023-11-24 PROCEDURE — 99232 SBSQ HOSP IP/OBS MODERATE 35: CPT

## 2023-11-24 PROCEDURE — 73590 X-RAY EXAM OF LOWER LEG: CPT | Mod: 26,LT

## 2023-11-24 PROCEDURE — 73630 X-RAY EXAM OF FOOT: CPT | Mod: 26,LT

## 2023-11-24 PROCEDURE — 99233 SBSQ HOSP IP/OBS HIGH 50: CPT

## 2023-11-24 PROCEDURE — 73600 X-RAY EXAM OF ANKLE: CPT | Mod: 26,LT

## 2023-11-24 PROCEDURE — 73700 CT LOWER EXTREMITY W/O DYE: CPT | Mod: 26,LT

## 2023-11-24 RX ORDER — TICAGRELOR 90 MG/1
90 TABLET ORAL EVERY 12 HOURS
Refills: 0 | Status: DISCONTINUED | OUTPATIENT
Start: 2023-11-24 | End: 2023-11-30

## 2023-11-24 RX ADMIN — Medication 1 MILLIGRAM(S): at 11:32

## 2023-11-24 RX ADMIN — ATORVASTATIN CALCIUM 80 MILLIGRAM(S): 80 TABLET, FILM COATED ORAL at 21:06

## 2023-11-24 RX ADMIN — Medication 81 MILLIGRAM(S): at 11:31

## 2023-11-24 RX ADMIN — PANTOPRAZOLE SODIUM 40 MILLIGRAM(S): 20 TABLET, DELAYED RELEASE ORAL at 06:15

## 2023-11-24 RX ADMIN — HEPARIN SODIUM 5000 UNIT(S): 5000 INJECTION INTRAVENOUS; SUBCUTANEOUS at 18:26

## 2023-11-24 RX ADMIN — Medication 5 MILLIGRAM(S): at 18:26

## 2023-11-24 RX ADMIN — CLOPIDOGREL BISULFATE 75 MILLIGRAM(S): 75 TABLET, FILM COATED ORAL at 11:31

## 2023-11-24 RX ADMIN — TICAGRELOR 90 MILLIGRAM(S): 90 TABLET ORAL at 18:26

## 2023-11-24 RX ADMIN — LOSARTAN POTASSIUM 100 MILLIGRAM(S): 100 TABLET, FILM COATED ORAL at 06:15

## 2023-11-24 RX ADMIN — Medication 5 MILLIGRAM(S): at 06:15

## 2023-11-24 RX ADMIN — HEPARIN SODIUM 5000 UNIT(S): 5000 INJECTION INTRAVENOUS; SUBCUTANEOUS at 06:15

## 2023-11-24 RX ADMIN — ATENOLOL 50 MILLIGRAM(S): 25 TABLET ORAL at 06:15

## 2023-11-24 NOTE — CONSULT NOTE ADULT - ASSESSMENT
IMPRESSION: Rehab of acute left thalamic stroke / gait ataxia / asthma, DLD, HTN, chronic incontinence, arthritis    PRECAUTIONS: [   ] Cardiac  [   ] Respiratory  [   ] Seizures [   ] Contact Isolation  [   ] Droplet Isolation  [   ] Other    Weight Bearing Status:     RECOMMENDATION:    Out of Bed to Chair     DVT/Decubiti Prophylaxis    REHAB PLAN:     [ x   ] Bedside P/T 3-5 times a week   [  x  ]   Bedside O/T  2-3 times a week             [    ] Speech Therapy               [    ]  No Rehab Therapy Indicated   Conditioning/ROM                                    ADL  Bed Mobility                                               Conditioning/ROM  Transfers                                                     Bed Mobility  Sitting /Standing Balance                         Transfers                                        Gait Training                                               Sitting/Standing Balance  Stair Training [   ]Applicable                    Home equipment Eval                                                                        Splinting  [   ] Only      GOALS:   ADL   [ x   ]   Independent                    Transfers  [  x  ] Independent                          Ambulation  [ x   ] Independent     [ x    ] With device                            [    ]  CG                                                         [    ]  CG                                                                  [    ] CG                            [    ] Min A                                                   [    ] Min A                                                              [    ] Min  A          DISCHARGE PLAN:   [    ]  Good candidate for Intensive Rehabilitation/Hospital based                                             Will tolerate 3hrs Intensive Rehab Daily                                       [     ]  Short Term Rehab in Skilled Nursing Facility                                       [     ]  Home with Outpatient or  services                                         [  x   ]  Possible Candidate for Intensive Hospital based Rehab                                       
84 yo f with pmhx of OA, HTN, DLD , walks with the help of a walker (rollator) and able to perform ADLs with some assistance presenting today for 3 days of acute onset distal numbness and imbalance. Pt denies any progression of her sensory symptoms but reports that her balance is worse since onset. Found to have an acute L thalamic lacunar infarct on admission. As a whole, symptoms do not match stroke localization. 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     Acute Lt thalamic infarct  -CTH: Chronic right occipital stroke  -CTA H/N:Marked calcification at the right and left common carotid artery bifurcations with severe stenosis at the level of the right carotid bulb, and the left CC  bifurcation and at the left carotid bulb. No LVO or aneurysm  - NI consulted to evaluate carotid stenosis: planning tx stenting procedure on Tuesday.   - MRI Brain: Left thalamic acute lacunar infarct. No acute hemorrhage. Transferred to stroke service to complete workup.  - Telemonitoring  - Neuro Checks q 8 hrs  - Continue ASA 81 mg once daily and Plavix 75mg once daily. Patient will stay on that regimen for 90 days.  - TTE showing 50-55% EF, with moderate to severe Aortic stenosis.   - Started on atorvastatin 80mg once daily and Plavix 75mg qd  - ESR 57 , CRP <3.0, Vitamin b12, folate >20, HbA1C 5.3, , Lyme antibody, TSH 2.19, IgA LEVEL, Ganglioside antibodies including anti GM1 (-), GM2 (+), GD1a (-), GD1b (-), and GQ1b (-), SPEP, UPEP, Vitb12 413, VitB1, Vit-E, Homocysteine, Methylmalonic acid. Copper, Zinc, HIV (-), RPR (-)   - Obtain x ray right shoulder (pending)  - PT/OT Evaluation Pending (attempted but patient using bedpan, will return). Anticipated d/c to 4A (pending approval)  - Plavix switched to Brilinta 90mg twice daily, per [facility] request following PRU results.       #L ankle pain.   Xray shows possible distal tibial fracture.   Ortho consulted, pending recs.     HTN  - Maintain normotension and Normovolemia  - H/O Heart murmur: follows with Dr. Sicat, supposed to have TTE done on 11/22, need sto be informed  - Will Attempt to reach pt's outp cardiologist to obtain past TTE studies.      #Nutrition/Fluids/Electrolytes   - replete K<4 and Mg <2  - ensure regular BMs  - consider Miralax BID, Senna    #DVT Px  SCDs  Lovenox    #Progress Note Handoff  Pending: Clinical improvement and stability__x___PT____x____  Pt/Family discussion: Pt informed and agrees with the current plan  Disposition: Home______/SNF_______/4A______/To be determined____x____    My note supersedes the residents note should a discrepancy arise.    Chart and notes personally reviewed.  Care Discussed with Consultants/Other Providers/ Housestaff [ x] YES [ ] NO   Radiology, labs, old records personally reviewed.    discussed w/ housestaff, nursing, case management, neuro team    Attestation Statements:    Attestation Statements:  Risk Statement (NON-critical care).     On this date of service, level of risk to patient is considered: High.     Due to: acute stroke, neuro checks, telemetry monitoring, ankle pain, severe ICAD    Time-based billing (NON-critical care).     55 minutes spent on total encounter. The necessity of the time spent during the encounter on this date of service was due to:     time spent on review of labs, imaging studies, old records, obtaining history, personally examining patient, counselling and communicating with patient/ family, entering orders for medications/tests/etc, discussions with other health care providers, documentation in electronic health records, independent interpretation of labs, imaging/procedure results and care coordination.    
Never smoker

## 2023-11-24 NOTE — CHART NOTE - NSCHARTNOTEFT_GEN_A_CORE
A P2y12I Verify Now test was performed on the patient today at 13:15 with a result of [id number].   PRU Test Lot #: [id number]  Recommending that Plavix 75mg daily be replaced by Brilinta 90 mg q 12 hours leading up to possible carotid stenting this coming Tuesday.   [id number] [facility]

## 2023-11-24 NOTE — PROGRESS NOTE ADULT - SUBJECTIVE AND OBJECTIVE BOX
Neuroendovascular Progress Note:    HPI:  The patient is an 83-year-old female with a past history of OA, HTN, and DLD, who presented  with acute onset distal numbness/ trouble walking x 3 days and was  found to have an left thalamic lacunar infarct on MR brain 11/21. Bilateral Common Carotid Artery bifurcation calcifications with left CC and bilateral carotid bulb severe stenosis noted on CTA head/neck 11/20. A neuroendovascular consult had been placed for the above findings. PRU was performed today with a result of 278 (not therapeutic on Plavix). On exam, the patient has no acute complaints.    Past Medical History:  Rheumatoid arthritis  Hyperlipemia  Hypertension  Stress incontinence  Asthma    24-Hour Events: None    Medication:  aspirin enteric coated 81 milliGRAM(s) Oral daily  atenolol  Tablet 50 milliGRAM(s) Oral daily  atorvastatin 80 milliGRAM(s) Oral at bedtime  clopidogrel Tablet 75 milliGRAM(s) Oral daily  folic acid 1 milliGRAM(s) Oral daily  heparin   Injectable 5000 Unit(s) SubCutaneous every 12 hours  losartan 100 milliGRAM(s) Oral daily  oxybutynin 5 milliGRAM(s) Oral two times a day  pantoprazole    Tablet 40 milliGRAM(s) Oral before breakfast    Allergies:  sulfa drugs (Rash)  penicillins (Rash)    Recent Vitals:  T(F): 96.1 (11-24-23 @ 04:54), Max: 96.9 (11-23-23 @ 21:27)  HR: 57 (11-24-23 @ 04:54) (57 - 66)  BP: 148/66 (11-24-23 @ 04:54) (132/63 - 148/66)  RR: 18 (11-24-23 @ 04:54) (18 - 18)  SpO2: 97% (11-23-23 @ 21:27) (97% - 97%)    Recent Labs:                        11.3   3.52  )-----------( 173      ( 23 Nov 2023 06:44 )             36.8     11-23    143  |  105  |  19  ----------------------------<  99  4.2   |  27  |  1.1    Ca    8.9      23 Nov 2023 06:44  Mg     2.0     11-23    Urinalysis Basic - ( 23 Nov 2023 06:44 )    Color: x / Appearance: x / SG: x / pH: x  Gluc: 99 mg/dL / Ketone: x  / Bili: x / Urobili: x   Blood: x / Protein: x / Nitrite: x   Leuk Esterase: x / RBC: x / WBC x   Sq Epi: x / Non Sq Epi: x / Bacteria: x    Exam:  General: NAD  Neuro: AAOx3, following commands, moving all extremities antigravity without drift, EOMI, visual fields full, +decreased b/l lower extremity sensation to light touch, no dysarthria or aphasia, no dysmetria on finger-to-nose or heel-to-shin testing.   Extremities: Reports left ankle pain.     Radiology:     ACC: [id number] EXAM: MR SPINE CERVICAL WAW IC ORDERED BY: MIGUELANGEL EVANS    PROCEDURE DATE: 11/21/2023      IMPRESSION:    1. Multilevel degenerative changes with moderate/severe spinal stenosis at C5-6 and C6-7. Mild spinal cord flattening at both levels, without associated spinal cord signal abnormality.    2. Multilevel moderate/severe neural foraminal stenosis.    --- End of Report ---    LISA DE LA CRUZ MD; Attending Radiologist  This document has been electronically signed. Nov 21 2023 12:27PM      ACC: [id number] EXAM: CT ANGIO NECK (W)AW IC ORDERED BY: MIGUELANGEL EVANS    ACC: [id number] EXAM: CT ANGIO BRAIN (W)AW IC ORDERED BY: MIGUELANGEL EVANS    ACC: [id number] EXAM: CT BRAIN ORDERED BY: MIGUELANGEL EVANS    PROCEDURE DATE: 11/20/2023  IMPRESSION:    CT HEAD:    A focal 2 cm hypodensity is noted in the right posterior occipital region consistent with chronic infarction.    No evidence of acute intracranial hemorrhage, acute territorial infarct, mass or midline shift.      CTA:    Marked calcification at the right and left common carotid artery bifurcations with severe stenosis at the level of the right carotid bulb, and the left CC bifurcation and at the left carotid bulb.    No evidence of major vascular occlusion or aneurysm.    --- End of Report --    SHAW ORTEGA MD; Attending Interventional Radiologist  This document has been electronically signed. Nov 20 2023 8:26PM    Assessment:   The patient is an 83-year-old female with a past history of OA, HTN, and DLD, who presented  with acute onset distal numbness/ trouble walking x 3 days and was  found to have an left thalamic lacunar infarct on MR brain 11/21. Bilateral Common Carotid Artery bifurcation calcifications with left CC and bilateral carotid bulb severe stenosis noted on CTA head/neck 11/20. A neuroendovascular consult had been placed for the above findings. PRU was performed today with a result of 278 (not therapeutic on Plavix). On exam, the patient has no acute complaints.    Suggestions:  - Recommending switch from Plavix 75mg daily to Brilinta 90mg q 12 hours due to PRU result of 278 this afternoon.   - Plan for a diagnostic cerebral angiogram + possible carotid angioplasty and stenting with Dr. Waterman this coming Tuesday 11/28.  - Continue Aspirin 81mg daily and high intensity Statin  [id number] Neuroendovascular with questions/ concerns or if there is any acute change in the patient's neurological status/ exam

## 2023-11-24 NOTE — CONSULT NOTE ADULT - SUBJECTIVE AND OBJECTIVE BOX
MADISON GLORIA  83y  Female    Patient is a 83y old  Female who presents with a chief complaint of Numbness and Imbalance (24 Nov 2023 13:57)      HPI:  Patient is a 84 yo female with pmhx of rheumatoid arthritis, HTN, DLD , walks with the help of a walker (rollator) and able to perform ADLs with some assistance presenting today for 3 days of distal numbness and imbalance. Patient states that she got flu shot this past Friday and then on Saturday she cleaned her house and felt extremely tired with right arm pain and rested on a couch then tried getting up after some time but fell back on the couch and thereon, she noted that she had problems with balance and also experienced distal numbness both on her foot and hands. She denies any progression of her sensory symptoms but reports that her balance is worse since onset. She denies any fever chills diarrhoea, focal weakness, speech visual deficits, problems breathing, neck or back pain , swallowing. Patient has baseline urinary incontinence.  (20 Nov 2023 21:47)    S: Patient was examined and seen at bedside. This morning pt is resting comfortably in bed and reports no new issues or overnight events. C/o Lt ankle pain w/ palpation. No other complaints.  Denies CP, SOB, N/V/D/C/AP, cough, F, chills, dizziness, new focal weakness, HA, vision changes, dysuria, or urinary symptoms, blood in stool.  ROS: all other systems reviewed and are negative    PAST MEDICAL & SURGICAL HISTORY:  Rheumatoid arthritis      Hyperlipemia      Hypertension      Stress incontinence      Asthma      S/P total knee replacement, right        SOCIAL HISTORY:  Tobacco: negative  Illicit drugs: negative  Alcohol: social  Family history reviewed and otherwise non-contributory No clotting disorders, CVAs at early age.  ALLERGIES: NKDA    MEDICATIONS  (STANDING):  aspirin enteric coated 81 milliGRAM(s) Oral daily  atenolol  Tablet 50 milliGRAM(s) Oral daily  atorvastatin 80 milliGRAM(s) Oral at bedtime  folic acid 1 milliGRAM(s) Oral daily  heparin   Injectable 5000 Unit(s) SubCutaneous every 12 hours  losartan 100 milliGRAM(s) Oral daily  oxybutynin 5 milliGRAM(s) Oral two times a day  pantoprazole    Tablet 40 milliGRAM(s) Oral before breakfast  ticagrelor 90 milliGRAM(s) Oral every 12 hours    MEDICATIONS  (PRN):  albuterol    90 MICROgram(s) HFA Inhaler 2 Puff(s) Inhalation every 6 hours PRN for bronchospasm    Home Medications:  Albuterol (Eqv-ProAir HFA) 90 mcg/inh inhalation aerosol: 2 puff(s) inhaled every 6 hours as needed for  bronchospasm (20 Nov 2023 23:28)  aspirin 81 mg oral delayed release tablet: 1 tab(s) orally once a day (20 Nov 2023 23:43)  atenolol 50 mg oral tablet: 1 tab(s) orally once a day (12 Jul 2019 18:35)  carbonyl iron 45 mg oral tablet: 1 tab(s) orally once a day (20 Nov 2023 23:27)  fesoterodine 4 mg oral tablet, extended release: 1 tab(s) orally once a day (20 Nov 2023 23:22)  folic acid 1 mg oral tablet: 1 tab(s) orally once a day (12 Jul 2019 18:35)  losartan 100 mg oral tablet: 1 tab(s) orally once a day (at bedtime) (20 Nov 2023 23:26)  pantoprazole 40 mg oral delayed release tablet: 1 tab(s) orally once a day (12 Jul 2019 18:35)  simvastatin 20 mg oral tablet: 1 tab(s) orally once a day (at bedtime) (20 Nov 2023 23:26)          Vital Signs Last 24 Hrs  T(C): 36.2 (24 Nov 2023 14:35), Max: 36.2 (24 Nov 2023 14:35)  T(F): 97.2 (24 Nov 2023 14:35), Max: 97.2 (24 Nov 2023 14:35)  HR: 64 (24 Nov 2023 15:49) (57 - 66)  BP: 147/66 (24 Nov 2023 15:49) (87/52 - 148/66)  BP(mean): 95 (24 Nov 2023 04:54) (90 - 95)  RR: 18 (24 Nov 2023 14:35) (18 - 18)  SpO2: 97% (23 Nov 2023 21:27) (97% - 97%)    Parameters below as of 24 Nov 2023 04:54  Patient On (Oxygen Delivery Method): room air      CAPILLARY BLOOD GLUCOSE          General: NAD. Looks stated age.  HEENT: clean oropharynx, EOMI, no LAD  Neck: trachea midline, no thyromegaly  CV: nl S1 S2; no m/r/g  Resp: decreased breath sounds at base  GI: NT/ND/S +BS  MS: no clubbing/cyanosis/edema, +pulses. +hammer toes  Neuro: motor 5/5, decreased sensation in LEs  Skin: no rashes, nl turgor  Psychiatric: AA0x3 w/ intact insight and judgement    tele: SR, nonspecific changes (on my own evaluation of tele monitor)    LABS:                        11.3   3.52  )-----------( 173      ( 23 Nov 2023 06:44 )             36.8     11-23    143  |  105  |  19  ----------------------------<  99  4.2   |  27  |  1.1    Ca    8.9      23 Nov 2023 06:44  Mg     2.0     11-23              Urinalysis Basic - ( 23 Nov 2023 06:44 )    Color: x / Appearance: x / SG: x / pH: x  Gluc: 99 mg/dL / Ketone: x  / Bili: x / Urobili: x   Blood: x / Protein: x / Nitrite: x   Leuk Esterase: x / RBC: x / WBC x   Sq Epi: x / Non Sq Epi: x / Bacteria: x            EKG - SR, nonspecific changes (my read)  Chart and consultant noted personally reviewed.  Care Discussed with Consultants/Other Providers/ Housestaff [ x] YES [ ] NO   Radiology, labs, old records personally reviewed.          
Neuroendovascular Consult note:   Consulted for: Carotid stenosis     HPI:  Patient is a 84 yo female with pmhx of rheumatoid arthritis, HTN, DLD , walks with the help of a walker (rollator) and able to perform ADLs with some assistance presenting today for 3 days of distal numbness and imbalance. Patient states that she got flu shot this past Friday and then on Saturday she cleaned her house and felt extremely tired with right arm pain and rested on a couch then tried getting up after some time but fell back on the couch and thereon, she noted that she had problems with balance and also experienced distal numbness both on her foot and hands. She denies any progression of her sensory symptoms but reports that her balance is worse since onset. She denies any fever chills diarrhoea, focal weakness, speech visual deficits, problems breathing, neck or back pain , swallowing. Patient has baseline urinary incontinence.  (20 Nov 2023 21:47)    Neuroendovascular team consulted for evaluation for carotid stenosis. CTH focal 2cm hypodensity right posterior occipital region consistent with chronic infarct, no evidence of acute intracranial hemorrhage, acute territorial infarct, mass or midline shift. CTA marked calcification right and left common carotid artery bifurcations with severe stenosis at the level of the right carotid bulb, left CC bifurcation at the left carotid bulb. Patient without complaints in ED. Now on DAPT - Aspirin 81 and Plavix 75, high dose statin.     Past medical history:   Rheumatoid arthritis  Hyperlipemia  Hypertension  Stress incontinence  Asthma    Medications:  aspirin enteric coated 81 milliGRAM(s) Oral daily  atenolol  Tablet 50 milliGRAM(s) Oral daily  atorvastatin 80 milliGRAM(s) Oral at bedtime  clopidogrel Tablet 75 milliGRAM(s) Oral daily  folic acid 1 milliGRAM(s) Oral daily  heparin   Injectable 5000 Unit(s) SubCutaneous every 12 hours  losartan 100 milliGRAM(s) Oral daily  oxybutynin 5 milliGRAM(s) Oral two times a day  pantoprazole    Tablet 40 milliGRAM(s) Oral before breakfast    Vitals:   T(F): 98.5 (11-22-23 @ 16:10), Max: 98.9 (11-21-23 @ 23:42)  HR: 62 (11-22-23 @ 16:10) (57 - 62)  BP: 182/72 (11-22-23 @ 16:10) (117/56 - 182/72)  RR: 18 (11-22-23 @ 16:10) (18 - 18)  SpO2: 96% (11-22-23 @ 16:10) (95% - 96%)    Labs:                         11.2   3.98  )-----------( 142      ( 22 Nov 2023 08:33 )             36.8   11-22  142  |  102  |  18  ----------------------------<  113<H>  4.2   |  26  |  1.2  Ca    8.9      22 Nov 2023 08:33  Mg     1.9     11-22  TPro  6.3  /  Alb  x   /  TBili  x   /  DBili  x   /  AST  x   /  ALT  x   /  AlkPhos  x   11-21  PT/INR - ( 20 Nov 2023 18:08 )   PT: 11.50 sec;   INR: 1.01 ratio   PTT - ( 20 Nov 2023 18:08 )  PTT:29.8 sec  LIVER FUNCTIONS - ( 21 Nov 2023 08:32 )  Alb: x     / Pro: 6.3 g/dL / ALK PHOS: x     / ALT: x     / AST: x     / GGT: x           Exam:   General - NAD   Neuro - AAO3, follows commands, EOMi, visual fields intact, moving all extremities, RUE limited ROM 2.2 pain, decreased sensation bilateral LE, no dysarthria, no aphasisa, no dysmetria on finger to nose     Radiology:   < from: CT Angio Neck w/ IV Cont (11.20.23 @ 19:54) >  IMPRESSION:    CT HEAD:    A focal 2 cm hypodensity is noted in the right posterior occipital region   consistent with chronic infarction.    No evidence of acute intracranial hemorrhage, acute territorial infarct,   mass or midline shift.      CTA:    Marked calcification at the right and left common carotid artery   bifurcations with severe stenosis at the level of the right carotid bulb,   and the left CC bifurcation and at the left carotid bulb.    No evidence of major vascular occlusion or aneurysm.    < end of copied text >  < from: CT Head No Cont (11.20.23 @ 18:29) >  IMPRESSION:    CT HEAD:    A focal 2 cm hypodensity is noted in the right posterior occipital region   consistent with chronic infarction.    No evidence of acute intracranial hemorrhage, acute territorial infarct,   mass or midline shift.      CTA:    Marked calcification at the right and left common carotid artery   bifurcations with severe stenosis at the level of the right carotid bulb,   and the left CC bifurcation and at the left carotid bulb.    No evidence of major vascular occlusion or aneurysm.      < end of copied text >  < from: MR Head w/wo IV Cont (11.21.23 @ 11:15) >  IMPRESSION:  Left thalamic acute lacunar infarct. No acute hemorrhage.    < end of copied text >  < from: MR Cervical Spine w/wo IV Cont (11.21.23 @ 11:15) >  IMPRESSION:    1.  Multilevel degenerative changes with moderate/severe spinal stenosis   at C5-6 and C6-7. Mild spinal cord flattening at both levels, without   associated spinal cord signal abnormality.    2.  Multilevel moderate/severe neural foraminal stenosis.      < end of copied text >    Assessment:   83 year old with female with history of OA, HTN, DLD, presented with 3 days of acute onset distal numbness and imbalance. Patient was found with left thalamic lacunar infarct on admission. Found with marked calcification at the right and left common carotid artery bifurcations with severe stenosis at the level of the right carotid bulb, left CC bifurcation and ast the left carotid bulb.     Suggestions:  Planning for diagnostic cerebral angiogram with possible carotid angioplasty/stenting Tuesday 11/28  Continue DAPT   Continue high dose statin   PRU Friday   Continue management per stroke team   Plan discussed with Dr Waterman and the stroke team.   [id number] neuroendovascular 
HPI:  Patient is a 84 yo female with pmhx of rheumatoid arthritis, HTN, DLD , walks with the help of a walker (rollator) and able to perform ADLs with some assistance presenting today for 3 days of distal numbness and imbalance. Patient states that she got flu shot this past Friday and then on Saturday she cleaned her house and felt extremely tired with right arm pain and rested on a couch then tried getting up after some time but fell back on the couch and thereon, she noted that she had problems with balance and also experienced distal numbness both on her foot and hands. She denies any progression of her sensory symptoms but reports that her balance is worse since onset. She denies any fever chills diarrhoea, focal weakness, speech visual deficits, problems breathing, neck or back pain , swallowing. Patient has baseline urinary incontinence.     < from: MR Head w/wo IV Cont (11.21.23 @ 11:15) >  IMPRESSION:  Left thalamic acute lacunar infarct. No acute hemorrhage.    --- End of Report ---    < end of copied text >        PAST MEDICAL & SURGICAL HISTORY:  Rheumatoid arthritis      Hyperlipemia      Hypertension      Stress incontinence      Asthma      S/P total knee replacement, right          Hospital Course:    TODAY'S SUBJECTIVE & REVIEW OF SYMPTOMS:     Constitutional WNL   Cardio WNL   Resp WNL   GI WNL  Heme WNL  Endo WNL  Skin WNL  MSK WNL  Neuro unsteady gait and distal extremity numbness   Cognitive WNL  Psych WNL      MEDICATIONS  (STANDING):  aspirin enteric coated 81 milliGRAM(s) Oral daily  atenolol  Tablet 50 milliGRAM(s) Oral daily  folic acid 1 milliGRAM(s) Oral daily  losartan 100 milliGRAM(s) Oral daily  oxybutynin 5 milliGRAM(s) Oral two times a day  pantoprazole    Tablet 40 milliGRAM(s) Oral before breakfast  simvastatin 20 milliGRAM(s) Oral at bedtime    MEDICATIONS  (PRN):  albuterol    90 MICROgram(s) HFA Inhaler 2 Puff(s) Inhalation every 6 hours PRN for bronchospasm      FAMILY HISTORY:  FH: hypertension        Allergies    sulfa drugs (Rash)  penicillins (Rash)    Intolerances        SOCIAL HISTORY:    [  ] Etoh  [  ] Smoking  [  ] Substance abuse     Home Environment:  [   ] Home Alone  [ x  ] Lives with Family  [   ] Home Health Aid    Dwelling:  [   ] Apartment  [ x  ] Private House  [   ] Adult Home  [   ] Skilled Nursing Facility      [   ] Short Term  [   ] Long Term  [ x  ] Stairs       Elevator [   ]    FUNCTIONAL STATUS PTA: (Check all that apply)  Ambulation: [  x  ]Independent    [   ] Dependent     [   ] Non-Ambulatory  Assistive Device: [   ] SA Cane  [   ]  Q Cane  [   ] Walker  [   ]  Wheelchair  ADL : [  x ] Independent  [    ]  Dependent       Vital Signs Last 24 Hrs  T(C): 36.6 (21 Nov 2023 07:33), Max: 36.9 (20 Nov 2023 16:56)  T(F): 97.9 (21 Nov 2023 07:33), Max: 98.4 (20 Nov 2023 16:56)  HR: 62 (21 Nov 2023 07:33) (62 - 96)  BP: 136/68 (21 Nov 2023 07:33) (136/68 - 216/84)  BP(mean): 94 (21 Nov 2023 07:33) (94 - 111)  RR: 18 (21 Nov 2023 07:33) (18 - 18)  SpO2: 99% (21 Nov 2023 07:33) (93% - 100%)    Parameters below as of 21 Nov 2023 07:33  Patient On (Oxygen Delivery Method): room air          PHYSICAL EXAM: Awake & Alert  GENERAL: NAD  HEAD:  Normocephalic  CHEST/LUNG: Clear   HEART: S1S2+  ABDOMEN: Soft, Nontender  EXTREMITIES:  no calf tenderness    NERVOUS SYSTEM:  Cranial Nerves 2-12 intact [   ] Abnormal  [   ]  ROM: WFL all extremities [ x  ]  Abnormal [   ]  Motor Strength: WFL all extremities  [  x ]  Abnormal [   ]  Sensation: intact to light touch [ x  ] Abnormal [   ]    FUNCTIONAL STATUS:  Bed Mobility: Independent [   ]  Supervision [   ]  Needs Assistance [ x  ]  N/A [   ]  Transfers: Independent [   ]  Supervision [   ]  Needs Assistance [   ]  N/A [   ]   Ambulation: Independent [   ]  Supervision [   ]  Needs Assistance [   ]  N/A [   ]  ADL: Independent [   ] Requires Assistance [   ] N/A [   ]      LABS:                        11.1   3.80  )-----------( 175      ( 21 Nov 2023 08:32 )             35.5     11-21    145  |  104  |  16  ----------------------------<  87  4.1   |  29  |  1.2    Ca    9.3      21 Nov 2023 08:32    TPro  7.0  /  Alb  4.1  /  TBili  0.6  /  DBili  x   /  AST  20  /  ALT  12  /  AlkPhos  70  11-20    PT/INR - ( 20 Nov 2023 18:08 )   PT: 11.50 sec;   INR: 1.01 ratio         PTT - ( 20 Nov 2023 18:08 )  PTT:29.8 sec  Urinalysis Basic - ( 21 Nov 2023 08:32 )    Color: x / Appearance: x / SG: x / pH: x  Gluc: 87 mg/dL / Ketone: x  / Bili: x / Urobili: x   Blood: x / Protein: x / Nitrite: x   Leuk Esterase: x / RBC: x / WBC x   Sq Epi: x / Non Sq Epi: x / Bacteria: x        RADIOLOGY & ADDITIONAL STUDIES:

## 2023-11-24 NOTE — PROGRESS NOTE ADULT - SUBJECTIVE AND OBJECTIVE BOX
Neurology Progress Note    Interval History:     Patient was seen and examined, no acute events over night.     Medications:  albuterol    90 MICROgram(s) HFA Inhaler 2 Puff(s) Inhalation every 6 hours PRN  aspirin enteric coated 81 milliGRAM(s) Oral daily  atenolol  Tablet 50 milliGRAM(s) Oral daily  atorvastatin 80 milliGRAM(s) Oral at bedtime  clopidogrel Tablet 75 milliGRAM(s) Oral daily  folic acid 1 milliGRAM(s) Oral daily  heparin   Injectable 5000 Unit(s) SubCutaneous every 12 hours  losartan 100 milliGRAM(s) Oral daily  oxybutynin 5 milliGRAM(s) Oral two times a day  pantoprazole    Tablet 40 milliGRAM(s) Oral before breakfast      Vital Signs Last 24 Hrs  T(C): 35.6 (24 Nov 2023 04:54), Max: 36.9 (23 Nov 2023 12:47)  T(F): 96.1 (24 Nov 2023 04:54), Max: 98.4 (23 Nov 2023 12:47)  HR: 57 (24 Nov 2023 04:54) (57 - 66)  BP: 148/66 (24 Nov 2023 04:54) (132/63 - 170/74)  BP(mean): 95 (24 Nov 2023 04:54) (90 - 95)  RR: 18 (24 Nov 2023 04:54) (18 - 18)  SpO2: 97% (23 Nov 2023 21:27) (97% - 97%)    Parameters below as of 24 Nov 2023 04:54  Patient On (Oxygen Delivery Method): room air          Neurological Exam:   Mental status: Awake, alert and oriented x3.  Recent and remote memory intact.  Naming, repetition and comprehension intact.  Attention/concentration intact.  No dysarthria, no aphasia.  Fund of knowledge appropriate.    Cranial nerves: Pupils equally round and reactive to light, visual fields full, no nystagmus, extraocular muscles intact, V1 through V3 intact bilaterally and symmetric, face symmetric, hearing intact to finger rub, palate elevation symmetric, tongue was midline.  Motor:  L mild pronator drift. 4+/5 R arm flexion and extension, 4/5 L arm flexion an extension. 4+/5 R hip flexion 4 L hip flexion. 4+/5 R knee flexion 4/5 L knee flexion. 4+ R Dorsiflexion R 5/5 plantar flexion. 4 L Dorsiflexion L 5/5 plantar flexion.   Sensation: Intact to light touch, proprioception, and pinprick on b/l UE. Absent pinprick on LUE from toe to mid shin. Severely decrease temp sensation on b/l feet, Absent vibration on b/l LE. No propioception on LLE, diminshed propioception in RLE  Coordination: No dysmetria on finger-to-nose and heel-to-shin.  No dysdiadokinesia.  Reflexes: 2+ in bilateral UE/LE, Upgoing toes bilaterally. (-) Garcia. 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   Gait: deferred      Labs:  CBC Full  -  ( 23 Nov 2023 06:44 )  WBC Count : 3.52 K/uL  RBC Count : 3.58 M/uL  Hemoglobin : 11.3 g/dL  Hematocrit : 36.8 %  Platelet Count - Automated : 173 K/uL  Mean Cell Volume : 102.8 fL  Mean Cell Hemoglobin : 31.6 pg  Mean Cell Hemoglobin Concentration : 30.7 g/dL  Auto Neutrophil # : 2.18 K/uL  Auto Lymphocyte # : 0.73 K/uL  Auto Monocyte # : 0.41 K/uL  Auto Eosinophil # : 0.15 K/uL  Auto Basophil # : 0.04 K/uL  Auto Neutrophil % : 62.0 %  Auto Lymphocyte % : 20.7 %  Auto Monocyte % : 11.6 %  Auto Eosinophil % : 4.3 %  Auto Basophil % : 1.1 %    11-23    143  |  105  |  19  ----------------------------<  99  4.2   |  27  |  1.1    Ca    8.9      23 Nov 2023 06:44  Mg     2.0     11-23    Urinalysis Basic - ( 23 Nov 2023 06:44 )    Color: x / Appearance: x / SG: x / pH: x  Gluc: 99 mg/dL / Ketone: x  / Bili: x / Urobili: x   Blood: x / Protein: x / Nitrite: x   Leuk Esterase: x / RBC: x / WBC x   Sq Epi: x / Non Sq Epi: x / Bacteria: x        RADIOLOGY & ADDITIONAL TESTS:    < from: Xray Shoulder 2 Views, Bilateral (11.21.23 @ 20:10) >  Right: No acute fracture or dislocation. Severe glenohumeral joint and   moderate/severe acromioclavicular joint osteoarthritis. Complete loss of   the subacromial space with pseudoarticulation ofthe humeral head and   acromion is consistent with chronic rotator cuff tears.    < end of copied text >  < from: MR Cervical Spine w/wo IV Cont (11.21.23 @ 11:15) >  IMPRESSION:    1.  Multilevel degenerative changes with moderate/severe spinal stenosis   at C5-6 and C6-7. Mild spinal cord flattening at both levels, without   associated spinal cord signal abnormality.    2.  Multilevel moderate/severe neural foraminal stenosis.    < end of copied text >  < from: MR Head w/wo IV Cont (11.21.23 @ 11:15) >  IMPRESSION:  Left thalamic acute lacunar infarct. No acute hemorrhage.    < end of copied text >

## 2023-11-24 NOTE — PROGRESS NOTE ADULT - ASSESSMENT
82 yo f with pmhx of OA, HTN, DLD , walks with the help of a walker (rollator) and able to perform ADLs with some assistance presenting today for 3 days of acute onset distal numbness and imbalance. Pt denies any progression of her sensory symptoms but reports that her balance is worse since onset. Found to have an acute L thalamic lacunar infarct on admission. As a whole, symptoms do not match stroke localization. 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     Neurological:  -CTH: Chronic right occipital stroke  -CTA H/N:Marked calcification at the right and left common carotid artery bifurcations with severe stenosis at the level of the right carotid bulb, and the left CC  bifurcation and at the left carotid bulb. No LVO or aneurysm  - NI consulted to evaluate carotid stenosis: planning tx stenting procedure on Tuesday.   - MRI Brain: Left thalamic acute lacunar infarct. No acute hemorrhage. Transferred to stroke service to complete workup.  - Telemonitoring  - Neuro Checks q 8 hrs  - Continue ASA 81 mg once daily and Plavix 75mg once daily. Patient will stay on that regimen for 90 days.  - TTE showing 50-55% EF, with moderate to severe Aortic stenosis.   - Started on atorvastatin 80mg once daily and Plavix 75mg qd  - Check orthostatic BP (please document corresponding heart rates)  - ESR 57 , CRP <3.0, Vitamin b12, folate >20, HbA1C 5.3, , Lyme antibody, TSH 2.19, IgA LEVEL, Ganglioside antibodies including anti GM1 (-), GM2 (+), GD1a (-), GD1b (-), and GQ1b (-), SPEP, UPEP, Vitb12 413, VitB1, Vit-E, Homocysteine, Methylmalonic acid. Copper, Zinc, HIV (-), RPR (-)   - Obtain x ray right shoulder (pending)  - PT/OT Evaluation Pending (attempted but patient using bedpan, will return). Anticipated d/c to 4A (pending approval)    Cardiovascular:  -Telemonitoring  - Maintain normotension and Normovolemia  - H/O Heart murmur: follows with Dr. Patel, supposed to have TTE done on 11/22, need sto be informed  - Will Attempt to reach pt's outp cardiologist to obtain past TTE studies.      Pulmonary:  -Monitor closely for respiratory decline  -Keep 02 sat >94%    GI:  -DASH diet  -IV fluids-None    :  Monitor electrolytes and replete as needed    -ID:  Abnormal UA but not symptomatic, Currently afebrile, No concern for infection at this time.    Hematology:  - c/w sequential stocking  - Heparin sq q 8 hrs for dvt prophylaxis   82 yo f with pmhx of OA, HTN, DLD , walks with the help of a walker (rollator) and able to perform ADLs with some assistance presenting today for 3 days of acute onset distal numbness and imbalance. Pt denies any progression of her sensory symptoms but reports that her balance is worse since onset. Found to have an acute L thalamic lacunar infarct on admission. As a whole, symptoms do not match stroke localization. 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     Neurological:  -CTH: Chronic right occipital stroke  -CTA H/N:Marked calcification at the right and left common carotid artery bifurcations with severe stenosis at the level of the right carotid bulb, and the left CC  bifurcation and at the left carotid bulb. No LVO or aneurysm  - NI consulted to evaluate carotid stenosis: planning tx stenting procedure on Tuesday.   - MRI Brain: Left thalamic acute lacunar infarct. No acute hemorrhage. Transferred to stroke service to complete workup.  - Telemonitoring  - Neuro Checks q 8 hrs  - Continue ASA 81 mg once daily and Plavix 75mg once daily. Patient will stay on that regimen for 90 days.  - TTE showing 50-55% EF, with moderate to severe Aortic stenosis.   - Started on atorvastatin 80mg once daily and Plavix 75mg qd  - Check orthostatic BP (please document corresponding heart rates)  - ESR 57 , CRP <3.0, Vitamin b12, folate >20, HbA1C 5.3, , Lyme antibody, TSH 2.19, IgA LEVEL, Ganglioside antibodies including anti GM1 (-), GM2 (+), GD1a (-), GD1b (-), and GQ1b (-), SPEP, UPEP, Vitb12 413, VitB1, Vit-E, Homocysteine, Methylmalonic acid. Copper, Zinc, HIV (-), RPR (-)   - Obtain x ray right shoulder (pending)  - PT/OT Evaluation Pending (attempted but patient using bedpan, will return). Anticipated d/c to 4A (pending approval)  - PRU collected per neuroendovascular request  - Patient complained of L ankle pain. Xray shows possible distal tibial fracture. Ortho consulted, pending recs.     Cardiovascular:  -Telemonitoring  - Maintain normotension and Normovolemia  - H/O Heart murmur: follows with Dr. Patel, supposed to have TTE done on 11/22, need sto be informed  - Will Attempt to reach pt's outp cardiologist to obtain past TTE studies.      Pulmonary:  -Monitor closely for respiratory decline  -Keep 02 sat >94%    GI:  -DASH diet  -IV fluids-None    :  Monitor electrolytes and replete as needed    -ID:  Abnormal UA but not symptomatic, Currently afebrile, No concern for infection at this time.    Hematology:  - c/w sequential stocking  - Heparin sq q 8 hrs for dvt prophylaxis   82 yo f with pmhx of OA, HTN, DLD , walks with the help of a walker (rollator) and able to perform ADLs with some assistance presenting today for 3 days of acute onset distal numbness and imbalance. Pt denies any progression of her sensory symptoms but reports that her balance is worse since onset. Found to have an acute L thalamic lacunar infarct on admission. As a whole, symptoms do not match stroke localization. 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     Neurological:  -CTH: Chronic right occipital stroke  -CTA H/N:Marked calcification at the right and left common carotid artery bifurcations with severe stenosis at the level of the right carotid bulb, and the left CC  bifurcation and at the left carotid bulb. No LVO or aneurysm  - NI consulted to evaluate carotid stenosis: planning tx stenting procedure on Tuesday.   - MRI Brain: Left thalamic acute lacunar infarct. No acute hemorrhage. Transferred to stroke service to complete workup.  - Telemonitoring  - Neuro Checks q 8 hrs  - Continue ASA 81 mg once daily and Plavix 75mg once daily. Patient will stay on that regimen for 90 days.  - TTE showing 50-55% EF, with moderate to severe Aortic stenosis.   - Started on atorvastatin 80mg once daily and Plavix 75mg qd  - Check orthostatic BP (please document corresponding heart rates)  - ESR 57 , CRP <3.0, Vitamin b12, folate >20, HbA1C 5.3, , Lyme antibody, TSH 2.19, IgA LEVEL, Ganglioside antibodies including anti GM1 (-), GM2 (+), GD1a (-), GD1b (-), and GQ1b (-), SPEP, UPEP, Vitb12 413, VitB1, Vit-E, Homocysteine, Methylmalonic acid. Copper, Zinc, HIV (-), RPR (-)   - Obtain x ray right shoulder (pending)  - PT/OT Evaluation Pending (attempted but patient using bedpan, will return). Anticipated d/c to 4A (pending approval)  - Plavix switched to Brilinta 90mg twice daily, per neuroendovascular request following PRU results.   - Patient complained of L ankle pain. Xray shows possible distal tibial fracture. Ortho consulted, pending recs.     Cardiovascular:  -Telemonitoring  - Maintain normotension and Normovolemia  - H/O Heart murmur: follows with Dr. Patel, supposed to have TTE done on 11/22, need sto be informed  - Will Attempt to reach pt's outp cardiologist to obtain past TTE studies.      Pulmonary:  -Monitor closely for respiratory decline  -Keep 02 sat >94%    GI:  -DASH diet  -IV fluids-None    :  Monitor electrolytes and replete as needed    -ID:  Abnormal UA but not symptomatic, Currently afebrile, No concern for infection at this time.    Hematology:  - c/w sequential stocking  - Heparin sq q 8 hrs for dvt prophylaxis

## 2023-11-25 LAB
ANION GAP SERPL CALC-SCNC: 8 MMOL/L — SIGNIFICANT CHANGE UP (ref 7–14)
ANION GAP SERPL CALC-SCNC: 8 MMOL/L — SIGNIFICANT CHANGE UP (ref 7–14)
BUN SERPL-MCNC: 26 MG/DL — HIGH (ref 10–20)
BUN SERPL-MCNC: 26 MG/DL — HIGH (ref 10–20)
CALCIUM SERPL-MCNC: 8.7 MG/DL — SIGNIFICANT CHANGE UP (ref 8.4–10.5)
CALCIUM SERPL-MCNC: 8.7 MG/DL — SIGNIFICANT CHANGE UP (ref 8.4–10.5)
CHLORIDE SERPL-SCNC: 105 MMOL/L — SIGNIFICANT CHANGE UP (ref 98–110)
CHLORIDE SERPL-SCNC: 105 MMOL/L — SIGNIFICANT CHANGE UP (ref 98–110)
CO2 SERPL-SCNC: 27 MMOL/L — SIGNIFICANT CHANGE UP (ref 17–32)
CO2 SERPL-SCNC: 27 MMOL/L — SIGNIFICANT CHANGE UP (ref 17–32)
CREAT SERPL-MCNC: 1.2 MG/DL — SIGNIFICANT CHANGE UP (ref 0.7–1.5)
CREAT SERPL-MCNC: 1.2 MG/DL — SIGNIFICANT CHANGE UP (ref 0.7–1.5)
EGFR: 45 ML/MIN/1.73M2 — LOW
EGFR: 45 ML/MIN/1.73M2 — LOW
ERYTHROCYTE [SEDIMENTATION RATE] IN BLOOD: 73 MM/HR — HIGH (ref 0–20)
ERYTHROCYTE [SEDIMENTATION RATE] IN BLOOD: 73 MM/HR — HIGH (ref 0–20)
GLUCOSE SERPL-MCNC: 93 MG/DL — SIGNIFICANT CHANGE UP (ref 70–99)
GLUCOSE SERPL-MCNC: 93 MG/DL — SIGNIFICANT CHANGE UP (ref 70–99)
HCT VFR BLD CALC: 36.6 % — LOW (ref 37–47)
HCT VFR BLD CALC: 36.6 % — LOW (ref 37–47)
HGB BLD-MCNC: 11.1 G/DL — LOW (ref 12–16)
HGB BLD-MCNC: 11.1 G/DL — LOW (ref 12–16)
MAGNESIUM SERPL-MCNC: 1.9 MG/DL — SIGNIFICANT CHANGE UP (ref 1.8–2.4)
MAGNESIUM SERPL-MCNC: 1.9 MG/DL — SIGNIFICANT CHANGE UP (ref 1.8–2.4)
MCHC RBC-ENTMCNC: 30.3 G/DL — LOW (ref 32–37)
MCHC RBC-ENTMCNC: 30.3 G/DL — LOW (ref 32–37)
MCHC RBC-ENTMCNC: 31.4 PG — HIGH (ref 27–31)
MCHC RBC-ENTMCNC: 31.4 PG — HIGH (ref 27–31)
MCV RBC AUTO: 103.7 FL — HIGH (ref 81–99)
MCV RBC AUTO: 103.7 FL — HIGH (ref 81–99)
NRBC # BLD: 0 /100 WBCS — SIGNIFICANT CHANGE UP (ref 0–0)
NRBC # BLD: 0 /100 WBCS — SIGNIFICANT CHANGE UP (ref 0–0)
PHOSPHATE SERPL-MCNC: 3.8 MG/DL — SIGNIFICANT CHANGE UP (ref 2.1–4.9)
PHOSPHATE SERPL-MCNC: 3.8 MG/DL — SIGNIFICANT CHANGE UP (ref 2.1–4.9)
PLATELET # BLD AUTO: 165 K/UL — SIGNIFICANT CHANGE UP (ref 130–400)
PLATELET # BLD AUTO: 165 K/UL — SIGNIFICANT CHANGE UP (ref 130–400)
PMV BLD: 11.1 FL — HIGH (ref 7.4–10.4)
PMV BLD: 11.1 FL — HIGH (ref 7.4–10.4)
POTASSIUM SERPL-MCNC: 4.3 MMOL/L — SIGNIFICANT CHANGE UP (ref 3.5–5)
POTASSIUM SERPL-MCNC: 4.3 MMOL/L — SIGNIFICANT CHANGE UP (ref 3.5–5)
POTASSIUM SERPL-SCNC: 4.3 MMOL/L — SIGNIFICANT CHANGE UP (ref 3.5–5)
POTASSIUM SERPL-SCNC: 4.3 MMOL/L — SIGNIFICANT CHANGE UP (ref 3.5–5)
RBC # BLD: 3.53 M/UL — LOW (ref 4.2–5.4)
RBC # BLD: 3.53 M/UL — LOW (ref 4.2–5.4)
RBC # FLD: 13.3 % — SIGNIFICANT CHANGE UP (ref 11.5–14.5)
RBC # FLD: 13.3 % — SIGNIFICANT CHANGE UP (ref 11.5–14.5)
SODIUM SERPL-SCNC: 140 MMOL/L — SIGNIFICANT CHANGE UP (ref 135–146)
SODIUM SERPL-SCNC: 140 MMOL/L — SIGNIFICANT CHANGE UP (ref 135–146)
URATE SERPL-MCNC: 5.5 MG/DL — SIGNIFICANT CHANGE UP (ref 2.5–7)
URATE SERPL-MCNC: 5.5 MG/DL — SIGNIFICANT CHANGE UP (ref 2.5–7)
WBC # BLD: 4.67 K/UL — LOW (ref 4.8–10.8)
WBC # BLD: 4.67 K/UL — LOW (ref 4.8–10.8)
WBC # FLD AUTO: 4.67 K/UL — LOW (ref 4.8–10.8)
WBC # FLD AUTO: 4.67 K/UL — LOW (ref 4.8–10.8)

## 2023-11-25 PROCEDURE — 99232 SBSQ HOSP IP/OBS MODERATE 35: CPT

## 2023-11-25 RX ADMIN — Medication 1 MILLIGRAM(S): at 11:40

## 2023-11-25 RX ADMIN — TICAGRELOR 90 MILLIGRAM(S): 90 TABLET ORAL at 05:35

## 2023-11-25 RX ADMIN — HEPARIN SODIUM 5000 UNIT(S): 5000 INJECTION INTRAVENOUS; SUBCUTANEOUS at 17:14

## 2023-11-25 RX ADMIN — Medication 5 MILLIGRAM(S): at 17:07

## 2023-11-25 RX ADMIN — TICAGRELOR 90 MILLIGRAM(S): 90 TABLET ORAL at 17:07

## 2023-11-25 RX ADMIN — PANTOPRAZOLE SODIUM 40 MILLIGRAM(S): 20 TABLET, DELAYED RELEASE ORAL at 08:50

## 2023-11-25 RX ADMIN — Medication 81 MILLIGRAM(S): at 11:39

## 2023-11-25 RX ADMIN — Medication 5 MILLIGRAM(S): at 05:36

## 2023-11-25 RX ADMIN — HEPARIN SODIUM 5000 UNIT(S): 5000 INJECTION INTRAVENOUS; SUBCUTANEOUS at 05:35

## 2023-11-25 RX ADMIN — ATORVASTATIN CALCIUM 80 MILLIGRAM(S): 80 TABLET, FILM COATED ORAL at 21:04

## 2023-11-25 NOTE — PROGRESS NOTE ADULT - SUBJECTIVE AND OBJECTIVE BOX
Neurology Progress Note    Interval History:      HPI:  Patient is a 84 yo female with pmhx of rheumatoid arthritis, HTN, DLD , walks with the help of a walker (rollator) and able to perform ADLs with some assistance presenting today for 3 days of distal numbness and imbalance. Patient states that she got flu shot this past Friday and then on Saturday she cleaned her house and felt extremely tired with right arm pain and rested on a couch then tried getting up after some time but fell back on the couch and thereon, she noted that she had problems with balance and also experienced distal numbness both on her foot and hands. She denies any progression of her sensory symptoms but reports that her balance is worse since onset. She denies any fever chills diarrhoea, focal weakness, speech visual deficits, problems breathing, neck or back pain , swallowing. Patient has baseline urinary incontinence.  (20 Nov 2023 21:47)      PAST MEDICAL & SURGICAL HISTORY:  Rheumatoid arthritis    Hyperlipemia    Hypertension    Stress incontinence    Asthma    S/P total knee replacement, right            Medications:  albuterol    90 MICROgram(s) HFA Inhaler 2 Puff(s) Inhalation every 6 hours PRN  aspirin enteric coated 81 milliGRAM(s) Oral daily  atenolol  Tablet 50 milliGRAM(s) Oral daily  atorvastatin 80 milliGRAM(s) Oral at bedtime  folic acid 1 milliGRAM(s) Oral daily  heparin   Injectable 5000 Unit(s) SubCutaneous every 12 hours  losartan 100 milliGRAM(s) Oral daily  oxybutynin 5 milliGRAM(s) Oral two times a day  pantoprazole    Tablet 40 milliGRAM(s) Oral before breakfast  ticagrelor 90 milliGRAM(s) Oral every 12 hours      Vital Signs Last 24 Hrs  T(C): 35.4 (25 Nov 2023 05:15), Max: 36.8 (24 Nov 2023 21:02)  T(F): 95.8 (25 Nov 2023 05:15), Max: 98.3 (24 Nov 2023 21:02)  HR: 59 (25 Nov 2023 05:15) (58 - 64)  BP: 117/53 (25 Nov 2023 05:15) (87/52 - 147/66)  BP(mean): 76 (25 Nov 2023 05:15) (76 - 79)  RR: 18 (25 Nov 2023 05:15) (16 - 18)  SpO2: --        Neurological Exam:   Mental status: Awake, alert and oriented x3.  Recent and remote memory intact.  Naming, repetition and comprehension intact.  Attention/concentration intact.  No dysarthria, no aphasia.  Fund of knowledge appropriate.    Cranial nerves: Pupils equally round and reactive to light, visual fields full, no nystagmus, extraocular muscles intact, V1 through V3 intact bilaterally and symmetric, face symmetric, hearing intact to finger rub, palate elevation symmetric, tongue was midline.  Motor:  MRC grading 5/5 b/l UE/LE.   strength 5/5.  Normal tone and bulk.  No abnormal movements.    Sensation: Intact to light touch, proprioception, and pinprick.   Coordination: No dysmetria on finger-to-nose and heel-to-shin.  No dysdiadokinesia.  Reflexes: 2+ in bilateral UE/LE, downgoing toes bilaterally. (-) Garcia.  Gait: Narrow and steady. No ataxia.  Romberg negative    Labs:  CBC Full  -  ( 25 Nov 2023 07:37 )  WBC Count : 4.67 K/uL  RBC Count : 3.53 M/uL  Hemoglobin : 11.1 g/dL  Hematocrit : 36.6 %  Platelet Count - Automated : 165 K/uL  Mean Cell Volume : 103.7 fL  Mean Cell Hemoglobin : 31.4 pg  Mean Cell Hemoglobin Concentration : 30.3 g/dL  Auto Neutrophil # : x  Auto Lymphocyte # : x  Auto Monocyte # : x  Auto Eosinophil # : x  Auto Basophil # : x  Auto Neutrophil % : x  Auto Lymphocyte % : x  Auto Monocyte % : x  Auto Eosinophil % : x  Auto Basophil % : x    11-25    140  |  105  |  26<H>  ----------------------------<  93  4.3   |  27  |  1.2    Ca    8.7      25 Nov 2023 07:37  Phos  3.8     11-25  Mg     1.9     11-25          Urinalysis Basic - ( 25 Nov 2023 07:37 )    Color: x / Appearance: x / SG: x / pH: x  Gluc: 93 mg/dL / Ketone: x  / Bili: x / Urobili: x   Blood: x / Protein: x / Nitrite: x   Leuk Esterase: x / RBC: x / WBC x   Sq Epi: x / Non Sq Epi: x / Bacteria: x        Assessment:  This is a 83y Female w/ h/o     Plan: Neurology Progress Note    Interval History:      HPI:  Patient is a 82 yo female with pmhx of rheumatoid arthritis, HTN, DLD , walks with the help of a walker (rollator) and able to perform ADLs with some assistance presenting today for 3 days of distal numbness and imbalance. Patient states that she got flu shot this past Friday and then on Saturday she cleaned her house and felt extremely tired with right arm pain and rested on a couch then tried getting up after some time but fell back on the couch and thereon, she noted that she had problems with balance and also experienced distal numbness both on her foot and hands. She denies any progression of her sensory symptoms but reports that her balance is worse since onset. She denies any fever chills diarrhoea, focal weakness, speech visual deficits, problems breathing, neck or back pain , swallowing. Patient has baseline urinary incontinence.  (20 Nov 2023 21:47)      PAST MEDICAL & SURGICAL HISTORY:  Rheumatoid arthritis    Hyperlipemia    Hypertension    Stress incontinence    Asthma    S/P total knee replacement, right            Medications:  albuterol    90 MICROgram(s) HFA Inhaler 2 Puff(s) Inhalation every 6 hours PRN  aspirin enteric coated 81 milliGRAM(s) Oral daily  atenolol  Tablet 50 milliGRAM(s) Oral daily  atorvastatin 80 milliGRAM(s) Oral at bedtime  folic acid 1 milliGRAM(s) Oral daily  heparin   Injectable 5000 Unit(s) SubCutaneous every 12 hours  losartan 100 milliGRAM(s) Oral daily  oxybutynin 5 milliGRAM(s) Oral two times a day  pantoprazole    Tablet 40 milliGRAM(s) Oral before breakfast  ticagrelor 90 milliGRAM(s) Oral every 12 hours      Vital Signs Last 24 Hrs  T(C): 35.4 (25 Nov 2023 05:15), Max: 36.8 (24 Nov 2023 21:02)  T(F): 95.8 (25 Nov 2023 05:15), Max: 98.3 (24 Nov 2023 21:02)  HR: 59 (25 Nov 2023 05:15) (58 - 64)  BP: 117/53 (25 Nov 2023 05:15) (87/52 - 147/66)  BP(mean): 76 (25 Nov 2023 05:15) (76 - 79)  RR: 18 (25 Nov 2023 05:15) (16 - 18)  SpO2: --        Neurological Exam:   Mental status: Awake, alert and oriented x3.  Recent and remote memory intact.  Naming, repetition and comprehension intact.  Attention/concentration intact.  No dysarthria, no aphasia.  Fund of knowledge appropriate.    Cranial nerves: Pupils equally round and reactive to light, visual fields full, no nystagmus, extraocular muscles intact, V1 through V3 intact bilaterally and symmetric, face symmetric, hearing intact to finger rub, palate elevation symmetric, tongue was midline.  Motor:  MRC grading 5/5 b/l UE/LE.   strength 5/5.  Normal tone and bulk.  No abnormal movements.    Sensation: Intact to light touch, proprioception, and pinprick.   Coordination: No dysmetria on finger-to-nose and heel-to-shin.  No dysdiadokinesia.  Reflexes: 2+ in bilateral UE/LE, downgoing toes bilaterally. (-) Garcia.  Gait: Narrow and steady. No ataxia.  Romberg negative    Labs:  CBC Full  -  ( 25 Nov 2023 07:37 )  WBC Count : 4.67 K/uL  RBC Count : 3.53 M/uL  Hemoglobin : 11.1 g/dL  Hematocrit : 36.6 %  Platelet Count - Automated : 165 K/uL  Mean Cell Volume : 103.7 fL  Mean Cell Hemoglobin : 31.4 pg  Mean Cell Hemoglobin Concentration : 30.3 g/dL  Auto Neutrophil # : x  Auto Lymphocyte # : x  Auto Monocyte # : x  Auto Eosinophil # : x  Auto Basophil # : x  Auto Neutrophil % : x  Auto Lymphocyte % : x  Auto Monocyte % : x  Auto Eosinophil % : x  Auto Basophil % : x    11-25    140  |  105  |  26<H>  ----------------------------<  93  4.3   |  27  |  1.2    Ca    8.7      25 Nov 2023 07:37  Phos  3.8     11-25  Mg     1.9     11-25          Urinalysis Basic - ( 25 Nov 2023 07:37 )    Color: x / Appearance: x / SG: x / pH: x  Gluc: 93 mg/dL / Ketone: x  / Bili: x / Urobili: x   Blood: x / Protein: x / Nitrite: x   Leuk Esterase: x / RBC: x / WBC x   Sq Epi: x / Non Sq Epi: x / Bacteria: x        Assessment:  This is a 83y Female w/ h/o     Plan: Neurology Progress Note    Interval History:  Pt currently stable overnight.  No new complaints.  Numbness in distal extremities currently improving.      HPI:  Patient is a 82 yo female with pmhx of rheumatoid arthritis, HTN, DLD , walks with the help of a walker (rollator) and able to perform ADLs with some assistance presenting today for 3 days of distal numbness and imbalance. Patient states that she got flu shot this past Friday and then on Saturday she cleaned her house and felt extremely tired with right arm pain and rested on a couch then tried getting up after some time but fell back on the couch and thereon, she noted that she had problems with balance and also experienced distal numbness both on her foot and hands. She denies any progression of her sensory symptoms but reports that her balance is worse since onset. She denies any fever chills diarrhoea, focal weakness, speech visual deficits, problems breathing, neck or back pain , swallowing. Patient has baseline urinary incontinence.  (20 Nov 2023 21:47)      PAST MEDICAL & SURGICAL HISTORY:  Rheumatoid arthritis    Hyperlipemia    Hypertension    Stress incontinence    Asthma    S/P total knee replacement, right            Medications:  albuterol    90 MICROgram(s) HFA Inhaler 2 Puff(s) Inhalation every 6 hours PRN  aspirin enteric coated 81 milliGRAM(s) Oral daily  atenolol  Tablet 50 milliGRAM(s) Oral daily  atorvastatin 80 milliGRAM(s) Oral at bedtime  folic acid 1 milliGRAM(s) Oral daily  heparin   Injectable 5000 Unit(s) SubCutaneous every 12 hours  losartan 100 milliGRAM(s) Oral daily  oxybutynin 5 milliGRAM(s) Oral two times a day  pantoprazole    Tablet 40 milliGRAM(s) Oral before breakfast  ticagrelor 90 milliGRAM(s) Oral every 12 hours      Vital Signs Last 24 Hrs  T(C): 35.4 (25 Nov 2023 05:15), Max: 36.8 (24 Nov 2023 21:02)  T(F): 95.8 (25 Nov 2023 05:15), Max: 98.3 (24 Nov 2023 21:02)  HR: 59 (25 Nov 2023 05:15) (58 - 64)  BP: 117/53 (25 Nov 2023 05:15) (87/52 - 147/66)  BP(mean): 76 (25 Nov 2023 05:15) (76 - 79)  RR: 18 (25 Nov 2023 05:15) (16 - 18)  SpO2: --        Neurological Exam:   Mental status: Awake, alert and oriented x3.  Recent and remote memory intact.  Naming, repetition and comprehension intact.  Attention/concentration intact.  No dysarthria, no aphasia.  Fund of knowledge appropriate.    Cranial nerves: Pupils equally round and reactive to light, visual fields full, no nystagmus, extraocular muscles intact, V1 through V3 intact bilaterally and symmetric, face symmetric, hearing intact to finger rub, palate elevation symmetric, tongue was midline.  Motor:  MRC grading 5/5 b/l UE/LE.   strength 5/5.  Normal tone and bulk.  No abnormal movements.    Sensation: Intact to light touch, proprioception, and pinprick.   Coordination: No dysmetria on finger-to-nose and heel-to-shin.  No dysdiadokinesia.  Reflexes: 2+ in bilateral UE/LE, downgoing toes bilaterally. (-) Garcia.  Gait: Narrow and steady. No ataxia.  Romberg negative    Labs:  CBC Full  -  ( 25 Nov 2023 07:37 )  WBC Count : 4.67 K/uL  RBC Count : 3.53 M/uL  Hemoglobin : 11.1 g/dL  Hematocrit : 36.6 %  Platelet Count - Automated : 165 K/uL  Mean Cell Volume : 103.7 fL  Mean Cell Hemoglobin : 31.4 pg  Mean Cell Hemoglobin Concentration : 30.3 g/dL  Auto Neutrophil # : x  Auto Lymphocyte # : x  Auto Monocyte # : x  Auto Eosinophil # : x  Auto Basophil # : x  Auto Neutrophil % : x  Auto Lymphocyte % : x  Auto Monocyte % : x  Auto Eosinophil % : x  Auto Basophil % : x    11-25    140  |  105  |  26<H>  ----------------------------<  93  4.3   |  27  |  1.2    Ca    8.7      25 Nov 2023 07:37  Phos  3.8     11-25  Mg     1.9     11-25          Urinalysis Basic - ( 25 Nov 2023 07:37 )    Color: x / Appearance: x / SG: x / pH: x  Gluc: 93 mg/dL / Ketone: x  / Bili: x / Urobili: x   Blood: x / Protein: x / Nitrite: x   Leuk Esterase: x / RBC: x / WBC x   Sq Epi: x / Non Sq Epi: x / Bacteria: x        Assessment:  This is a 83y Female w/ h/o     Plan:

## 2023-11-25 NOTE — PROGRESS NOTE ADULT - ASSESSMENT
84 yo f with pmhx of OA, HTN, DLD , walks with the help of a walker (rollator) and able to perform ADLs with some assistance presenting today for 3 days of acute onset distal numbness and imbalance. Pt denies any progression of her sensory symptoms but reports that her balance is worse since onset. Found to have an acute L thalamic lacunar infarct on admission. As a whole, symptoms do not match stroke localization. 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     Acute Lt thalamic infarct  B/l ICAD  -CTH: Chronic right occipital stroke  -CTA H/N:Marked calcification at the right and left common carotid artery bifurcations with severe stenosis at the level of the right carotid bulb, and the left CC  bifurcation and at the left carotid bulb. No LVO or aneurysm  - NI consulted to evaluate carotid stenosis: planning tx stenting procedure on Tuesday.   - MRI Brain: Left thalamic acute lacunar infarct. No acute hemorrhage. Transferred to stroke service to complete workup.  - Telemonitoring  - Neuro Checks q 8 hrs  - Continue ASA 81 mg once daily and Plavix 75mg once daily. Patient will stay on that regimen for 90 days.  - TTE showing 50-55% EF, with moderate to severe Aortic stenosis.   - Started on atorvastatin 80mg once daily and Plavix 75mg qd  - ESR 57 , CRP <3.0, Vitamin b12, folate >20, HbA1C 5.3, , Lyme antibody, TSH 2.19, IgA LEVEL, Ganglioside antibodies including anti GM1 (-), GM2 (+), GD1a (-), GD1b (-), and GQ1b (-), SPEP, UPEP, Vitb12 413, VitB1, Vit-E, Homocysteine, Methylmalonic acid. Copper, Zinc, HIV (-), RPR (-)   - Obtain x ray right shoulder (pending)  - PT/OT Evaluation Pending (attempted but patient using bedpan, will return). Anticipated d/c to 4A (pending approval)  - Plavix switched to Brilinta 90mg twice daily, per neuroendovascular request following PRU results.   - plan for DSA 11/28      #L ankle pain.   Xray shows possible distal tibial fracture.   CT: no fx but osteoporosis and OA    HTN  Mod-severe AS  - Maintain normotension and Normovolemia  -outpt f/u with Dr. Patel,       #Nutrition/Fluids/Electrolytes   - replete K<4 and Mg <2  - ensure regular BMs  - consider Miralax BID, Senna    #DVT Px  SCDs  Lovenox    #Progress Note Handoff  Pending: Clinical improvement and stability__x___PT____x__DSA__  Pt/Family discussion: Pt informed and agrees with the current plan  Disposition: Home______/SNF_______/4A______/To be determined____x____    My note supersedes the residents note should a discrepancy arise.    Chart and notes personally reviewed.  Care Discussed with Consultants/Other Providers/ Housestaff [ x] YES [ ] NO   Radiology, labs, old records personally reviewed.    discussed w/ housestaff, nursing, case management, neuro team    Time-based billing (NON-critical care).     35 minutes spent on total encounter. The necessity of the time spent during the encounter on this date of service was due to:     time spent on review of labs, imaging studies, old records, obtaining history, personally examining patient, counselling and communicating with patient/ family, entering orders for medications/tests/etc, discussions with other health care providers, documentation in electronic health records, independent interpretation of labs, imaging/procedure results and care coordination.

## 2023-11-25 NOTE — PROGRESS NOTE ADULT - ASSESSMENT
84 yo f with pmhx of OA, HTN, DLD , walks with the help of a walker (rollator) and able to perform ADLs with some assistance presenting today for 3 days of acute onset distal numbness and imbalance. Pt denies any progression of her sensory symptoms but reports that her balance is worse since onset. Found to have an acute L thalamic lacunar infarct on admission. As a whole, symptoms do not match stroke localization. 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     Neurological:  -CTH: Chronic right occipital stroke  -CTA H/N:Marked calcification at the right and left common carotid artery bifurcations with severe stenosis at the level of the right carotid bulb, and the left CC  bifurcation and at the left carotid bulb. No LVO or aneurysm  - NI consulted to evaluate carotid stenosis: planning tx stenting procedure on Tuesday. Keep NPO after midnight on Monday.  - MRI Brain: Left thalamic acute lacunar infarct. No acute hemorrhage. Transferred to stroke service to complete workup.  - Telemonitoring  - Neuro Checks q 8 hrs  - Continue ASA 81 mg once daily and Plavix 75mg once daily. Patient will stay on that regimen for 90 days.  - TTE showing 50-55% EF, with moderate to severe Aortic stenosis.   - Started on atorvastatin 80mg once daily  - ESR 57 , CRP <3.0, Vitamin b12, folate >20, HbA1C 5.3, , Lyme antibody, TSH 2.19, IgA LEVEL, Ganglioside antibodies including anti GM1 (-), GM2 (+), GD1a (-), GD1b (-), and GQ1b (-), SPEP, UPEP, Vitb12 413, VitB1, Vit-E, Homocysteine, Methylmalonic acid. Copper, Zinc, HIV (-), RPR (-)   - Obtain x ray right shoulder (pending)  - PT/OT Evaluation Pending (attempted but patient using bedpan, will return). Anticipated d/c to 4A (pending approval)  - Plavix switched to Brilinta 90mg twice daily, per neuroendovascular request following PRU results.   - Patient complained of L ankle pain. Xray shows possible distal tibial fracture. No fracture on CT scan, likely artifactual. Ortho consult d/c'd    Cardiovascular:  -Telemonitoring  - Maintain normotension and Normovolemia  - H/O Heart murmur: follows with Dr. Patel, supposed to have TTE done on 11/22, need sto be informed  - Will Attempt to reach pt's outp cardiologist to obtain past TTE studies.      Pulmonary:  -Monitor closely for respiratory decline  -Keep 02 sat >94%    GI:  -DASH diet  -IV fluids-None    :  Monitor electrolytes and replete as needed    -ID:  Abnormal UA but not symptomatic, Currently afebrile, No concern for infection at this time.    Hematology:  - c/w sequential stocking  - Heparin sq q 8 hrs for dvt prophylaxis   82 yo f with pmhx of OA, HTN, DLD , walks with the help of a walker (rollator) and able to perform ADLs with some assistance presenting today for 3 days of acute onset distal numbness and imbalance. Pt denies any progression of her sensory symptoms but reports that her balance is worse since onset. Found to have an acute L thalamic lacunar infarct on admission. As a whole, symptoms do not match stroke localization. 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     Neurological:  -CTH: Chronic right occipital stroke  -CTA H/N:Marked calcification at the right and left common carotid artery bifurcations with severe stenosis at the level of the right carotid bulb, and the left CC  bifurcation and at the left carotid bulb. No LVO or aneurysm  - NI consulted to evaluate carotid stenosis: planning tx stenting procedure on Tuesday. Keep NPO after midnight on Monday.  - MRI Brain: Left thalamic acute lacunar infarct. No acute hemorrhage. Transferred to stroke service to complete workup.  - Telemonitoring  - Neuro Checks q 8 hrs  - Continue ASA 81 mg once daily and Plavix 75mg once daily. Patient will stay on that regimen for 90 days.  - TTE showing 50-55% EF, with moderate to severe Aortic stenosis.   - Started on atorvastatin 80mg once daily  - ESR 57 , CRP <3.0, Vitamin b12, folate >20, HbA1C 5.3, , Lyme antibody, TSH 2.19, IgA LEVEL, Ganglioside antibodies including anti GM1 (-), GM2 (+), GD1a (-), GD1b (-), and GQ1b (-), SPEP, UPEP, Vitb12 413, VitB1, Vit-E, Homocysteine, Methylmalonic acid. Copper, Zinc, HIV (-), RPR (-)   - Obtain x ray right shoulder (pending)  - PT/OT Evaluation Pending (attempted but patient using bedpan, will return). Anticipated d/c to 4A (pending approval)  - Plavix switched to Brilinta 90mg twice daily, per neuroendovascular request following PRU results.   - Patient complained of L ankle pain. Xray shows possible distal tibial fracture. No fracture on CT scan, likely artifactual. Ortho consult d/c'd    Cardiovascular:  -Telemonitoring  - Maintain normotension and Normovolemia  - H/O Heart murmur: follows with Dr. Patel, supposed to have TTE done on 11/22, need sto be informed  - Will Attempt to reach pt's outp cardiologist to obtain past TTE studies.      Pulmonary:  -Monitor closely for respiratory decline  -Keep 02 sat >94%    GI:  -DASH diet  -IV fluids-None    :  Monitor electrolytes and replete as needed    -ID:  Abnormal UA but not symptomatic, Currently afebrile, No concern for infection at this time.    Hematology:  - c/w sequential stocking  - Heparin sq q 8 hrs for dvt prophylaxis   84 yo f with pmhx of OA, HTN, DLD , walks with the help of a walker (rollator) and able to perform ADLs with some assistance presenting today for 3 days of acute onset distal numbness and imbalance. Pt denies any progression of her sensory symptoms but reports that her balance is worse since onset. Found to have an acute L thalamic lacunar infarct on admission. As a whole, symptoms do not match stroke localization. 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     Neurological:  -CTH: Chronic right occipital stroke  -CTA H/N:Marked calcification at the right and left common carotid artery bifurcations with severe stenosis at the level of the right carotid bulb, and the left CC  bifurcation and at the left carotid bulb. No LVO or aneurysm  - NI consulted to evaluate carotid stenosis: planning tx stenting procedure on Tuesday. Keep NPO after midnight on Monday.  - MRI Brain: Left thalamic acute lacunar infarct. No acute hemorrhage. Transferred to stroke service to complete workup.  - Telemonitoring  - Neuro Checks q 8 hrs  - Continue ASA 81 mg once daily and Plavix 75mg once daily. Patient will stay on that regimen for 90 days.  - TTE showing 50-55% EF, with moderate to severe Aortic stenosis.   - Started on atorvastatin 80mg once daily  - ESR 57 , CRP <3.0, Vitamin b12, folate >20, HbA1C 5.3, , Lyme antibody, TSH 2.19, IgA LEVEL, Ganglioside antibodies including anti GM1 (-), GM2 (+), GD1a (-), GD1b (-), and GQ1b (-), SPEP, UPEP, Vitb12 413, VitB1, Vit-E, Homocysteine, Methylmalonic acid. Copper, Zinc, HIV (-), RPR (-)   - Obtain x ray right shoulder (pending)  - PT/OT Evaluation Pending (attempted but patient using bedpan, will return). Anticipated d/c to 4A (pending approval)  - Plavix switched to Brilinta 90mg twice daily, per neuroendovascular request following PRU results.   - Patient complained of L ankle pain. Xray shows possible distal tibial fracture. No fracture on CT scan, likely artifactual. Ortho consult d/c'd. Sed rate and Uric acid levels order to r/o gout. f/u  - Out of bed to chair and PT consult requested for today.    Cardiovascular:  -Telemonitoring  - Maintain normotension and Normovolemia  - H/O Heart murmur: follows with Dr. Patel, supposed to have TTE done on 11/22, need sto be informed  - Will Attempt to reach pt's outp cardiologist to obtain past TTE studies.      Pulmonary:  -Monitor closely for respiratory decline  -Keep 02 sat >94%    GI:  -DASH diet  -IV fluids-None    :  Monitor electrolytes and replete as needed    -ID:  Abnormal UA but not symptomatic, Currently afebrile, No concern for infection at this time.    Hematology:  - c/w sequential stocking  - Heparin sq q 8 hrs for dvt prophylaxis   82 yo f with pmhx of OA, HTN, DLD , walks with the help of a walker (rollator) and able to perform ADLs with some assistance presenting today for 3 days of acute onset distal numbness and imbalance. Pt denies any progression of her sensory symptoms but reports that her balance is worse since onset. Found to have an acute L thalamic lacunar infarct on admission. As a whole, symptoms do not match stroke localization. 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     Neurological:  -CTH: Chronic right occipital stroke  -CTA H/N:Marked calcification at the right and left common carotid artery bifurcations with severe stenosis at the level of the right carotid bulb, and the left CC  bifurcation and at the left carotid bulb. No LVO or aneurysm  - NI consulted to evaluate carotid stenosis: planning tx stenting procedure on Tuesday. Keep NPO after midnight on Monday.  - MRI Brain: Left thalamic acute lacunar infarct. No acute hemorrhage. Transferred to stroke service to complete workup.  - Telemonitoring  - Neuro Checks q 8 hrs  - Continue ASA 81 mg once daily and Plavix 75mg once daily. Patient will stay on that regimen for 90 days.  - TTE showing 50-55% EF, with moderate to severe Aortic stenosis.   - Started on atorvastatin 80mg once daily  - ESR 57 , CRP <3.0, Vitamin b12, folate >20, HbA1C 5.3, , Lyme antibody, TSH 2.19, IgA LEVEL, Ganglioside antibodies including anti GM1 (-), GM2 (+), GD1a (-), GD1b (-), and GQ1b (-), SPEP, UPEP, Vitb12 413, VitB1, Vit-E, Homocysteine, Methylmalonic acid. Copper, Zinc, HIV (-), RPR (-)   - Obtain x ray right shoulder (pending)  - PT/OT Evaluation Pending (attempted but patient using bedpan, will return). Anticipated d/c to 4A (pending approval)  - Plavix switched to Brilinta 90mg twice daily, per neuroendovascular request following PRU results.   - Patient complained of L ankle pain. Xray shows possible distal tibial fracture. No fracture on CT scan, likely artifactual. Ortho consult d/c'd. Sed rate and Uric acid levels order to r/o gout. f/u  - Out of bed to chair and PT consult requested for today.    Cardiovascular:  -Telemonitoring  - Maintain normotension and Normovolemia  - H/O Heart murmur: follows with Dr. Patel, supposed to have TTE done on 11/22, need sto be informed  - Will Attempt to reach pt's outp cardiologist to obtain past TTE studies.      Pulmonary:  -Monitor closely for respiratory decline  -Keep 02 sat >94%    GI:  -DASH diet  -IV fluids-None    :  Monitor electrolytes and replete as needed    -ID:  Abnormal UA but not symptomatic, Currently afebrile, No concern for infection at this time.    Hematology:  - c/w sequential stocking  - Heparin sq q 8 hrs for dvt prophylaxis

## 2023-11-25 NOTE — PROGRESS NOTE ADULT - SUBJECTIVE AND OBJECTIVE BOX
MADISON GLORIA  83y  Female    Patient is a 83y old  Female who presents with a chief complaint of Numbness and Imbalance (24 Nov 2023 13:57)      HPI:  Patient is a 84 yo female with pmhx of rheumatoid arthritis, HTN, DLD , walks with the help of a walker (rollator) and able to perform ADLs with some assistance presenting today for 3 days of distal numbness and imbalance. Patient states that she got flu shot this past Friday and then on Saturday she cleaned her house and felt extremely tired with right arm pain and rested on a couch then tried getting up after some time but fell back on the couch and thereon, she noted that she had problems with balance and also experienced distal numbness both on her foot and hands. She denies any progression of her sensory symptoms but reports that her balance is worse since onset. She denies any fever chills diarrhoea, focal weakness, speech visual deficits, problems breathing, neck or back pain , swallowing. Patient has baseline urinary incontinence.  (20 Nov 2023 21:47)    S: Patient was examined and seen at bedside. This morning pt is resting comfortably in bed and reports no new issues or overnight events. No new complaints. Feels well.  Denies CP, SOB, N/V/D/C/AP, cough, F, chills, dizziness, new focal weakness, HA, vision changes, dysuria, or urinary symptoms, blood in stool.  ROS: all other systems reviewed and are negative    PAST MEDICAL & SURGICAL HISTORY:  Rheumatoid arthritis      Hyperlipemia      Hypertension      Stress incontinence      Asthma      S/P total knee replacement, right        SOCIAL HISTORY:  Tobacco: negative  Illicit drugs: negative  Alcohol: social  Family history reviewed and otherwise non-contributory No clotting disorders, CVAs at early age.  ALLERGIES: NKDA    General: NAD. Looks stated age.  HEENT: clean oropharynx, EOMI, no LAD  Neck: trachea midline, no thyromegaly  CV: nl S1 S2; no m/r/g  Resp: decreased breath sounds at base  GI: NT/ND/S +BS  MS: no clubbing/cyanosis/trace edema b/l, +pulses. +hammer toes  Neuro: motor 5/5, decreased sensation in LEs  Skin: no rashes, nl turgor  Psychiatric: AA0x3 w/ intact insight and judgement    tele: SR, nonspecific changes (on my own evaluation of tele monitor)          Home Medications:  Albuterol (Eqv-ProAir HFA) 90 mcg/inh inhalation aerosol: 2 puff(s) inhaled every 6 hours as needed for  bronchospasm (20 Nov 2023 23:28)  aspirin 81 mg oral delayed release tablet: 1 tab(s) orally once a day (20 Nov 2023 23:43)  atenolol 50 mg oral tablet: 1 tab(s) orally once a day (12 Jul 2019 18:35)  carbonyl iron 45 mg oral tablet: 1 tab(s) orally once a day (20 Nov 2023 23:27)  fesoterodine 4 mg oral tablet, extended release: 1 tab(s) orally once a day (20 Nov 2023 23:22)  folic acid 1 mg oral tablet: 1 tab(s) orally once a day (12 Jul 2019 18:35)  losartan 100 mg oral tablet: 1 tab(s) orally once a day (at bedtime) (20 Nov 2023 23:26)  pantoprazole 40 mg oral delayed release tablet: 1 tab(s) orally once a day (12 Jul 2019 18:35)  simvastatin 20 mg oral tablet: 1 tab(s) orally once a day (at bedtime) (20 Nov 2023 23:26)    MEDICATIONS  (STANDING):  aspirin enteric coated 81 milliGRAM(s) Oral daily  atenolol  Tablet 50 milliGRAM(s) Oral daily  atorvastatin 80 milliGRAM(s) Oral at bedtime  folic acid 1 milliGRAM(s) Oral daily  heparin   Injectable 5000 Unit(s) SubCutaneous every 12 hours  losartan 100 milliGRAM(s) Oral daily  oxybutynin 5 milliGRAM(s) Oral two times a day  pantoprazole    Tablet 40 milliGRAM(s) Oral before breakfast  ticagrelor 90 milliGRAM(s) Oral every 12 hours    MEDICATIONS  (PRN):  albuterol    90 MICROgram(s) HFA Inhaler 2 Puff(s) Inhalation every 6 hours PRN for bronchospasm    Vital Signs Last 24 Hrs  T(C): 36.2 (25 Nov 2023 13:04), Max: 36.8 (24 Nov 2023 21:02)  T(F): 97.1 (25 Nov 2023 13:04), Max: 98.3 (24 Nov 2023 21:02)  HR: 71 (25 Nov 2023 13:04) (58 - 71)  BP: 109/51 (25 Nov 2023 13:04) (87/52 - 147/66)  BP(mean): 76 (25 Nov 2023 05:15) (76 - 79)  RR: 18 (25 Nov 2023 05:15) (16 - 18)  SpO2: --      CAPILLARY BLOOD GLUCOSE        LABS:                        11.1   4.67  )-----------( 165      ( 25 Nov 2023 07:37 )             36.6     11-25    140  |  105  |  26<H>  ----------------------------<  93  4.3   |  27  |  1.2    Ca    8.7      25 Nov 2023 07:37  Phos  3.8     11-25  Mg     1.9     11-25              Urinalysis Basic - ( 25 Nov 2023 07:37 )    Color: x / Appearance: x / SG: x / pH: x  Gluc: 93 mg/dL / Ketone: x  / Bili: x / Urobili: x   Blood: x / Protein: x / Nitrite: x   Leuk Esterase: x / RBC: x / WBC x   Sq Epi: x / Non Sq Epi: x / Bacteria: x              Consultant Notes Reviewed:  [x ] YES  [ ] NO  Care Discussed with Consultants/Other Providers/ Housestaff [ x] YES  [ ] NO  Radiology, labs, new studies personally reviewed.

## 2023-11-26 LAB
ANION GAP SERPL CALC-SCNC: 12 MMOL/L — SIGNIFICANT CHANGE UP (ref 7–14)
ANION GAP SERPL CALC-SCNC: 12 MMOL/L — SIGNIFICANT CHANGE UP (ref 7–14)
BUN SERPL-MCNC: 25 MG/DL — HIGH (ref 10–20)
BUN SERPL-MCNC: 25 MG/DL — HIGH (ref 10–20)
CALCIUM SERPL-MCNC: 8.9 MG/DL — SIGNIFICANT CHANGE UP (ref 8.4–10.4)
CALCIUM SERPL-MCNC: 8.9 MG/DL — SIGNIFICANT CHANGE UP (ref 8.4–10.4)
CHLORIDE SERPL-SCNC: 106 MMOL/L — SIGNIFICANT CHANGE UP (ref 98–110)
CHLORIDE SERPL-SCNC: 106 MMOL/L — SIGNIFICANT CHANGE UP (ref 98–110)
CO2 SERPL-SCNC: 24 MMOL/L — SIGNIFICANT CHANGE UP (ref 17–32)
CO2 SERPL-SCNC: 24 MMOL/L — SIGNIFICANT CHANGE UP (ref 17–32)
CREAT SERPL-MCNC: 1.2 MG/DL — SIGNIFICANT CHANGE UP (ref 0.7–1.5)
CREAT SERPL-MCNC: 1.2 MG/DL — SIGNIFICANT CHANGE UP (ref 0.7–1.5)
EGFR: 45 ML/MIN/1.73M2 — LOW
EGFR: 45 ML/MIN/1.73M2 — LOW
GAD65 AB SER-MCNC: 0 NMOL/L — SIGNIFICANT CHANGE UP
GAD65 AB SER-MCNC: 0 NMOL/L — SIGNIFICANT CHANGE UP
GLUCOSE SERPL-MCNC: 90 MG/DL — SIGNIFICANT CHANGE UP (ref 70–99)
GLUCOSE SERPL-MCNC: 90 MG/DL — SIGNIFICANT CHANGE UP (ref 70–99)
HCT VFR BLD CALC: 37 % — SIGNIFICANT CHANGE UP (ref 37–47)
HCT VFR BLD CALC: 37 % — SIGNIFICANT CHANGE UP (ref 37–47)
HGB BLD-MCNC: 11.4 G/DL — LOW (ref 12–16)
HGB BLD-MCNC: 11.4 G/DL — LOW (ref 12–16)
MAGNESIUM SERPL-MCNC: 1.8 MG/DL — SIGNIFICANT CHANGE UP (ref 1.8–2.4)
MAGNESIUM SERPL-MCNC: 1.8 MG/DL — SIGNIFICANT CHANGE UP (ref 1.8–2.4)
MCHC RBC-ENTMCNC: 30.8 G/DL — LOW (ref 32–37)
MCHC RBC-ENTMCNC: 30.8 G/DL — LOW (ref 32–37)
MCHC RBC-ENTMCNC: 31.1 PG — HIGH (ref 27–31)
MCHC RBC-ENTMCNC: 31.1 PG — HIGH (ref 27–31)
MCV RBC AUTO: 100.8 FL — HIGH (ref 81–99)
MCV RBC AUTO: 100.8 FL — HIGH (ref 81–99)
NRBC # BLD: 0 /100 WBCS — SIGNIFICANT CHANGE UP (ref 0–0)
NRBC # BLD: 0 /100 WBCS — SIGNIFICANT CHANGE UP (ref 0–0)
PHOSPHATE SERPL-MCNC: 4.1 MG/DL — SIGNIFICANT CHANGE UP (ref 2.1–4.9)
PHOSPHATE SERPL-MCNC: 4.1 MG/DL — SIGNIFICANT CHANGE UP (ref 2.1–4.9)
PLATELET # BLD AUTO: 167 K/UL — SIGNIFICANT CHANGE UP (ref 130–400)
PLATELET # BLD AUTO: 167 K/UL — SIGNIFICANT CHANGE UP (ref 130–400)
PMV BLD: 11.2 FL — HIGH (ref 7.4–10.4)
PMV BLD: 11.2 FL — HIGH (ref 7.4–10.4)
POTASSIUM SERPL-MCNC: 4.6 MMOL/L — SIGNIFICANT CHANGE UP (ref 3.5–5)
POTASSIUM SERPL-MCNC: 4.6 MMOL/L — SIGNIFICANT CHANGE UP (ref 3.5–5)
POTASSIUM SERPL-SCNC: 4.6 MMOL/L — SIGNIFICANT CHANGE UP (ref 3.5–5)
POTASSIUM SERPL-SCNC: 4.6 MMOL/L — SIGNIFICANT CHANGE UP (ref 3.5–5)
RBC # BLD: 3.67 M/UL — LOW (ref 4.2–5.4)
RBC # BLD: 3.67 M/UL — LOW (ref 4.2–5.4)
RBC # FLD: 13.2 % — SIGNIFICANT CHANGE UP (ref 11.5–14.5)
RBC # FLD: 13.2 % — SIGNIFICANT CHANGE UP (ref 11.5–14.5)
SODIUM SERPL-SCNC: 142 MMOL/L — SIGNIFICANT CHANGE UP (ref 135–146)
SODIUM SERPL-SCNC: 142 MMOL/L — SIGNIFICANT CHANGE UP (ref 135–146)
WBC # BLD: 4.6 K/UL — LOW (ref 4.8–10.8)
WBC # BLD: 4.6 K/UL — LOW (ref 4.8–10.8)
WBC # FLD AUTO: 4.6 K/UL — LOW (ref 4.8–10.8)
WBC # FLD AUTO: 4.6 K/UL — LOW (ref 4.8–10.8)

## 2023-11-26 PROCEDURE — 99232 SBSQ HOSP IP/OBS MODERATE 35: CPT

## 2023-11-26 RX ADMIN — Medication 1 MILLIGRAM(S): at 12:56

## 2023-11-26 RX ADMIN — TICAGRELOR 90 MILLIGRAM(S): 90 TABLET ORAL at 05:10

## 2023-11-26 RX ADMIN — ATORVASTATIN CALCIUM 80 MILLIGRAM(S): 80 TABLET, FILM COATED ORAL at 21:01

## 2023-11-26 RX ADMIN — Medication 5 MILLIGRAM(S): at 18:13

## 2023-11-26 RX ADMIN — ATENOLOL 50 MILLIGRAM(S): 25 TABLET ORAL at 05:10

## 2023-11-26 RX ADMIN — Medication 81 MILLIGRAM(S): at 12:55

## 2023-11-26 RX ADMIN — HEPARIN SODIUM 5000 UNIT(S): 5000 INJECTION INTRAVENOUS; SUBCUTANEOUS at 05:09

## 2023-11-26 RX ADMIN — TICAGRELOR 90 MILLIGRAM(S): 90 TABLET ORAL at 18:13

## 2023-11-26 RX ADMIN — HEPARIN SODIUM 5000 UNIT(S): 5000 INJECTION INTRAVENOUS; SUBCUTANEOUS at 18:14

## 2023-11-26 RX ADMIN — LOSARTAN POTASSIUM 100 MILLIGRAM(S): 100 TABLET, FILM COATED ORAL at 05:09

## 2023-11-26 RX ADMIN — PANTOPRAZOLE SODIUM 40 MILLIGRAM(S): 20 TABLET, DELAYED RELEASE ORAL at 05:13

## 2023-11-26 RX ADMIN — Medication 5 MILLIGRAM(S): at 05:09

## 2023-11-26 NOTE — PROGRESS NOTE ADULT - ASSESSMENT
84 yo f with pmhx of OA, HTN, DLD , walks with the help of a walker (rollator) and able to perform ADLs with some assistance presenting today for 3 days of acute onset distal numbness and imbalance. Pt denies any progression of her sensory symptoms but reports that her balance is worse since onset. Found to have an acute L thalamic lacunar infarct on admission. As a whole, symptoms do not match stroke localization. 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     Neurological:  -CTH: Chronic right occipital stroke  -CTA H/N:Marked calcification at the right and left common carotid artery bifurcations with severe stenosis at the level of the right carotid bulb, and the left CC  bifurcation and at the left carotid bulb. No LVO or aneurysm  - NI consulted to evaluate carotid stenosis: planning tx stenting procedure on Tuesday (11/28). Make NPO after midnight on Monday.  - MRI Brain: Left thalamic acute lacunar infarct. No acute hemorrhage. Transferred to stroke service to complete workup.  - Telemonitoring  - Neuro Checks q 8 hrs  - Continue ASA 81 mg once daily. Patient will stay on that regimen for 90 days.  - TTE showing 50-55% EF, with moderate to severe Aortic stenosis.   - Started on atorvastatin 80mg once daily  - ESR 57 , CRP <3.0, Vitamin b12, folate >20, HbA1C 5.3, , Lyme antibody, TSH 2.19, IgA LEVEL, Ganglioside antibodies including anti GM1 (-), GM2 (+), GD1a (-), GD1b (-), and GQ1b (-), SPEP, UPEP, Vitb12 413, VitB1, Vit-E, Homocysteine, Methylmalonic acid. Copper, Zinc, HIV (-), RPR (-)   - Obtain x ray right shoulder (pending)  - PT/OT Evaluation Pending (attempted but patient using bedpan, will return). Anticipated d/c to 4A (pending approval)  - Plavix switched to Brilinta 90mg twice daily (per neuroendovascular request) following PRU results.   - Patient complained of L ankle pain. Xray shows possible distal tibial fracture. No fracture on CT scan, likely artifactual. Ortho consult d/c'd. Sed rate and Uric acid levels order to r/o gout--> uric acid wnl 5.5, sed rate elevated at 73  - Out of bed to chair and PT consult requested.    Cardiovascular:  -Telemonitoring  - Maintain normotension and Normovolemia  - H/O Heart murmur: follows with Dr. Patel, supposed to have TTE done on 11/22, need sto be informed  - Will Attempt to reach pt's outp cardiologist to obtain past TTE studies.      Pulmonary:  -Monitor closely for respiratory decline  -Keep 02 sat >94%    GI:  -DASH diet  -IV fluids-None    :  Monitor electrolytes and replete as needed    -ID:  Abnormal UA but not symptomatic, Currently afebrile, No concern for infection at this time.    Hematology:  - c/w sequential stocking  - Heparin sq q 8 hrs for dvt prophylaxis

## 2023-11-26 NOTE — PROGRESS NOTE ADULT - SUBJECTIVE AND OBJECTIVE BOX
MADISON GLORIA  83y  Female    Patient is a 83y old  Female who presents with a chief complaint of Numbness and Imbalance (24 Nov 2023 13:57)      HPI:  Patient is a 84 yo female with pmhx of rheumatoid arthritis, HTN, DLD , walks with the help of a walker (rollator) and able to perform ADLs with some assistance presenting today for 3 days of distal numbness and imbalance. Patient states that she got flu shot this past Friday and then on Saturday she cleaned her house and felt extremely tired with right arm pain and rested on a couch then tried getting up after some time but fell back on the couch and thereon, she noted that she had problems with balance and also experienced distal numbness both on her foot and hands. She denies any progression of her sensory symptoms but reports that her balance is worse since onset. She denies any fever chills diarrhoea, focal weakness, speech visual deficits, problems breathing, neck or back pain , swallowing. Patient has baseline urinary incontinence.  (20 Nov 2023 21:47)    S: Patient was examined and seen at bedside. This morning pt is resting comfortably in bed and reports no new issues or overnight events.     Denies CP, SOB, N/V/D/C/AP, cough, F, chills, dizziness, HA, vision changes, dysuria, or urinary symptoms, blood in stool.  ROS: all other systems reviewed and are negative    PAST MEDICAL & SURGICAL HISTORY:  Rheumatoid arthritis      Hyperlipemia      Hypertension      Stress incontinence      Asthma      S/P total knee replacement, right        SOCIAL HISTORY:  Tobacco: negative  Illicit drugs: negative  Alcohol: social  Family history reviewed and otherwise non-contributory No clotting disorders, CVAs at early age.  ALLERGIES: NKDA    General: NAD. Looks stated age.  HEENT: clean oropharynx, EOMI, no LAD  Neck: trachea midline, no thyromegaly  CV: nl S1 S2; no m/r/g  Resp: decreased breath sounds at base  GI: NT/ND/S +BS  MS: no clubbing/cyanosis/trace edema b/l, +pulses. +hammer toes  Neuro: motor 5/5, decreased sensation in LEs  Skin: no rashes, nl turgor  Psychiatric: AA0x3 w/ good insight and judgement    tele: SR, nonspecific changes (on my own evaluation of tele monitor)            Home Medications:  Albuterol (Eqv-ProAir HFA) 90 mcg/inh inhalation aerosol: 2 puff(s) inhaled every 6 hours as needed for  bronchospasm (20 Nov 2023 23:28)  aspirin 81 mg oral delayed release tablet: 1 tab(s) orally once a day (20 Nov 2023 23:43)  atenolol 50 mg oral tablet: 1 tab(s) orally once a day (12 Jul 2019 18:35)  carbonyl iron 45 mg oral tablet: 1 tab(s) orally once a day (20 Nov 2023 23:27)  fesoterodine 4 mg oral tablet, extended release: 1 tab(s) orally once a day (20 Nov 2023 23:22)  folic acid 1 mg oral tablet: 1 tab(s) orally once a day (12 Jul 2019 18:35)  losartan 100 mg oral tablet: 1 tab(s) orally once a day (at bedtime) (20 Nov 2023 23:26)  pantoprazole 40 mg oral delayed release tablet: 1 tab(s) orally once a day (12 Jul 2019 18:35)  simvastatin 20 mg oral tablet: 1 tab(s) orally once a day (at bedtime) (20 Nov 2023 23:26)    MEDICATIONS  (STANDING):  aspirin enteric coated 81 milliGRAM(s) Oral daily  atenolol  Tablet 50 milliGRAM(s) Oral daily  atorvastatin 80 milliGRAM(s) Oral at bedtime  folic acid 1 milliGRAM(s) Oral daily  heparin   Injectable 5000 Unit(s) SubCutaneous every 12 hours  losartan 100 milliGRAM(s) Oral daily  oxybutynin 5 milliGRAM(s) Oral two times a day  pantoprazole    Tablet 40 milliGRAM(s) Oral before breakfast  ticagrelor 90 milliGRAM(s) Oral every 12 hours    MEDICATIONS  (PRN):  albuterol    90 MICROgram(s) HFA Inhaler 2 Puff(s) Inhalation every 6 hours PRN for bronchospasm    Vital Signs Last 24 Hrs  T(C): 36.3 (26 Nov 2023 05:14), Max: 36.3 (25 Nov 2023 19:58)  T(F): 97.3 (26 Nov 2023 05:14), Max: 97.3 (25 Nov 2023 19:58)  HR: 70 (26 Nov 2023 05:14) (70 - 72)  BP: 137/62 (26 Nov 2023 05:14) (137/62 - 139/65)  BP(mean): 89 (26 Nov 2023 05:14) (89 - 94)  RR: 18 (26 Nov 2023 05:14) (18 - 18)  SpO2: --    Parameters below as of 26 Nov 2023 05:14  Patient On (Oxygen Delivery Method): room air      CAPILLARY BLOOD GLUCOSE        LABS:                        11.4   4.60  )-----------( 167      ( 26 Nov 2023 07:37 )             37.0     11-26    142  |  106  |  25<H>  ----------------------------<  90  4.6   |  24  |  1.2    Ca    8.9      26 Nov 2023 07:37  Phos  4.1     11-26  Mg     1.8     11-26              Urinalysis Basic - ( 26 Nov 2023 07:37 )    Color: x / Appearance: x / SG: x / pH: x  Gluc: 90 mg/dL / Ketone: x  / Bili: x / Urobili: x   Blood: x / Protein: x / Nitrite: x   Leuk Esterase: x / RBC: x / WBC x   Sq Epi: x / Non Sq Epi: x / Bacteria: x              Consultant Notes Reviewed:  [x ] YES  [ ] NO  Care Discussed with Consultants/Other Providers/ Housestaff [ x] YES  [ ] NO  Radiology, labs, new studies personally reviewed.        < from: CT Angio Neck w/ IV Cont (11.20.23 @ 19:54) >  heber Apices: Bullous changes in both apical regions    IMPRESSION:    CT HEAD:    A focal 2 cm hypodensity is noted in the right posterior occipital region   consistent with chronic infarction.    No evidence of acute intracranial hemorrhage, acute territorial infarct,   mass or midline shift.      CTA:    Marked calcification at the right and left common carotid artery   bifurcations with severe stenosis at the level of the right carotid bulb,   and the left CC bifurcation and at the left carotid bulb.    No evidence of major vascular occlusion or aneurysm.      < end of copied text >

## 2023-11-26 NOTE — PROGRESS NOTE ADULT - SUBJECTIVE AND OBJECTIVE BOX
OVERNIGHT EVENTS:  ar    SUBJECTIVE / INTERVAL HPI: Patient seen and examined at bedside.  Eating breakfast, in no discomfort nor distress.   No longer experiencing numbness although R shoulder abduction is painful due to chronic rotator cuff issue.    VITAL SIGNS:  Vital Signs Last 24 Hrs  T(C): 36.3 (26 Nov 2023 05:14), Max: 36.3 (25 Nov 2023 19:58)  T(F): 97.3 (26 Nov 2023 05:14), Max: 97.3 (25 Nov 2023 19:58)  HR: 70 (26 Nov 2023 05:14) (70 - 72)  BP: 137/62 (26 Nov 2023 05:14) (109/51 - 139/65)  BP(mean): 89 (26 Nov 2023 05:14) (89 - 94)  RR: 18 (26 Nov 2023 05:14) (18 - 18)  SpO2: --    Parameters below as of 26 Nov 2023 05:14  Patient On (Oxygen Delivery Method): room air      I&O's Summary    25 Nov 2023 07:01  -  26 Nov 2023 07:00  --------------------------------------------------------  IN: 0 mL / OUT: 825 mL / NET: -825 mL        PHYSICAL EXAM:  Mental status: Awake, alert and oriented x3.  Recent and remote memory intact.  Naming, repetition and comprehension intact.  Attention/concentration intact.  No dysarthria, no aphasia.  Fund of knowledge appropriate.    Cranial nerves: Pupils equally round and reactive to light, visual fields full, no nystagmus, extraocular muscles intact, V1 through V3 intact bilaterally and symmetric, face symmetric, hearing intact to finger rub, palate elevation symmetric, tongue was midline.  Motor:  MRC grading 5/5 b/l UE/LE.   strength 5/5.  Normal tone and bulk.  No abnormal movements.    Sensation: Intact to light touch, proprioception, and pinprick.   Coordination: No dysmetria on finger-to-nose and heel-to-shin.  No dysdiadokinesia.  Reflexes: 2+ in bilateral UE/LE, downgoing toes bilaterally. (-) Garcia.  Gait: Narrow and steady. No ataxia.  Romberg negative    MEDICATIONS:  MEDICATIONS  (STANDING):  aspirin enteric coated 81 milliGRAM(s) Oral daily  atenolol  Tablet 50 milliGRAM(s) Oral daily  atorvastatin 80 milliGRAM(s) Oral at bedtime  folic acid 1 milliGRAM(s) Oral daily  heparin   Injectable 5000 Unit(s) SubCutaneous every 12 hours  losartan 100 milliGRAM(s) Oral daily  oxybutynin 5 milliGRAM(s) Oral two times a day  pantoprazole    Tablet 40 milliGRAM(s) Oral before breakfast  ticagrelor 90 milliGRAM(s) Oral every 12 hours    MEDICATIONS  (PRN):  albuterol    90 MICROgram(s) HFA Inhaler 2 Puff(s) Inhalation every 6 hours PRN for bronchospasm      ALLERGIES:  Allergies    sulfa drugs (Rash)  penicillins (Rash)    Intolerances        LABS:                        11.1   4.67  )-----------( 165      ( 25 Nov 2023 07:37 )             36.6     11-25    140  |  105  |  26<H>  ----------------------------<  93  4.3   |  27  |  1.2    Ca    8.7      25 Nov 2023 07:37  Phos  3.8     11-25  Mg     1.9     11-25        Urinalysis Basic - ( 25 Nov 2023 07:37 )    Color: x / Appearance: x / SG: x / pH: x  Gluc: 93 mg/dL / Ketone: x  / Bili: x / Urobili: x   Blood: x / Protein: x / Nitrite: x   Leuk Esterase: x / RBC: x / WBC x   Sq Epi: x / Non Sq Epi: x / Bacteria: x      CAPILLARY BLOOD GLUCOSE          RADIOLOGY & ADDITIONAL TESTS: Reviewed.

## 2023-11-27 LAB
ANION GAP SERPL CALC-SCNC: 11 MMOL/L — SIGNIFICANT CHANGE UP (ref 7–14)
ANION GAP SERPL CALC-SCNC: 11 MMOL/L — SIGNIFICANT CHANGE UP (ref 7–14)
BUN SERPL-MCNC: 31 MG/DL — HIGH (ref 10–20)
BUN SERPL-MCNC: 31 MG/DL — HIGH (ref 10–20)
CALCIUM SERPL-MCNC: 8.7 MG/DL — SIGNIFICANT CHANGE UP (ref 8.4–10.5)
CALCIUM SERPL-MCNC: 8.7 MG/DL — SIGNIFICANT CHANGE UP (ref 8.4–10.5)
CHLORIDE SERPL-SCNC: 105 MMOL/L — SIGNIFICANT CHANGE UP (ref 98–110)
CHLORIDE SERPL-SCNC: 105 MMOL/L — SIGNIFICANT CHANGE UP (ref 98–110)
CO2 SERPL-SCNC: 23 MMOL/L — SIGNIFICANT CHANGE UP (ref 17–32)
CO2 SERPL-SCNC: 23 MMOL/L — SIGNIFICANT CHANGE UP (ref 17–32)
CREAT SERPL-MCNC: 1.3 MG/DL — SIGNIFICANT CHANGE UP (ref 0.7–1.5)
CREAT SERPL-MCNC: 1.3 MG/DL — SIGNIFICANT CHANGE UP (ref 0.7–1.5)
EGFR: 41 ML/MIN/1.73M2 — LOW
EGFR: 41 ML/MIN/1.73M2 — LOW
GLUCOSE SERPL-MCNC: 99 MG/DL — SIGNIFICANT CHANGE UP (ref 70–99)
GLUCOSE SERPL-MCNC: 99 MG/DL — SIGNIFICANT CHANGE UP (ref 70–99)
HCT VFR BLD CALC: 35.9 % — LOW (ref 37–47)
HCT VFR BLD CALC: 35.9 % — LOW (ref 37–47)
HGB BLD-MCNC: 11.1 G/DL — LOW (ref 12–16)
HGB BLD-MCNC: 11.1 G/DL — LOW (ref 12–16)
MAGNESIUM SERPL-MCNC: 1.8 MG/DL — SIGNIFICANT CHANGE UP (ref 1.8–2.4)
MAGNESIUM SERPL-MCNC: 1.8 MG/DL — SIGNIFICANT CHANGE UP (ref 1.8–2.4)
MCHC RBC-ENTMCNC: 30.9 G/DL — LOW (ref 32–37)
MCHC RBC-ENTMCNC: 30.9 G/DL — LOW (ref 32–37)
MCHC RBC-ENTMCNC: 31.4 PG — HIGH (ref 27–31)
MCHC RBC-ENTMCNC: 31.4 PG — HIGH (ref 27–31)
MCV RBC AUTO: 101.7 FL — HIGH (ref 81–99)
MCV RBC AUTO: 101.7 FL — HIGH (ref 81–99)
NRBC # BLD: 0 /100 WBCS — SIGNIFICANT CHANGE UP (ref 0–0)
NRBC # BLD: 0 /100 WBCS — SIGNIFICANT CHANGE UP (ref 0–0)
PHOSPHATE SERPL-MCNC: 4 MG/DL — SIGNIFICANT CHANGE UP (ref 2.1–4.9)
PHOSPHATE SERPL-MCNC: 4 MG/DL — SIGNIFICANT CHANGE UP (ref 2.1–4.9)
PLATELET # BLD AUTO: 156 K/UL — SIGNIFICANT CHANGE UP (ref 130–400)
PLATELET # BLD AUTO: 156 K/UL — SIGNIFICANT CHANGE UP (ref 130–400)
PMV BLD: 11.7 FL — HIGH (ref 7.4–10.4)
PMV BLD: 11.7 FL — HIGH (ref 7.4–10.4)
POTASSIUM SERPL-MCNC: 5.1 MMOL/L — HIGH (ref 3.5–5)
POTASSIUM SERPL-MCNC: 5.1 MMOL/L — HIGH (ref 3.5–5)
POTASSIUM SERPL-SCNC: 5.1 MMOL/L — HIGH (ref 3.5–5)
POTASSIUM SERPL-SCNC: 5.1 MMOL/L — HIGH (ref 3.5–5)
RBC # BLD: 3.53 M/UL — LOW (ref 4.2–5.4)
RBC # BLD: 3.53 M/UL — LOW (ref 4.2–5.4)
RBC # FLD: 13.2 % — SIGNIFICANT CHANGE UP (ref 11.5–14.5)
RBC # FLD: 13.2 % — SIGNIFICANT CHANGE UP (ref 11.5–14.5)
SODIUM SERPL-SCNC: 139 MMOL/L — SIGNIFICANT CHANGE UP (ref 135–146)
SODIUM SERPL-SCNC: 139 MMOL/L — SIGNIFICANT CHANGE UP (ref 135–146)
VIT B1 SERPL-MCNC: 437.8 NMOL/L — HIGH (ref 66.5–200)
VIT B1 SERPL-MCNC: 437.8 NMOL/L — HIGH (ref 66.5–200)
WBC # BLD: 5.12 K/UL — SIGNIFICANT CHANGE UP (ref 4.8–10.8)
WBC # BLD: 5.12 K/UL — SIGNIFICANT CHANGE UP (ref 4.8–10.8)
WBC # FLD AUTO: 5.12 K/UL — SIGNIFICANT CHANGE UP (ref 4.8–10.8)
WBC # FLD AUTO: 5.12 K/UL — SIGNIFICANT CHANGE UP (ref 4.8–10.8)

## 2023-11-27 PROCEDURE — 99232 SBSQ HOSP IP/OBS MODERATE 35: CPT

## 2023-11-27 RX ORDER — CHLORHEXIDINE GLUCONATE 213 G/1000ML
1 SOLUTION TOPICAL
Refills: 0 | Status: DISCONTINUED | OUTPATIENT
Start: 2023-11-27 | End: 2023-11-30

## 2023-11-27 RX ADMIN — HEPARIN SODIUM 5000 UNIT(S): 5000 INJECTION INTRAVENOUS; SUBCUTANEOUS at 05:03

## 2023-11-27 RX ADMIN — HEPARIN SODIUM 5000 UNIT(S): 5000 INJECTION INTRAVENOUS; SUBCUTANEOUS at 17:30

## 2023-11-27 RX ADMIN — ATENOLOL 50 MILLIGRAM(S): 25 TABLET ORAL at 05:03

## 2023-11-27 RX ADMIN — PANTOPRAZOLE SODIUM 40 MILLIGRAM(S): 20 TABLET, DELAYED RELEASE ORAL at 05:02

## 2023-11-27 RX ADMIN — LOSARTAN POTASSIUM 100 MILLIGRAM(S): 100 TABLET, FILM COATED ORAL at 05:02

## 2023-11-27 RX ADMIN — Medication 1 MILLIGRAM(S): at 12:24

## 2023-11-27 RX ADMIN — ATORVASTATIN CALCIUM 80 MILLIGRAM(S): 80 TABLET, FILM COATED ORAL at 21:06

## 2023-11-27 RX ADMIN — Medication 81 MILLIGRAM(S): at 12:24

## 2023-11-27 RX ADMIN — TICAGRELOR 90 MILLIGRAM(S): 90 TABLET ORAL at 05:02

## 2023-11-27 RX ADMIN — TICAGRELOR 90 MILLIGRAM(S): 90 TABLET ORAL at 17:30

## 2023-11-27 RX ADMIN — Medication 5 MILLIGRAM(S): at 05:02

## 2023-11-27 NOTE — PROGRESS NOTE ADULT - SUBJECTIVE AND OBJECTIVE BOX
OVERNIGHT EVENTS:  ar    SUBJECTIVE / INTERVAL HPI: Patient seen and examined at bedside.  In no distress, pleasant, comfortable and sitting in chair after working with PT.    VITAL SIGNS:  Vital Signs Last 24 Hrs  T(C): 36.4 (27 Nov 2023 05:01), Max: 36.9 (26 Nov 2023 13:34)  T(F): 97.6 (27 Nov 2023 05:01), Max: 98.4 (26 Nov 2023 13:34)  HR: 67 (27 Nov 2023 05:01) (61 - 68)  BP: 136/65 (27 Nov 2023 05:01) (130/62 - 144/65)  BP(mean): 89 (26 Nov 2023 20:11) (89 - 89)  RR: 18 (27 Nov 2023 05:01) (18 - 18)  SpO2: 100% (26 Nov 2023 20:12) (100% - 100%)    Parameters below as of 26 Nov 2023 20:12  Patient On (Oxygen Delivery Method): nasal cannula  O2 Flow (L/min): 2    I&O's Summary    26 Nov 2023 07:01  -  27 Nov 2023 07:00  --------------------------------------------------------  IN: 0 mL / OUT: 1900 mL / NET: -1900 mL        PHYSICAL EXAM:  Mental status: Awake, alert and oriented x3.  Recent and remote memory intact.  Naming, repetition and comprehension intact.  Attention/concentration intact.  No dysarthria, no aphasia.  Fund of knowledge appropriate.    Cranial nerves: Pupils equally round and reactive to light, visual fields full, no nystagmus, extraocular muscles intact, V1 through V3 intact bilaterally and symmetric, face symmetric, hearing intact to finger rub, palate elevation symmetric, tongue was midline.  Motor:  MRC grading 5/5 b/l UE/LE.   strength 5/5.  Normal tone and bulk.  No abnormal movements.    Sensation: Intact to light touch, proprioception, and pinprick.   Coordination: No dysmetria on finger-to-nose and heel-to-shin.  No dysdiadokinesia.  Reflexes: 2+ in bilateral UE/LE (although LE difficult to assess 2/2 edema), downgoing toes bilaterally. (-) Garcia.  Gait: deferred    MEDICATIONS:  MEDICATIONS  (STANDING):  aspirin enteric coated 81 milliGRAM(s) Oral daily  atenolol  Tablet 50 milliGRAM(s) Oral daily  atorvastatin 80 milliGRAM(s) Oral at bedtime  chlorhexidine 2% Cloths 1 Application(s) Topical <User Schedule>  folic acid 1 milliGRAM(s) Oral daily  heparin   Injectable 5000 Unit(s) SubCutaneous every 12 hours  losartan 100 milliGRAM(s) Oral daily  oxybutynin 5 milliGRAM(s) Oral two times a day  pantoprazole    Tablet 40 milliGRAM(s) Oral before breakfast  ticagrelor 90 milliGRAM(s) Oral every 12 hours    MEDICATIONS  (PRN):  albuterol    90 MICROgram(s) HFA Inhaler 2 Puff(s) Inhalation every 6 hours PRN for bronchospasm      ALLERGIES:  Allergies    sulfa drugs (Rash)  penicillins (Rash)    Intolerances        LABS:                        11.1   5.12  )-----------( 156      ( 27 Nov 2023 05:17 )             35.9     11-27    139  |  105  |  31<H>  ----------------------------<  99  5.1<H>   |  23  |  1.3    Ca    8.7      27 Nov 2023 05:17  Phos  4.0     11-27  Mg     1.8     11-27        Urinalysis Basic - ( 27 Nov 2023 05:17 )    Color: x / Appearance: x / SG: x / pH: x  Gluc: 99 mg/dL / Ketone: x  / Bili: x / Urobili: x   Blood: x / Protein: x / Nitrite: x   Leuk Esterase: x / RBC: x / WBC x   Sq Epi: x / Non Sq Epi: x / Bacteria: x      CAPILLARY BLOOD GLUCOSE          RADIOLOGY & ADDITIONAL TESTS: Reviewed.

## 2023-11-27 NOTE — PROGRESS NOTE ADULT - SUBJECTIVE AND OBJECTIVE BOX
Neuroendovascular Progress Note:     HPI:  Patient is a 84 yo female with pmhx of rheumatoid arthritis, HTN, DLD , walks with the help of a walker (rollator) and able to perform ADLs with some assistance presenting today for 3 days of distal numbness and imbalance. Patient states that she got flu shot this past Friday and then on Saturday she cleaned her house and felt extremely tired with right arm pain and rested on a couch then tried getting up after some time but fell back on the couch and thereon, she noted that she had problems with balance and also experienced distal numbness both on her foot and hands. She denies any progression of her sensory symptoms but reports that her balance is worse since onset. She denies any fever chills diarrhoea, focal weakness, speech visual deficits, problems breathing, neck or back pain , swallowing. Patient has baseline urinary incontinence.  (20 Nov 2023 21:47)    Patient seen and examined this AM. No complaints. Awaiting carotid angioplasty/stenting tomorrow with neuroendovascular team. On ASA/brilinta.       Past medical history:   Rheumatoid arthritis  Hyperlipemia  Hypertension  Stress incontinence  Asthma    Medications:   aspirin enteric coated 81 milliGRAM(s) Oral daily  atenolol  Tablet 50 milliGRAM(s) Oral daily  atorvastatin 80 milliGRAM(s) Oral at bedtime  chlorhexidine 2% Cloths 1 Application(s) Topical <User Schedule>  folic acid 1 milliGRAM(s) Oral daily  heparin   Injectable 5000 Unit(s) SubCutaneous every 12 hours  losartan 100 milliGRAM(s) Oral daily  oxybutynin 5 milliGRAM(s) Oral two times a day  pantoprazole    Tablet 40 milliGRAM(s) Oral before breakfast  ticagrelor 90 milliGRAM(s) Oral every 12 hours      Vitals:   T(F): 98.9 (11-27-23 @ 13:09), Max: 98.9 (11-27-23 @ 13:09)  HR: 68 (11-27-23 @ 13:09) (67 - 68)  BP: 147/66 (11-27-23 @ 13:09) (130/62 - 147/66)  RR: 20 (11-27-23 @ 13:09) (18 - 20)  SpO2: 100% (11-26-23 @ 20:12) (100% - 100%)    Labs:                         11.1   5.12  )-----------( 156      ( 27 Nov 2023 05:17 )             35.9     11-27    139  |  105  |  31<H>  ----------------------------<  99  5.1<H>   |  23  |  1.3    Ca    8.7      27 Nov 2023 05:17  Phos  4.0     11-27  Mg     1.8     11-27      Exam:   General - NAD   Neuro - AAo3, follows commands, EOMi, visual fields intact, face symmetric, moving all extremities to antigravity, decreased sensation bl LE, no neglect, no aphasia, no dysarthria, no dysmetria on finger to nose.     Radiology:   Imaging reviewed per neuroendovascular ACP/attending.     Assessment:   83 year old with female with history of OA, HTN, DLD, presented with 3 days of acute onset distal numbness and imbalance. Patient was found with left thalamic lacunar infarct on admission. Found with marked calcification at the right and left common carotid artery bifurcations with severe stenosis at the level of the right carotid bulb, left CC bifurcation and ast the left carotid bulb.     Suggestions:  Carotid angioplasty/stenting with diagnostic cerebral angiogram tomorrow   NPO except meds after MN   IVF as tolerated after MN   MUST continue DAPT - aspirin 81 QD and brilinta 90 BID   If + carotid stenting, will upgrade to stroke unit post procedure. Team aware.   Discussed w Dr Waterman. Primary team aware of above plan.   [id number] neuroendovascular

## 2023-11-27 NOTE — PROGRESS NOTE ADULT - ASSESSMENT
82 yo f with pmhx of OA, HTN, DLD , walks with the help of a walker (rollator) and able to perform ADLs with some assistance presenting today for 3 days of acute onset distal numbness and imbalance. Pt denies any progression of her sensory symptoms but reports that her balance is worse since onset. Found to have an acute L thalamic lacunar infarct on admission. As a whole, symptoms do not match stroke localization. 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     Neurological:  -CTH: Chronic right occipital stroke  -CTA H/N:Marked calcification at the right and left common carotid artery bifurcations with severe stenosis at the level of the right carotid bulb, and the left CC  bifurcation and at the left carotid bulb. No LVO or aneurysm  - NI consulted to evaluate carotid stenosis: planning tx stenting procedure on Tuesday (11/28). She is NPO after midnight on Monday.  - MRI Brain: Left thalamic acute lacunar infarct. No acute hemorrhage. Transferred to stroke service to complete workup.  - Telemonitoring  - Neuro Checks q 8 hrs  - Continue ASA 81 mg once daily. Patient will stay on that regimen for 90 days.  - TTE showing 50-55% EF, with moderate to severe Aortic stenosis.   - Started on atorvastatin 80mg once daily  - ESR 57 , CRP <3.0, Vitamin b12, folate >20, HbA1C 5.3, , Lyme antibody, TSH 2.19, IgA LEVEL, Ganglioside antibodies including anti GM1 (-), GM2 (+), GD1a (-), GD1b (-), and GQ1b (-), SPEP, UPEP, Vitb12 413, VitB1, Vit-E, Homocysteine, Methylmalonic acid. Copper, Zinc, HIV (-), RPR (-)   - Obtain x ray right shoulder (pending)  - PT/OT Evaluation Pending (attempted but patient using bedpan, will return). Anticipated d/c to 4A (pending approval)  - Plavix switched to Brilinta 90mg twice daily (per neuroendovascular request) given PRU result of 278.  - Patient complained of L ankle pain. Xray shows possible distal tibial fracture. No fracture on CT scan, likely artifactual. Ortho consult d/c'd. Sed rate and Uric acid levels order to r/o gout--> uric acid wnl 5.5, sed rate elevated at 73  - Out of bed to chair, PT working with her    Cardiovascular:  -Telemonitoring  - Maintain normotension and Normovolemia  - H/O Heart murmur: follows with Dr. Patel  - TTE on this admission showing EF 50-55% with moderate to severe aortic valve stenosis.  - outpatient TTE July 2019 with EF 50-55% but only mild aortic valve stenosis.    Pulmonary:  -Monitor closely for respiratory decline  -Keep 02 sat >94%    GI:  -DASH diet  -IV fluids-None    :  Monitor electrolytes and replete as needed    -ID:  Abnormal UA but not symptomatic, Currently afebrile, No concern for infection at this time.    Hematology:  - c/w sequential stocking  - Heparin sq q 8 hrs for dvt prophylaxis

## 2023-11-28 ENCOUNTER — TRANSCRIPTION ENCOUNTER (OUTPATIENT)
Age: 83
End: 2023-11-28

## 2023-11-28 ENCOUNTER — APPOINTMENT (OUTPATIENT)
Dept: NEUROSURGERY | Facility: HOSPITAL | Age: 83
End: 2023-11-28

## 2023-11-28 LAB
A-TOCOPHEROL VIT E SERPL-MCNC: 14 MG/L — SIGNIFICANT CHANGE UP (ref 9–29)
A-TOCOPHEROL VIT E SERPL-MCNC: 14 MG/L — SIGNIFICANT CHANGE UP (ref 9–29)
ANION GAP SERPL CALC-SCNC: 12 MMOL/L — SIGNIFICANT CHANGE UP (ref 7–14)
ANION GAP SERPL CALC-SCNC: 12 MMOL/L — SIGNIFICANT CHANGE UP (ref 7–14)
ANION GAP SERPL CALC-SCNC: 14 MMOL/L — SIGNIFICANT CHANGE UP (ref 7–14)
ANION GAP SERPL CALC-SCNC: 14 MMOL/L — SIGNIFICANT CHANGE UP (ref 7–14)
BASOPHILS # BLD AUTO: 0.04 K/UL — SIGNIFICANT CHANGE UP (ref 0–0.2)
BASOPHILS NFR BLD AUTO: 0.6 % — SIGNIFICANT CHANGE UP (ref 0–1)
BASOPHILS NFR BLD AUTO: 0.6 % — SIGNIFICANT CHANGE UP (ref 0–1)
BASOPHILS NFR BLD AUTO: 0.7 % — SIGNIFICANT CHANGE UP (ref 0–1)
BASOPHILS NFR BLD AUTO: 0.7 % — SIGNIFICANT CHANGE UP (ref 0–1)
BETA+GAMMA TOCOPHEROL SERPL-MCNC: 1.4 MG/L — SIGNIFICANT CHANGE UP (ref 0.5–4.9)
BETA+GAMMA TOCOPHEROL SERPL-MCNC: 1.4 MG/L — SIGNIFICANT CHANGE UP (ref 0.5–4.9)
BLD GP AB SCN SERPL QL: SIGNIFICANT CHANGE UP
BLD GP AB SCN SERPL QL: SIGNIFICANT CHANGE UP
BUN SERPL-MCNC: 26 MG/DL — HIGH (ref 10–20)
BUN SERPL-MCNC: 26 MG/DL — HIGH (ref 10–20)
BUN SERPL-MCNC: 27 MG/DL — HIGH (ref 10–20)
BUN SERPL-MCNC: 27 MG/DL — HIGH (ref 10–20)
CALCIUM SERPL-MCNC: 8.8 MG/DL — SIGNIFICANT CHANGE UP (ref 8.4–10.5)
CALCIUM SERPL-MCNC: 8.8 MG/DL — SIGNIFICANT CHANGE UP (ref 8.4–10.5)
CALCIUM SERPL-MCNC: 8.9 MG/DL — SIGNIFICANT CHANGE UP (ref 8.4–10.5)
CALCIUM SERPL-MCNC: 8.9 MG/DL — SIGNIFICANT CHANGE UP (ref 8.4–10.5)
CHLORIDE SERPL-SCNC: 104 MMOL/L — SIGNIFICANT CHANGE UP (ref 98–110)
CHLORIDE SERPL-SCNC: 104 MMOL/L — SIGNIFICANT CHANGE UP (ref 98–110)
CHLORIDE SERPL-SCNC: 110 MMOL/L — SIGNIFICANT CHANGE UP (ref 98–110)
CHLORIDE SERPL-SCNC: 110 MMOL/L — SIGNIFICANT CHANGE UP (ref 98–110)
CO2 SERPL-SCNC: 18 MMOL/L — SIGNIFICANT CHANGE UP (ref 17–32)
CO2 SERPL-SCNC: 18 MMOL/L — SIGNIFICANT CHANGE UP (ref 17–32)
CO2 SERPL-SCNC: 22 MMOL/L — SIGNIFICANT CHANGE UP (ref 17–32)
CO2 SERPL-SCNC: 22 MMOL/L — SIGNIFICANT CHANGE UP (ref 17–32)
CREAT SERPL-MCNC: 1.1 MG/DL — SIGNIFICANT CHANGE UP (ref 0.7–1.5)
CREAT SERPL-MCNC: 1.1 MG/DL — SIGNIFICANT CHANGE UP (ref 0.7–1.5)
CREAT SERPL-MCNC: 1.2 MG/DL — SIGNIFICANT CHANGE UP (ref 0.7–1.5)
CREAT SERPL-MCNC: 1.2 MG/DL — SIGNIFICANT CHANGE UP (ref 0.7–1.5)
EGFR: 45 ML/MIN/1.73M2 — LOW
EGFR: 45 ML/MIN/1.73M2 — LOW
EGFR: 50 ML/MIN/1.73M2 — LOW
EGFR: 50 ML/MIN/1.73M2 — LOW
EOSINOPHIL # BLD AUTO: 0.14 K/UL — SIGNIFICANT CHANGE UP (ref 0–0.7)
EOSINOPHIL # BLD AUTO: 0.14 K/UL — SIGNIFICANT CHANGE UP (ref 0–0.7)
EOSINOPHIL # BLD AUTO: 0.15 K/UL — SIGNIFICANT CHANGE UP (ref 0–0.7)
EOSINOPHIL # BLD AUTO: 0.15 K/UL — SIGNIFICANT CHANGE UP (ref 0–0.7)
EOSINOPHIL NFR BLD AUTO: 2.2 % — SIGNIFICANT CHANGE UP (ref 0–8)
EOSINOPHIL NFR BLD AUTO: 2.2 % — SIGNIFICANT CHANGE UP (ref 0–8)
EOSINOPHIL NFR BLD AUTO: 2.5 % — SIGNIFICANT CHANGE UP (ref 0–8)
EOSINOPHIL NFR BLD AUTO: 2.5 % — SIGNIFICANT CHANGE UP (ref 0–8)
GLUCOSE SERPL-MCNC: 111 MG/DL — HIGH (ref 70–99)
GLUCOSE SERPL-MCNC: 111 MG/DL — HIGH (ref 70–99)
GLUCOSE SERPL-MCNC: 97 MG/DL — SIGNIFICANT CHANGE UP (ref 70–99)
GLUCOSE SERPL-MCNC: 97 MG/DL — SIGNIFICANT CHANGE UP (ref 70–99)
HCT VFR BLD CALC: 36.7 % — LOW (ref 37–47)
HCT VFR BLD CALC: 36.7 % — LOW (ref 37–47)
HCT VFR BLD CALC: 37.8 % — SIGNIFICANT CHANGE UP (ref 37–47)
HCT VFR BLD CALC: 37.8 % — SIGNIFICANT CHANGE UP (ref 37–47)
HGB BLD-MCNC: 11.1 G/DL — LOW (ref 12–16)
HGB BLD-MCNC: 11.1 G/DL — LOW (ref 12–16)
HGB BLD-MCNC: 11.5 G/DL — LOW (ref 12–16)
HGB BLD-MCNC: 11.5 G/DL — LOW (ref 12–16)
IMM GRANULOCYTES NFR BLD AUTO: 0.9 % — HIGH (ref 0.1–0.3)
IMM GRANULOCYTES NFR BLD AUTO: 0.9 % — HIGH (ref 0.1–0.3)
IMM GRANULOCYTES NFR BLD AUTO: 1 % — HIGH (ref 0.1–0.3)
IMM GRANULOCYTES NFR BLD AUTO: 1 % — HIGH (ref 0.1–0.3)
LYMPHOCYTES # BLD AUTO: 0.55 K/UL — LOW (ref 1.2–3.4)
LYMPHOCYTES # BLD AUTO: 0.55 K/UL — LOW (ref 1.2–3.4)
LYMPHOCYTES # BLD AUTO: 0.61 K/UL — LOW (ref 1.2–3.4)
LYMPHOCYTES # BLD AUTO: 0.61 K/UL — LOW (ref 1.2–3.4)
LYMPHOCYTES # BLD AUTO: 10.9 % — LOW (ref 20.5–51.1)
LYMPHOCYTES # BLD AUTO: 10.9 % — LOW (ref 20.5–51.1)
LYMPHOCYTES # BLD AUTO: 7.9 % — LOW (ref 20.5–51.1)
LYMPHOCYTES # BLD AUTO: 7.9 % — LOW (ref 20.5–51.1)
MAGNESIUM SERPL-MCNC: 1.9 MG/DL — SIGNIFICANT CHANGE UP (ref 1.8–2.4)
MAGNESIUM SERPL-MCNC: 1.9 MG/DL — SIGNIFICANT CHANGE UP (ref 1.8–2.4)
MCHC RBC-ENTMCNC: 30.2 G/DL — LOW (ref 32–37)
MCHC RBC-ENTMCNC: 30.2 G/DL — LOW (ref 32–37)
MCHC RBC-ENTMCNC: 30.4 G/DL — LOW (ref 32–37)
MCHC RBC-ENTMCNC: 30.4 G/DL — LOW (ref 32–37)
MCHC RBC-ENTMCNC: 31 PG — SIGNIFICANT CHANGE UP (ref 27–31)
MCHC RBC-ENTMCNC: 31 PG — SIGNIFICANT CHANGE UP (ref 27–31)
MCHC RBC-ENTMCNC: 31.5 PG — HIGH (ref 27–31)
MCHC RBC-ENTMCNC: 31.5 PG — HIGH (ref 27–31)
MCV RBC AUTO: 102.5 FL — HIGH (ref 81–99)
MCV RBC AUTO: 102.5 FL — HIGH (ref 81–99)
MCV RBC AUTO: 103.6 FL — HIGH (ref 81–99)
MCV RBC AUTO: 103.6 FL — HIGH (ref 81–99)
METHYLMALONATE SERPL-SCNC: 248 NMOL/L — SIGNIFICANT CHANGE UP (ref 0–378)
METHYLMALONATE SERPL-SCNC: 248 NMOL/L — SIGNIFICANT CHANGE UP (ref 0–378)
MONOCYTES # BLD AUTO: 0.54 K/UL — SIGNIFICANT CHANGE UP (ref 0.1–0.6)
MONOCYTES # BLD AUTO: 0.54 K/UL — SIGNIFICANT CHANGE UP (ref 0.1–0.6)
MONOCYTES # BLD AUTO: 0.69 K/UL — HIGH (ref 0.1–0.6)
MONOCYTES # BLD AUTO: 0.69 K/UL — HIGH (ref 0.1–0.6)
MONOCYTES NFR BLD AUTO: 9.6 % — HIGH (ref 1.7–9.3)
MONOCYTES NFR BLD AUTO: 9.6 % — HIGH (ref 1.7–9.3)
MONOCYTES NFR BLD AUTO: 9.9 % — HIGH (ref 1.7–9.3)
MONOCYTES NFR BLD AUTO: 9.9 % — HIGH (ref 1.7–9.3)
NEUTROPHILS # BLD AUTO: 4.22 K/UL — SIGNIFICANT CHANGE UP (ref 1.4–6.5)
NEUTROPHILS # BLD AUTO: 4.22 K/UL — SIGNIFICANT CHANGE UP (ref 1.4–6.5)
NEUTROPHILS # BLD AUTO: 5.46 K/UL — SIGNIFICANT CHANGE UP (ref 1.4–6.5)
NEUTROPHILS # BLD AUTO: 5.46 K/UL — SIGNIFICANT CHANGE UP (ref 1.4–6.5)
NEUTROPHILS NFR BLD AUTO: 75.4 % — HIGH (ref 42.2–75.2)
NEUTROPHILS NFR BLD AUTO: 75.4 % — HIGH (ref 42.2–75.2)
NEUTROPHILS NFR BLD AUTO: 78.4 % — HIGH (ref 42.2–75.2)
NEUTROPHILS NFR BLD AUTO: 78.4 % — HIGH (ref 42.2–75.2)
NRBC # BLD: 0 /100 WBCS — SIGNIFICANT CHANGE UP (ref 0–0)
PHOSPHATE SERPL-MCNC: 4.2 MG/DL — SIGNIFICANT CHANGE UP (ref 2.1–4.9)
PHOSPHATE SERPL-MCNC: 4.2 MG/DL — SIGNIFICANT CHANGE UP (ref 2.1–4.9)
PLATELET # BLD AUTO: 174 K/UL — SIGNIFICANT CHANGE UP (ref 130–400)
PLATELET # BLD AUTO: 174 K/UL — SIGNIFICANT CHANGE UP (ref 130–400)
PLATELET # BLD AUTO: 177 K/UL — SIGNIFICANT CHANGE UP (ref 130–400)
PLATELET # BLD AUTO: 177 K/UL — SIGNIFICANT CHANGE UP (ref 130–400)
PMV BLD: 11.2 FL — HIGH (ref 7.4–10.4)
POTASSIUM SERPL-MCNC: 4.4 MMOL/L — SIGNIFICANT CHANGE UP (ref 3.5–5)
POTASSIUM SERPL-MCNC: 4.4 MMOL/L — SIGNIFICANT CHANGE UP (ref 3.5–5)
POTASSIUM SERPL-MCNC: 4.7 MMOL/L — SIGNIFICANT CHANGE UP (ref 3.5–5)
POTASSIUM SERPL-MCNC: 4.7 MMOL/L — SIGNIFICANT CHANGE UP (ref 3.5–5)
POTASSIUM SERPL-SCNC: 4.4 MMOL/L — SIGNIFICANT CHANGE UP (ref 3.5–5)
POTASSIUM SERPL-SCNC: 4.4 MMOL/L — SIGNIFICANT CHANGE UP (ref 3.5–5)
POTASSIUM SERPL-SCNC: 4.7 MMOL/L — SIGNIFICANT CHANGE UP (ref 3.5–5)
POTASSIUM SERPL-SCNC: 4.7 MMOL/L — SIGNIFICANT CHANGE UP (ref 3.5–5)
RBC # BLD: 3.58 M/UL — LOW (ref 4.2–5.4)
RBC # BLD: 3.58 M/UL — LOW (ref 4.2–5.4)
RBC # BLD: 3.65 M/UL — LOW (ref 4.2–5.4)
RBC # BLD: 3.65 M/UL — LOW (ref 4.2–5.4)
RBC # FLD: 13.1 % — SIGNIFICANT CHANGE UP (ref 11.5–14.5)
RBC # FLD: 13.1 % — SIGNIFICANT CHANGE UP (ref 11.5–14.5)
RBC # FLD: 13.2 % — SIGNIFICANT CHANGE UP (ref 11.5–14.5)
RBC # FLD: 13.2 % — SIGNIFICANT CHANGE UP (ref 11.5–14.5)
SODIUM SERPL-SCNC: 140 MMOL/L — SIGNIFICANT CHANGE UP (ref 135–146)
WBC # BLD: 5.6 K/UL — SIGNIFICANT CHANGE UP (ref 4.8–10.8)
WBC # BLD: 5.6 K/UL — SIGNIFICANT CHANGE UP (ref 4.8–10.8)
WBC # BLD: 6.96 K/UL — SIGNIFICANT CHANGE UP (ref 4.8–10.8)
WBC # BLD: 6.96 K/UL — SIGNIFICANT CHANGE UP (ref 4.8–10.8)
WBC # FLD AUTO: 5.6 K/UL — SIGNIFICANT CHANGE UP (ref 4.8–10.8)
WBC # FLD AUTO: 5.6 K/UL — SIGNIFICANT CHANGE UP (ref 4.8–10.8)
WBC # FLD AUTO: 6.96 K/UL — SIGNIFICANT CHANGE UP (ref 4.8–10.8)
WBC # FLD AUTO: 6.96 K/UL — SIGNIFICANT CHANGE UP (ref 4.8–10.8)

## 2023-11-28 PROCEDURE — 76937 US GUIDE VASCULAR ACCESS: CPT | Mod: 26

## 2023-11-28 PROCEDURE — 99232 SBSQ HOSP IP/OBS MODERATE 35: CPT

## 2023-11-28 PROCEDURE — 36222 PLACE CATH CAROTID/INOM ART: CPT | Mod: 50

## 2023-11-28 RX ORDER — PREGABALIN 225 MG/1
1000 CAPSULE ORAL EVERY 24 HOURS
Refills: 0 | Status: DISCONTINUED | OUTPATIENT
Start: 2023-11-28 | End: 2023-11-30

## 2023-11-28 RX ORDER — SODIUM CHLORIDE 9 MG/ML
1000 INJECTION INTRAMUSCULAR; INTRAVENOUS; SUBCUTANEOUS
Refills: 0 | Status: DISCONTINUED | OUTPATIENT
Start: 2023-11-28 | End: 2023-11-30

## 2023-11-28 RX ADMIN — LOSARTAN POTASSIUM 100 MILLIGRAM(S): 100 TABLET, FILM COATED ORAL at 05:07

## 2023-11-28 RX ADMIN — PANTOPRAZOLE SODIUM 40 MILLIGRAM(S): 20 TABLET, DELAYED RELEASE ORAL at 05:07

## 2023-11-28 RX ADMIN — ATENOLOL 50 MILLIGRAM(S): 25 TABLET ORAL at 05:07

## 2023-11-28 RX ADMIN — HEPARIN SODIUM 5000 UNIT(S): 5000 INJECTION INTRAVENOUS; SUBCUTANEOUS at 18:38

## 2023-11-28 RX ADMIN — Medication 5 MILLIGRAM(S): at 18:38

## 2023-11-28 RX ADMIN — HEPARIN SODIUM 5000 UNIT(S): 5000 INJECTION INTRAVENOUS; SUBCUTANEOUS at 05:06

## 2023-11-28 RX ADMIN — SODIUM CHLORIDE 75 MILLILITER(S): 9 INJECTION INTRAMUSCULAR; INTRAVENOUS; SUBCUTANEOUS at 20:01

## 2023-11-28 RX ADMIN — ATORVASTATIN CALCIUM 80 MILLIGRAM(S): 80 TABLET, FILM COATED ORAL at 21:18

## 2023-11-28 RX ADMIN — Medication 5 MILLIGRAM(S): at 05:07

## 2023-11-28 RX ADMIN — TICAGRELOR 90 MILLIGRAM(S): 90 TABLET ORAL at 18:38

## 2023-11-28 RX ADMIN — Medication 81 MILLIGRAM(S): at 10:01

## 2023-11-28 RX ADMIN — SODIUM CHLORIDE 75 MILLILITER(S): 9 INJECTION INTRAMUSCULAR; INTRAVENOUS; SUBCUTANEOUS at 01:18

## 2023-11-28 RX ADMIN — CHLORHEXIDINE GLUCONATE 1 APPLICATION(S): 213 SOLUTION TOPICAL at 05:07

## 2023-11-28 RX ADMIN — TICAGRELOR 90 MILLIGRAM(S): 90 TABLET ORAL at 05:06

## 2023-11-28 NOTE — PRE PROCEDURE NOTE - PRE PROCEDURE EVALUATION
HPI:  83 year old with female with history of OA, HTN, DLD, presented with 3 days of acute onset distal numbness and imbalance. Patient was found with left thalamic lacunar infarct on admission. Found with marked calcification at the right and left common carotid artery bifurcations with severe stenosis at the level of the right carotid bulb, left CC bifurcation and ast the left carotid bulb.     NPO except meds since midnight.   IVF NS @ 76    Allergies: sulfa drugs (Rash)  penicillins (Rash)    PAST MEDICAL & SURGICAL HISTORY:  Rheumatoid arthritis      Hyperlipemia      Hypertension      Stress incontinence      Asthma      S/P total knee replacement, right          Current Medications: albuterol    90 MICROgram(s) HFA Inhaler 2 Puff(s) Inhalation every 6 hours PRN  aspirin enteric coated 81 milliGRAM(s) Oral daily  atenolol  Tablet 50 milliGRAM(s) Oral daily  atorvastatin 80 milliGRAM(s) Oral at bedtime  chlorhexidine 2% Cloths 1 Application(s) Topical <User Schedule>  cyanocobalamin 1000 MICROGram(s) Oral every 24 hours  folic acid 1 milliGRAM(s) Oral daily  heparin   Injectable 5000 Unit(s) SubCutaneous every 12 hours  losartan 100 milliGRAM(s) Oral daily  oxybutynin 5 milliGRAM(s) Oral two times a day  pantoprazole    Tablet 40 milliGRAM(s) Oral before breakfast  sodium chloride 0.9%. 1000 milliLiter(s) IV Continuous <Continuous>  ticagrelor 90 milliGRAM(s) Oral every 12 hours      Labs:                         11.5   5.60  )-----------( 177      ( 28 Nov 2023 06:59 )             37.8     11-28    140  |  104  |  27<H>  ----------------------------<  97  4.4   |  22  |  1.1    Ca    8.8      28 Nov 2023 06:59  Phos  4.2     11-28  Mg     1.9     11-28        Blood Bank: 11-28-23  --  B POS  --      Assessment/Plan:   This is a 83y  year old female presents with carotid stenosis   Patient presents to neuro-IR for diagnostic cerebral angiogram + planned carotid angioplasty/stenting.   Procedure, goals, risks, benefits and alternatives  were discussed with patient and patient's family.  All questions were answered to best understanding.   Risks discussed include but are not limited to stroke, vessel injury, hemorrhage, and or arteriotomy site hematoma.   Patient / proxy demonstrates understanding  of all risks involved with this procedure and wishes to continue.     Appropriate consent was obtained and placed in the patient's chart.      HPI:  83 year old with female with history of OA, HTN, DLD, presented with 3 days of acute onset distal numbness and imbalance. Patient was found with left thalamic lacunar infarct on admission. Found with marked calcification at the right and left common carotid artery bifurcations with severe stenosis at the level of the right carotid bulb, left CC bifurcation and ast the left carotid bulb.   Patient received ASA 81 mg and Brilinta 90 mg this AM pre procedure.     NPO except meds since midnight.   IVF NS @ 76  NIHSS 3 - bilateral UE limited ROM 2/2 hx OA and Right rotator cuff tear     Allergies: sulfa drugs (Rash)  penicillins (Rash)    PAST MEDICAL & SURGICAL HISTORY:  Rheumatoid arthritis  Hyperlipemia  Hypertension  Stress incontinence  Asthma  S/P total knee replacement, right      Current Medications: albuterol    90 MICROgram(s) HFA Inhaler 2 Puff(s) Inhalation every 6 hours PRN  aspirin enteric coated 81 milliGRAM(s) Oral daily  atenolol  Tablet 50 milliGRAM(s) Oral daily  atorvastatin 80 milliGRAM(s) Oral at bedtime  chlorhexidine 2% Cloths 1 Application(s) Topical <User Schedule>  cyanocobalamin 1000 MICROGram(s) Oral every 24 hours  folic acid 1 milliGRAM(s) Oral daily  heparin   Injectable 5000 Unit(s) SubCutaneous every 12 hours  losartan 100 milliGRAM(s) Oral daily  oxybutynin 5 milliGRAM(s) Oral two times a day  pantoprazole    Tablet 40 milliGRAM(s) Oral before breakfast  sodium chloride 0.9%. 1000 milliLiter(s) IV Continuous <Continuous>  ticagrelor 90 milliGRAM(s) Oral every 12 hours      Labs:                         11.5   5.60  )-----------( 177      ( 28 Nov 2023 06:59 )             37.8     11-28    140  |  104  |  27<H>  ----------------------------<  97  4.4   |  22  |  1.1    Ca    8.8      28 Nov 2023 06:59  Phos  4.2     11-28  Mg     1.9     11-28        Blood Bank: 11-28-23  --  B POS  --      Assessment/Plan:   This is a 83y  year old female presents with carotid stenosis.  Patient presents to neuro-IR for diagnostic cerebral angiogram + planned carotid angioplasty/stenting.   Procedure, goals, risks, benefits and alternatives  were discussed with patient and patient's family.  All questions were answered to best understanding.   Risks discussed include but are not limited to stroke, vessel injury, hemorrhage, and or arteriotomy site hematoma.   Patient / proxy demonstrates understanding  of all risks involved with this procedure and wishes to continue.     Appropriate consent was obtained and placed in the patient's chart.

## 2023-11-28 NOTE — BRIEF OPERATIVE NOTE - OPERATION/FINDINGS
Procedure : Diagnostic cerebral angiogram     Contrast: Visipaque 320   IA medications: Heparin 3000 IU, Verapamil 2.5mg, Nitroglycerin 200 mcg   IV  medications: Heparin 3000 IU   Implants placed: n/a  Complications : n/a    Preliminary Report:  Selective diagnostic angiogram performed from right radial arteriotomy site with preliminary findings of non flow limiting stenosis bilateral carotid arteries. No carotid angioplasty/stenting warranted.   Official IR neuro procedure note to follow.

## 2023-11-28 NOTE — PROGRESS NOTE ADULT - SUBJECTIVE AND OBJECTIVE BOX
OVERNIGHT EVENTS:  No overnight complaints or problems     SUBJECTIVE / INTERVAL HPI: Patient seen and examined at bedside.  In no distress, pleasant, comfortable     VITAL SIGNS:  Vital Signs Last 24 Hrs  T(C): 36.3 (28 Nov 2023 09:35), Max: 37.2 (27 Nov 2023 13:09)  T(F): 97.4 (28 Nov 2023 09:35), Max: 98.9 (27 Nov 2023 13:09)  HR: 58 (28 Nov 2023 09:35) (58 - 68)  BP: 156/78 (28 Nov 2023 09:35) (138/62 - 156/78)  BP(mean): 89 (27 Nov 2023 19:49) (89 - 89)  RR: 16 (28 Nov 2023 09:35) (16 - 20)  SpO2: 96% (28 Nov 2023 09:35) (96% - 98%)    Parameters below as of 27 Nov 2023 19:49  Patient On (Oxygen Delivery Method): room air        PHYSICAL EXAM:  Mental status: Awake, alert and oriented in person time and place. Speech is fluent with intact naming, repetition and comprehension.  Attention/concentration intact.  No dysarthria, no aphasia. Follows commands   Cranial nerves: Pupils equally round and reactive to light, visual fields full, no nystagmus, no visual problems, extraocular muscles intact. Sensation V1 through V3 intact bilaterally and symmetric. Face symmetric with normal eye closure and smile, hearing is bilaterally intact to finger rub, uvula is midline and soft palate rises symmetrically, tongue was midline.  Motor:  Normal bulk and tone, no abnormal movements strenght grading is LLE 5/5 LUE 5/5 RUE 5/5 RLE 5/5.   strength 5/5.  Sensation: Intact to light touch, proprioception, vibration and pinprick.   Coordination: No dysmetria on finger-to-nose and heel-to-shin.  No dysdiadokinesia.  Reflexes: 2+ in bilateral UE/LE (although LE difficult to assess 2/2 edema), downgoing toes bilaterally. (-) Garcia.  Gait: deferred    MEDICATIONS:  MEDICATIONS  (STANDING):  aspirin enteric coated 81 milliGRAM(s) Oral daily  atenolol  Tablet 50 milliGRAM(s) Oral daily  atorvastatin 80 milliGRAM(s) Oral at bedtime  chlorhexidine 2% Cloths 1 Application(s) Topical <User Schedule>  cyanocobalamin 1000 MICROGram(s) Oral every 24 hours  folic acid 1 milliGRAM(s) Oral daily  heparin   Injectable 5000 Unit(s) SubCutaneous every 12 hours  losartan 100 milliGRAM(s) Oral daily  oxybutynin 5 milliGRAM(s) Oral two times a day  pantoprazole    Tablet 40 milliGRAM(s) Oral before breakfast  sodium chloride 0.9%. 1000 milliLiter(s) (75 mL/Hr) IV Continuous <Continuous>  ticagrelor 90 milliGRAM(s) Oral every 12 hours    MEDICATIONS  (PRN):  albuterol    90 MICROgram(s) HFA Inhaler 2 Puff(s) Inhalation every 6 hours PRN for bronchospasm    ALLERGIES:  Allergies    sulfa drugs (Rash)  penicillins (Rash)    Intolerances        LABS:  CBC:            11.5   5.60  )-----------( 177      ( 11-28-23 @ 06:59 )             37.8     Chem:         ( 11-28-23 @ 06:59 )    140  |  104  |  27<H>  ----------------------------<  97  4.4   |  22  |  1.1    Liver Functions:     Type & Screen:     Ca    8.8      28 Nov 2023 06:59  Phos  4.2     11-28  Mg    1.9     11-28    Urinalysis Basic - ( 28 Nov 2023 06:59 )    Color: x / Appearance: x / SG: x / pH: x  Gluc: 97 mg/dL / Ketone: x  / Bili: x / Urobili: x   Blood: x / Protein: x / Nitrite: x   Leuk Esterase: x / RBC: x / WBC x   Sq Epi: x / Non Sq Epi: x / Bacteria: x      CAPILLARY BLOOD GLUCOSE          RADIOLOGY & ADDITIONAL TESTS: Reviewed.   OVERNIGHT EVENTS:  No overnight complaints or problems     SUBJECTIVE / INTERVAL HPI: Patient seen and examined at bedside.  In no distress, pleasant, comfortable     VITAL SIGNS:  Vital Signs Last 24 Hrs  T(C): 36.3 (28 Nov 2023 09:35), Max: 37.2 (27 Nov 2023 13:09)  T(F): 97.4 (28 Nov 2023 09:35), Max: 98.9 (27 Nov 2023 13:09)  HR: 58 (28 Nov 2023 09:35) (58 - 68)  BP: 156/78 (28 Nov 2023 09:35) (138/62 - 156/78)  BP(mean): 89 (27 Nov 2023 19:49) (89 - 89)  RR: 16 (28 Nov 2023 09:35) (16 - 20)  SpO2: 96% (28 Nov 2023 09:35) (96% - 98%)    Parameters below as of 27 Nov 2023 19:49  Patient On (Oxygen Delivery Method): room air        PHYSICAL EXAM:  Mental status: Awake, alert and oriented in person time and place. Speech is fluent with intact naming, repetition and comprehension.  Attention/concentration intact.  No dysarthria, no aphasia. Follows commands   Cranial nerves: Pupils equally round and reactive to light, visual fields full, no nystagmus, no visual problems, extraocular muscles intact. Sensation V1 through V3 intact bilaterally and symmetric. Face symmetric with normal eye closure and smile, hearing is bilaterally intact to finger rub, uvula is midline and soft palate rises symmetrically, tongue was midline.  Motor:  Normal bulk and tone, no abnormal movements strenght grading is LLE 5/5 LUE 5/5 RUE 5/5 RLE 5/5.   strength 5/5.  Sensation: Intact to light touch, proprioception, vibration and pinprick.   Coordination: No dysmetria on finger-to-nose and heel-to-shin.  No dysdiadokinesia.  Reflexes: 2+ in bilateral UE/LE (although LE difficult to assess 2/2 edema), downgoing toes bilaterally. (-) Garcia.  Gait: deferred    MEDICATIONS:  MEDICATIONS  (STANDING):  aspirin enteric coated 81 milliGRAM(s) Oral daily  atenolol  Tablet 50 milliGRAM(s) Oral daily  atorvastatin 80 milliGRAM(s) Oral at bedtime  chlorhexidine 2% Cloths 1 Application(s) Topical <User Schedule>  cyanocobalamin 1000 MICROGram(s) Oral every 24 hours  folic acid 1 milliGRAM(s) Oral daily  heparin   Injectable 5000 Unit(s) SubCutaneous every 12 hours  losartan 100 milliGRAM(s) Oral daily  oxybutynin 5 milliGRAM(s) Oral two times a day  pantoprazole    Tablet 40 milliGRAM(s) Oral before breakfast  sodium chloride 0.9%. 1000 milliLiter(s) (75 mL/Hr) IV Continuous <Continuous>  ticagrelor 90 milliGRAM(s) Oral every 12 hours    MEDICATIONS  (PRN):  albuterol    90 MICROgram(s) HFA Inhaler 2 Puff(s) Inhalation every 6 hours PRN for bronchospasm    ALLERGIES:  Allergies    sulfa drugs (Rash)  penicillins (Rash)    Intolerances        LABS:  CBC:            11.5   5.60  )-----------( 177      ( 11-28-23 @ 06:59 )             37.8     Chem:         ( 11-28-23 @ 06:59 )    140  |  104  |  27<H>  ----------------------------<  97  4.4   |  22  |  1.1    Liver Functions:     Type & Screen:     Ca    8.8      28 Nov 2023 06:59  Phos  4.2     11-28  Mg    1.9     11-28    Urinalysis Basic - ( 28 Nov 2023 06:59 )    Color: x / Appearance: x / SG: x / pH: x  Gluc: 97 mg/dL / Ketone: x  / Bili: x / Urobili: x   Blood: x / Protein: x / Nitrite: x   Leuk Esterase: x / RBC: x / WBC x   Sq Epi: x / Non Sq Epi: x / Bacteria: x      CAPILLARY BLOOD GLUCOSE          RADIOLOGY & ADDITIONAL TESTS:   MR Head: Left thalamic acute lacunar infarct. No acute hemorrhage.  CTA: Marked calcification at the right and left common carotid artery bifurcations with severe stenosis at the level of the right carotid bulb, and the left CC bifurcation and at the left carotid bulb. No evidence of major vascular occlusion or aneurysm.  Right lower lung opacities likely prominent vasculature. Correlate for respiratory symptoms and obtain CT if needed. No pneumothorax or pleural effusion on frontal view.    Chest Rx: Stable cardiomediastinal silhouette. Dilated right pulmonary artery is similar to prior exam. Unchanged osseous structures.    CT Ankle: 1. Osteopenia without acute osseous abnormality.  2. Chronic/degenerative change throughout the foot/ankle   OVERNIGHT EVENTS:  No overnight complaints or problems     SUBJECTIVE / INTERVAL HPI: Patient seen and examined at bedside.  In no distress, pleasant, comfortable   HPI:  Patient is a 83 year old female with pmhx of rheumatoid arthritis, HTN, DLD , walks with the help of a walker (rollator) and able to perform ADLs with some assistance presenting today for 3 days of distal numbness and imbalance. Patient states that she got flu shot this past Friday and then on Saturday she cleaned her house and felt extremely tired with right arm pain and rested on a couch then tried getting up after some time but fell back on the couch and thereon, she noted that she had problems with balance and also experienced distal numbness both on her foot and hands. She denies any progression of her sensory symptoms but reports that her balance is worse since onset. She denies any fever chills diarrhoea, focal weakness, speech visual deficits, problems breathing, neck or back pain , swallowing. Patient has baseline urinary incontinence.  (20 Nov 2023 21:47)    Patient seen and examined this AM. No complaints. Awaiting carotid angioplasty/stenting tomorrow with neuroendovascular team. On ASA/brilinta.       Past medical history:   Rheumatoid arthritis  Hyperlipemia  Hypertension  Stress incontinence  Asthma    VITAL SIGNS:  Vital Signs Last 24 Hrs  T(C): 36.3 (28 Nov 2023 09:35), Max: 37.2 (27 Nov 2023 13:09)  T(F): 97.4 (28 Nov 2023 09:35), Max: 98.9 (27 Nov 2023 13:09)  HR: 58 (28 Nov 2023 09:35) (58 - 68)  BP: 156/78 (28 Nov 2023 09:35) (138/62 - 156/78)  BP(mean): 89 (27 Nov 2023 19:49) (89 - 89)  RR: 16 (28 Nov 2023 09:35) (16 - 20)  SpO2: 96% (28 Nov 2023 09:35) (96% - 98%)    Parameters below as of 27 Nov 2023 19:49  Patient On (Oxygen Delivery Method): room air        PHYSICAL EXAM:  Mental status: Awake, alert and oriented in person time and place. Speech is fluent with intact naming, repetition and comprehension.  Attention/concentration intact.  No dysarthria, no aphasia. Follows commands   Cranial nerves: Pupils equally round and reactive to light, visual fields full, no nystagmus, no visual problems, extraocular muscles intact. Sensation V1 through V3 intact bilaterally and symmetric. Face symmetric with normal eye closure and smile, hearing is bilaterally intact to finger rub, uvula is midline and soft palate rises symmetrically, tongue was midline.  Motor:  Normal bulk and tone, no abnormal movements strenght grading is LLE 5/5 LUE 5/5 RUE 5/5 RLE 5/5.   strength 5/5.  Sensation: Intact to light touch, proprioception, vibration and pinprick.   Coordination: No dysmetria on finger-to-nose and heel-to-shin.  No dysdiadokinesia.  Reflexes: 2+ in bilateral UE/LE (although LE difficult to assess 2/2 edema), downgoing toes bilaterally. (-) Garcia.  Gait: deferred    MEDICATIONS:  MEDICATIONS  (STANDING):  aspirin enteric coated 81 milliGRAM(s) Oral daily  atenolol  Tablet 50 milliGRAM(s) Oral daily  atorvastatin 80 milliGRAM(s) Oral at bedtime  chlorhexidine 2% Cloths 1 Application(s) Topical <User Schedule>  cyanocobalamin 1000 MICROGram(s) Oral every 24 hours  folic acid 1 milliGRAM(s) Oral daily  heparin   Injectable 5000 Unit(s) SubCutaneous every 12 hours  losartan 100 milliGRAM(s) Oral daily  oxybutynin 5 milliGRAM(s) Oral two times a day  pantoprazole    Tablet 40 milliGRAM(s) Oral before breakfast  sodium chloride 0.9%. 1000 milliLiter(s) (75 mL/Hr) IV Continuous <Continuous>  ticagrelor 90 milliGRAM(s) Oral every 12 hours    MEDICATIONS  (PRN):  albuterol    90 MICROgram(s) HFA Inhaler 2 Puff(s) Inhalation every 6 hours PRN for bronchospasm    ALLERGIES:  Allergies    sulfa drugs (Rash)  penicillins (Rash)    Intolerances        LABS:  CBC:            11.5   5.60  )-----------( 177      ( 11-28-23 @ 06:59 )             37.8     Chem:         ( 11-28-23 @ 06:59 )    140  |  104  |  27<H>  ----------------------------<  97  4.4   |  22  |  1.1    Liver Functions:     Type & Screen:     Ca    8.8      28 Nov 2023 06:59  Phos  4.2     11-28  Mg    1.9     11-28    Urinalysis Basic - ( 28 Nov 2023 06:59 )    Color: x / Appearance: x / SG: x / pH: x  Gluc: 97 mg/dL / Ketone: x  / Bili: x / Urobili: x   Blood: x / Protein: x / Nitrite: x   Leuk Esterase: x / RBC: x / WBC x   Sq Epi: x / Non Sq Epi: x / Bacteria: x      CAPILLARY BLOOD GLUCOSE          RADIOLOGY & ADDITIONAL TESTS:   MR Head: Left thalamic acute lacunar infarct. No acute hemorrhage.  CTA: Marked calcification at the right and left common carotid artery bifurcations with severe stenosis at the level of the right carotid bulb, and the left CC bifurcation and at the left carotid bulb. No evidence of major vascular occlusion or aneurysm.  Right lower lung opacities likely prominent vasculature. Correlate for respiratory symptoms and obtain CT if needed. No pneumothorax or pleural effusion on frontal view.    Chest Rx: Stable cardiomediastinal silhouette. Dilated right pulmonary artery is similar to prior exam. Unchanged osseous structures.    CT Ankle: 1. Osteopenia without acute osseous abnormality.  2. Chronic/degenerative change throughout the foot/ankle   OVERNIGHT EVENTS:  No overnight complaints or problems     SUBJECTIVE / INTERVAL HPI: Patient seen and examined at bedside.  In no distress, pleasant, comfortable   HPI:  Patient is a 83 year old female with pmhx of osteoarthritiss, Hipertension, hiperlipidemia, walks with the help of a walker (rollator) and able to perform ADLs with some assistance presenting today for 3 days of distal numbness and imbalance. Patient states that she got flu shot this past Friday and then on Saturday she cleaned her house and felt extremely tired with right arm pain and rested on a couch then tried getting up after some time but fell back on the couch and thereon, she noted that she had problems with balance and also experienced distal numbness both on her foot and hands. She denies any progression of her sensory symptoms but reports that her balance is worse since onset. She denies any fever chills diarrhoea, focal weakness, speech visual deficits, problems breathing, neck or back pain , swallowing. Patient has baseline urinary incontinence. Also she has shoulder pain and limited movement by osteoarthitis     Patient seen and examined this AM. No complaints. Awaiting carotid angioplasty/stenting tomorrow with neuroendovascular team. On ASA/brilinta.       Past medical history:   Rheumatoid arthritis  Hyperlipemia  Hypertension  Stress incontinence  Asthma    VITAL SIGNS:  Vital Signs Last 24 Hrs  T(C): 36.3 (28 Nov 2023 09:35), Max: 37.2 (27 Nov 2023 13:09)  T(F): 97.4 (28 Nov 2023 09:35), Max: 98.9 (27 Nov 2023 13:09)  HR: 58 (28 Nov 2023 09:35) (58 - 68)  BP: 156/78 (28 Nov 2023 09:35) (138/62 - 156/78)  BP(mean): 89 (27 Nov 2023 19:49) (89 - 89)  RR: 16 (28 Nov 2023 09:35) (16 - 20)  SpO2: 96% (28 Nov 2023 09:35) (96% - 98%)    Parameters below as of 27 Nov 2023 19:49  Patient On (Oxygen Delivery Method): room air        PHYSICAL EXAM:  Mental status: Awake, alert and oriented in person time and place. Speech is fluent with intact naming, repetition and comprehension.  Attention/concentration intact.  No dysarthria, no aphasia. Follows commands   Cranial nerves: Pupils equally round and reactive to light, visual fields full, no nystagmus, no visual problems, extraocular muscles intact. Sensation V1 through V3 intact bilaterally and symmetric. Face symmetric with normal eye closure and smile, hearing is bilaterally intact to finger rub, uvula is midline and soft palate rises symmetrically, tongue was midline.  Motor:  Normal bulk and tone, no abnormal movements strenght grading is LLE 5/5 LUE 5/5 RUE 5/5 RLE 5/5.   strength 5/5. Pain when she riases her right hand by shoulder osteoarthitis  Sensation: Intact to light touch, proprioception, vibration and pinprick.   Coordination: No dysmetria on finger-to-nose and heel-to-shin.  No dysdiadokinesia.  Reflexes: 2+ in bilateral UE/LE (although LE difficult to assess 2/2 edema), downgoing toes bilaterally. (-) Garcia.  Gait: deferred    MEDICATIONS:  MEDICATIONS  (STANDING):  aspirin enteric coated 81 milliGRAM(s) Oral daily  atenolol  Tablet 50 milliGRAM(s) Oral daily  atorvastatin 80 milliGRAM(s) Oral at bedtime  chlorhexidine 2% Cloths 1 Application(s) Topical <User Schedule>  cyanocobalamin 1000 MICROGram(s) Oral every 24 hours  folic acid 1 milliGRAM(s) Oral daily  heparin   Injectable 5000 Unit(s) SubCutaneous every 12 hours  losartan 100 milliGRAM(s) Oral daily  oxybutynin 5 milliGRAM(s) Oral two times a day  pantoprazole    Tablet 40 milliGRAM(s) Oral before breakfast  sodium chloride 0.9%. 1000 milliLiter(s) (75 mL/Hr) IV Continuous <Continuous>  ticagrelor 90 milliGRAM(s) Oral every 12 hours    MEDICATIONS  (PRN):  albuterol    90 MICROgram(s) HFA Inhaler 2 Puff(s) Inhalation every 6 hours PRN for bronchospasm    ALLERGIES:  Allergies    sulfa drugs (Rash)  penicillins (Rash)    Intolerances        LABS:  CBC:            11.5   5.60  )-----------( 177      ( 11-28-23 @ 06:59 )             37.8     Chem:         ( 11-28-23 @ 06:59 )    140  |  104  |  27<H>  ----------------------------<  97  4.4   |  22  |  1.1    Liver Functions:     Type & Screen:     Ca    8.8      28 Nov 2023 06:59  Phos  4.2     11-28  Mg    1.9     11-28    Urinalysis Basic - ( 28 Nov 2023 06:59 )    Color: x / Appearance: x / SG: x / pH: x  Gluc: 97 mg/dL / Ketone: x  / Bili: x / Urobili: x   Blood: x / Protein: x / Nitrite: x   Leuk Esterase: x / RBC: x / WBC x   Sq Epi: x / Non Sq Epi: x / Bacteria: x      CAPILLARY BLOOD GLUCOSE          RADIOLOGY & ADDITIONAL TESTS:   MR Head: Left thalamic acute lacunar infarct. No acute hemorrhage.  CTA: Marked calcification at the right and left common carotid artery bifurcations with severe stenosis at the level of the right carotid bulb, and the left CC bifurcation and at the left carotid bulb. No evidence of major vascular occlusion or aneurysm.  Right lower lung opacities likely prominent vasculature. Correlate for respiratory symptoms and obtain CT if needed. No pneumothorax or pleural effusion on frontal view.    Chest Rx: Stable cardiomediastinal silhouette. Dilated right pulmonary artery is similar to prior exam. Unchanged osseous structures.    CT Ankle: 1. Osteopenia without acute osseous abnormality.  2. Chronic/degenerative change throughout the foot/ankle   OVERNIGHT EVENTS:  No overnight complaints or problems     SUBJECTIVE / INTERVAL HPI: Patient seen and examined at bedside.  In no distress, pleasant, comfortable   HPI:  This is a 83 year old female with pmhx of osteoarthritiss, Hipertension, hiperlipidemia, walks with the help of a walker (rollator) and able to perform ADLs with some assistance. She presented with distal numbness and imbalance. She denies any progression of her sensory symptoms but reports that her balance is worse since onset. She denies any fever chills diarrhoea, focal weakness, speech visual deficits, problems breathing, neck or back pain , swallowing. Patient has baseline urinary incontinence. Also she has shoulder pain and limited movement by osteoarthitis     Patient seen and examined this AM. No complaints. She wents to a carotid angioplasty/stenting with neuroendovascular team. On ASA/brilinta.       Past medical history:   Rheumatoid arthritis  Hyperlipemia  Hypertension  Stress incontinence  Asthma    VITAL SIGNS:  Vital Signs Last 24 Hrs  T(C): 36.3 (28 Nov 2023 09:35), Max: 37.2 (27 Nov 2023 13:09)  T(F): 97.4 (28 Nov 2023 09:35), Max: 98.9 (27 Nov 2023 13:09)  HR: 58 (28 Nov 2023 09:35) (58 - 68)  BP: 156/78 (28 Nov 2023 09:35) (138/62 - 156/78)  BP(mean): 89 (27 Nov 2023 19:49) (89 - 89)  RR: 16 (28 Nov 2023 09:35) (16 - 20)  SpO2: 96% (28 Nov 2023 09:35) (96% - 98%)    Parameters below as of 27 Nov 2023 19:49  Patient On (Oxygen Delivery Method): room air        PHYSICAL EXAM:  Mental status: Awake, alert and oriented in person time and place. Speech is fluent with intact naming, repetition and comprehension.  Attention/concentration intact.  No dysarthria, no aphasia. Follows commands   Cranial nerves: Pupils equally round and reactive to light, visual fields full, no nystagmus, no visual problems, extraocular muscles intact. Sensation V1 through V3 intact bilaterally and symmetric. Face symmetric with normal eye closure and smile, hearing is bilaterally intact to finger rub, uvula is midline and soft palate rises symmetrically, tongue was midline.  Motor:  Normal bulk and tone, no abnormal movements strenght grading is LLE 5/5 LUE 5/5 RUE 5/5 RLE 5/5.   strength 5/5. Pain when she riases her right hand by shoulder osteoarthitis  Sensation: Intact to light touch, proprioception, vibration and pinprick.   Coordination: No dysmetria on finger-to-nose and heel-to-shin.  No dysdiadokinesia.  Reflexes: 2+ in bilateral UE/LE (although LE difficult to assess 2/2 edema), downgoing toes bilaterally. (-) Garcia.  Gait: deferred    MEDICATIONS:  MEDICATIONS  (STANDING):  aspirin enteric coated 81 milliGRAM(s) Oral daily  atenolol  Tablet 50 milliGRAM(s) Oral daily  atorvastatin 80 milliGRAM(s) Oral at bedtime  chlorhexidine 2% Cloths 1 Application(s) Topical <User Schedule>  cyanocobalamin 1000 MICROGram(s) Oral every 24 hours  folic acid 1 milliGRAM(s) Oral daily  heparin   Injectable 5000 Unit(s) SubCutaneous every 12 hours  losartan 100 milliGRAM(s) Oral daily  oxybutynin 5 milliGRAM(s) Oral two times a day  pantoprazole    Tablet 40 milliGRAM(s) Oral before breakfast  sodium chloride 0.9%. 1000 milliLiter(s) (75 mL/Hr) IV Continuous <Continuous>  ticagrelor 90 milliGRAM(s) Oral every 12 hours    MEDICATIONS  (PRN):  albuterol    90 MICROgram(s) HFA Inhaler 2 Puff(s) Inhalation every 6 hours PRN for bronchospasm    ALLERGIES:  Allergies    sulfa drugs (Rash)  penicillins (Rash)    Intolerances        LABS:  CBC:            11.5   5.60  )-----------( 177      ( 11-28-23 @ 06:59 )             37.8     Chem:         ( 11-28-23 @ 06:59 )    140  |  104  |  27<H>  ----------------------------<  97  4.4   |  22  |  1.1    Liver Functions:     Type & Screen:     Ca    8.8      28 Nov 2023 06:59  Phos  4.2     11-28  Mg    1.9     11-28    Urinalysis Basic - ( 28 Nov 2023 06:59 )    Color: x / Appearance: x / SG: x / pH: x  Gluc: 97 mg/dL / Ketone: x  / Bili: x / Urobili: x   Blood: x / Protein: x / Nitrite: x   Leuk Esterase: x / RBC: x / WBC x   Sq Epi: x / Non Sq Epi: x / Bacteria: x      CAPILLARY BLOOD GLUCOSE          RADIOLOGY & ADDITIONAL TESTS:   MR Head: Left thalamic acute lacunar infarct. No acute hemorrhage.  CTA: Marked calcification at the right and left common carotid artery bifurcations with severe stenosis at the level of the right carotid bulb, and the left CC bifurcation and at the left carotid bulb. No evidence of major vascular occlusion or aneurysm.  Right lower lung opacities likely prominent vasculature. Correlate for respiratory symptoms and obtain CT if needed. No pneumothorax or pleural effusion on frontal view.    Chest Rx: Stable cardiomediastinal silhouette. Dilated right pulmonary artery is similar to prior exam. Unchanged osseous structures.    CT Ankle: 1. Osteopenia without acute osseous abnormality.  2. Chronic/degenerative change throughout the foot/ankle   OVERNIGHT EVENTS:  No overnight complaints or problems     SUBJECTIVE / INTERVAL HPI: Patient seen and examined at bedside.  In no distress, pleasant, comfortable   HPI:  This is a 83 year old female with pmhx of osteoarthritiss, Hipertension, hiperlipidemia, walks with the help of a walker (rollator) and able to perform ADLs with some assistance. She presented with distal numbness and imbalance. She denies any progression of her sensory symptoms but reports that her balance is worse since onset. She denies any fever chills diarrhoea, focal weakness, speech visual deficits, problems breathing, neck or back pain , swallowing. Patient has baseline urinary incontinence. Also she has shoulder pain and limited movement by osteoarthitis     Patient seen and examined this AM. No complaints. She has today her carotid angioplasty/stenting with neuroendovascular team. On ASA/brilinta.       Past medical history:   Rheumatoid arthritis  Hyperlipemia  Hypertension  Stress incontinence  Asthma    VITAL SIGNS:  Vital Signs Last 24 Hrs  T(C): 36.3 (28 Nov 2023 09:35), Max: 37.2 (27 Nov 2023 13:09)  T(F): 97.4 (28 Nov 2023 09:35), Max: 98.9 (27 Nov 2023 13:09)  HR: 58 (28 Nov 2023 09:35) (58 - 68)  BP: 156/78 (28 Nov 2023 09:35) (138/62 - 156/78)  BP(mean): 89 (27 Nov 2023 19:49) (89 - 89)  RR: 16 (28 Nov 2023 09:35) (16 - 20)  SpO2: 96% (28 Nov 2023 09:35) (96% - 98%)    Parameters below as of 27 Nov 2023 19:49  Patient On (Oxygen Delivery Method): room air        PHYSICAL EXAM:  Mental status: Awake, alert and oriented in person time and place. Speech is fluent with intact naming, repetition and comprehension.  Attention/concentration intact.  No dysarthria, no aphasia. Follows commands   Cranial nerves: Pupils equally round and reactive to light, visual fields full, no nystagmus, no visual problems, extraocular muscles intact. Sensation V1 through V3 intact bilaterally and symmetric. Face symmetric with normal eye closure and smile, hearing is bilaterally intact to finger rub, uvula is midline and soft palate rises symmetrically, tongue was midline.  Motor:  Normal bulk and tone, no abnormal movements strenght grading is LLE 5/5 LUE 5/5 RUE 5/5 RLE 5/5.   strength 5/5. Pain when she riases her right hand by shoulder osteoarthitis  Sensation: Intact to light touch, proprioception, vibration and pinprick.   Coordination: No dysmetria on finger-to-nose and heel-to-shin.  No dysdiadokinesia.  Reflexes: 2+ in bilateral UE/LE (although LE difficult to assess 2/2 edema), downgoing toes bilaterally. (-) Garcia.  Gait: deferred    MEDICATIONS:  MEDICATIONS  (STANDING):  aspirin enteric coated 81 milliGRAM(s) Oral daily  atenolol  Tablet 50 milliGRAM(s) Oral daily  atorvastatin 80 milliGRAM(s) Oral at bedtime  chlorhexidine 2% Cloths 1 Application(s) Topical <User Schedule>  cyanocobalamin 1000 MICROGram(s) Oral every 24 hours  folic acid 1 milliGRAM(s) Oral daily  heparin   Injectable 5000 Unit(s) SubCutaneous every 12 hours  losartan 100 milliGRAM(s) Oral daily  oxybutynin 5 milliGRAM(s) Oral two times a day  pantoprazole    Tablet 40 milliGRAM(s) Oral before breakfast  sodium chloride 0.9%. 1000 milliLiter(s) (75 mL/Hr) IV Continuous <Continuous>  ticagrelor 90 milliGRAM(s) Oral every 12 hours    MEDICATIONS  (PRN):  albuterol    90 MICROgram(s) HFA Inhaler 2 Puff(s) Inhalation every 6 hours PRN for bronchospasm    ALLERGIES:  Allergies    sulfa drugs (Rash)  penicillins (Rash)    Intolerances        LABS:  CBC:            11.5   5.60  )-----------( 177      ( 11-28-23 @ 06:59 )             37.8     Chem:         ( 11-28-23 @ 06:59 )    140  |  104  |  27<H>  ----------------------------<  97  4.4   |  22  |  1.1    Liver Functions:     Type & Screen:     Ca    8.8      28 Nov 2023 06:59  Phos  4.2     11-28  Mg    1.9     11-28    Urinalysis Basic - ( 28 Nov 2023 06:59 )    Color: x / Appearance: x / SG: x / pH: x  Gluc: 97 mg/dL / Ketone: x  / Bili: x / Urobili: x   Blood: x / Protein: x / Nitrite: x   Leuk Esterase: x / RBC: x / WBC x   Sq Epi: x / Non Sq Epi: x / Bacteria: x      CAPILLARY BLOOD GLUCOSE          RADIOLOGY & ADDITIONAL TESTS:   MR Head: Left thalamic acute lacunar infarct. No acute hemorrhage.  CTA: Marked calcification at the right and left common carotid artery bifurcations with severe stenosis at the level of the right carotid bulb, and the left CC bifurcation and at the left carotid bulb. No evidence of major vascular occlusion or aneurysm.  Right lower lung opacities likely prominent vasculature. Correlate for respiratory symptoms and obtain CT if needed. No pneumothorax or pleural effusion on frontal view.    Chest Rx: Stable cardiomediastinal silhouette. Dilated right pulmonary artery is similar to prior exam. Unchanged osseous structures.    CT Ankle: 1. Osteopenia without acute osseous abnormality.  2. Chronic/degenerative change throughout the foot/ankle   OVERNIGHT EVENTS:  No overnight complaints or problems     SUBJECTIVE / INTERVAL HPI: Patient seen and examined at bedside.  In no distress, pleasant, comfortable   HPI:  This is a 83 year old female with pmhx of osteoarthritiss, Hipertension, hiperlipidemia, walks with the help of a walker (rollator) and able to perform activities of daily living with some assistance. She presented in the hospital with distal numbness and imbalance . She denies any progression of her sensory symptoms but reports that her balance is worse since onset. She denies any fever chills diarrhoea, focal weakness, speech visual deficits, problems breathing, neck or back pain , swallowing. Patient has baseline urinary incontinence. Also she has shoulder pain and limited movement by osteoarthitis     Patient seen and examined this AM. No complaints. She has today her carotid angioplasty/stenting with neuroendovascular team. On ASA/brilinta.       Past medical history:   Rheumatoid arthritis  Hyperlipemia  Hypertension  Stress incontinence  Asthma    VITAL SIGNS:  Vital Signs Last 24 Hrs  T(C): 36.3 (28 Nov 2023 09:35), Max: 37.2 (27 Nov 2023 13:09)  T(F): 97.4 (28 Nov 2023 09:35), Max: 98.9 (27 Nov 2023 13:09)  HR: 58 (28 Nov 2023 09:35) (58 - 68)  BP: 156/78 (28 Nov 2023 09:35) (138/62 - 156/78)  BP(mean): 89 (27 Nov 2023 19:49) (89 - 89)  RR: 16 (28 Nov 2023 09:35) (16 - 20)  SpO2: 96% (28 Nov 2023 09:35) (96% - 98%)    Parameters below as of 27 Nov 2023 19:49  Patient On (Oxygen Delivery Method): room air        PHYSICAL EXAM:  Mental status: Awake, alert and oriented in person time and place. Speech is fluent with intact naming, repetition and comprehension.  Attention/concentration intact.  No dysarthria, no aphasia. Follows commands   Cranial nerves: Pupils equally round and reactive to light, visual fields full, no nystagmus, no visual problems, extraocular muscles intact. Sensation V1 through V3 intact bilaterally and symmetric. Face symmetric with normal eye closure and smile, hearing is bilaterally intact to finger rub, uvula is midline and soft palate rises symmetrically, tongue was midline.  Motor:  Normal bulk and tone, no abnormal movements strenght grading is LLE 5/5 LUE 5/5 RUE 5/5 RLE 5/5.   strength 5/5. Pain when she riases her right hand by shoulder osteoarthitis  Sensation: Intact to light touch, proprioception, vibration and pinprick.   Coordination: No dysmetria on finger-to-nose and heel-to-shin.  No dysdiadokinesia.  Reflexes: 2+ in bilateral UE/LE (although LE difficult to assess 2/2 edema), downgoing toes bilaterally. (-) Garcia.  Gait: deferred    MEDICATIONS:  MEDICATIONS  (STANDING):  aspirin enteric coated 81 milliGRAM(s) Oral daily  atenolol  Tablet 50 milliGRAM(s) Oral daily  atorvastatin 80 milliGRAM(s) Oral at bedtime  chlorhexidine 2% Cloths 1 Application(s) Topical <User Schedule>  cyanocobalamin 1000 MICROGram(s) Oral every 24 hours  folic acid 1 milliGRAM(s) Oral daily  heparin   Injectable 5000 Unit(s) SubCutaneous every 12 hours  losartan 100 milliGRAM(s) Oral daily  oxybutynin 5 milliGRAM(s) Oral two times a day  pantoprazole    Tablet 40 milliGRAM(s) Oral before breakfast  sodium chloride 0.9%. 1000 milliLiter(s) (75 mL/Hr) IV Continuous <Continuous>  ticagrelor 90 milliGRAM(s) Oral every 12 hours    MEDICATIONS  (PRN):  albuterol    90 MICROgram(s) HFA Inhaler 2 Puff(s) Inhalation every 6 hours PRN for bronchospasm    ALLERGIES:  Allergies    sulfa drugs (Rash)  penicillins (Rash)    Intolerances        LABS:  CBC:            11.5   5.60  )-----------( 177      ( 11-28-23 @ 06:59 )             37.8     Chem:         ( 11-28-23 @ 06:59 )    140  |  104  |  27<H>  ----------------------------<  97  4.4   |  22  |  1.1    Liver Functions:     Type & Screen:     Ca    8.8      28 Nov 2023 06:59  Phos  4.2     11-28  Mg    1.9     11-28    Urinalysis Basic - ( 28 Nov 2023 06:59 )    Color: x / Appearance: x / SG: x / pH: x  Gluc: 97 mg/dL / Ketone: x  / Bili: x / Urobili: x   Blood: x / Protein: x / Nitrite: x   Leuk Esterase: x / RBC: x / WBC x   Sq Epi: x / Non Sq Epi: x / Bacteria: x      CAPILLARY BLOOD GLUCOSE          RADIOLOGY & ADDITIONAL TESTS:   MR Head: Left thalamic acute lacunar infarct. No acute hemorrhage.  CTA: Marked calcification at the right and left common carotid artery bifurcations with severe stenosis at the level of the right carotid bulb, and the left CC bifurcation and at the left carotid bulb. No evidence of major vascular occlusion or aneurysm.  Right lower lung opacities likely prominent vasculature. Correlate for respiratory symptoms and obtain CT if needed. No pneumothorax or pleural effusion on frontal view.    Chest Rx: Stable cardiomediastinal silhouette. Dilated right pulmonary artery is similar to prior exam. Unchanged osseous structures.    CT Ankle: 1. Osteopenia without acute osseous abnormality.  2. Chronic/degenerative change throughout the foot/ankle   OVERNIGHT EVENTS:  No overnight complaints or problems     SUBJECTIVE / INTERVAL HPI: Patient seen and examined at bedside.  In no distress, pleasant, comfortable   HPI:  This is a 83 year old female with pmhx of osteoarthritiss, Hipertension, hiperlipidemia, walks with the help of a walker (rollator) and able to perform activities of daily living with some assistance. She presented to the hospital with distal numbness and imbalance . She denies any progression of her sensory symptoms but reports that her balance is worse since onset. She denies any fever chills diarrhoea, focal weakness, speech visual deficits, problems breathing, neck or back pain , swallowing. Patient has baseline urinary incontinence. Also she has shoulder pain and limited movement by osteoarthitis     Patient seen and examined this AM. No complaints. She has today her carotid angioplasty/stenting with neuroendovascular team. On ASA/brilinta.       Past medical history:   Rheumatoid arthritis  Hyperlipemia  Hypertension  Stress incontinence  Asthma    VITAL SIGNS:  Vital Signs Last 24 Hrs  T(C): 36.3 (28 Nov 2023 09:35), Max: 37.2 (27 Nov 2023 13:09)  T(F): 97.4 (28 Nov 2023 09:35), Max: 98.9 (27 Nov 2023 13:09)  HR: 58 (28 Nov 2023 09:35) (58 - 68)  BP: 156/78 (28 Nov 2023 09:35) (138/62 - 156/78)  BP(mean): 89 (27 Nov 2023 19:49) (89 - 89)  RR: 16 (28 Nov 2023 09:35) (16 - 20)  SpO2: 96% (28 Nov 2023 09:35) (96% - 98%)    Parameters below as of 27 Nov 2023 19:49  Patient On (Oxygen Delivery Method): room air        PHYSICAL EXAM:  Mental status: Awake, alert and oriented in person time and place. Speech is fluent with intact naming, repetition and comprehension.  Attention/concentration intact.  No dysarthria, no aphasia. Follows commands   Cranial nerves: Pupils equally round and reactive to light, visual fields full, no nystagmus, no visual problems, extraocular muscles intact. Sensation V1 through V3 intact bilaterally and symmetric. Face symmetric with normal eye closure and smile, hearing is bilaterally intact to finger rub, uvula is midline and soft palate rises symmetrically, tongue was midline.  Motor:  Normal bulk and tone, no abnormal movements strenght grading is LLE 5/5 LUE 5/5 RUE 5/5 RLE 5/5.   strength 5/5. Pain when she riases her right hand by shoulder osteoarthitis  Sensation: Intact to light touch, proprioception, vibration and pinprick.   Coordination: No dysmetria on finger-to-nose and heel-to-shin.  No dysdiadokinesia.  Reflexes: 2+ in bilateral UE/LE (although LE difficult to assess 2/2 edema), downgoing toes bilaterally. (-) Garcia.  Gait: deferred    MEDICATIONS:  MEDICATIONS  (STANDING):  aspirin enteric coated 81 milliGRAM(s) Oral daily  atenolol  Tablet 50 milliGRAM(s) Oral daily  atorvastatin 80 milliGRAM(s) Oral at bedtime  chlorhexidine 2% Cloths 1 Application(s) Topical <User Schedule>  cyanocobalamin 1000 MICROGram(s) Oral every 24 hours  folic acid 1 milliGRAM(s) Oral daily  heparin   Injectable 5000 Unit(s) SubCutaneous every 12 hours  losartan 100 milliGRAM(s) Oral daily  oxybutynin 5 milliGRAM(s) Oral two times a day  pantoprazole    Tablet 40 milliGRAM(s) Oral before breakfast  sodium chloride 0.9%. 1000 milliLiter(s) (75 mL/Hr) IV Continuous <Continuous>  ticagrelor 90 milliGRAM(s) Oral every 12 hours    MEDICATIONS  (PRN):  albuterol    90 MICROgram(s) HFA Inhaler 2 Puff(s) Inhalation every 6 hours PRN for bronchospasm    ALLERGIES:  Allergies    sulfa drugs (Rash)  penicillins (Rash)    Intolerances        LABS:  CBC:            11.5   5.60  )-----------( 177      ( 11-28-23 @ 06:59 )             37.8     Chem:         ( 11-28-23 @ 06:59 )    140  |  104  |  27<H>  ----------------------------<  97  4.4   |  22  |  1.1    Liver Functions:     Type & Screen:     Ca    8.8      28 Nov 2023 06:59  Phos  4.2     11-28  Mg    1.9     11-28    Urinalysis Basic - ( 28 Nov 2023 06:59 )    Color: x / Appearance: x / SG: x / pH: x  Gluc: 97 mg/dL / Ketone: x  / Bili: x / Urobili: x   Blood: x / Protein: x / Nitrite: x   Leuk Esterase: x / RBC: x / WBC x   Sq Epi: x / Non Sq Epi: x / Bacteria: x      CAPILLARY BLOOD GLUCOSE          RADIOLOGY & ADDITIONAL TESTS:   MR Head: Left thalamic acute lacunar infarct. No acute hemorrhage.  CTA: Marked calcification at the right and left common carotid artery bifurcations with severe stenosis at the level of the right carotid bulb, and the left CC bifurcation and at the left carotid bulb. No evidence of major vascular occlusion or aneurysm.  Right lower lung opacities likely prominent vasculature. Correlate for respiratory symptoms and obtain CT if needed. No pneumothorax or pleural effusion on frontal view.    Chest Rx: Stable cardiomediastinal silhouette. Dilated right pulmonary artery is similar to prior exam. Unchanged osseous structures.    CT Ankle: 1. Osteopenia without acute osseous abnormality.  2. Chronic/degenerative change throughout the foot/ankle   OVERNIGHT EVENTS:  No overnight complaints or problems     SUBJECTIVE / INTERVAL HPI: Patient seen and examined at bedside.  In no distress, pleasant, comfortable   HPI:  This is a 83 year old female with pmhx of osteoarthritiss, Hipertension, hiperlipidemia, walks with the help of a walker (rollator) and able to perform activities of daily living with some assistance. She presented to the hospital with distal numbness and imbalance . She denies any progression of her sensory symptoms but reports that her balance is worse since onset. She denies any fever chills diarrhea, focal weakness, speech visual deficits, problems breathing, neck or back pain , swallowing. Patient has baseline urinary incontinence. Also she has shoulder pain and limited movement by osteoarthitis     Patient seen and examined this AM. No complaints. She has today her carotid angioplasty/stenting with neuroendovascular team. On ASA/brilinta.       Past medical history:   Rheumatoid arthritis  Hyperlipemia  Hypertension  Stress incontinence  Asthma    VITAL SIGNS:  Vital Signs Last 24 Hrs  T(C): 36.3 (28 Nov 2023 09:35), Max: 37.2 (27 Nov 2023 13:09)  T(F): 97.4 (28 Nov 2023 09:35), Max: 98.9 (27 Nov 2023 13:09)  HR: 58 (28 Nov 2023 09:35) (58 - 68)  BP: 156/78 (28 Nov 2023 09:35) (138/62 - 156/78)  BP(mean): 89 (27 Nov 2023 19:49) (89 - 89)  RR: 16 (28 Nov 2023 09:35) (16 - 20)  SpO2: 96% (28 Nov 2023 09:35) (96% - 98%)    Parameters below as of 27 Nov 2023 19:49  Patient On (Oxygen Delivery Method): room air        PHYSICAL EXAM:  Mental status: Awake, alert and oriented in person time and place. Speech is fluent with intact naming, repetition and comprehension.  Attention/concentration intact.  No dysarthria, no aphasia. Follows commands   Cranial nerves: Pupils equally round and reactive to light, visual fields full, no nystagmus, no visual problems, extraocular muscles intact. Sensation V1 through V3 intact bilaterally and symmetric. Face symmetric with normal eye closure and smile, hearing is bilaterally intact to finger rub, uvula is midline and soft palate rises symmetrically, tongue was midline.  Motor:  Normal bulk and tone, no abnormal movements strenght grading is LLE 5/5 LUE 5/5 RUE 5/5 RLE 5/5.   strength 5/5. Pain when she riases her right hand by shoulder osteoarthitis  Sensation: Intact to light touch, proprioception, vibration and pinprick.   Coordination: No dysmetria on finger-to-nose and heel-to-shin.  No dysdiadokinesia.  Reflexes: 2+ in bilateral UE/LE (although LE difficult to assess 2/2 edema), downgoing toes bilaterally. (-) Garcia.  Gait: deferred    MEDICATIONS:  MEDICATIONS  (STANDING):  aspirin enteric coated 81 milliGRAM(s) Oral daily  atenolol  Tablet 50 milliGRAM(s) Oral daily  atorvastatin 80 milliGRAM(s) Oral at bedtime  chlorhexidine 2% Cloths 1 Application(s) Topical <User Schedule>  cyanocobalamin 1000 MICROGram(s) Oral every 24 hours  folic acid 1 milliGRAM(s) Oral daily  heparin   Injectable 5000 Unit(s) SubCutaneous every 12 hours  losartan 100 milliGRAM(s) Oral daily  oxybutynin 5 milliGRAM(s) Oral two times a day  pantoprazole    Tablet 40 milliGRAM(s) Oral before breakfast  sodium chloride 0.9%. 1000 milliLiter(s) (75 mL/Hr) IV Continuous <Continuous>  ticagrelor 90 milliGRAM(s) Oral every 12 hours    MEDICATIONS  (PRN):  albuterol    90 MICROgram(s) HFA Inhaler 2 Puff(s) Inhalation every 6 hours PRN for bronchospasm    ALLERGIES:  Allergies    sulfa drugs (Rash)  penicillins (Rash)    Intolerances        LABS:  CBC:            11.5   5.60  )-----------( 177      ( 11-28-23 @ 06:59 )             37.8     Chem:         ( 11-28-23 @ 06:59 )    140  |  104  |  27<H>  ----------------------------<  97  4.4   |  22  |  1.1    Liver Functions:     Type & Screen:     Ca    8.8      28 Nov 2023 06:59  Phos  4.2     11-28  Mg    1.9     11-28    Urinalysis Basic - ( 28 Nov 2023 06:59 )    Color: x / Appearance: x / SG: x / pH: x  Gluc: 97 mg/dL / Ketone: x  / Bili: x / Urobili: x   Blood: x / Protein: x / Nitrite: x   Leuk Esterase: x / RBC: x / WBC x   Sq Epi: x / Non Sq Epi: x / Bacteria: x      CAPILLARY BLOOD GLUCOSE          RADIOLOGY & ADDITIONAL TESTS:   MR Head: Left thalamic acute lacunar infarct. No acute hemorrhage.  CTA: Marked calcification at the right and left common carotid artery bifurcations with severe stenosis at the level of the right carotid bulb, and the left CC bifurcation and at the left carotid bulb. No evidence of major vascular occlusion or aneurysm.  Right lower lung opacities likely prominent vasculature. Correlate for respiratory symptoms and obtain CT if needed. No pneumothorax or pleural effusion on frontal view.    Chest Rx: Stable cardiomediastinal silhouette. Dilated right pulmonary artery is similar to prior exam. Unchanged osseous structures.    CT Ankle: 1. Osteopenia without acute osseous abnormality.  2. Chronic/degenerative change throughout the foot/ankle   OVERNIGHT EVENTS:  No overnight complaints or problems     SUBJECTIVE / INTERVAL HPI: Patient seen and examined at bedside.  In no distress, pleasant, comfortable   HPI:  This is a 83 year old female with pmhx of osteoarthritiss, Hipertension, hiperlipidemia, walks with the help of a walker (rollator) and able to perform activities of daily living with some assistance. She presented to the hospital with distal numbness and imbalance . She denies any progression of her sensory symptoms but reports that her balance is worse since onset. She denies any fever chills diarrhea, focal weakness, speech visual deficits, problems breathing, neck or back pain , swallowing. Patient has baseline urinary incontinence. Also she has shoulder pain and limited movement by osteoarthitis     Patient seen and examined this AM. No complaints. She had today her carotid angioplasty/stenting with neuroendovascular team. On ASA/brilinta.       Past medical history:   Rheumatoid arthritis  Hyperlipemia  Hypertension  Stress incontinence  Asthma    VITAL SIGNS:  Vital Signs Last 24 Hrs  T(C): 36.3 (28 Nov 2023 09:35), Max: 37.2 (27 Nov 2023 13:09)  T(F): 97.4 (28 Nov 2023 09:35), Max: 98.9 (27 Nov 2023 13:09)  HR: 58 (28 Nov 2023 09:35) (58 - 68)  BP: 156/78 (28 Nov 2023 09:35) (138/62 - 156/78)  BP(mean): 89 (27 Nov 2023 19:49) (89 - 89)  RR: 16 (28 Nov 2023 09:35) (16 - 20)  SpO2: 96% (28 Nov 2023 09:35) (96% - 98%)    Parameters below as of 27 Nov 2023 19:49  Patient On (Oxygen Delivery Method): room air        PHYSICAL EXAM:  Mental status: Awake, alert and oriented in person time and place. Speech is fluent with intact naming, repetition and comprehension.  Attention/concentration intact.  No dysarthria, no aphasia. Follows commands   Cranial nerves: Pupils equally round and reactive to light, visual fields full, no nystagmus, no visual problems, extraocular muscles intact. Sensation V1 through V3 intact bilaterally and symmetric. Face symmetric with normal eye closure and smile, hearing is bilaterally intact to finger rub, uvula is midline and soft palate rises symmetrically, tongue was midline.  Motor:  Normal bulk and tone, no abnormal movements strenght grading is LLE 5/5 LUE 5/5 RUE 5/5 RLE 5/5.   strength 5/5. Pain when she riases her right hand by shoulder osteoarthitis  Sensation: Intact to light touch, proprioception, vibration and pinprick.   Coordination: No dysmetria on finger-to-nose and heel-to-shin.  No dysdiadokinesia.  Reflexes: 2+ in bilateral UE/LE (although LE difficult to assess 2/2 edema), downgoing toes bilaterally. (-) Garcia.  Gait: deferred    MEDICATIONS:  MEDICATIONS  (STANDING):  aspirin enteric coated 81 milliGRAM(s) Oral daily  atenolol  Tablet 50 milliGRAM(s) Oral daily  atorvastatin 80 milliGRAM(s) Oral at bedtime  chlorhexidine 2% Cloths 1 Application(s) Topical <User Schedule>  cyanocobalamin 1000 MICROGram(s) Oral every 24 hours  folic acid 1 milliGRAM(s) Oral daily  heparin   Injectable 5000 Unit(s) SubCutaneous every 12 hours  losartan 100 milliGRAM(s) Oral daily  oxybutynin 5 milliGRAM(s) Oral two times a day  pantoprazole    Tablet 40 milliGRAM(s) Oral before breakfast  sodium chloride 0.9%. 1000 milliLiter(s) (75 mL/Hr) IV Continuous <Continuous>  ticagrelor 90 milliGRAM(s) Oral every 12 hours    MEDICATIONS  (PRN):  albuterol    90 MICROgram(s) HFA Inhaler 2 Puff(s) Inhalation every 6 hours PRN for bronchospasm    ALLERGIES:  Allergies    sulfa drugs (Rash)  penicillins (Rash)    Intolerances        LABS:  CBC:            11.5   5.60  )-----------( 177      ( 11-28-23 @ 06:59 )             37.8     Chem:         ( 11-28-23 @ 06:59 )    140  |  104  |  27<H>  ----------------------------<  97  4.4   |  22  |  1.1    Liver Functions:     Type & Screen:     Ca    8.8      28 Nov 2023 06:59  Phos  4.2     11-28  Mg    1.9     11-28    Urinalysis Basic - ( 28 Nov 2023 06:59 )    Color: x / Appearance: x / SG: x / pH: x  Gluc: 97 mg/dL / Ketone: x  / Bili: x / Urobili: x   Blood: x / Protein: x / Nitrite: x   Leuk Esterase: x / RBC: x / WBC x   Sq Epi: x / Non Sq Epi: x / Bacteria: x      CAPILLARY BLOOD GLUCOSE          RADIOLOGY & ADDITIONAL TESTS:   MR Head: Left thalamic acute lacunar infarct. No acute hemorrhage.  CTA: Marked calcification at the right and left common carotid artery bifurcations with severe stenosis at the level of the right carotid bulb, and the left CC bifurcation and at the left carotid bulb. No evidence of major vascular occlusion or aneurysm.  Right lower lung opacities likely prominent vasculature. Correlate for respiratory symptoms and obtain CT if needed. No pneumothorax or pleural effusion on frontal view.    Chest Rx: Stable cardiomediastinal silhouette. Dilated right pulmonary artery is similar to prior exam. Unchanged osseous structures.    CT Ankle: 1. Osteopenia without acute osseous abnormality.  2. Chronic/degenerative change throughout the foot/ankle   OVERNIGHT EVENTS:  No overnight complaints or problems     SUBJECTIVE / INTERVAL HPI: Patient seen and examined at bedside.  In no distress, pleasant, comfortable   HPI:  This is a 83 year old female with pmhx of osteoarthritiss, Hipertension, hiperlipidemia, walks with the help of a walker (rollator) and able to perform activities of daily living with some assistance. She presented to the hospital with distal numbness and imbalance . She denies any progression of her sensory symptoms but reports that her balance is worse since onset. She denies any fever chills diarrhea, focal weakness, speech visual deficits, problems breathing, neck or back pain , swallowing. Patient has baseline urinary incontinence. Also she has shoulder pain and limited movement by osteoarthitis     Patient seen and examined this AM. No complaints. She had today her carotid angioplasty/stenting with neuroendovascular team. On ASA/brilinta.       Past medical history:   Rheumatoid arthritis  Hyperlipemia  Hypertension  Stress incontinence  Asthma    VITAL SIGNS:  Vital Signs Last 24 Hrs  T(C): 36.3 (28 Nov 2023 09:35), Max: 37.2 (27 Nov 2023 13:09)  T(F): 97.4 (28 Nov 2023 09:35), Max: 98.9 (27 Nov 2023 13:09)  HR: 58 (28 Nov 2023 09:35) (58 - 68)  BP: 156/78 (28 Nov 2023 09:35) (138/62 - 156/78)  BP(mean): 89 (27 Nov 2023 19:49) (89 - 89)  RR: 16 (28 Nov 2023 09:35) (16 - 20)  SpO2: 96% (28 Nov 2023 09:35) (96% - 98%)    Parameters below as of 27 Nov 2023 19:49  Patient On (Oxygen Delivery Method): room air        PHYSICAL EXAM:  Mental status: Awake, alert and oriented in person time and place. Speech is fluent with intact naming, repetition and comprehension.  Attention/concentration intact.  No dysarthria, no aphasia. Follows commands   Cranial nerves: Pupils equally round and reactive to light, visual fields full, no nystagmus, no visual problems, extraocular muscles intact. Sensation V1 through V3 intact bilaterally and symmetric. Face symmetric with normal eye closure and smile, hearing is bilaterally intact to finger rub, uvula is midline and soft palate rises symmetrically, tongue was midline.  Motor:  Normal bulk and tone, no abnormal movements strenght grading is LLE 5/5 LUE 5/5 RUE 5/5 RLE 5/5.   strength 5/5. Pain when she riases her right hand by shoulder osteoarthitis  Sensation: Intact to light touch, proprioception, vibration and pinprick.   Coordination: No dysmetria on finger-to-nose and heel-to-shin.  No dysdiadokinesia.  Reflexes: 2+ in bilateral UE/LE (although LE difficult to assess 2/2 edema), downgoing toes bilaterally. (-) Garcia.  Gait: deferred    MEDICATIONS:  MEDICATIONS  (STANDING):  aspirin enteric coated 81 milliGRAM(s) Oral daily  atenolol  Tablet 50 milliGRAM(s) Oral daily  atorvastatin 80 milliGRAM(s) Oral at bedtime  chlorhexidine 2% Cloths 1 Application(s) Topical <User Schedule>  cyanocobalamin 1000 MICROGram(s) Oral every 24 hours  folic acid 1 milliGRAM(s) Oral daily  heparin   Injectable 5000 Unit(s) SubCutaneous every 12 hours  losartan 100 milliGRAM(s) Oral daily  oxybutynin 5 milliGRAM(s) Oral two times a day  pantoprazole    Tablet 40 milliGRAM(s) Oral before breakfast  sodium chloride 0.9%. 1000 milliLiter(s) (75 mL/Hr) IV Continuous <Continuous>  ticagrelor 90 milliGRAM(s) Oral every 12 hours    MEDICATIONS  (PRN):  albuterol    90 MICROgram(s) HFA Inhaler 2 Puff(s) Inhalation every 6 hours PRN for bronchospasm    ALLERGIES:  Allergies    sulfa drugs (Rash)  penicillins (Rash)    Intolerances        LABS:  CBC:            11.5   5.60  )-----------( 177      ( 11-28-23 @ 06:59 )             37.8     Chem:         ( 11-28-23 @ 06:59 )    140  |  104  |  27<H>  ----------------------------<  97  4.4   |  22  |  1.1    Liver Functions:   AST: 20 U/L (20/11/2023)   ALT: 12 U/L (20/11/2023)    Type & Screen:     Ca    8.8      28 Nov 2023 06:59  Phos  4.2     11-28  Mg    1.9     11-28    Urinalysis Basic - ( 28 Nov 2023 06:59 )    Color: x / Appearance: x / SG: x / pH: x  Gluc: 97 mg/dL / Ketone: x  / Bili: x / Urobili: x   Blood: x / Protein: x / Nitrite: x   Leuk Esterase: x / RBC: x / WBC x   Sq Epi: x / Non Sq Epi: x / Bacteria: x      CAPILLARY BLOOD GLUCOSE          RADIOLOGY & ADDITIONAL TESTS:   MR Head: Left thalamic acute lacunar infarct. No acute hemorrhage.  CTA: Marked calcification at the right and left common carotid artery bifurcations with severe stenosis at the level of the right carotid bulb, and the left CC bifurcation and at the left carotid bulb. No evidence of major vascular occlusion or aneurysm.  Right lower lung opacities likely prominent vasculature. Correlate for respiratory symptoms and obtain CT if needed. No pneumothorax or pleural effusion on frontal view.    Chest Rx: Stable cardiomediastinal silhouette. Dilated right pulmonary artery is similar to prior exam. Unchanged osseous structures.    CT Ankle: 1. Osteopenia without acute osseous abnormality.  2. Chronic/degenerative change throughout the foot/ankle   OVERNIGHT EVENTS:  No overnight complaints or problems     SUBJECTIVE / INTERVAL HPI: Patient seen and examined at bedside.  In no distress, pleasant, comfortable   HPI:  This is a 83 year old female with pmhx of osteoarthritis, Hypertension, hyperlipidemia, walks with the help of a walker (rollator) and able to perform activities of daily living with some assistance. She presented to the hospital with distal numbness and imbalance . She denies any progression of her sensory symptoms but reports that her balance is worse since onset. She denies any fever chills diarrhea, focal weakness, speech visual deficits, problems breathing, neck or back pain , swallowing. Patient has baseline urinary incontinence. Also she has shoulder pain and limited movement by osteoarthritis     Patient seen and examined this AM. No complaints. She had today her carotid angioplasty/stenting with neuroendovascular team. On ASA/brilinta.       Past medical history:   Rheumatoid arthritis  Hyperlipemia  Hypertension  Stress incontinence  Asthma    VITAL SIGNS:  Vital Signs Last 24 Hrs  T(C): 36.3 (28 Nov 2023 09:35), Max: 37.2 (27 Nov 2023 13:09)  T(F): 97.4 (28 Nov 2023 09:35), Max: 98.9 (27 Nov 2023 13:09)  HR: 58 (28 Nov 2023 09:35) (58 - 68)  BP: 156/78 (28 Nov 2023 09:35) (138/62 - 156/78)  BP(mean): 89 (27 Nov 2023 19:49) (89 - 89)  RR: 16 (28 Nov 2023 09:35) (16 - 20)  SpO2: 96% (28 Nov 2023 09:35) (96% - 98%)    Parameters below as of 27 Nov 2023 19:49  Patient On (Oxygen Delivery Method): room air        PHYSICAL EXAM:  Mental status: Awake, alert and oriented in person time and place. Speech is fluent with intact naming, repetition and comprehension.  Attention/concentration intact.  No dysarthria, no aphasia. Follows commands   Cranial nerves: Pupils equally round and reactive to light, visual fields full, no nystagmus, no visual problems, extraocular muscles intact. Sensation V1 through V3 intact bilaterally and symmetric. Face symmetric with normal eye closure and smile, hearing is bilaterally intact to finger rub, uvula is midline and soft palate rises symmetrically, tongue was midline.  Motor:  Normal bulk and tone, no abnormal movements strength grading is LLE 5/5 LUE 5/5 RUE 5/5 RLE 5/5.   strength 5/5. Pain when she riases her right hand by shoulder osteoarthitis  Sensation: Intact to light touch, proprioception, vibration and pinprick.   Coordination: No dysmetria on finger-to-nose and heel-to-shin.  No dysdiadokinesia.  Reflexes: 2+ in bilateral UE/LE (although LE difficult to assess 2/2 edema), downgoing toes bilaterally. (-) Garcia.  Gait: deferred    MEDICATIONS:  MEDICATIONS  (STANDING):  aspirin enteric coated 81 milliGRAM(s) Oral daily  atenolol  Tablet 50 milliGRAM(s) Oral daily  atorvastatin 80 milliGRAM(s) Oral at bedtime  chlorhexidine 2% Cloths 1 Application(s) Topical <User Schedule>  cyanocobalamin 1000 MICROGram(s) Oral every 24 hours  folic acid 1 milliGRAM(s) Oral daily  heparin   Injectable 5000 Unit(s) SubCutaneous every 12 hours  losartan 100 milliGRAM(s) Oral daily  oxybutynin 5 milliGRAM(s) Oral two times a day  pantoprazole    Tablet 40 milliGRAM(s) Oral before breakfast  sodium chloride 0.9%. 1000 milliLiter(s) (75 mL/Hr) IV Continuous <Continuous>  ticagrelor 90 milliGRAM(s) Oral every 12 hours    MEDICATIONS  (PRN):  albuterol    90 MICROgram(s) HFA Inhaler 2 Puff(s) Inhalation every 6 hours PRN for bronchospasm    ALLERGIES:  Allergies    sulfa drugs (Rash)  penicillins (Rash)    Intolerances        LABS:  CBC:            11.5   5.60  )-----------( 177      ( 11-28-23 @ 06:59 )             37.8     Chem:         ( 11-28-23 @ 06:59 )    140  |  104  |  27<H>  ----------------------------<  97  4.4   |  22  |  1.1    Liver Functions:   AST: 20 U/L (20/11/2023)   ALT: 12 U/L (20/11/2023)    Type & Screen:     Ca    8.8      28 Nov 2023 06:59  Phos  4.2     11-28  Mg    1.9     11-28    Urinalysis Basic - ( 28 Nov 2023 06:59 )    Color: x / Appearance: x / SG: x / pH: x  Gluc: 97 mg/dL / Ketone: x  / Bili: x / Urobili: x   Blood: x / Protein: x / Nitrite: x   Leuk Esterase: x / RBC: x / WBC x   Sq Epi: x / Non Sq Epi: x / Bacteria: x      CAPILLARY BLOOD GLUCOSE          RADIOLOGY & ADDITIONAL TESTS:   MR Head: Left thalamic acute lacunar infarct. No acute hemorrhage.  CTA: Marked calcification at the right and left common carotid artery bifurcations with severe stenosis at the level of the right carotid bulb, and the left CC bifurcation and at the left carotid bulb. No evidence of major vascular occlusion or aneurysm.  Right lower lung opacities likely prominent vasculature. Correlate for respiratory symptoms and obtain CT if needed. No pneumothorax or pleural effusion on frontal view.    Chest Rx: Stable cardiomediastinal silhouette. Dilated right pulmonary artery is similar to prior exam. Unchanged osseous structures.    CT Ankle: 1. Osteopenia without acute osseous abnormality.  2. Chronic/degenerative change throughout the foot/ankle

## 2023-11-28 NOTE — BRIEF OPERATIVE NOTE - COMMENTS
Please follow post NI orders for neuro checks, distal pulses, vitals, and arteriotomy site checks placed for total of 2 hour recovery period following procedure.   Keep HOB elevated, right wrist straight and immobile for x 2 hr    Keep IVF as ordered.   -160     Patient tolerated procedure well, hemodynamically stable, no change in neurological status compared to baseline.   NIHSS pre procedure _3_ post procedure 3__  Right wrist site CDI without evidence of bleeding oozing ecchymosis at the time of closure. Mild swelling post procedure manual pressure held for 10 min. Elevated with ice pack improving. Site nontender to palpation. Temperature and color consistent bilaterally to palpation with intact distal pulses. TR to 14 cc post op removed in IR .     Please notify provider with any signs of bleeding or hematoma at arteriotomy site, change in mental status, vitals outside parameters, or absent distal pulses.   Management per stroke team. Signout given via spectra.   [phone]

## 2023-11-28 NOTE — BRIEF OPERATIVE NOTE - NSICDXBRIEFOPLAUNCH_GEN_ALL_CORE
Problem: Falls - Risk of:  Goal: Will remain free from falls  Description  Will remain free from falls  Outcome: Met This Shift  Pt. Free of falls and injuries this shift. Problem: Falls - Risk of:  Goal: Absence of physical injury  Description  Absence of physical injury  Outcome: Met This Shift   No injuries this shift. Patient's safety maintained. <--- Click to Launch ICDx for PreOp, PostOp and Procedure

## 2023-11-28 NOTE — PROGRESS NOTE ADULT - ASSESSMENT
84 yo f with pmhx of OA, HTN, DLD , walks with the help of a walker (rollator) and able to perform ADLs with some assistance presenting today for 3 days of acute onset distal numbness and imbalance. Pt denies any progression of her sensory symptoms but reports that her balance is worse since onset. Found to have an acute L thalamic lacunar infarct on admission. As a whole, symptoms do not match stroke localization. 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     Neurological:  -CTH: Chronic right occipital stroke  -CTA H/N:Marked calcification at the right and left common carotid artery bifurcations with severe stenosis at the level of the right carotid bulb, and the left CC  bifurcation and at the left carotid bulb. No LVO or aneurysm  - NI consulted to evaluate carotid stenosis: planning tx stenting procedure on Tuesday (11/28). She is NPO after midnight on Monday.  - MRI Brain: Left thalamic acute lacunar infarct. No acute hemorrhage. Transferred to stroke service to complete workup.  - Telemonitoring  - Neuro Checks q 8 hrs  - Continue ASA 81 mg once daily. Patient will stay on that regimen for 90 days.  - TTE showing 50-55% EF, with moderate to severe Aortic stenosis.   - Started on atorvastatin 80mg once daily  - ESR 57 , CRP <3.0, Vitamin b12, folate >20, HbA1C 5.3, , Lyme antibody, TSH 2.19, IgA LEVEL, Ganglioside antibodies including anti GM1 (-), GM2 (+), GD1a (-), GD1b (-), and GQ1b (-), SPEP, UPEP, Vitb12 413, VitB1, Vit-E, Homocysteine, Methylmalonic acid. Copper, Zinc, HIV (-), RPR (-)   - Obtain x ray right shoulder (pending)  - PT/OT Evaluation Pending (attempted but patient using bedpan, will return). Anticipated d/c to 4A (pending approval)  - Plavix switched to Brilinta 90mg twice daily (per neuroendovascular request) given PRU result of 278.  - Patient complained of L ankle pain. Xray shows possible distal tibial fracture. No fracture on CT scan, likely artifactual. Ortho consult d/c'd. Sed rate and Uric acid levels order to r/o gout--> uric acid wnl 5.5, sed rate elevated at 73  - Out of bed to chair, PT working with her    Cardiovascular:  -Telemonitoring  - Maintain normotension and Normovolemia  - H/O Heart murmur: follows with Dr. Patel  - TTE on this admission showing EF 50-55% with moderate to severe aortic valve stenosis.  - outpatient TTE July 2019 with EF 50-55% but only mild aortic valve stenosis.    Pulmonary:  -Monitor closely for respiratory decline  -Keep 02 sat >94%    GI:  -DASH diet  -IV fluids-None    :  Monitor electrolytes and replete as needed    -ID:  Abnormal UA but not symptomatic, Currently afebrile, No concern for infection at this time.    Hematology:  - c/w sequential stocking  - Heparin sq q 8 hrs for dvt prophylaxis   83 year old female with pmhx of osteo arthritis,, Hipertension, Hiperlipidemia , walks with the help of a walker (rollator) and able to perform ADLs with some assistance. She presented with an acute onset of distal numbness and imbalance. Patient denies sensory symptoms but reports that her balance is worse since onset. The diagnostic impression is an acute ischemic stroke of left lateral thalamus/internal capsule region and   a severe left ICA stenosis. Symptoms do not match stroke localization. 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     Neurological:  -CTH: Chronic right occipital stroke  -CTA H/N:Marked calcification at the right and left common carotid artery bifurcations with severe stenosis at the level of the right carotid bulb, and the left CC  bifurcation and at the left carotid bulb. No LVO or aneurysm  - Stenting procedure was done   - MRI Brain: Left thalamic acute lacunar infarct. No acute hemorrhage. Transferred to stroke service to complete workup.  - Telemonitoring  - Neuro Checks q 8 hrs  - Continue ASA 81 mg once daily. Patient will stay on that regimen for 90 days.  - TTE showing 50-55% EF, with moderate to severe Aortic stenosis.   - Started on atorvastatin 80mg once daily  - ESR 57 , CRP <3.0, Vitamin b12, folate >20, HbA1C 5.3, , Lyme antibody, TSH 2.19, IgA LEVEL, Ganglioside antibodies including anti GM1 (-), GM2 (+), GD1a (-), GD1b (-), and GQ1b (-), SPEP, UPEP, Vitb12 413, VitB1, Vit-E, Homocysteine, Methylmalonic acid. Copper, Zinc, HIV (-), RPR (-)   - PT/OT Evaluation Pending (attempted but Pt decline to participate in PT due to not feeling to well and waiting to be take for the test as per pt, will return). Anticipated d/c to 4A (pending approval)  - Plavix switched to Brilinta 90mg twice daily (per neuroendovascular request) given PRU result of 278.  - Patient complained of L ankle pain. Xray shows possible distal tibial fracture. No fracture on CT scan, likely artifactual. Ortho consult d/c'd. Sed rate and Uric acid levels order to r/o gout--> uric acid wnl 5.5, sed rate elevated at 73  - Out of bed to chair, PT working with her    Cardiovascular:  -Telemonitoring  - Maintain normotension and Normovolemia  - H/O Heart murmur: follows with Dr. Patel  - TTE on this admission showing EF 50-55% with moderate to severe aortic valve stenosis.  - outpatient TTE July 2019 with EF 50-55% but only mild aortic valve stenosis.    Pulmonary:  -Monitor closely for respiratory decline  -Keep 02 sat >94%    GI:  -DASH diet  -IV fluids-None    :  Monitor electrolytes and replete as needed    -ID:  Abnormal UA but not symptomatic, Currently afebrile, No concern for infection at this time.    Hematology:  - c/w sequential stocking  - Heparin sq q 8 hrs for dvt prophylaxis   83 year old female with pmhx of osteo arthritis,, Hipertension, Hiperlipidemia , walks with the help of a walker (rollator) and able to perform ADLs with some assistance. She presented with an acute onset of distal numbness and imbalance. Patient denies sensory symptoms but reports that her balance is worse since onset. The diagnostic impression is an acute ischemic stroke of left lateral thalamus/internal capsule region and   a severe left ICA stenosis. Symptoms do not match stroke localization. 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     Neurological:  -CTH: Chronic right occipital stroke  -CTA H/N:Marked calcification at the right and left common carotid artery bifurcations with severe stenosis at the level of the right carotid bulb, and the left CC  bifurcation and at the left carotid bulb. No LVO or aneurysm  - Stenting procedure was done   - MRI Brain: Left thalamic acute lacunar infarct. No acute hemorrhage. Transferred to stroke service to complete workup.  - Telemonitoring  - Neuro Checks q 8 hrs  - Continue ASA 81 mg once daily. Patient will stay on that regimen for 90 days.  - TTE showing 50-55% EF, with moderate to severe Aortic stenosis.   - Started on atorvastatin 80mg once daily  - ESR 57 , CRP <3.0, Vitamin b12, folate >20, HbA1C 5.3, , Lyme antibody, TSH 2.19, IgA LEVEL, Ganglioside antibodies including anti GM1 (-), GM2 (+), GD1a (-), GD1b (-), and GQ1b (-), SPEP, UPEP, Vitb12 413, VitB1, Vit-E, Homocysteine, Methylmalonic acid. Copper, Zinc, HIV (-), RPR (-)   - PT/OT Evaluation Pending   - Plavix switched to Brilinta 90mg twice daily (per neuroendovascular request) given PRU result of 278.  - Patient complained of L ankle pain. Xray shows possible distal tibial fracture. No fracture on CT scan, likely artifactual. Ortho consult d/c'd. Sed rate and Uric acid levels order to r/o gout--> uric acid wnl 5.5, sed rate elevated at 73  - Out of bed to chair, PT working with her    Cardiovascular:  -Telemonitoring  - Maintain normotension and Normovolemia  - H/O Heart murmur: follows with Dr. Patel  - TTE on this admission showing EF 50-55% with moderate to severe aortic valve stenosis.  - outpatient TTE July 2019 with EF 50-55% but only mild aortic valve stenosis.    Pulmonary:  -Monitor closely for respiratory decline  -Keep 02 sat >94%    GI:  -DASH diet  -IV fluids-None    :  Monitor electrolytes and replete as needed    -ID:  Abnormal UA but not symptomatic, Currently afebrile, No concern for infection at this time.    Hematology:  - c/w sequential stocking  - Heparin sq q 8 hrs for dvt prophylaxis   83 year old female with pmhx of osteoarthritis, Hypertension Hyperlipidemia , walks with the help of a walker (rollator) and able to perform ADLs with some assistance. She presented with an acute onset of distal numbness and imbalance. Patient denies sensory symptoms but reports that her balance is worse since onset. The diagnostic impression is an acute ischemic stroke of left lateral thalamus/internal capsule region and   a severe left ICA stenosis. Symptoms do not match stroke localization. 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     Neurological:  -CTH: Chronic right occipital stroke  -CTA H/N:Marked calcification at the right and left common carotid artery bifurcations with severe stenosis at the level of the right carotid bulb, and the left CC  bifurcation and at the left carotid bulb. No LVO or aneurysm  - evaluated by NI for stenting of ICA but deferred  - MRI Brain: Left thalamic acute lacunar infarct. No acute hemorrhage  - Telemonitoring  - Neuro Checks q 8 hrs  - Continue ASA 81 mg once daily & Brillinta 90mg qD. Patient will stay on that regimen for 90 days.  - TTE showing 50-55% EF, with moderate to severe Aortic stenosis.   - Started on atorvastatin 80mg once daily  - ESR 57 , CRP <3.0, Vitamin b12, folate >20, HbA1C 5.3, , Lyme antibody, TSH 2.19, IgA LEVEL, Ganglioside antibodies including anti GM1 (-), GM2 (+), GD1a (-), GD1b (-), and GQ1b (-), SPEP, UPEP, Vitb12 413, VitB1, Vit-E, Homocysteine, Methylmalonic acid. Copper, Zinc, HIV (-), RPR (-)   - PT/OT Evaluation Pending   - Plavix switched to Brilinta 90mg twice daily (per neuroendovascular request) given PRU result of 278.  - Patient complained of L ankle pain.  no fracture on CT scan. Ortho consult d/c'd. Sed rate and Uric acid levels order to r/o gout--> uric acid wnl 5.5, sed rate elevated at 73  - Out of bed to chair, PT working with her    Cardiovascular:  -Telemonitoring  - Maintain normotension and Normovolemia  - H/O Heart murmur: follows with Dr. Patel  - TTE on this admission showing EF 50-55% with moderate to severe aortic valve stenosis.  - outpatient TTE July 2019 with EF 50-55% but only mild aortic valve stenosis.    Pulmonary:  -Monitor closely for respiratory decline  -Keep 02 sat >94%    GI:  -DASH diet  -IV fluids-None    :  Monitor electrolytes and replete as needed    -ID:  Abnormal UA but not symptomatic, Currently afebrile, No concern for infection at this time.    Hematology:  - c/w sequential stocking  - Heparin sq q 8 hrs for dvt prophylaxis  - given MCV of 103.6 and borderline B12, started on B12 supplementation PO    DISPO:  4A likely on 11/29   83 year old female with pmhx of osteoarthritis, Hypertension Hyperlipidemia , walks with the help of a walker (rollator) and able to perform ADLs with some assistance. She presented with an acute onset of distal numbness and imbalance. Patient denies sensory symptoms but reports that her balance is worse since onset. The diagnostic impression is an acute ischemic stroke of left lateral thalamus/internal capsule region and   a severe left ICA stenosis. Symptoms do not match stroke localization. 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     Neurological:  -CTH: Chronic right occipital stroke  -CTA H/N: Marked calcification at the right and left common carotid artery bifurcations with severe stenosis at the level of the right carotid bulb, and the left CC  bifurcation and at the left carotid bulb. No LVO or aneurysm  - evaluated by NI for stenting of ICA but deferred  - MRI Brain: Left thalamic acute lacunar infarct. No acute hemorrhage  - Telemonitoring  - Neuro Checks q 8 hrs  - Continue ASA 81 mg once daily & Brillinta 90mg qD. Patient will stay on that regimen for 90 days.  - TTE showing 50-55% EF, with moderate to severe Aortic stenosis.   - Started on atorvastatin 80mg once daily  - ESR 57 , CRP <3.0, Vitamin b12, folate >20, HbA1C 5.3, , Lyme antibody, TSH 2.19, IgA LEVEL, Ganglioside antibodies including anti GM1 (-), GM2 (+), GD1a (-), GD1b (-), and GQ1b (-), SPEP, UPEP, Vitb12 413, VitB1, Vit-E, Homocysteine, Methylmalonic acid. Copper, Zinc, HIV (-), RPR (-)   - PT/OT Evaluation Pending   - Plavix switched to Brilinta 90mg twice daily (per neuroendovascular request) given PRU result of 278.  - Patient complained of L ankle pain.  no fracture on CT scan. Ortho consult d/c'd. Sed rate and Uric acid levels order to r/o gout--> uric acid wnl 5.5, sed rate elevated at 73  - Out of bed to chair, PT working with her    Cardiovascular:  -Telemonitoring  - Maintain normotension and Normovolemia  - H/O Heart murmur: follows with Dr. Patel  - TTE on this admission showing EF 50-55% with moderate to severe aortic valve stenosis.  - outpatient TTE July 2019 with EF 50-55% but only mild aortic valve stenosis.    Pulmonary:  -Monitor closely for respiratory decline  -Keep 02 sat >94%    GI:  -DASH diet  -IV fluids-None    :  Monitor electrolytes and replete as needed    -ID:  Abnormal UA but not symptomatic, Currently afebrile, No concern for infection at this time.    Hematology:  - c/w sequential stocking  - Heparin sq q 8 hrs for dvt prophylaxis  - given MCV of 103.6 and borderline B12, started on B12 supplementation PO    DISPO:  4A likely on 11/29

## 2023-11-29 LAB
ANION GAP SERPL CALC-SCNC: 11 MMOL/L — SIGNIFICANT CHANGE UP (ref 7–14)
ANION GAP SERPL CALC-SCNC: 11 MMOL/L — SIGNIFICANT CHANGE UP (ref 7–14)
BASOPHILS # BLD AUTO: 0.04 K/UL — SIGNIFICANT CHANGE UP (ref 0–0.2)
BASOPHILS # BLD AUTO: 0.04 K/UL — SIGNIFICANT CHANGE UP (ref 0–0.2)
BASOPHILS NFR BLD AUTO: 0.6 % — SIGNIFICANT CHANGE UP (ref 0–1)
BASOPHILS NFR BLD AUTO: 0.6 % — SIGNIFICANT CHANGE UP (ref 0–1)
BUN SERPL-MCNC: 24 MG/DL — HIGH (ref 10–20)
BUN SERPL-MCNC: 24 MG/DL — HIGH (ref 10–20)
CALCIUM SERPL-MCNC: 8.6 MG/DL — SIGNIFICANT CHANGE UP (ref 8.4–10.5)
CALCIUM SERPL-MCNC: 8.6 MG/DL — SIGNIFICANT CHANGE UP (ref 8.4–10.5)
CHLORIDE SERPL-SCNC: 110 MMOL/L — SIGNIFICANT CHANGE UP (ref 98–110)
CHLORIDE SERPL-SCNC: 110 MMOL/L — SIGNIFICANT CHANGE UP (ref 98–110)
CO2 SERPL-SCNC: 20 MMOL/L — SIGNIFICANT CHANGE UP (ref 17–32)
CO2 SERPL-SCNC: 20 MMOL/L — SIGNIFICANT CHANGE UP (ref 17–32)
CREAT SERPL-MCNC: 1 MG/DL — SIGNIFICANT CHANGE UP (ref 0.7–1.5)
CREAT SERPL-MCNC: 1 MG/DL — SIGNIFICANT CHANGE UP (ref 0.7–1.5)
EGFR: 56 ML/MIN/1.73M2 — LOW
EGFR: 56 ML/MIN/1.73M2 — LOW
EOSINOPHIL # BLD AUTO: 0.18 K/UL — SIGNIFICANT CHANGE UP (ref 0–0.7)
EOSINOPHIL # BLD AUTO: 0.18 K/UL — SIGNIFICANT CHANGE UP (ref 0–0.7)
EOSINOPHIL NFR BLD AUTO: 2.5 % — SIGNIFICANT CHANGE UP (ref 0–8)
EOSINOPHIL NFR BLD AUTO: 2.5 % — SIGNIFICANT CHANGE UP (ref 0–8)
GLUCOSE SERPL-MCNC: 90 MG/DL — SIGNIFICANT CHANGE UP (ref 70–99)
GLUCOSE SERPL-MCNC: 90 MG/DL — SIGNIFICANT CHANGE UP (ref 70–99)
HCT VFR BLD CALC: 35.6 % — LOW (ref 37–47)
HCT VFR BLD CALC: 35.6 % — LOW (ref 37–47)
HGB BLD-MCNC: 10.8 G/DL — LOW (ref 12–16)
HGB BLD-MCNC: 10.8 G/DL — LOW (ref 12–16)
IMM GRANULOCYTES NFR BLD AUTO: 1 % — HIGH (ref 0.1–0.3)
IMM GRANULOCYTES NFR BLD AUTO: 1 % — HIGH (ref 0.1–0.3)
LYMPHOCYTES # BLD AUTO: 0.62 K/UL — LOW (ref 1.2–3.4)
LYMPHOCYTES # BLD AUTO: 0.62 K/UL — LOW (ref 1.2–3.4)
LYMPHOCYTES # BLD AUTO: 8.7 % — LOW (ref 20.5–51.1)
LYMPHOCYTES # BLD AUTO: 8.7 % — LOW (ref 20.5–51.1)
MAGNESIUM SERPL-MCNC: 1.8 MG/DL — SIGNIFICANT CHANGE UP (ref 1.8–2.4)
MAGNESIUM SERPL-MCNC: 1.8 MG/DL — SIGNIFICANT CHANGE UP (ref 1.8–2.4)
MCHC RBC-ENTMCNC: 30.3 G/DL — LOW (ref 32–37)
MCHC RBC-ENTMCNC: 30.3 G/DL — LOW (ref 32–37)
MCHC RBC-ENTMCNC: 31.5 PG — HIGH (ref 27–31)
MCHC RBC-ENTMCNC: 31.5 PG — HIGH (ref 27–31)
MCV RBC AUTO: 103.8 FL — HIGH (ref 81–99)
MCV RBC AUTO: 103.8 FL — HIGH (ref 81–99)
MONOCYTES # BLD AUTO: 0.66 K/UL — HIGH (ref 0.1–0.6)
MONOCYTES # BLD AUTO: 0.66 K/UL — HIGH (ref 0.1–0.6)
MONOCYTES NFR BLD AUTO: 9.2 % — SIGNIFICANT CHANGE UP (ref 1.7–9.3)
MONOCYTES NFR BLD AUTO: 9.2 % — SIGNIFICANT CHANGE UP (ref 1.7–9.3)
NEUTROPHILS # BLD AUTO: 5.59 K/UL — SIGNIFICANT CHANGE UP (ref 1.4–6.5)
NEUTROPHILS # BLD AUTO: 5.59 K/UL — SIGNIFICANT CHANGE UP (ref 1.4–6.5)
NEUTROPHILS NFR BLD AUTO: 78 % — HIGH (ref 42.2–75.2)
NEUTROPHILS NFR BLD AUTO: 78 % — HIGH (ref 42.2–75.2)
NRBC # BLD: 0 /100 WBCS — SIGNIFICANT CHANGE UP (ref 0–0)
NRBC # BLD: 0 /100 WBCS — SIGNIFICANT CHANGE UP (ref 0–0)
PHOSPHATE SERPL-MCNC: 3.8 MG/DL — SIGNIFICANT CHANGE UP (ref 2.1–4.9)
PHOSPHATE SERPL-MCNC: 3.8 MG/DL — SIGNIFICANT CHANGE UP (ref 2.1–4.9)
PLATELET # BLD AUTO: 173 K/UL — SIGNIFICANT CHANGE UP (ref 130–400)
PLATELET # BLD AUTO: 173 K/UL — SIGNIFICANT CHANGE UP (ref 130–400)
PMV BLD: 11.3 FL — HIGH (ref 7.4–10.4)
PMV BLD: 11.3 FL — HIGH (ref 7.4–10.4)
POTASSIUM SERPL-MCNC: 4.5 MMOL/L — SIGNIFICANT CHANGE UP (ref 3.5–5)
POTASSIUM SERPL-MCNC: 4.5 MMOL/L — SIGNIFICANT CHANGE UP (ref 3.5–5)
POTASSIUM SERPL-SCNC: 4.5 MMOL/L — SIGNIFICANT CHANGE UP (ref 3.5–5)
POTASSIUM SERPL-SCNC: 4.5 MMOL/L — SIGNIFICANT CHANGE UP (ref 3.5–5)
RBC # BLD: 3.43 M/UL — LOW (ref 4.2–5.4)
RBC # BLD: 3.43 M/UL — LOW (ref 4.2–5.4)
RBC # FLD: 13.3 % — SIGNIFICANT CHANGE UP (ref 11.5–14.5)
RBC # FLD: 13.3 % — SIGNIFICANT CHANGE UP (ref 11.5–14.5)
SODIUM SERPL-SCNC: 141 MMOL/L — SIGNIFICANT CHANGE UP (ref 135–146)
SODIUM SERPL-SCNC: 141 MMOL/L — SIGNIFICANT CHANGE UP (ref 135–146)
WBC # BLD: 7.16 K/UL — SIGNIFICANT CHANGE UP (ref 4.8–10.8)
WBC # BLD: 7.16 K/UL — SIGNIFICANT CHANGE UP (ref 4.8–10.8)
WBC # FLD AUTO: 7.16 K/UL — SIGNIFICANT CHANGE UP (ref 4.8–10.8)
WBC # FLD AUTO: 7.16 K/UL — SIGNIFICANT CHANGE UP (ref 4.8–10.8)

## 2023-11-29 PROCEDURE — 99232 SBSQ HOSP IP/OBS MODERATE 35: CPT

## 2023-11-29 RX ORDER — SIMVASTATIN 20 MG/1
1 TABLET, FILM COATED ORAL
Refills: 0 | DISCHARGE

## 2023-11-29 RX ORDER — SENNA PLUS 8.6 MG/1
2 TABLET ORAL AT BEDTIME
Refills: 0 | Status: DISCONTINUED | OUTPATIENT
Start: 2023-11-29 | End: 2023-11-30

## 2023-11-29 RX ORDER — POLYETHYLENE GLYCOL 3350 17 G/17G
17 POWDER, FOR SOLUTION ORAL AT BEDTIME
Refills: 0 | Status: DISCONTINUED | OUTPATIENT
Start: 2023-11-29 | End: 2023-11-30

## 2023-11-29 RX ORDER — ATORVASTATIN CALCIUM 80 MG/1
1 TABLET, FILM COATED ORAL
Qty: 0 | Refills: 0 | DISCHARGE
Start: 2023-11-29

## 2023-11-29 RX ORDER — TICAGRELOR 90 MG/1
1 TABLET ORAL
Qty: 0 | Refills: 0 | DISCHARGE
Start: 2023-11-29

## 2023-11-29 RX ORDER — PREGABALIN 225 MG/1
1 CAPSULE ORAL
Qty: 0 | Refills: 0 | DISCHARGE
Start: 2023-11-29

## 2023-11-29 RX ADMIN — HEPARIN SODIUM 5000 UNIT(S): 5000 INJECTION INTRAVENOUS; SUBCUTANEOUS at 05:15

## 2023-11-29 RX ADMIN — HEPARIN SODIUM 5000 UNIT(S): 5000 INJECTION INTRAVENOUS; SUBCUTANEOUS at 17:05

## 2023-11-29 RX ADMIN — Medication 81 MILLIGRAM(S): at 11:19

## 2023-11-29 RX ADMIN — PREGABALIN 1000 MICROGRAM(S): 225 CAPSULE ORAL at 11:19

## 2023-11-29 RX ADMIN — SODIUM CHLORIDE 75 MILLILITER(S): 9 INJECTION INTRAMUSCULAR; INTRAVENOUS; SUBCUTANEOUS at 05:21

## 2023-11-29 RX ADMIN — Medication 5 MILLIGRAM(S): at 17:05

## 2023-11-29 RX ADMIN — ATENOLOL 50 MILLIGRAM(S): 25 TABLET ORAL at 05:15

## 2023-11-29 RX ADMIN — Medication 1 MILLIGRAM(S): at 11:19

## 2023-11-29 RX ADMIN — CHLORHEXIDINE GLUCONATE 1 APPLICATION(S): 213 SOLUTION TOPICAL at 05:20

## 2023-11-29 RX ADMIN — ATORVASTATIN CALCIUM 80 MILLIGRAM(S): 80 TABLET, FILM COATED ORAL at 21:20

## 2023-11-29 RX ADMIN — LOSARTAN POTASSIUM 100 MILLIGRAM(S): 100 TABLET, FILM COATED ORAL at 05:16

## 2023-11-29 RX ADMIN — PANTOPRAZOLE SODIUM 40 MILLIGRAM(S): 20 TABLET, DELAYED RELEASE ORAL at 05:15

## 2023-11-29 RX ADMIN — TICAGRELOR 90 MILLIGRAM(S): 90 TABLET ORAL at 17:06

## 2023-11-29 RX ADMIN — Medication 5 MILLIGRAM(S): at 05:16

## 2023-11-29 RX ADMIN — TICAGRELOR 90 MILLIGRAM(S): 90 TABLET ORAL at 05:15

## 2023-11-29 NOTE — PROGRESS NOTE ADULT - ASSESSMENT
84 yo f with pmhx of OA, HTN, DLD , walks with the help of a walker (rollator) and able to perform ADLs with some assistance presenting today for 3 days of acute onset distal numbness and imbalance. Pt denies any progression of her sensory symptoms but reports that her balance is worse since onset. Found to have an acute L thalamic lacunar infarct on admission. As a whole, symptoms do not match stroke localization. 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     Acute Lt thalamic infarct  ?B/l ICAD  -CTH: Chronic right occipital stroke  -CTA H/N:Marked calcification at the right and left common carotid artery bifurcations with severe stenosis at the level of the right carotid bulb, and the left CC  bifurcation and at the left carotid bulb. No LVO or aneurysm  - NI consulted to evaluate carotid stenosis: planning tx stenting procedure on Tuesday.   - MRI Brain: Left thalamic acute lacunar infarct. No acute hemorrhage. Transferred to stroke service to complete workup.  - Telemonitoring  - Neuro Checks q 8 hrs  - Continue ASA 81 mg once daily and Plavix 75mg once daily. Patient will stay on that regimen for 90 days.  - TTE showing 50-55% EF, with moderate to severe Aortic stenosis.   - Started on atorvastatin 80mg once daily and Plavix 75mg qd  - ESR 57 , CRP <3.0, Vitamin b12, folate >20, HbA1C 5.3, , Lyme antibody, TSH 2.19, IgA LEVEL, Ganglioside antibodies including anti GM1 (-), GM2 (+), GD1a (-), GD1b (-), and GQ1b (-), SPEP, UPEP, Vitb12 413, VitB1, Vit-E, Homocysteine, Methylmalonic acid. Copper, Zinc, HIV (-), RPR (-)   - Obtain x ray right shoulder (pending)  - PT/OT Evaluation Pending (attempted but patient using bedpan, will return). Anticipated d/c to 4A (pending approval)  - Plavix switched to Brilinta 90mg twice daily, per neuroendovascular request following PRU results.   - s/p DSA 11/28: non flow limiting stenosis bilateral carotid arteries. No carotid angioplasty/stenting warranted.     #L ankle pain.   Xray shows possible distal tibial fracture.   CT: no fx but osteoporosis and OA    HTN  Mod-severe AS  - Maintain normotension and Normovolemia  -outpt f/u with Dr. Patel,     #Nutrition/Fluids/Electrolytes   - replete K<4 and Mg <2  - ensure regular BMs  - consider Miralax BID, Senna    #DVT Px  SCDs  Lovenox    #Progress Note Handoff  Pending: Clinical improvement and stability__x___PT___  Pt/Family discussion: Pt informed and agrees with the current plan  Disposition: Home______/SNF_______/4A___?___/To be determined____x____    My note supersedes the residents note should a discrepancy arise.    Chart and notes personally reviewed.  Care Discussed with Consultants/Other Providers/ Housestaff [ x] YES [ ] NO   Radiology, labs, old records personally reviewed.    discussed w/ housestaff, nursing, case management, neuro team    Time-based billing (NON-critical care).     35 minutes spent on total encounter. The necessity of the time spent during the encounter on this date of service was due to:     time spent on review of labs, imaging studies, old records, obtaining history, personally examining patient, counselling and communicating with patient/ family, entering orders for medications/tests/etc, discussions with other health care providers, documentation in electronic health records, independent interpretation of labs, imaging/procedure results and care coordination.

## 2023-11-29 NOTE — PROGRESS NOTE ADULT - SUBJECTIVE AND OBJECTIVE BOX
MADISON GLORIA  83y  Female    Patient is a 83y old  Female who presents with a chief complaint of Numbness and Imbalance (24 Nov 2023 13:57)      HPI:  Patient is a 82 yo female with pmhx of rheumatoid arthritis, HTN, DLD , walks with the help of a walker (rollator) and able to perform ADLs with some assistance presenting today for 3 days of distal numbness and imbalance. Patient states that she got flu shot this past Friday and then on Saturday she cleaned her house and felt extremely tired with right arm pain and rested on a couch then tried getting up after some time but fell back on the couch and thereon, she noted that she had problems with balance and also experienced distal numbness both on her foot and hands. She denies any progression of her sensory symptoms but reports that her balance is worse since onset. She denies any fever chills diarrhoea, focal weakness, speech visual deficits, problems breathing, neck or back pain , swallowing. Patient has baseline urinary incontinence.  (20 Nov 2023 21:47)    S: Patient was examined and seen at bedside. This morning pt is resting comfortably in bed and reports no new issues or overnight events. No complaints. Went for DSA yesterday but no intervention done due to no significant stenosis.    Denies CP, SOB, N/V/D/C/AP, cough, F, chills, dizziness, HA, vision changes, dysuria, or urinary symptoms, blood in stool.  ROS: all other systems reviewed and are negative    PAST MEDICAL & SURGICAL HISTORY:  Rheumatoid arthritis      Hyperlipemia      Hypertension      Stress incontinence      Asthma      S/P total knee replacement, right        SOCIAL HISTORY:  Tobacco: negative  Illicit drugs: negative  Alcohol: social  Family history reviewed and otherwise non-contributory No clotting disorders, CVAs at early age.  ALLERGIES: NKDA    General: NAD. Looks stated age.  HEENT: clean oropharynx, EOMI, no LAD  Neck: trachea midline, no thyromegaly  CV: nl S1 S2; no m/r/g  Resp: decreased breath sounds at base  GI: NT/ND/S +BS  MS: no clubbing/cyanosis/trace edema b/l, +pulses. +hammer toes  Neuro: motor 5/5, decreased sensation in LEs  Skin: no rashes, nl turgor  Psychiatric: AA0x3 w/ good insight and judgement    tele: SR, nonspecific changes (on my own evaluation of tele monitor)            Home Medications:  Albuterol (Eqv-ProAir HFA) 90 mcg/inh inhalation aerosol: 2 puff(s) inhaled every 6 hours as needed for  bronchospasm (20 Nov 2023 23:28)  aspirin 81 mg oral delayed release tablet: 1 tab(s) orally once a day (20 Nov 2023 23:43)  atenolol 50 mg oral tablet: 1 tab(s) orally once a day (12 Jul 2019 18:35)  atorvastatin 80 mg oral tablet: 1 tab(s) orally once a day (at bedtime) (29 Nov 2023 11:56)  carbonyl iron 45 mg oral tablet: 1 tab(s) orally once a day (20 Nov 2023 23:27)  cyanocobalamin 1000 mcg oral tablet: 1 tab(s) orally every 24 hours (29 Nov 2023 11:56)  fesoterodine 4 mg oral tablet, extended release: 1 tab(s) orally once a day (20 Nov 2023 23:22)  folic acid 1 mg oral tablet: 1 tab(s) orally once a day (12 Jul 2019 18:35)  losartan 100 mg oral tablet: 1 tab(s) orally once a day (at bedtime) (20 Nov 2023 23:26)  pantoprazole 40 mg oral delayed release tablet: 1 tab(s) orally once a day (12 Jul 2019 18:35)  ticagrelor 90 mg oral tablet: 1 tab(s) orally every 12 hours (29 Nov 2023 11:56)    MEDICATIONS  (STANDING):  aspirin enteric coated 81 milliGRAM(s) Oral daily  atenolol  Tablet 50 milliGRAM(s) Oral daily  atorvastatin 80 milliGRAM(s) Oral at bedtime  chlorhexidine 2% Cloths 1 Application(s) Topical <User Schedule>  cyanocobalamin 1000 MICROGram(s) Oral every 24 hours  folic acid 1 milliGRAM(s) Oral daily  heparin   Injectable 5000 Unit(s) SubCutaneous every 12 hours  losartan 100 milliGRAM(s) Oral daily  oxybutynin 5 milliGRAM(s) Oral two times a day  pantoprazole    Tablet 40 milliGRAM(s) Oral before breakfast  polyethylene glycol 3350 17 Gram(s) Oral at bedtime  senna 2 Tablet(s) Oral at bedtime  sodium chloride 0.9%. 1000 milliLiter(s) (75 mL/Hr) IV Continuous <Continuous>  ticagrelor 90 milliGRAM(s) Oral every 12 hours    MEDICATIONS  (PRN):  albuterol    90 MICROgram(s) HFA Inhaler 2 Puff(s) Inhalation every 6 hours PRN for bronchospasm    Vital Signs Last 24 Hrs  T(C): 36.7 (29 Nov 2023 05:41), Max: 36.7 (29 Nov 2023 05:41)  T(F): 98 (29 Nov 2023 05:41), Max: 98 (29 Nov 2023 05:41)  HR: 68 (29 Nov 2023 05:41) (63 - 72)  BP: 120/58 (29 Nov 2023 05:41) (120/58 - 163/73)  BP(mean): 84 (29 Nov 2023 05:41) (84 - 89)  RR: 18 (29 Nov 2023 05:41) (18 - 18)  SpO2: 96% (28 Nov 2023 15:55) (96% - 99%)    Parameters below as of 29 Nov 2023 05:41  Patient On (Oxygen Delivery Method): room air      CAPILLARY BLOOD GLUCOSE        LABS:                        10.8   7.16  )-----------( 173      ( 29 Nov 2023 06:49 )             35.6     11-29    141  |  110  |  24<H>  ----------------------------<  90  4.5   |  20  |  1.0    Ca    8.6      29 Nov 2023 06:49  Phos  3.8     11-29  Mg     1.8     11-29              Urinalysis Basic - ( 29 Nov 2023 06:49 )    Color: x / Appearance: x / SG: x / pH: x  Gluc: 90 mg/dL / Ketone: x  / Bili: x / Urobili: x   Blood: x / Protein: x / Nitrite: x   Leuk Esterase: x / RBC: x / WBC x   Sq Epi: x / Non Sq Epi: x / Bacteria: x              Consultant Notes Reviewed:  [x ] YES  [ ] NO  Care Discussed with Consultants/Other Providers/ Housestaff [ x] YES  [ ] NO  Radiology, labs, new studies personally reviewed.                                < from: CT Angio Neck w/ IV Cont (11.20.23 @ 19:54) >  heber Apices: Bullous changes in both apical regions    IMPRESSION:    CT HEAD:    A focal 2 cm hypodensity is noted in the right posterior occipital region   consistent with chronic infarction.    No evidence of acute intracranial hemorrhage, acute territorial infarct,   mass or midline shift.      CTA:    Marked calcification at the right and left common carotid artery   bifurcations with severe stenosis at the level of the right carotid bulb,   and the left CC bifurcation and at the left carotid bulb.    No evidence of major vascular occlusion or aneurysm.      < end of copied text >

## 2023-11-29 NOTE — PROGRESS NOTE ADULT - SUBJECTIVE AND OBJECTIVE BOX
Neurology Progress Note    Interval History:    No acute event over night. Ready to be discharged to  tomorrow AM.     Medications:  albuterol    90 MICROgram(s) HFA Inhaler 2 Puff(s) Inhalation every 6 hours PRN  aspirin enteric coated 81 milliGRAM(s) Oral daily  atenolol  Tablet 50 milliGRAM(s) Oral daily  atorvastatin 80 milliGRAM(s) Oral at bedtime  chlorhexidine 2% Cloths 1 Application(s) Topical <User Schedule>  cyanocobalamin 1000 MICROGram(s) Oral every 24 hours  folic acid 1 milliGRAM(s) Oral daily  heparin   Injectable 5000 Unit(s) SubCutaneous every 12 hours  losartan 100 milliGRAM(s) Oral daily  oxybutynin 5 milliGRAM(s) Oral two times a day  pantoprazole    Tablet 40 milliGRAM(s) Oral before breakfast  sodium chloride 0.9%. 1000 milliLiter(s) IV Continuous <Continuous>  ticagrelor 90 milliGRAM(s) Oral every 12 hours      Vital Signs Last 24 Hrs  T(C): 36.7 (29 Nov 2023 05:41), Max: 36.7 (29 Nov 2023 05:41)  T(F): 98 (29 Nov 2023 05:41), Max: 98 (29 Nov 2023 05:41)  HR: 68 (29 Nov 2023 05:41) (58 - 72)  BP: 120/58 (29 Nov 2023 05:41) (120/58 - 163/73)  BP(mean): 84 (29 Nov 2023 05:41) (84 - 89)  RR: 18 (29 Nov 2023 05:41) (16 - 18)  SpO2: 96% (28 Nov 2023 15:55) (96% - 99%)    Parameters below as of 29 Nov 2023 05:41  Patient On (Oxygen Delivery Method): room air      PHYSICAL EXAM:  Mental status: Awake, alert and oriented in person time and place. Speech is fluent with intact naming, repetition and comprehension.  Attention/concentration intact.  No dysarthria, no aphasia. Follows commands   Cranial nerves: Pupils equally round and reactive to light, visual fields full, no nystagmus, no visual problems, extraocular muscles intact. Sensation V1 through V3 intact bilaterally and symmetric. Face symmetric with normal eye closure and smile, hearing is bilaterally intact to finger rub, uvula is midline and soft palate rises symmetrically, tongue was midline.  Motor:  Normal bulk and tone, no abnormal movements strength grading is LLE 5/5 LUE 5/5 RUE 5/5 RLE 5/5.   strength 5/5. Pain when she riases her right hand by shoulder osteoarthitis  Sensation: Intact to light touch, proprioception, vibration and pinprick.   Coordination: No dysmetria on finger-to-nose and heel-to-shin.  No dysdiadokinesia.  Reflexes: 2+ in bilateral UE/LE (although LE difficult to assess 2/2 edema), downgoing toes bilaterally. (-) Garcia.  Gait: deferred    Labs:  CBC Full  -  ( 29 Nov 2023 06:49 )  WBC Count : 7.16 K/uL  RBC Count : 3.43 M/uL  Hemoglobin : 10.8 g/dL  Hematocrit : 35.6 %  Platelet Count - Automated : 173 K/uL  Mean Cell Volume : 103.8 fL  Mean Cell Hemoglobin : 31.5 pg  Mean Cell Hemoglobin Concentration : 30.3 g/dL  Auto Neutrophil # : 5.59 K/uL  Auto Lymphocyte # : 0.62 K/uL  Auto Monocyte # : 0.66 K/uL  Auto Eosinophil # : 0.18 K/uL  Auto Basophil # : 0.04 K/uL  Auto Neutrophil % : 78.0 %  Auto Lymphocyte % : 8.7 %  Auto Monocyte % : 9.2 %  Auto Eosinophil % : 2.5 %  Auto Basophil % : 0.6 %    11-29    141  |  110  |  24<H>  ----------------------------<  90  4.5   |  20  |  1.0    Ca    8.6      29 Nov 2023 06:49  Phos  3.8     11-29  Mg     1.8     11-29          Urinalysis Basic - ( 29 Nov 2023 06:49 )    Color: x / Appearance: x / SG: x / pH: x  Gluc: 90 mg/dL / Ketone: x  / Bili: x / Urobili: x   Blood: x / Protein: x / Nitrite: x   Leuk Esterase: x / RBC: x / WBC x   Sq Epi: x / Non Sq Epi: x / Bacteria: x        RADIOLOGY & ADDITIONAL TESTS:    RADIOLOGY & ADDITIONAL TESTS:   MR Head: Left thalamic acute lacunar infarct. No acute hemorrhage.  CTA: Marked calcification at the right and left common carotid artery bifurcations with severe stenosis at the level of the right carotid bulb, and the left CC bifurcation and at the left carotid bulb. No evidence of major vascular occlusion or aneurysm.  Right lower lung opacities likely prominent vasculature. Correlate for respiratory symptoms and obtain CT if needed. No pneumothorax or pleural effusion on frontal view.    Chest Rx: Stable cardiomediastinal silhouette. Dilated right pulmonary artery is similar to prior exam. Unchanged osseous structures.    CT Ankle: 1. Osteopenia without acute osseous abnormality.  2. Chronic/degenerative change throughout the foot/ankle

## 2023-11-29 NOTE — PROGRESS NOTE ADULT - ATTENDING COMMENTS
PT w/ ? RF p/w acute progressive ataxia with L > R sensory loss predominantly proprioception and vibratory involving arm and leg found to have acute L capsular lacunar infarct thromboembolic from symptomatic moderate L ICAS currently on medical management w/ possible DSA/stent next week with neuroendovascular team.  Otherwise stable neurologically.  F/u neuropathic b/w and in meantime may transfer to inpatient rehab while awaiting DSA.  Continuing secondary stroke prevention.
Patient seen and examined and agree with above except as noted.  Patients history, notes ,labs, imaging, vitals and meds reviewed personally.    Plan as above
Pt is a 82 yo F with PMHx of HTN, HLD, right rotator cuff injury, OA, who presented with imbalance.     Impr: acute ischemic stroke of left lateral thalamus/internal capsule region  CTA head/neck with severe left ICA stenosis  PTA: ASA_> ASA/brilinta (elevated PRU on plavix)  Continue high intensity statin  Plan for DSA +/- MAT tomorrow  PT/OT/ST, tele, -180, NCC post MAT, then likely IPR
PT p/w acute progressive ataxia with L > R sensory loss predominantly proprioception and vibratory involving arm and leg found to have acute L capsular lacunar infarct thromboembolic from symptomatic moderate L ICAS currently on medical management awaiting DSA/possible stenting 11/28/23 with neuroendovascular team.  Currently improving numbness found to have GM2ab + of unclear clinical significance recommend continue current management of acute CVA.  Recommendations as above.
PT w/ ? RF p/w acute progressive ataxia with L > R sensory loss predominantly proprioception and vibratory involving arm and leg with ? weakness currently admitted for possible acute neuropathy however found to have acute L capsular lacunar infarct.  Recommend observation for possible underlying neuropathic process since respiratory status stable and hold on IV immunoglobulin since may increase risk of stroke.  In meantime continue stroke management and workup.  Continue serial PFTs for now and if neuropathic process worsens, may weigh risks/benefits of immunomodulating treatment again.  In meantime continue workup for neuropathic etiology also.
Patient seen and examined and agree with above except as noted.  Patients history, notes ,labs, imaging, vitals and meds reviewed personally.  No new complaints however on exam pain in the left ankle on palpation  Will get xray left ankle    Plan as above
Pt is a 82 yo F with PMHx of HTN, HLD, right rotator cuff injury, OA, who presented with imbalance.     Impr: acute ischemic stroke of left lateral thalamus/internal capsule region  CTA head/neck with severe left ICA stenosis  PTA: ASA_> ASA/brilinta (elevated PRU on plavix)  Continue high intensity statin  S/p DSA today, no severe ICA stenosis noted thus no MAT performed.   PT/OT/ST, tele, -160, Dispo- IPR. likely tomorrow .
PT w/ ? RF p/w acute progressive ataxia with L > R sensory loss predominantly proprioception and vibratory involving arm and leg found to have acute L capsular lacunar infarct thromboembolic from symptomatic moderate L ICAS currently on medical management awaiting DSA/possible stenting 11/28/23 with neuroendovascular team.  Currently improving numbness found to have GM2ab + of unclear clinical significance recommend continue current management of acute CVA and if still symptomatic after discharge, may f/u as outpt for EMG/NCS.  Recommendations as above.
Pt is a 84 yo F with PMHx of HTN, HLD, right rotator cuff injury, OA, who presented with imbalance.     Impr: acute ischemic stroke of left lateral thalamus/internal capsule region  CTA head/neck with severe left ICA stenosis  PTA: ASA_> ASA/brilinta (elevated PRU on plavix)  Continue high intensity statin  S/p DSA today, no severe ICA stenosis noted thus no MAT performed.   PT/OT/ST, tele, -160, Dispo- likely IPR .

## 2023-11-29 NOTE — PROGRESS NOTE ADULT - ASSESSMENT
S:  patient states that she still  Has discomfort but that it is no worse than this am and better overall from the time of admission, she has been npo and is hungry, she has ambulated to the rest room without problems, she is taking tylenol iv and iv torado 83 year old female with pmhx of osteoarthritis, Hypertension Hyperlipidemia , walks with the help of a walker (rollator) and able to perform ADLs with some assistance. She presented with an acute onset of distal numbness and imbalance. Patient denies sensory symptoms but reports that her balance is worse since onset. The diagnostic impression is an acute ischemic stroke of left lateral thalamus/internal capsule region and   a severe left ICA stenosis. Symptoms do not match stroke localization. 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     Neurological:  -CTH: Chronic right occipital stroke  -CTA H/N: Marked calcification at the right and left common carotid artery bifurcations with severe stenosis at the level of the right carotid bulb, and the left CC  bifurcation and at the left carotid bulb. No LVO or aneurysm  - evaluated by [facility] for stenting of ICA but deferred  - MRI Brain: Left thalamic acute lacunar infarct. No acute hemorrhage  - Telemonitoring  - Neuro Checks q 8 hrs  - Continue ASA 81 mg once daily & Brillinta 90mg qD. Patient will stay on that regimen for 90 days.  - TTE showing 50-55% EF, with moderate to severe Aortic stenosis.   - Started on atorvastatin 80mg once daily  - ESR 57 , CRP <3.0, Vitamin b12, folate >20, HbA1C 5.3, , Lyme antibody, TSH 2.19, IgA LEVEL, Ganglioside antibodies including anti GM1 (-), GM2 (+), GD1a (-), GD1b (-), and GQ1b (-), SPEP, UPEP, Vitb12 413, VitB1, Vit-E, Homocysteine, Methylmalonic acid. Copper, Zinc, HIV (-), RPR (-)   - PT/OT Evaluation Pending   - Plavix switched to Brilinta 90mg twice daily (per neuroendovascular request) given PRU result of 278.  - Patient complained of L ankle pain.  no fracture on CT scan. Ortho consult d/c'd. Sed rate and Uric acid levels order to r/o gout--> uric acid wnl 5.5, sed rate elevated at 73  - Out of bed to chair, PT working with her    Cardiovascular:  -Telemonitoring  - Maintain normotension and Normovolemia  - H/O Heart murmur: follows with Dr. Patel  - TTE on this admission showing EF 50-55% with moderate to severe aortic valve stenosis.  - outpatient TTE July 2019 with EF 50-55% but only mild aortic valve stenosis.    Pulmonary:  -Monitor closely for respiratory decline  -Keep 02 sat >94%    GI:  -DASH diet  -IV fluids-None    :  Monitor electrolytes and replete as needed    -ID:  Abnormal UA but not symptomatic, Currently afebrile, No concern for infection at this time.    Hematology:  - c/w sequential stocking  - Heparin sq q 8 hrs for dvt prophylaxis  - given MCV of 103.6 and borderline B12, started on B12 supplementation PO    DISPO:  4A 11/30.

## 2023-11-30 ENCOUNTER — INPATIENT (INPATIENT)
Facility: HOSPITAL | Age: 83
LOS: 14 days | Discharge: HOME HEALTH | DRG: 57 | End: 2023-12-15
Attending: PHYSICAL MEDICINE & REHABILITATION | Admitting: PHYSICAL MEDICINE & REHABILITATION
Payer: MEDICARE

## 2023-11-30 VITALS
WEIGHT: 147.71 LBS | HEIGHT: 67 IN | RESPIRATION RATE: 20 BRPM | HEART RATE: 68 BPM | TEMPERATURE: 98 F | DIASTOLIC BLOOD PRESSURE: 70 MMHG | SYSTOLIC BLOOD PRESSURE: 155 MMHG

## 2023-11-30 DIAGNOSIS — Z96.651 PRESENCE OF RIGHT ARTIFICIAL KNEE JOINT: Chronic | ICD-10-CM

## 2023-11-30 DIAGNOSIS — I63.9 CEREBRAL INFARCTION, UNSPECIFIED: ICD-10-CM

## 2023-11-30 LAB
ANION GAP SERPL CALC-SCNC: 8 MMOL/L — SIGNIFICANT CHANGE UP (ref 7–14)
ANION GAP SERPL CALC-SCNC: 8 MMOL/L — SIGNIFICANT CHANGE UP (ref 7–14)
BASOPHILS # BLD AUTO: 0.04 K/UL — SIGNIFICANT CHANGE UP (ref 0–0.2)
BASOPHILS # BLD AUTO: 0.04 K/UL — SIGNIFICANT CHANGE UP (ref 0–0.2)
BASOPHILS NFR BLD AUTO: 0.8 % — SIGNIFICANT CHANGE UP (ref 0–1)
BASOPHILS NFR BLD AUTO: 0.8 % — SIGNIFICANT CHANGE UP (ref 0–1)
BUN SERPL-MCNC: 23 MG/DL — HIGH (ref 10–20)
BUN SERPL-MCNC: 23 MG/DL — HIGH (ref 10–20)
CALCIUM SERPL-MCNC: 8.6 MG/DL — SIGNIFICANT CHANGE UP (ref 8.4–10.5)
CALCIUM SERPL-MCNC: 8.6 MG/DL — SIGNIFICANT CHANGE UP (ref 8.4–10.5)
CHLORIDE SERPL-SCNC: 114 MMOL/L — HIGH (ref 98–110)
CHLORIDE SERPL-SCNC: 114 MMOL/L — HIGH (ref 98–110)
CO2 SERPL-SCNC: 22 MMOL/L — SIGNIFICANT CHANGE UP (ref 17–32)
CO2 SERPL-SCNC: 22 MMOL/L — SIGNIFICANT CHANGE UP (ref 17–32)
CREAT SERPL-MCNC: 1.2 MG/DL — SIGNIFICANT CHANGE UP (ref 0.7–1.5)
CREAT SERPL-MCNC: 1.2 MG/DL — SIGNIFICANT CHANGE UP (ref 0.7–1.5)
EGFR: 45 ML/MIN/1.73M2 — LOW
EGFR: 45 ML/MIN/1.73M2 — LOW
EOSINOPHIL # BLD AUTO: 0.19 K/UL — SIGNIFICANT CHANGE UP (ref 0–0.7)
EOSINOPHIL # BLD AUTO: 0.19 K/UL — SIGNIFICANT CHANGE UP (ref 0–0.7)
EOSINOPHIL NFR BLD AUTO: 4 % — SIGNIFICANT CHANGE UP (ref 0–8)
EOSINOPHIL NFR BLD AUTO: 4 % — SIGNIFICANT CHANGE UP (ref 0–8)
GLUCOSE SERPL-MCNC: 99 MG/DL — SIGNIFICANT CHANGE UP (ref 70–99)
GLUCOSE SERPL-MCNC: 99 MG/DL — SIGNIFICANT CHANGE UP (ref 70–99)
HCT VFR BLD CALC: 32.8 % — LOW (ref 37–47)
HCT VFR BLD CALC: 32.8 % — LOW (ref 37–47)
HGB BLD-MCNC: 10 G/DL — LOW (ref 12–16)
HGB BLD-MCNC: 10 G/DL — LOW (ref 12–16)
IMM GRANULOCYTES NFR BLD AUTO: 0.8 % — HIGH (ref 0.1–0.3)
IMM GRANULOCYTES NFR BLD AUTO: 0.8 % — HIGH (ref 0.1–0.3)
LYMPHOCYTES # BLD AUTO: 0.47 K/UL — LOW (ref 1.2–3.4)
LYMPHOCYTES # BLD AUTO: 0.47 K/UL — LOW (ref 1.2–3.4)
LYMPHOCYTES # BLD AUTO: 10 % — LOW (ref 20.5–51.1)
LYMPHOCYTES # BLD AUTO: 10 % — LOW (ref 20.5–51.1)
MAGNESIUM SERPL-MCNC: 1.8 MG/DL — SIGNIFICANT CHANGE UP (ref 1.8–2.4)
MAGNESIUM SERPL-MCNC: 1.8 MG/DL — SIGNIFICANT CHANGE UP (ref 1.8–2.4)
MCHC RBC-ENTMCNC: 30.5 G/DL — LOW (ref 32–37)
MCHC RBC-ENTMCNC: 30.5 G/DL — LOW (ref 32–37)
MCHC RBC-ENTMCNC: 31.4 PG — HIGH (ref 27–31)
MCHC RBC-ENTMCNC: 31.4 PG — HIGH (ref 27–31)
MCV RBC AUTO: 103.1 FL — HIGH (ref 81–99)
MCV RBC AUTO: 103.1 FL — HIGH (ref 81–99)
MONOCYTES # BLD AUTO: 0.43 K/UL — SIGNIFICANT CHANGE UP (ref 0.1–0.6)
MONOCYTES # BLD AUTO: 0.43 K/UL — SIGNIFICANT CHANGE UP (ref 0.1–0.6)
MONOCYTES NFR BLD AUTO: 9.1 % — SIGNIFICANT CHANGE UP (ref 1.7–9.3)
MONOCYTES NFR BLD AUTO: 9.1 % — SIGNIFICANT CHANGE UP (ref 1.7–9.3)
NEUTROPHILS # BLD AUTO: 3.54 K/UL — SIGNIFICANT CHANGE UP (ref 1.4–6.5)
NEUTROPHILS # BLD AUTO: 3.54 K/UL — SIGNIFICANT CHANGE UP (ref 1.4–6.5)
NEUTROPHILS NFR BLD AUTO: 75.3 % — HIGH (ref 42.2–75.2)
NEUTROPHILS NFR BLD AUTO: 75.3 % — HIGH (ref 42.2–75.2)
NRBC # BLD: 0 /100 WBCS — SIGNIFICANT CHANGE UP (ref 0–0)
NRBC # BLD: 0 /100 WBCS — SIGNIFICANT CHANGE UP (ref 0–0)
PHOSPHATE SERPL-MCNC: 3.9 MG/DL — SIGNIFICANT CHANGE UP (ref 2.1–4.9)
PHOSPHATE SERPL-MCNC: 3.9 MG/DL — SIGNIFICANT CHANGE UP (ref 2.1–4.9)
PLATELET # BLD AUTO: 176 K/UL — SIGNIFICANT CHANGE UP (ref 130–400)
PLATELET # BLD AUTO: 176 K/UL — SIGNIFICANT CHANGE UP (ref 130–400)
PMV BLD: 11.7 FL — HIGH (ref 7.4–10.4)
PMV BLD: 11.7 FL — HIGH (ref 7.4–10.4)
POTASSIUM SERPL-MCNC: 4.2 MMOL/L — SIGNIFICANT CHANGE UP (ref 3.5–5)
POTASSIUM SERPL-MCNC: 4.2 MMOL/L — SIGNIFICANT CHANGE UP (ref 3.5–5)
POTASSIUM SERPL-SCNC: 4.2 MMOL/L — SIGNIFICANT CHANGE UP (ref 3.5–5)
POTASSIUM SERPL-SCNC: 4.2 MMOL/L — SIGNIFICANT CHANGE UP (ref 3.5–5)
RBC # BLD: 3.18 M/UL — LOW (ref 4.2–5.4)
RBC # BLD: 3.18 M/UL — LOW (ref 4.2–5.4)
RBC # FLD: 13.2 % — SIGNIFICANT CHANGE UP (ref 11.5–14.5)
RBC # FLD: 13.2 % — SIGNIFICANT CHANGE UP (ref 11.5–14.5)
SODIUM SERPL-SCNC: 144 MMOL/L — SIGNIFICANT CHANGE UP (ref 135–146)
SODIUM SERPL-SCNC: 144 MMOL/L — SIGNIFICANT CHANGE UP (ref 135–146)
WBC # BLD: 4.71 K/UL — LOW (ref 4.8–10.8)
WBC # BLD: 4.71 K/UL — LOW (ref 4.8–10.8)
WBC # FLD AUTO: 4.71 K/UL — LOW (ref 4.8–10.8)
WBC # FLD AUTO: 4.71 K/UL — LOW (ref 4.8–10.8)

## 2023-11-30 PROCEDURE — 97010 HOT OR COLD PACKS THERAPY: CPT | Mod: GP

## 2023-11-30 PROCEDURE — 97166 OT EVAL MOD COMPLEX 45 MIN: CPT | Mod: GO

## 2023-11-30 PROCEDURE — 83550 IRON BINDING TEST: CPT

## 2023-11-30 PROCEDURE — 92610 EVALUATE SWALLOWING FUNCTION: CPT | Mod: GN

## 2023-11-30 PROCEDURE — 92507 TX SP LANG VOICE COMM INDIV: CPT | Mod: GN

## 2023-11-30 PROCEDURE — 97537 COMMUNITY/WORK REINTEGRATION: CPT | Mod: GO

## 2023-11-30 PROCEDURE — 83735 ASSAY OF MAGNESIUM: CPT

## 2023-11-30 PROCEDURE — 92523 SPEECH SOUND LANG COMPREHEN: CPT | Mod: GN

## 2023-11-30 PROCEDURE — 90791 PSYCH DIAGNOSTIC EVALUATION: CPT

## 2023-11-30 PROCEDURE — 80053 COMPREHEN METABOLIC PANEL: CPT

## 2023-11-30 PROCEDURE — 36415 COLL VENOUS BLD VENIPUNCTURE: CPT

## 2023-11-30 PROCEDURE — 96132 NRPSYC TST EVAL PHYS/QHP 1ST: CPT

## 2023-11-30 PROCEDURE — 97535 SELF CARE MNGMENT TRAINING: CPT | Mod: GO

## 2023-11-30 PROCEDURE — 97110 THERAPEUTIC EXERCISES: CPT | Mod: GO

## 2023-11-30 PROCEDURE — 83540 ASSAY OF IRON: CPT

## 2023-11-30 PROCEDURE — 82728 ASSAY OF FERRITIN: CPT

## 2023-11-30 PROCEDURE — 90832 PSYTX W PT 30 MINUTES: CPT

## 2023-11-30 PROCEDURE — 97530 THERAPEUTIC ACTIVITIES: CPT | Mod: GP

## 2023-11-30 PROCEDURE — 85045 AUTOMATED RETICULOCYTE COUNT: CPT

## 2023-11-30 PROCEDURE — 97112 NEUROMUSCULAR REEDUCATION: CPT | Mod: GP

## 2023-11-30 PROCEDURE — 97116 GAIT TRAINING THERAPY: CPT | Mod: GP

## 2023-11-30 PROCEDURE — 99232 SBSQ HOSP IP/OBS MODERATE 35: CPT

## 2023-11-30 PROCEDURE — 99239 HOSP IP/OBS DSCHRG MGMT >30: CPT

## 2023-11-30 PROCEDURE — 85025 COMPLETE CBC W/AUTO DIFF WBC: CPT

## 2023-11-30 RX ORDER — CHLORHEXIDINE GLUCONATE 213 G/1000ML
1 SOLUTION TOPICAL
Refills: 0 | Status: DISCONTINUED | OUTPATIENT
Start: 2023-11-30 | End: 2023-12-15

## 2023-11-30 RX ORDER — FOLIC ACID 0.8 MG
1 TABLET ORAL DAILY
Refills: 0 | Status: DISCONTINUED | OUTPATIENT
Start: 2023-11-30 | End: 2023-12-15

## 2023-11-30 RX ORDER — ALBUTEROL 90 UG/1
2 AEROSOL, METERED ORAL EVERY 6 HOURS
Refills: 0 | Status: DISCONTINUED | OUTPATIENT
Start: 2023-11-30 | End: 2023-12-15

## 2023-11-30 RX ORDER — PREGABALIN 225 MG/1
1000 CAPSULE ORAL EVERY 24 HOURS
Refills: 0 | Status: DISCONTINUED | OUTPATIENT
Start: 2023-11-30 | End: 2023-12-15

## 2023-11-30 RX ORDER — POLYETHYLENE GLYCOL 3350 17 G/17G
17 POWDER, FOR SOLUTION ORAL AT BEDTIME
Refills: 0 | Status: DISCONTINUED | OUTPATIENT
Start: 2023-11-30 | End: 2023-12-12

## 2023-11-30 RX ORDER — PANTOPRAZOLE SODIUM 20 MG/1
40 TABLET, DELAYED RELEASE ORAL
Refills: 0 | Status: DISCONTINUED | OUTPATIENT
Start: 2023-11-30 | End: 2023-12-15

## 2023-11-30 RX ORDER — LOSARTAN POTASSIUM 100 MG/1
100 TABLET, FILM COATED ORAL AT BEDTIME
Refills: 0 | Status: DISCONTINUED | OUTPATIENT
Start: 2023-12-01 | End: 2023-12-15

## 2023-11-30 RX ORDER — SENNA PLUS 8.6 MG/1
2 TABLET ORAL AT BEDTIME
Refills: 0 | Status: DISCONTINUED | OUTPATIENT
Start: 2023-11-30 | End: 2023-12-12

## 2023-11-30 RX ORDER — ATENOLOL 25 MG/1
50 TABLET ORAL DAILY
Refills: 0 | Status: DISCONTINUED | OUTPATIENT
Start: 2023-11-30 | End: 2023-12-15

## 2023-11-30 RX ORDER — HEPARIN SODIUM 5000 [USP'U]/ML
5000 INJECTION INTRAVENOUS; SUBCUTANEOUS EVERY 12 HOURS
Refills: 0 | Status: DISCONTINUED | OUTPATIENT
Start: 2023-11-30 | End: 2023-12-15

## 2023-11-30 RX ORDER — ASPIRIN/CALCIUM CARB/MAGNESIUM 324 MG
81 TABLET ORAL DAILY
Refills: 0 | Status: DISCONTINUED | OUTPATIENT
Start: 2023-11-30 | End: 2023-12-15

## 2023-11-30 RX ORDER — TICAGRELOR 90 MG/1
90 TABLET ORAL EVERY 12 HOURS
Refills: 0 | Status: DISCONTINUED | OUTPATIENT
Start: 2023-11-30 | End: 2023-12-15

## 2023-11-30 RX ORDER — ATORVASTATIN CALCIUM 80 MG/1
80 TABLET, FILM COATED ORAL AT BEDTIME
Refills: 0 | Status: DISCONTINUED | OUTPATIENT
Start: 2023-11-30 | End: 2023-12-15

## 2023-11-30 RX ORDER — OXYBUTYNIN CHLORIDE 5 MG
5 TABLET ORAL
Refills: 0 | Status: DISCONTINUED | OUTPATIENT
Start: 2023-11-30 | End: 2023-12-01

## 2023-11-30 RX ADMIN — ATORVASTATIN CALCIUM 80 MILLIGRAM(S): 80 TABLET, FILM COATED ORAL at 21:50

## 2023-11-30 RX ADMIN — LOSARTAN POTASSIUM 100 MILLIGRAM(S): 100 TABLET, FILM COATED ORAL at 05:07

## 2023-11-30 RX ADMIN — Medication 81 MILLIGRAM(S): at 12:25

## 2023-11-30 RX ADMIN — ATENOLOL 50 MILLIGRAM(S): 25 TABLET ORAL at 05:08

## 2023-11-30 RX ADMIN — POLYETHYLENE GLYCOL 3350 17 GRAM(S): 17 POWDER, FOR SOLUTION ORAL at 21:50

## 2023-11-30 RX ADMIN — CHLORHEXIDINE GLUCONATE 1 APPLICATION(S): 213 SOLUTION TOPICAL at 05:09

## 2023-11-30 RX ADMIN — TICAGRELOR 90 MILLIGRAM(S): 90 TABLET ORAL at 17:41

## 2023-11-30 RX ADMIN — PANTOPRAZOLE SODIUM 40 MILLIGRAM(S): 20 TABLET, DELAYED RELEASE ORAL at 05:09

## 2023-11-30 RX ADMIN — Medication 1 MILLIGRAM(S): at 12:25

## 2023-11-30 RX ADMIN — HEPARIN SODIUM 5000 UNIT(S): 5000 INJECTION INTRAVENOUS; SUBCUTANEOUS at 05:07

## 2023-11-30 RX ADMIN — Medication 5 MILLIGRAM(S): at 05:08

## 2023-11-30 RX ADMIN — PREGABALIN 1000 MICROGRAM(S): 225 CAPSULE ORAL at 12:25

## 2023-11-30 RX ADMIN — TICAGRELOR 90 MILLIGRAM(S): 90 TABLET ORAL at 05:08

## 2023-11-30 RX ADMIN — SENNA PLUS 2 TABLET(S): 8.6 TABLET ORAL at 21:50

## 2023-11-30 RX ADMIN — HEPARIN SODIUM 5000 UNIT(S): 5000 INJECTION INTRAVENOUS; SUBCUTANEOUS at 17:41

## 2023-11-30 RX ADMIN — Medication 5 MILLIGRAM(S): at 17:41

## 2023-11-30 NOTE — PROGRESS NOTE ADULT - SUBJECTIVE AND OBJECTIVE BOX
OVERNIGHT EVENTS:  ar    SUBJECTIVE / INTERVAL HPI: Patient seen and examined at bedside.  In no discomfort.    VITAL SIGNS:  Vital Signs Last 24 Hrs  T(C): 36.3 (30 Nov 2023 04:56), Max: 37.2 (29 Nov 2023 14:14)  T(F): 97.4 (30 Nov 2023 04:56), Max: 99 (29 Nov 2023 14:14)  HR: 91 (30 Nov 2023 04:56) (62 - 91)  BP: 163/70 (30 Nov 2023 04:56) (114/58 - 163/70)  BP(mean): 101 (30 Nov 2023 04:56) (101 - 101)  RR: 16 (30 Nov 2023 04:56) (16 - 18)  SpO2: --      I&O's Summary      PHYSICAL EXAM:  Mental status: Awake, alert and oriented in person time and place. Speech is fluent with intact naming, repetition and comprehension.  Attention/concentration intact.  No dysarthria, no aphasia. Follows commands   Cranial nerves: Pupils equally round and reactive to light, visual fields full, no nystagmus, no visual problems, extraocular muscles intact. Sensation V1 through V3 intact bilaterally and symmetric. Face symmetric with normal eye closure and smile, hearing is bilaterally intact to finger rub, uvula is midline and soft palate rises symmetrically, tongue was midline.  Motor:  Normal bulk and tone, no abnormal movements strength grading is LLE 5/5 LUE 5/5 RUE 5/5 RLE 5/5.   strength 5/5. Pain when she riases her right hand by shoulder osteoarthitis  Sensation: Intact to light touch, proprioception, vibration and pinprick.   Coordination: No dysmetria on finger-to-nose and heel-to-shin.  No dysdiadokinesia.  Reflexes: 2+ in bilateral UE/LE (although LE difficult to assess 2/2 edema), downgoing toes bilaterally. (-) Garcia.  Gait: deferred    MEDICATIONS:  MEDICATIONS  (STANDING):  aspirin enteric coated 81 milliGRAM(s) Oral daily  atenolol  Tablet 50 milliGRAM(s) Oral daily  atorvastatin 80 milliGRAM(s) Oral at bedtime  chlorhexidine 2% Cloths 1 Application(s) Topical <User Schedule>  cyanocobalamin 1000 MICROGram(s) Oral every 24 hours  folic acid 1 milliGRAM(s) Oral daily  heparin   Injectable 5000 Unit(s) SubCutaneous every 12 hours  losartan 100 milliGRAM(s) Oral daily  oxybutynin 5 milliGRAM(s) Oral two times a day  pantoprazole    Tablet 40 milliGRAM(s) Oral before breakfast  polyethylene glycol 3350 17 Gram(s) Oral at bedtime  senna 2 Tablet(s) Oral at bedtime  sodium chloride 0.9%. 1000 milliLiter(s) (75 mL/Hr) IV Continuous <Continuous>  ticagrelor 90 milliGRAM(s) Oral every 12 hours    MEDICATIONS  (PRN):  albuterol    90 MICROgram(s) HFA Inhaler 2 Puff(s) Inhalation every 6 hours PRN for bronchospasm      ALLERGIES:  Allergies    sulfa drugs (Rash)  penicillins (Rash)    Intolerances        LABS:                        10.8   7.16  )-----------( 173      ( 29 Nov 2023 06:49 )             35.6     11-29    141  |  110  |  24<H>  ----------------------------<  90  4.5   |  20  |  1.0    Ca    8.6      29 Nov 2023 06:49  Phos  3.8     11-29  Mg     1.8     11-29        Urinalysis Basic - ( 29 Nov 2023 06:49 )    Color: x / Appearance: x / SG: x / pH: x  Gluc: 90 mg/dL / Ketone: x  / Bili: x / Urobili: x   Blood: x / Protein: x / Nitrite: x   Leuk Esterase: x / RBC: x / WBC x   Sq Epi: x / Non Sq Epi: x / Bacteria: x      CAPILLARY BLOOD GLUCOSE          RADIOLOGY & ADDITIONAL TESTS: Reviewed.

## 2023-11-30 NOTE — H&P ADULT - NSHPSOCIALHISTORY_GEN_ALL_CORE
Living situation: Lives with family (daughter, son-in-law, grandkids) in private house, 0 Steps to enter, 12 Steps inside with stair lift.  Prior Level of Function: Ambulating independent with RW and rollator, Independent with ADL/iADLs  Social: denies tobacco, ETOH, or elicit drug use

## 2023-11-30 NOTE — PROGRESS NOTE ADULT - SUBJECTIVE AND OBJECTIVE BOX
MADISON GLORIA  83y  Female    Patient is a 83y old  Female who presents with a chief complaint of Numbness and Imbalance (24 Nov 2023 13:57)      HPI:  Patient is a 84 yo female with pmhx of rheumatoid arthritis, HTN, DLD , walks with the help of a walker (rollator) and able to perform ADLs with some assistance presenting today for 3 days of distal numbness and imbalance. Patient states that she got flu shot this past Friday and then on Saturday she cleaned her house and felt extremely tired with right arm pain and rested on a couch then tried getting up after some time but fell back on the couch and thereon, she noted that she had problems with balance and also experienced distal numbness both on her foot and hands. She denies any progression of her sensory symptoms but reports that her balance is worse since onset. She denies any fever chills diarrhoea, focal weakness, speech visual deficits, problems breathing, neck or back pain , swallowing. Patient has baseline urinary incontinence.  (20 Nov 2023 21:47)    S: Patient was examined and seen at bedside. This morning pt is resting comfortably in bed and reports no new issues or overnight events. No complaints. Feels getting stronger.    Denies CP, SOB, N/V/D/C/AP, cough, F, chills, dizziness, HA, vision changes, dysuria, or urinary symptoms, blood in stool.  ROS: all other systems reviewed and are negative    PAST MEDICAL & SURGICAL HISTORY:  Rheumatoid arthritis      Hyperlipemia      Hypertension      Stress incontinence      Asthma      S/P total knee replacement, right        SOCIAL HISTORY:  Tobacco: negative  Illicit drugs: negative  Alcohol: social  Family history reviewed and otherwise non-contributory No clotting disorders, CVAs at early age.  ALLERGIES: NKDA    General: NAD. Looks stated age.  HEENT: clean oropharynx, EOMI, no LAD  Neck: trachea midline, no thyromegaly  CV: nl S1 S2; no m/r/g  Resp: decreased breath sounds at base  GI: NT/ND/S +BS  MS: no clubbing/cyanosis/trace edema b/l, +pulses. +hammer toes  Neuro: motor 5/5, decreased sensation in LEs  Skin: no rashes, nl turgor  Psychiatric: AA0x3 w/ good insight and judgement    tele: SR, nonspecific changes (on my own evaluation of tele monitor)          Home Medications:  Albuterol (Eqv-ProAir HFA) 90 mcg/inh inhalation aerosol: 2 puff(s) inhaled every 6 hours as needed for  bronchospasm (20 Nov 2023 23:28)  aspirin 81 mg oral delayed release tablet: 1 tab(s) orally once a day (20 Nov 2023 23:43)  atenolol 50 mg oral tablet: 1 tab(s) orally once a day (12 Jul 2019 18:35)  atorvastatin 80 mg oral tablet: 1 tab(s) orally once a day (at bedtime) (29 Nov 2023 11:56)  carbonyl iron 45 mg oral tablet: 1 tab(s) orally once a day (20 Nov 2023 23:27)  cyanocobalamin 1000 mcg oral tablet: 1 tab(s) orally every 24 hours (29 Nov 2023 11:56)  fesoterodine 4 mg oral tablet, extended release: 1 tab(s) orally once a day (20 Nov 2023 23:22)  folic acid 1 mg oral tablet: 1 tab(s) orally once a day (12 Jul 2019 18:35)  losartan 100 mg oral tablet: 1 tab(s) orally once a day (at bedtime) (20 Nov 2023 23:26)  pantoprazole 40 mg oral delayed release tablet: 1 tab(s) orally once a day (12 Jul 2019 18:35)  ticagrelor 90 mg oral tablet: 1 tab(s) orally every 12 hours (29 Nov 2023 11:56)    MEDICATIONS  (STANDING):  aspirin enteric coated 81 milliGRAM(s) Oral daily  atenolol  Tablet 50 milliGRAM(s) Oral daily  atorvastatin 80 milliGRAM(s) Oral at bedtime  chlorhexidine 2% Cloths 1 Application(s) Topical <User Schedule>  cyanocobalamin 1000 MICROGram(s) Oral every 24 hours  folic acid 1 milliGRAM(s) Oral daily  heparin   Injectable 5000 Unit(s) SubCutaneous every 12 hours  losartan 100 milliGRAM(s) Oral daily  oxybutynin 5 milliGRAM(s) Oral two times a day  pantoprazole    Tablet 40 milliGRAM(s) Oral before breakfast  polyethylene glycol 3350 17 Gram(s) Oral at bedtime  senna 2 Tablet(s) Oral at bedtime  sodium chloride 0.9%. 1000 milliLiter(s) (75 mL/Hr) IV Continuous <Continuous>  ticagrelor 90 milliGRAM(s) Oral every 12 hours    MEDICATIONS  (PRN):  albuterol    90 MICROgram(s) HFA Inhaler 2 Puff(s) Inhalation every 6 hours PRN for bronchospasm    Vital Signs Last 24 Hrs  T(C): 37 (30 Nov 2023 13:30), Max: 37 (30 Nov 2023 13:30)  T(F): 98.6 (30 Nov 2023 13:30), Max: 98.6 (30 Nov 2023 13:30)  HR: 71 (30 Nov 2023 13:30) (62 - 91)  BP: 128/60 (30 Nov 2023 13:30) (114/58 - 163/70)  BP(mean): 101 (30 Nov 2023 04:56) (101 - 101)  RR: 18 (30 Nov 2023 13:30) (16 - 18)  SpO2: 98% (30 Nov 2023 13:30) (98% - 98%)    Parameters below as of 30 Nov 2023 13:30  Patient On (Oxygen Delivery Method): room air      CAPILLARY BLOOD GLUCOSE        LABS:                        10.0   4.71  )-----------( 176      ( 30 Nov 2023 06:47 )             32.8     11-30    144  |  114<H>  |  23<H>  ----------------------------<  99  4.2   |  22  |  1.2    Ca    8.6      30 Nov 2023 06:47  Phos  3.9     11-30  Mg     1.8     11-30              Urinalysis Basic - ( 30 Nov 2023 06:47 )    Color: x / Appearance: x / SG: x / pH: x  Gluc: 99 mg/dL / Ketone: x  / Bili: x / Urobili: x   Blood: x / Protein: x / Nitrite: x   Leuk Esterase: x / RBC: x / WBC x   Sq Epi: x / Non Sq Epi: x / Bacteria: x              Consultant Notes Reviewed:  [x ] YES  [ ] NO  Care Discussed with Consultants/Other Providers/ Housestaff [ x] YES  [ ] NO  Radiology, labs, new studies personally reviewed.                < from: CT Angio Neck w/ IV Cont (11.20.23 @ 19:54) >  heber Apices: Bullous changes in both apical regions    IMPRESSION:    CT HEAD:    A focal 2 cm hypodensity is noted in the right posterior occipital region   consistent with chronic infarction.    No evidence of acute intracranial hemorrhage, acute territorial infarct,   mass or midline shift.      CTA:    Marked calcification at the right and left common carotid artery   bifurcations with severe stenosis at the level of the right carotid bulb,   and the left CC bifurcation and at the left carotid bulb.    No evidence of major vascular occlusion or aneurysm.      < end of copied text >

## 2023-11-30 NOTE — H&P ADULT - ATTENDING COMMENTS
I reviewed the chart and examined the patient with the resident and we discussed the findings and treatment plan.  The patient is tolerating the bedside rehab program well. I agree with the findings and treatment plan above, which I modified as indicated. The patient requires 3 hrs a day of acute inpatient rehab. Patient admitted with c/o of initial RUE numbness, followed by difficulty standing and walking and sense of increased BLE distal numbness. She was initially evaluated for and acute neuropathy, but MRI of the head revealed an acute right thalamic infarct. She has a history of LE numbness c/w with a chronic peripheral polyneuropathy, likely from a history of taking Remicade. She is medically stable, but ataxic and now requires min/ mod assist for transfers and min assist to ambulate with a RW and mod assist for dressing. PTA, she was fully independent in mobility and ADLs using a RW. The patient requires acute interdisciplinary rehab including PT and OT and ST and Neuropsych evals to maximize function for safe d/c home in a reasonable time. The patient can tolerate and benefit from 3 hrs daily therapy and requires Physiatry f/u at least 3 days a week to manage her multiple deficits including ataxia from her stroke, sensory and blanace impairments from her neuropathy and pain and contractures from her arthritis.     I read, edited and agree with the Assessment:  #Rehab of left thalamic lacunar infarct in the setting of bilateral carotid artery stenosis resulting in ataxic gait, incoordination and ADL dysfunction.   *MRI Brain: Left thalamic acute lacunar infarct. No acute hemorrhage    -Admit to inpatient Rehab   - Patient is on full rehab program of 3 hours a day of PT, OT, and/or SLP, for 5-6 days a week, for a total of 15 hours a week.  - Precautions/restrictions: Safety precautions, fall precautions  - Pain control: Tylenol 650mg q6hr PRN  - C/w  ASA 81 mg once daily & Brillinta 90mg qD.  (Plavix switched to Brilinta 90mg twice daily (per neuroendovascular request) given PRU result of 278. Patient will stay on that regimen for 90 days.  - c/w atorvastatin 80mg once daily    #Peripheral Polyneuropathy of unknown etiology  - significant loss of proprioception and cold sensitivity in both distal LEs.   - possibly associated with Remicade use  - - Additional testing: ESR 57 , CRP <3.0, Vitamin b12, folate >20, HbA1C 5.3, , Lyme antibody, TSH 2.19, IgA LEVEL, Ganglioside antibodies including anti GM1 (-), GM2 (+), GD1a (-), GD1b (-), and GQ1b (-), SPEP, UPEP, Vitb12 413, VitB1, Vit-E, Homocysteine, Methylmalonic acid. Copper, Zinc, HIV (-), RPR (-)     #HTN  - Losartan 100mg QD  - Atenolol 50mg QD  -Continue to monitor hemodynamics    #Hx of Asthma  -States no flares in the past several years. Albuterol at home  -c/w Proventil q6hr PRN    #Arthritis  -Patient has history of RA diagnosis in which she was taking Remicade for many years, however from recent rheumatology evaluation outpatient by Dr. Cruz, diagnosed as OA, not RA  - severe pain and limited mobility of tj ankles and shoulders and deformities of hands and knees.  -PT/OT to     #Aortic valve stenosis, mod to severe  -Outpatient followup with Dr. Patel.   TTE: EF 50-55% with moderate to severe aortic valve stenosis.    #HLD  -Atorvastatin 80mg QD    #Urinary incontinence  Patient has history of urinary frequency and incontinent episodes at home  -Currently on Oxybutynin 5mg BID however patient reports dry mouth and poor tolerance to med  -Takes Fesoterdoine Fumerate 4mg at home. Will follow up for non formulary      -Pain control: Tylenol PRN    -GI/Bowel Mgmt: Senna, Miralax    -Bladder management: Urinary incontinent, will monitor and adjust meds     -Skin:  No active issues at this time    -FEN will monitor and supplemetn    - Diet: DASH       Precautions / PROPHYLAXIS:      - Falls    - Ortho: Weight bearing status: wbat      - DVT prophylaxis: Heparin BID

## 2023-11-30 NOTE — PROGRESS NOTE ADULT - ASSESSMENT
83 year old female with pmhx of osteoarthritis, Hypertension Hyperlipidemia , walks with the help of a walker (rollator) and able to perform ADLs with some assistance. She presented with an acute onset of distal numbness and imbalance. Patient denies sensory symptoms but reports that her balance is worse since onset. The diagnostic impression is an acute ischemic stroke of left lateral thalamus/internal capsule region and   a severe left ICA stenosis. Symptoms do not match stroke localization. 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     Neurological:  -CTH: Chronic right occipital stroke  -CTA H/N: Marked calcification at the right and left common carotid artery bifurcations with severe stenosis at the level of the right carotid bulb, and the left CC  bifurcation and at the left carotid bulb. No LVO or aneurysm  - evaluated by [facility] for stenting of ICA but deferred  - MRI Brain: Left thalamic acute lacunar infarct. No acute hemorrhage  - Telemonitoring  - Neuro Checks q 8 hrs  - Continue ASA 81 mg once daily & Brillinta 90mg qD. Patient will stay on that regimen for 90 days.  - TTE showing 50-55% EF, with moderate to severe Aortic stenosis.   - Started on atorvastatin 80mg once daily  - ESR 57 , CRP <3.0, Vitamin b12, folate >20, HbA1C 5.3, , Lyme antibody, TSH 2.19, IgA LEVEL, Ganglioside antibodies including anti GM1 (-), GM2 (+), GD1a (-), GD1b (-), and GQ1b (-), SPEP, UPEP, Vitb12 413, VitB1, Vit-E, Homocysteine, Methylmalonic acid. Copper, Zinc, HIV (-), RPR (-)   - Plavix switched to Brilinta 90mg twice daily (per neuroendovascular request) given PRU result of 278.  - Patient complained of L ankle pain.  no fracture on CT scan. Ortho consult d/c'd. Sed rate and Uric acid levels order to r/o gout--> uric acid wnl 5.5, sed rate elevated at 73  - Out of bed to chair, PT working with her    Cardiovascular:  -Telemonitoring  - Maintain normotension and Normovolemia  - H/O Heart murmur: follows with Dr. Patel  - TTE on this admission showing EF 50-55% with moderate to severe aortic valve stenosis.  - outpatient TTE July 2019 with EF 50-55% but only mild aortic valve stenosis.    Pulmonary:  -Monitor closely for respiratory decline  -Keep 02 sat >94%    GI:  -DASH diet  -IV fluids-None    :  Monitor electrolytes and replete as needed    -ID:  Abnormal UA but not symptomatic, Currently afebrile, No concern for infection at this time.    Hematology:  - c/w sequential stocking  - Heparin sq q 8 hrs for dvt prophylaxis  - given MCV of 103.6 and borderline B12, started on B12 supplementation PO    DISPO:  4A 11/30.

## 2023-11-30 NOTE — H&P ADULT - ASSESSMENT
Left VM for pt asking him to repeat contrast chest CT in 1 month after completing Rituxan + checking labs (including anca)- [facility] ASSESSMENT/PLAN    83 year old female with PMHx of arthritis, hypertension, hyperlipidemia who presented to the ED for gait ataxia and peripheral extremity numbness, found to have L thalamic lacunar infarct and bilateral calcification of common caroti artery bifurcations with severe stenosis at level of R carotid bulb and left CC bifurcation and left carotid bulb. s/p DSA with no stenting.     Prior to stroke, patient was independent with ADLs and ambulation using a rolling walker. Patient now requires min to moderate assistance with ambulation and ADLs 2/2 to left acute thalamic infarct. Therefore, there is a significant functional decline from baseline. Patient also with medical comorbidities of arthritis and HTN requiring medication management and monitoring of vitals by Physiatry team and nursing. May also need specialist consulting from rheumatology or patient’s arthritis. Patient is a complex medical case with mixed medical and neurological maladies. There are significant comorbidities which warrants acute rehab hospitalization. To return home it is in the patient’s best interest to have acute inpatient rehabilitative services to undergo intensive interdisciplinary therapy. Of note, patient lives in a private house with family and would have difficulty performing ADLs and iADLs individually. Furthermore, the patient is motivated and is able to tolerate up to 3 hours of daily therapy for 5-6 days a week for a total of 15 hours a week. Evaluated by Physiatry and deemed to be a good candidate for admission to  for acute inpatient rehab. Admitted to Acute Rehab on 11/30/23    #Rehab of left thalamic lacunar infarct in the setting of bilateral carotid artery stenosis resulting in ataxic gait, incoordination, proprioception defects, and ADL dysfunction.   *MRI Brain: Left thalamic acute lacunar infarct. No acute hemorrhage    -Admit to inpatient Rehab   - Patient is on full rehab program of 3 hours a day of PT, OT, and/or SLP, for 5-6 days a week, for a total of 15 hours a week.  - Precautions/restrictions: Safety precautions, fall precautions  - Pain control: Tylenol 650mg q6hr PRN  - C/w  ASA 81 mg once daily & Brillinta 90mg qD.  (Plavix switched to Brilinta 90mg twice daily (per neuroendovascular request) given PRU result of 278. Patient will stay on that regimen for 90 days.  - c/w atorvastatin 80mg once daily  - Additional testing: ESR 57 , CRP <3.0, Vitamin b12, folate >20, HbA1C 5.3, , Lyme antibody, TSH 2.19, IgA LEVEL, Ganglioside antibodies including anti GM1 (-), GM2 (+), GD1a (-), GD1b (-), and GQ1b (-), SPEP, UPEP, Vitb12 413, VitB1, Vit-E, Homocysteine, Methylmalonic acid. Copper, Zinc, HIV (-), RPR (-)     #HTN  - Losartan 100mg QD  - Atenolol 50mg QD  -Continue to monitor hemodynamics    #Hx of Asthma  -States no flares in the past several years. Albuterol at home  -c/w proventil q6hr PRN    #Arthritis  -Patient has history of RA diagnosis in which she was taking rasburicase for many hears, however from recent rheumatology evaluation outpatient by Dr. Cruz, diagnosed as OA  -PT/OT to shoulders/ankles    #Aortic valve stenosis  -Outpatient followup with Dr. Patel.   TTE: EF 50-55% with moderate to severe aortic valve stenosis.    #HLD  -Atorvastatin 80mg QD    #Urinary incontinence  Patient has history of urinary frequency and incontinent episodes at home  -Currently on Oxybutynin 5mg BID however patient reports dry mouth and poor tolerance to med  -Takes Fesoterdoine Fumerate 4mg at home. Will follow up for non formulary      -Pain control: Tylenol PRN    -GI/Bowel Mgmt: Senna, Miralax    -Bladder management: Urinary incontinent, will monitor and adjust meds     -Skin:  No active issues at this time    -FEN will monitor and supplemetn    - Diet: DASH           Precautions / PROPHYLAXIS:      - Falls    - Ortho: Weight bearing status:       - DVT prophylaxis: Heparin BID      MEDICAL PROGNOSIS: GOOD            REHAB POTENTIAL: GOOD             ESTIMATED DISPOSITION: HOME WITH HOME CARE       [ x ]  The goals of the IRF admission were discussed with the patient and or family member, who agreed             ELOS:  [  X   ] 7-14    [    ]  14-21    [    ]  Other    THERAPY ORDERS and INITIAL INDIVIDUALIZED PLAN OF CARE:  This initial individualized interdisciplinary plan of care, which was established by me (the attending physiatrist), is based on elements from the post admission evaluation. The interdisciplinary therapy program is to be at least 3 hrs a day, at least 5 days per week from from physical, occupational and/ or speech therapies as ordered by me below.      [ x  ] P.T. 90 mins. /day at least 5 out of 7 days:  [  x ] superficial  modalities prn, [ x  ] A/AAROM, [ x  ] PREs, [ x  ] transfer training,            [ x  ] progressive ambulation, [x   ] stairs                                               [ x  ] O.T. 90 mins. /day at least 5 out of 7 days::  [ x  ] modalities prn,  [ x  ]A/AAROM, [ x  ] PREs, [  x ] functional transfer training, [ x  ] ADL training           [  X ] cognitive/ perceptual eval and training, [   ] splint eval                                                  [  ] S.L.P:  [   ] speech eval, [   ] swallow eval     [  X ] Neuropsychology eval     [ x  ] Individualized rec. therapy      RATIONALE FOR INPATIENT ADMISSION - Patient demonstrates the following: (check all that apply)  [X] Medically appropriate for acute rehabilitation admission. Requires interdisiplinary therapy consisting of at least PT and OT, at least 3 hrs. a day at least 5 days a week  [X] Has attainable rehab goals with an appropriate initial discharge plan  [X] Has rehabilitation potential (expected to make a significant improvement within a reasonable period of time)  [X] Requires close medical management by a rehab physician, rehab nursing care,  and comprehensive interdisciplinary team (including PT, OT)    [X] Requires evaluation by a physiatrist at least 3 days a week to evaluate and manage and coordinate rehab and medical problems   ASSESSMENT/PLAN    83 year old female with PMHx of arthritis, hypertension, hyperlipidemia who presented to the ED for gait ataxia and peripheral extremity numbness, found to have L thalamic lacunar infarct and bilateral calcification of common caroti artery bifurcations with severe stenosis at level of R carotid bulb and left CC bifurcation and left carotid bulb. s/p DSA with no stenting.     Prior to stroke, patient was independent with ADLs and ambulation using a rolling walker. Patient now requires min to moderate assistance with ambulation and ADLs 2/2 to left acute thalamic infarct. Therefore, there is a significant functional decline from baseline. Patient also with medical comorbidities of arthritis and HTN requiring medication management and monitoring of vitals by Physiatry team and nursing. May also need specialist consulting from rheumatology or patient’s arthritis. Patient is a complex medical case with mixed medical and neurological maladies. There are significant comorbidities which warrants acute rehab hospitalization. To return home it is in the patient’s best interest to have acute inpatient rehabilitative services to undergo intensive interdisciplinary therapy. Of note, patient lives in a private house with family and would have difficulty performing ADLs and iADLs individually. Furthermore, the patient is motivated and is able to tolerate up to 3 hours of daily therapy for 5-6 days a week for a total of 15 hours a week. Evaluated by Physiatry and deemed to be a good candidate for admission to  for acute inpatient rehab. Admitted to Acute Rehab on 11/30/23    #Rehab of left thalamic lacunar infarct in the setting of bilateral carotid artery stenosis resulting in ataxic gait, incoordination and ADL dysfunction.   *MRI Brain: Left thalamic acute lacunar infarct. No acute hemorrhage    -Admit to inpatient Rehab   - Patient is on full rehab program of 3 hours a day of PT, OT, and/or SLP, for 5-6 days a week, for a total of 15 hours a week.  - Precautions/restrictions: Safety precautions, fall precautions  - Pain control: Tylenol 650mg q6hr PRN  - C/w  ASA 81 mg once daily & Brillinta 90mg qD.  (Plavix switched to Brilinta 90mg twice daily (per neuroendovascular request) given PRU result of 278. Patient will stay on that regimen for 90 days.  - c/w atorvastatin 80mg once daily    #Peripheral Polyneuropathy of unknown etiology  - significant loss of proprioception and cold sensitivity in both distal LEs.   - possibly associated with Remicade use  - - Additional testing: ESR 57 , CRP <3.0, Vitamin b12, folate >20, HbA1C 5.3, , Lyme antibody, TSH 2.19, IgA LEVEL, Ganglioside antibodies including anti GM1 (-), GM2 (+), GD1a (-), GD1b (-), and GQ1b (-), SPEP, UPEP, Vitb12 413, VitB1, Vit-E, Homocysteine, Methylmalonic acid. Copper, Zinc, HIV (-), RPR (-)     #HTN  - Losartan 100mg QD  - Atenolol 50mg QD  -Continue to monitor hemodynamics    #Hx of Asthma  -States no flares in the past several years. Albuterol at home  -c/w Proventil q6hr PRN    #Arthritis  -Patient has history of RA diagnosis in which she was taking Remicade for many years, however from recent rheumatology evaluation outpatient by Dr. Cruz, diagnosed as OA, not RA  - severe pain and limited mobility of tj ankles and shoulders and deformities of hands and knees.  -PT/OT to     #Aortic valve stenosis, mod to severe  -Outpatient followup with Dr. Patel.   TTE: EF 50-55% with moderate to severe aortic valve stenosis.    #HLD  -Atorvastatin 80mg QD    #Urinary incontinence  Patient has history of urinary frequency and incontinent episodes at home  -Currently on Oxybutynin 5mg BID however patient reports dry mouth and poor tolerance to med  -Takes Fesoterdoine Fumerate 4mg at home. Will follow up for non formulary      -Pain control: Tylenol PRN    -GI/Bowel Mgmt: Senna, Miralax    -Bladder management: Urinary incontinent, will monitor and adjust meds     -Skin:  No active issues at this time    -FEN will monitor and supplemetn    - Diet: DASH       Precautions / PROPHYLAXIS:      - Falls    - Ortho: Weight bearing status: wbat      - DVT prophylaxis: Heparin BID      MEDICAL PROGNOSIS: GOOD            REHAB POTENTIAL: GOOD             ESTIMATED DISPOSITION: HOME WITH HOME CARE       [ x ]  The goals of the IRF admission were discussed with the patient, who agreed             ELOS:  [  X   ] 7-14    [    ]  14-21    [    ]  Other    THERAPY ORDERS and INITIAL INDIVIDUALIZED PLAN OF CARE:  This initial individualized interdisciplinary plan of care, which was established by me (the attending physiatrist), is based on elements from the post admission evaluation. The interdisciplinary therapy program is to be at least 3 hrs a day, at least 5 days per week from from physical, occupational and/ or speech therapies as ordered by me below.      [ x  ] P.T. 90 mins. /day at least 5 out of 7 days:  [  x ] superficial  modalities prn, [ x  ] A/AAROM, [ x  ] PREs, [ x  ] transfer training,            [ x  ] progressive ambulation, [x   ] stairs                                               [ x  ] O.T. 90 mins. /day at least 5 out of 7 days::  [ x  ] modalities prn,  [ x  ]A/AAROM, [ x  ] PREs, [  x ] functional transfer training, [ x  ] ADL training           [  X ] cognitive/ perceptual eval and training, [   ] splint eval                                                  [  x  ] S.L.P:  [ x  ] speech eval, [   ] swallow eval     [  X ] Neuropsychology eval     [ x  ] Individualized rec. therapy      RATIONALE FOR INPATIENT ADMISSION - Patient demonstrates the following: (check all that apply)  [X] Medically appropriate for acute rehabilitation admission. Requires interdisiplinary therapy consisting of at least PT and OT, at least 3 hrs. a day at least 5 days a week  [X] Has attainable rehab goals with an appropriate initial discharge plan  [X] Has rehabilitation potential (expected to make a significant improvement within a reasonable period of time)  [X] Requires close medical management by a rehab physician, rehab nursing care,  and comprehensive interdisciplinary team (including PT, OT)    [X] Requires evaluation by a physiatrist at least 3 days a week to evaluate and manage and coordinate rehab and medical problems

## 2023-11-30 NOTE — H&P ADULT - REASON FOR ADMISSION
Rehab of left thalamic lacunar infarct in the setting of bilateral carotid artery stenosis resulting in ataxic gait, incoordination, proprioception defects, and ADL dysfunction. Rehab of left thalamic lacunar infarct resulting in ataxic gait, incoordination, and ADL dysfunction.

## 2023-11-30 NOTE — PROGRESS NOTE ADULT - PROVIDER SPECIALTY LIST ADULT
Neurology
Hospitalist
Neurology
Hospitalist
Neurology
Hospitalist
Hospitalist

## 2023-11-30 NOTE — H&P ADULT - HISTORY OF PRESENT ILLNESS
83 year old female with PMHx of arthritis, hypertension, hyperlipidemia who presented to the ED for gait ataxia and peripheral extremity numbness. Patient reported that 3 days PTA she received a flu shot, and over the weekend while cleaning experienced onset of RUE pain/weakness, in which she rested on the cough and then after trying to get up endorsed problems with balance and experiencing distal numbness in bother her hands and feet, prompting presentation to the ED. On presentation, imaging revealed L thalamic acute lacunar infarct. CTA revealed marked calcification at the right and left common carotid artery bifurcations with severe stenosis at the level of the right carotid bulb, left CC bifurcation and ast the left carotid bulb. Patient underwent diagnostic cerebral angiogram on 11/28/23 due to stenotic findings on CTA, which revealed non flow limiting stenosis bilateral carotid arteries, and no carotid angioplasty/stenting was warranted. Symptoms of distal numbness improved overtime and patient hemodynamically stable.      Living situation: Lives with family (daughter, son-in-law, grandkids) in private house, 0 Steps to enter, 12 Steps inside with stair lift.  Prior Level of Function: Ambulating independent with RW and rollator, Independent with ADL/iADLs    Evaluated by bedside physical therapy and occupational therapy. Patient is minimum to moderate assistance for bed mobility and transfers, ambulating 25 feet x3 with rolling walker minimum assistance.  Upper body dressing  and lower body dressing moderate assistance, Prior to admission patient was independent with mobility and ambulation with a rolling walker, and independent in all ADLs, IADLs. Evaluated by physical medicine and rehabilitation consult and deemed an appropriate candidate for inpatient rehabilitation facility. Admitted to [facility] on  11/30/23   83 year old female with PMHx of arthritis, hypertension, hyperlipidemia who presented to the ED for gait ataxia and peripheral extremity numbness. Patient reported that 3 days PTA she received a flu shot, and over the weekend while cleaning experienced onset of RUE pain/weakness, in which she rested on the cough and then after trying to get up endorsed problems with balance and experiencing distal numbness in bother her hands and feet, prompting presentation to the ED. On presentation, imaging revealed L thalamic acute lacunar infarct. CTA revealed marked calcification at the right and left common carotid artery bifurcations with severe stenosis at the level of the right carotid bulb, left CC bifurcation and ast the left carotid bulb. Patient underwent diagnostic cerebral angiogram on 11/28/23 due to stenotic findings on CTA, which revealed non flow limiting stenosis bilateral carotid arteries, and no carotid angioplasty/stenting was warranted. Symptoms of distal numbness improved overtime and patient hemodynamically stable. She has been medically stable. She has severe limitation of both shoulders at baseline.  Of note, the patient states that she was recently evaluated by a rheumatologist and was told that despite being on Remicade for years, that she has osteoarthritis and never had rheumatoid arthritis.     Living situation: Lives with family (daughter, son-in-law, grandkids) in private house, 0 Steps to enter, 12 Steps inside with stair lift.  Prior Level of Function: Ambulating independent with RW and rollator, Independent with ADL/iADLs    Evaluated by bedside physical therapy and occupational therapy. Patient is minimum to moderate assistance for bed mobility and transfers, ambulating 25 feet x3 with rolling walker minimum assistance.  Upper body dressing  and lower body dressing moderate/ max assistance, Prior to admission patient was independent with mobility and ambulation with a rolling walker, and independent in all ADLs, IADLs. Evaluated by physical medicine and rehabilitation consult and deemed an appropriate candidate for inpatient rehabilitation facility. Admitted to [facility] on  11/30/23

## 2023-11-30 NOTE — H&P ADULT - NSICDXPASTMEDICALHX_GEN_ALL_CORE_FT
PAST MEDICAL HISTORY:  Arthritis     Asthma     Hyperlipemia     Hypertension     Rheumatoid arthritis     Stress incontinence

## 2023-11-30 NOTE — PATIENT PROFILE ADULT - FALL HARM RISK - HARM RISK INTERVENTIONS
Assistance with ambulation/Assistance OOB with selected safe patient handling equipment/Communicate Risk of Fall with Harm to all staff/Discuss with provider need for PT consult/Monitor gait and stability/Reinforce activity limits and safety measures with patient and family/Tailored Fall Risk Interventions/Visual Cue: Yellow wristband and red socks/Bed in lowest position, wheels locked, appropriate side rails in place/Call bell, personal items and telephone in reach/Instruct patient to call for assistance before getting out of bed or chair/Non-slip footwear when patient is out of bed/[place] to call system/Physically safe environment - no spills, clutter or unnecessary equipment/Purposeful Proactive Rounding/Room/bathroom lighting operational, light cord in reach

## 2023-11-30 NOTE — PROGRESS NOTE ADULT - ASSESSMENT
82 yo f with pmhx of OA, HTN, DLD , walks with the help of a walker (rollator) and able to perform ADLs with some assistance presenting today for 3 days of acute onset distal numbness and imbalance. Pt denies any progression of her sensory symptoms but reports that her balance is worse since onset. Found to have an acute L thalamic lacunar infarct on admission. As a whole, symptoms do not match stroke localization. 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     Acute Lt thalamic infarct  ?B/l ICAD  -CTH: Chronic right occipital stroke  -CTA H/N:Marked calcification at the right and left common carotid artery bifurcations with severe stenosis at the level of the right carotid bulb, and the left CC  bifurcation and at the left carotid bulb. No LVO or aneurysm  - NI consulted to evaluate carotid stenosis: planning tx stenting procedure on Tuesday.   - MRI Brain: Left thalamic acute lacunar infarct. No acute hemorrhage. Transferred to stroke service to complete workup.  - Telemonitoring  - Neuro Checks q 8 hrs  - Continue ASA 81 mg once daily and Plavix 75mg once daily. Patient will stay on that regimen for 90 days.  - TTE showing 50-55% EF, with moderate to severe Aortic stenosis.   - Started on atorvastatin 80mg once daily and Plavix 75mg qd  - ESR 57 , CRP <3.0, Vitamin b12, folate >20, HbA1C 5.3, , Lyme antibody, TSH 2.19, IgA LEVEL, Ganglioside antibodies including anti GM1 (-), GM2 (+), GD1a (-), GD1b (-), and GQ1b (-), SPEP, UPEP, Vitb12 413, VitB1, Vit-E, Homocysteine, Methylmalonic acid. Copper, Zinc, HIV (-), RPR (-)   - Obtain x ray right shoulder (pending)  - PT/OT Evaluation Pending (attempted but patient using bedpan, will return). Anticipated d/c to 4A (pending approval)  - Plavix switched to Brilinta 90mg twice daily, per neuroendovascular request following PRU results.   - s/p DSA 11/28: non flow limiting stenosis bilateral carotid arteries. No carotid angioplasty/stenting warranted.     #L ankle pain.   Xray shows possible distal tibial fracture.   CT: no fx but osteoporosis and OA    HTN  Mod-severe AS  - Maintain normotension and Normovolemia  -outpt f/u with Dr. Patel,     #Nutrition/Fluids/Electrolytes   - replete K<4 and Mg <2  - ensure regular BMs  - Miralax BID, Senna    #DVT Px  SCDs  Lovenox    Chart and notes personally reviewed.  Care Discussed with Consultants/Other Providers/ Housestaff [ x] YES [ ] NO   Radiology, labs, old records personally reviewed.    discussed w/ housestaff, nursing, case management, neuro team    Time-based billing (NON-critical care).     35 minutes spent on total encounter. The necessity of the time spent during the encounter on this date of service was due to:     time spent on review of labs, imaging studies, old records, obtaining history, personally examining patient, counselling and communicating with patient/ family, entering orders for medications/tests/etc, discussions with other health care providers, documentation in electronic health records, independent interpretation of labs, imaging/procedure results and care coordination.

## 2023-11-30 NOTE — H&P ADULT - NSHPLABSRESULTS_GEN_ALL_CORE
10.0   4.71  )-----------( 176      ( 30 Nov 2023 06:47 )             32.8     11-30    144  |  114<H>  |  23<H>  ----------------------------<  99  4.2   |  22  |  1.2    Ca    8.6      30 Nov 2023 06:47  Phos  3.9     11-30  Mg     1.8     11-30        Urinalysis Basic - ( 30 Nov 2023 06:47 )    Color: x / Appearance: x / SG: x / pH: x  Gluc: 99 mg/dL / Ketone: x  / Bili: x / Urobili: x   Blood: x / Protein: x / Nitrite: x   Leuk Esterase: x / RBC: x / WBC x   Sq Epi: x / Non Sq Epi: x / Bacteria: x    IMAGING   CTH:  A focal 2 cm hypodensity is noted in the right posterior occipital region consistent with chronic infarction.  No evidence of acute intracranial hemorrhage, acute territorial infarct, mass or midline shift.  CTA:  Marked calcification at the right and left common carotid artery bifurcations with severe stenosis at the level of the right carotid bulb, and the left CC bifurcation and at the left carotid bulb.  No evidence of major vascular occlusion or aneurysm.  MRI:  Left thalamic acute lacunar infarct.  No acute hemorrhage.  MR C-spine: 1.  Multilevel degenerative changes with moderate/severe spinal stenosis at C5-6 and C6-7. Mild spinal cord flattening at both levels, without associated spinal cord signal abnormality.  2.  Multilevel moderate/severe neural foraminal stenosis.  CT Left Ankle: 1. Osteopenia without acute osseous abnormality. 2. Chronic/degenerative change throughout the foot/ankle.    TTE with bubble:  1. Low-normal global left ventricular systolic function.   2. Left ventricular ejection fraction, by visual estimation, is 50 to 55%.   3. Spectral Doppler shows pseudonormal pattern of left ventricular myocardial filling (Grade II diastolic dysfunction).   4. Normal right ventricular size and function.   5. Mildly enlarged left atrium.   6. Mildly enlarged right atrium.   7. Moderate to severe aortic valve stenosis (Vmax 3.37 m/s, PG/MG 45/27 mmHg, [place]O 0.85 cm2 and DVI 0.27. Suspect LFLG severe AS (SVi 27.8 mL/m2) vs moderate AS with inaccurate LVOT VTI.   8. Mild mitral valve regurgitation.   9. Adequate TR velocity was not obtained to accurately assess RVSP. 10.0   4.71  )-----------( 176      ( 30 Nov 2023 06:47 )             32.8     11-30    144  |  114<H>  |  23<H>  ----------------------------<  99  4.2   |  22  |  1.2    Ca    8.6      30 Nov 2023 06:47  Phos  3.9     11-30  Mg     1.8     11-30    IMAGING   CTH:  A focal 2 cm hypodensity is noted in the right posterior occipital region consistent with chronic infarction.  No evidence of acute intracranial hemorrhage, acute territorial infarct, mass or midline shift.  CTA:  Marked calcification at the right and left common carotid artery bifurcations with severe stenosis at the level of the right carotid bulb, and the left CC bifurcation and at the left carotid bulb.  No evidence of major vascular occlusion or aneurysm.  MRI:  Left thalamic acute lacunar infarct.  No acute hemorrhage.  MR C-spine: 1.  Multilevel degenerative changes with moderate/severe spinal stenosis at C5-6 and C6-7. Mild spinal cord flattening at both levels, without associated spinal cord signal abnormality.  2.  Multilevel moderate/severe neural foraminal stenosis.  CT Left Ankle: 1. Osteopenia without acute osseous abnormality. 2. Chronic/degenerative change throughout the foot/ankle.    TTE with bubble:  1. Low-normal global left ventricular systolic function.   2. Left ventricular ejection fraction, by visual estimation, is 50 to 55%.   3. Spectral Doppler shows pseudonormal pattern of left ventricular myocardial filling (Grade II diastolic dysfunction).   4. Normal right ventricular size and function.   5. Mildly enlarged left atrium.   6. Mildly enlarged right atrium.   7. Moderate to severe aortic valve stenosis (Vmax 3.37 m/s, PG/MG 45/27 mmHg, OR[place]O 0.85 cm2 and DVI 0.27. Suspect LFLG severe AS (SVi 27.8 mL/m2) vs moderate AS with inaccurate LVOT VTI.   8. Mild mitral valve regurgitation.   9. Adequate TR velocity was not obtained to accurately assess RVSP.

## 2023-11-30 NOTE — PROGRESS NOTE ADULT - REASON FOR ADMISSION
Numbness and Imbalance

## 2023-11-30 NOTE — H&P ADULT - TIME BILLING
chart review, comprehensive teaching history and physical  with resident, patient  counseling and education regarding disease risk factors and expected outcome and the inpatient rehab process, coordination with nursing, ACP and therapists and discussion with discharging team regarding handoff of the patient's condition and hospital course.

## 2023-11-30 NOTE — H&P ADULT - NSHPPHYSICALEXAM_GEN_ALL_CORE
PHYSICAL EXAMINATION   VItals: T(C): 36.3 (11-30-23 @ 04:56), Max: 37.2 (11-29-23 @ 14:14)  HR: 91 (11-30-23 @ 04:56) (62 - 91)  BP: 163/70 (11-30-23 @ 04:56) (114/58 - 163/70)  RR: 16 (11-30-23 @ 04:56) (16 - 18)  SpO2: --    General:[ X  ] NAD, Resting Comfortable,   [   ] other:                                HEENT: [  X ] NC/AT, EOMI, PERRL , Normal Conjunctivae,   [   ] other:  Cardio: [   ] RRR, no murmer,   [ X  ] other:  Grade 3 crescendo-decrescendo  systolic murumur auscultated in R 2nd intercostal space. RRR                             Pulm: [ X  ] No Respiratory Distress,  Lungs CTAB,   [   ] other:                       Abdomen: [X   ]ND/NT, Soft,   [   ] other:    : [   ] NO LOPEZ CATHETER, [   ] LOPEZ CATHETER- no meatal tear, no discharge, [X  ] other:   Urinary incontinence (baseline history)                                         MSK: [   ] No joint swelling, Full ROM,   [ X  ] other:    No AROM of R shoulder, 0-30 degrees AROM of L shoulder (2/2 OA)                                   Ext: [ X  ]No C/C/E, No calf tenderness,   [   ]other:    Skin: [  X ] intact,   [   ] other:                                                                   Neurological Examination:  Cognitive: [   X ] AAO x 3,   [    ]  other:                                                                      Attention:  [    ] intact,   [ X   ]  other:   Impaired with spelling WORLD backward and performin serial 7s                         Memory: [    ] intact,    [ X   ]  other:   2/3 with short term recall  Mood/Affect: [   X ] wnl,    [    ]  other:                                                                             Communication: [   X ]Fluent, no dysarthria, following commands:  [    ] other:   CN II - XII:  [  X  ] intact,  [    ] other:                                                                                        Motor:   RIGHT UE: [   ] WNL,  [X   ] other: 4-/5 shoulder abduction (limited ROM due to OA), 4+/5 elbow flexion/extension, 4+/5 finger  strength   LEFT    UE: [   ] WNL,  [ X  ] other: 4-/5 shoulder abduction (limited ROM due to OA), 4+/5 elbow flexion/extension, 4+/5 finger  strength   RIGHT LE: [   ] WNL,  [ X  ] other:  5-/5 hip flexion, 5/5 knee flexion/extension, dorsiflexion/plantarflexion   LEFT    LE: [   ] WNL,  [  X ] other: 5-/5 hip flexion, 5/5 knee flexion/extension, dorsiflexion/plantarflexion     Tone: [  X  ] wnl,   [    ]  other:  DTRs: [  X ]symmetric, [   ] other:  Coordination:   [    ] intact,   [  X  ] other: Incoordinate with FNF bilaterally (slightly worse R>L UE)                                                                          Sensory: [  X  ] Intact to light touch, temperature   [    ] other:    Decreased proprioception bilateral lower extremities  (-) garcia, babisnki, clonus b/l extremities PHYSICAL EXAMINATION   VItals: T(C): 36.3 (11-30-23 @ 04:56), Max: 37.2 (11-29-23 @ 14:14)  HR: 91 (11-30-23 @ 04:56) (62 - 91)  BP: 163/70 (11-30-23 @ 04:56) (114/58 - 163/70)  RR: 16 (11-30-23 @ 04:56) (16 - 18)  SpO2: --    General:[ X  ] NAD, Resting Comfortable,   [   ] other:                                HEENT: [  X ] NC/AT, EOMI, PERRL , Normal Conjunctivae,   [   ] other:  Cardio: [ X  ] RRR   [ X  ] other:  Grade 3 crescendo-decrescendo  systolic murumur auscultated in R 2nd intercostal space. RRR                             Pulm: [ X  ] No Respiratory Distress,  Lungs CTAB,   [   ] other:                       Abdomen: [X   ]ND/NT, Soft,   [   ] other:    : [  X ] NO LOPEZ CATHETER,        [   ] LOPEZ CATHETER- no meatal tear, no discharge,                                         MSK: [   ] No joint swelling, Full ROM,   [ X  ] other: Functional PROM both shoulders. (-) 30 deg. passive DF both ankles     No AROM of R shoulder, 0-30 degrees AROM of L shoulder (2/2 OA)                                   Ext: [ X ]   No C/C/E, No calf tenderness,   [   ]other:    Skin: [  X ] intact,   [   ] other:                                                                   Neurological Examination:  Cognitive: [   X ] AAO x 3,   [    ]  other:                                                                      Attention:  [    ] intact,   [ X   ]  other:   Impaired with spelling WORLD backward and perform serial 7s                         Memory: [    ] intact,    [ X   ]  other:   2/3 with short term recall  Mood/Affect: [   X ] wnl,    [    ]  other:                                                                             Communication: [   X ]Fluent, no dysarthria, following commands:  [    ] other:   CN II - XII:  [  X  ] intact,  [    ] other:                                                                                        Motor:   RIGHT UE: [   ] WNL,  [X   ] other: 4-/5 shoulder abduction (limited ROM due to OA), 4+/5 elbow flexion/extension, 4+/5 finger  strength   LEFT    UE: [   ] WNL,  [ X  ] other: 4-/5 shoulder abduction (limited ROM due to OA), 4+/5 elbow flexion/extension, 4+/5 finger  strength   RIGHT LE: [   ] WNL,  [ X  ] other:  5-/5 hip flexion, 5/5 knee flexion/extension, 2-/5 dorsiflexion due to limited passive range  LEFT    LE: [   ] WNL,  [  X ] other: 5-/5 hip flexion, 5/5 knee flexion/extension, 2-/5  dorsiflexion due to limited passive range    Tone: [  X  ] wnl,   [    ]  other:  DTRs: [  X ]symmetric, [   ] other:  Coordination:   [    ] intact,   [  X  ] other: Incoordinate with FNF bilaterally (worse R>L UE)                                                                          Sensory: [  X  ] Intact to light touch, temperature   [    ] other:    Decreased proprioception bilateral lower extremities  (-) garcia, babisnki, clonus b/l extremities

## 2023-11-30 NOTE — H&P ADULT - NSHPREVIEWOFSYSTEMS_GEN_ALL_CORE
Constiutional:    [  X ] WNL           [   ] poor appetite   [   ] insomnia   [   ] tired   Cardio:                [ X  ] WNL           [   ] CP   [   ] DEE   [   ] palpitations               Resp:                   [  X ] WNL           [   ] SOB   [   ] cough   [   ] wheezing   GI:                        [X   ] WNL           [   ] constipation   [   ] diarrhea   [   ] abdominal pain   [   ] nausea   [   ] emesis                                :                      [   ] WNL           [   ] LOPEZ  [   ] dusuria   [ X  ] difficulty voiding             Endo:                   [  X ] WNL          [   ] polyuria   [   ] temperature intolerance                 Skin:                     [   ] WNL          [  X ] pain   [   ] wound   [   ] rash   MSK:                    [   ] WNL          [   ] muscle pain   [  X ] joint pain/ stiffness   [   ] muscle tenderness   [   ] swelling   Neuro:                 [   ] WNL          [   ] HA   [   ] change in vision   [   ] tremor   [ X  ] weakness   [   ]dysphagia              Cognitive:           [ X  ] WNL           [   ]confusion      Psych:                  [ X  ] WNL           [   ] hallucinations   [   ]agitation   [   ] delusion   [   ]depression Constitutional:    [  X ] WNL           [   ] poor appetite   [   ] insomnia   [   ] tired   Cardio:                [ X  ] WNL           [   ] CP   [   ] DEE   [   ] palpitations               Resp:                   [  X ] WNL           [   ] SOB   [   ] cough   [   ] wheezing   GI:                        [X   ] WNL           [   ] constipation   [   ] diarrhea   [   ] abdominal pain   [   ] nausea   [   ] emesis                                :                      [   ] WNL           [   ] LOPEZ  [   ] dysuria   [ X  ] difficulty voiding             Endo:                   [  X ] WNL          [   ] polyuria   [   ] temperature intolerance                 Skin:                     [  X ] WNL          [   ] pain   [   ] wound   [   ] rash   MSK:                    [   ] WNL          [   ] muscle pain   [  X ] joint pain/ stiffness shoulder, hips, knees, ankles/ feet     [   ] muscle tenderness   [   ] swelling   Neuro:                 [   ] WNL          [   ] HA   [   ] change in vision   [   ] tremor   [ X  ] weakness/ unsteady gait    [   ]dysphagia              Cognitive:           [ X  ] WNL           [   ]confusion      Psych:                  [ X  ] WNL           [   ] hallucinations   [   ]agitation   [   ] delusion   [   ]depression

## 2023-12-01 VITALS
SYSTOLIC BLOOD PRESSURE: 116 MMHG | RESPIRATION RATE: 18 BRPM | DIASTOLIC BLOOD PRESSURE: 70 MMHG | TEMPERATURE: 99 F | HEART RATE: 96 BPM

## 2023-12-01 PROBLEM — M19.90 UNSPECIFIED OSTEOARTHRITIS, UNSPECIFIED SITE: Chronic | Status: ACTIVE | Noted: 2023-11-30

## 2023-12-01 LAB
ALBUMIN SERPL ELPH-MCNC: 3.2 G/DL — LOW (ref 3.5–5.2)
ALBUMIN SERPL ELPH-MCNC: 3.2 G/DL — LOW (ref 3.5–5.2)
ALP SERPL-CCNC: 70 U/L — SIGNIFICANT CHANGE UP (ref 30–115)
ALP SERPL-CCNC: 70 U/L — SIGNIFICANT CHANGE UP (ref 30–115)
ALT FLD-CCNC: 17 U/L — SIGNIFICANT CHANGE UP (ref 0–41)
ALT FLD-CCNC: 17 U/L — SIGNIFICANT CHANGE UP (ref 0–41)
ANION GAP SERPL CALC-SCNC: 12 MMOL/L — SIGNIFICANT CHANGE UP (ref 7–14)
ANION GAP SERPL CALC-SCNC: 12 MMOL/L — SIGNIFICANT CHANGE UP (ref 7–14)
AST SERPL-CCNC: 14 U/L — SIGNIFICANT CHANGE UP (ref 0–41)
AST SERPL-CCNC: 14 U/L — SIGNIFICANT CHANGE UP (ref 0–41)
BASOPHILS # BLD AUTO: 0.04 K/UL — SIGNIFICANT CHANGE UP (ref 0–0.2)
BASOPHILS # BLD AUTO: 0.04 K/UL — SIGNIFICANT CHANGE UP (ref 0–0.2)
BASOPHILS NFR BLD AUTO: 0.8 % — SIGNIFICANT CHANGE UP (ref 0–1)
BASOPHILS NFR BLD AUTO: 0.8 % — SIGNIFICANT CHANGE UP (ref 0–1)
BILIRUB SERPL-MCNC: 0.5 MG/DL — SIGNIFICANT CHANGE UP (ref 0.2–1.2)
BILIRUB SERPL-MCNC: 0.5 MG/DL — SIGNIFICANT CHANGE UP (ref 0.2–1.2)
BUN SERPL-MCNC: 20 MG/DL — SIGNIFICANT CHANGE UP (ref 10–20)
BUN SERPL-MCNC: 20 MG/DL — SIGNIFICANT CHANGE UP (ref 10–20)
CALCIUM SERPL-MCNC: 8.7 MG/DL — SIGNIFICANT CHANGE UP (ref 8.4–10.5)
CALCIUM SERPL-MCNC: 8.7 MG/DL — SIGNIFICANT CHANGE UP (ref 8.4–10.5)
CHLORIDE SERPL-SCNC: 109 MMOL/L — SIGNIFICANT CHANGE UP (ref 98–110)
CHLORIDE SERPL-SCNC: 109 MMOL/L — SIGNIFICANT CHANGE UP (ref 98–110)
CO2 SERPL-SCNC: 22 MMOL/L — SIGNIFICANT CHANGE UP (ref 17–32)
CO2 SERPL-SCNC: 22 MMOL/L — SIGNIFICANT CHANGE UP (ref 17–32)
CREAT SERPL-MCNC: 1.1 MG/DL — SIGNIFICANT CHANGE UP (ref 0.7–1.5)
CREAT SERPL-MCNC: 1.1 MG/DL — SIGNIFICANT CHANGE UP (ref 0.7–1.5)
EGFR: 50 ML/MIN/1.73M2 — LOW
EGFR: 50 ML/MIN/1.73M2 — LOW
EOSINOPHIL # BLD AUTO: 0.25 K/UL — SIGNIFICANT CHANGE UP (ref 0–0.7)
EOSINOPHIL # BLD AUTO: 0.25 K/UL — SIGNIFICANT CHANGE UP (ref 0–0.7)
EOSINOPHIL NFR BLD AUTO: 5.1 % — SIGNIFICANT CHANGE UP (ref 0–8)
EOSINOPHIL NFR BLD AUTO: 5.1 % — SIGNIFICANT CHANGE UP (ref 0–8)
GLUCOSE SERPL-MCNC: 96 MG/DL — SIGNIFICANT CHANGE UP (ref 70–99)
GLUCOSE SERPL-MCNC: 96 MG/DL — SIGNIFICANT CHANGE UP (ref 70–99)
HCT VFR BLD CALC: 34 % — LOW (ref 37–47)
HCT VFR BLD CALC: 34 % — LOW (ref 37–47)
HGB BLD-MCNC: 10.5 G/DL — LOW (ref 12–16)
HGB BLD-MCNC: 10.5 G/DL — LOW (ref 12–16)
IMM GRANULOCYTES NFR BLD AUTO: 0.6 % — HIGH (ref 0.1–0.3)
IMM GRANULOCYTES NFR BLD AUTO: 0.6 % — HIGH (ref 0.1–0.3)
LYMPHOCYTES # BLD AUTO: 0.53 K/UL — LOW (ref 1.2–3.4)
LYMPHOCYTES # BLD AUTO: 0.53 K/UL — LOW (ref 1.2–3.4)
LYMPHOCYTES # BLD AUTO: 10.8 % — LOW (ref 20.5–51.1)
LYMPHOCYTES # BLD AUTO: 10.8 % — LOW (ref 20.5–51.1)
MAGNESIUM SERPL-MCNC: 1.7 MG/DL — LOW (ref 1.8–2.4)
MAGNESIUM SERPL-MCNC: 1.7 MG/DL — LOW (ref 1.8–2.4)
MCHC RBC-ENTMCNC: 30.9 G/DL — LOW (ref 32–37)
MCHC RBC-ENTMCNC: 30.9 G/DL — LOW (ref 32–37)
MCHC RBC-ENTMCNC: 31.9 PG — HIGH (ref 27–31)
MCHC RBC-ENTMCNC: 31.9 PG — HIGH (ref 27–31)
MCV RBC AUTO: 103.3 FL — HIGH (ref 81–99)
MCV RBC AUTO: 103.3 FL — HIGH (ref 81–99)
MONOCYTES # BLD AUTO: 0.46 K/UL — SIGNIFICANT CHANGE UP (ref 0.1–0.6)
MONOCYTES # BLD AUTO: 0.46 K/UL — SIGNIFICANT CHANGE UP (ref 0.1–0.6)
MONOCYTES NFR BLD AUTO: 9.3 % — SIGNIFICANT CHANGE UP (ref 1.7–9.3)
MONOCYTES NFR BLD AUTO: 9.3 % — SIGNIFICANT CHANGE UP (ref 1.7–9.3)
NEUTROPHILS # BLD AUTO: 3.61 K/UL — SIGNIFICANT CHANGE UP (ref 1.4–6.5)
NEUTROPHILS # BLD AUTO: 3.61 K/UL — SIGNIFICANT CHANGE UP (ref 1.4–6.5)
NEUTROPHILS NFR BLD AUTO: 73.4 % — SIGNIFICANT CHANGE UP (ref 42.2–75.2)
NEUTROPHILS NFR BLD AUTO: 73.4 % — SIGNIFICANT CHANGE UP (ref 42.2–75.2)
NRBC # BLD: 0 /100 WBCS — SIGNIFICANT CHANGE UP (ref 0–0)
NRBC # BLD: 0 /100 WBCS — SIGNIFICANT CHANGE UP (ref 0–0)
PLATELET # BLD AUTO: 176 K/UL — SIGNIFICANT CHANGE UP (ref 130–400)
PLATELET # BLD AUTO: 176 K/UL — SIGNIFICANT CHANGE UP (ref 130–400)
PMV BLD: 11.4 FL — HIGH (ref 7.4–10.4)
PMV BLD: 11.4 FL — HIGH (ref 7.4–10.4)
POTASSIUM SERPL-MCNC: 4.1 MMOL/L — SIGNIFICANT CHANGE UP (ref 3.5–5)
POTASSIUM SERPL-MCNC: 4.1 MMOL/L — SIGNIFICANT CHANGE UP (ref 3.5–5)
POTASSIUM SERPL-SCNC: 4.1 MMOL/L — SIGNIFICANT CHANGE UP (ref 3.5–5)
POTASSIUM SERPL-SCNC: 4.1 MMOL/L — SIGNIFICANT CHANGE UP (ref 3.5–5)
PROT SERPL-MCNC: 5.8 G/DL — LOW (ref 6–8)
PROT SERPL-MCNC: 5.8 G/DL — LOW (ref 6–8)
RBC # BLD: 3.29 M/UL — LOW (ref 4.2–5.4)
RBC # BLD: 3.29 M/UL — LOW (ref 4.2–5.4)
RBC # FLD: 13.1 % — SIGNIFICANT CHANGE UP (ref 11.5–14.5)
RBC # FLD: 13.1 % — SIGNIFICANT CHANGE UP (ref 11.5–14.5)
SODIUM SERPL-SCNC: 143 MMOL/L — SIGNIFICANT CHANGE UP (ref 135–146)
SODIUM SERPL-SCNC: 143 MMOL/L — SIGNIFICANT CHANGE UP (ref 135–146)
WBC # BLD: 4.92 K/UL — SIGNIFICANT CHANGE UP (ref 4.8–10.8)
WBC # BLD: 4.92 K/UL — SIGNIFICANT CHANGE UP (ref 4.8–10.8)
WBC # FLD AUTO: 4.92 K/UL — SIGNIFICANT CHANGE UP (ref 4.8–10.8)
WBC # FLD AUTO: 4.92 K/UL — SIGNIFICANT CHANGE UP (ref 4.8–10.8)

## 2023-12-01 RX ORDER — MAGNESIUM OXIDE 400 MG ORAL TABLET 241.3 MG
400 TABLET ORAL
Refills: 0 | Status: COMPLETED | OUTPATIENT
Start: 2023-12-02 | End: 2023-12-04

## 2023-12-01 RX ORDER — FESOTERODINE FUMARATE 8 MG/1
4 TABLET, FILM COATED, EXTENDED RELEASE ORAL
Refills: 0 | Status: DISCONTINUED | OUTPATIENT
Start: 2023-12-01 | End: 2023-12-15

## 2023-12-01 RX ADMIN — ATENOLOL 50 MILLIGRAM(S): 25 TABLET ORAL at 06:30

## 2023-12-01 RX ADMIN — PANTOPRAZOLE SODIUM 40 MILLIGRAM(S): 20 TABLET, DELAYED RELEASE ORAL at 06:30

## 2023-12-01 RX ADMIN — Medication 5 MILLIGRAM(S): at 06:30

## 2023-12-01 RX ADMIN — PREGABALIN 1000 MICROGRAM(S): 225 CAPSULE ORAL at 11:42

## 2023-12-01 RX ADMIN — TICAGRELOR 90 MILLIGRAM(S): 90 TABLET ORAL at 17:12

## 2023-12-01 RX ADMIN — LOSARTAN POTASSIUM 100 MILLIGRAM(S): 100 TABLET, FILM COATED ORAL at 21:33

## 2023-12-01 RX ADMIN — ATORVASTATIN CALCIUM 80 MILLIGRAM(S): 80 TABLET, FILM COATED ORAL at 21:32

## 2023-12-01 RX ADMIN — HEPARIN SODIUM 5000 UNIT(S): 5000 INJECTION INTRAVENOUS; SUBCUTANEOUS at 17:12

## 2023-12-01 RX ADMIN — SENNA PLUS 2 TABLET(S): 8.6 TABLET ORAL at 21:33

## 2023-12-01 RX ADMIN — POLYETHYLENE GLYCOL 3350 17 GRAM(S): 17 POWDER, FOR SOLUTION ORAL at 21:33

## 2023-12-01 RX ADMIN — HEPARIN SODIUM 5000 UNIT(S): 5000 INJECTION INTRAVENOUS; SUBCUTANEOUS at 06:30

## 2023-12-01 RX ADMIN — Medication 1 MILLIGRAM(S): at 11:42

## 2023-12-01 RX ADMIN — TICAGRELOR 90 MILLIGRAM(S): 90 TABLET ORAL at 06:31

## 2023-12-01 RX ADMIN — Medication 81 MILLIGRAM(S): at 11:42

## 2023-12-01 RX ADMIN — CHLORHEXIDINE GLUCONATE 1 APPLICATION(S): 213 SOLUTION TOPICAL at 06:27

## 2023-12-01 NOTE — PROGRESS NOTE ADULT - ASSESSMENT
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 s/p DSA with no stenting.     #Rehab of left thalamic lacunar infarct in the setting of bilateral carotid artery stenosis resulting in ataxic gait, incoordination and ADL dysfunction.   *MRI Brain: Left thalamic acute lacunar infarct. No acute hemorrhage  s/p DSA 11/28: non flow limiting stenosis bilateral carotid arteries. No carotid angioplasty/stenting warranted.     -Admit to inpatient Rehab   - Patient is on full rehab program of 3 hours a day of PT, OT, and/or SLP, for 5-6 days a week, for a total of 15 hours a week.  - Precautions/restrictions: Safety precautions, fall precautions  - Pain control: Tylenol 650mg q6hr PRN  - C/w  ASA 81 mg once daily & Brillinta 90mg qD.  (Plavix switched to Brilinta 90mg twice daily (per neuroendovascular request) given PRU result of 278. Patient will stay on that regimen for 90 days.  - c/w atorvastatin 80mg once daily    #Peripheral Polyneuropathy of unknown etiology  - significant loss of proprioception and cold sensitivity in both distal LEs.   - possibly associated with Remicade use  - DDx include acute inflammatory demyelinating polyneuropathy, metabolic demyelinization (2/2 vitamin deficiency), autoimmune disorder  - Additional testing: ESR 57 , CRP <3.0, Vitamin b12, folate >20, HbA1C 5.3, , Lyme antibody, TSH 2.19, IgA LEVEL, Ganglioside antibodies including anti GM1 (-), GM2 (+), GD1a (-), GD1b (-), and GQ1b (-), SPEP, UPEP, Vitb12 413, VitB1, Vit-E, Homocysteine, Methylmalonic acid. Copper, Zinc, HIV (-), RPR (-)     #HTN  - Losartan 100mg QD  - Atenolol 50mg QD  -Continue to monitor hemodynamics    #Hx of Asthma  -States no flares in the past several years. Albuterol at home  -c/w Proventil q6hr PRN    #Arthritis  -Patient has history of RA diagnosis in which she was taking Remicade for many years, however from recent rheumatology evaluation outpatient by Dr. Cruz, diagnosed as OA, not RA  - severe pain and limited mobility of tj ankles and shoulders and deformities of hands and knees.  -PT/OT to     #Aortic valve stenosis, mod to severe  -Outpatient followup with Dr. Patel.   TTE: EF 50-55% with moderate to severe aortic valve stenosis.    #HLD  -Atorvastatin 80mg QD    #Urinary incontinence  Patient has history of urinary frequency and incontinent episodes at home  -Currently on Oxybutynin 5mg BID however patient reports dry mouth and poor tolerance to med  -Takes Fesoterdoine Fumerate 4mg at home. Will follow up for non formulary      -Pain control: Tylenol PRN    -GI/Bowel Mgmt: Senna, Miralax    -Bladder management: Urinary incontinent, will monitor and adjust meds     -Skin:  No active issues at this time    -FEN will monitor and supplemetn    - Diet: DASH       Precautions / PROPHYLAXIS:      - Falls    - Ortho: Weight bearing status: wbat      - DVT prophylaxis: Heparin BID

## 2023-12-01 NOTE — PROGRESS NOTE ADULT - SUBJECTIVE AND OBJECTIVE BOX
Patient is a 83y old  Female who presents with a chief complaint of Rehab of left thalamic lacunar infarct resulting in ataxic gait, incoordination, and ADL dysfunction. (30 Nov 2023 12:31)      HPI:  83 year old female with PMHx of arthritis, hypertension, hyperlipidemia who presented to the ED for gait ataxia and peripheral extremity numbness. Patient reported that 3 days PTA she received a flu shot, and over the weekend while cleaning experienced onset of RUE pain/weakness, in which she rested on the cough and then after trying to get up endorsed problems with balance and experiencing distal numbness in bother her hands and feet, prompting presentation to the ED. On presentation, imaging revealed L thalamic acute lacunar infarct. CTA revealed marked calcification at the right and left common carotid artery bifurcations with severe stenosis at the level of the right carotid bulb, left CC bifurcation and ast the left carotid bulb. Patient underwent diagnostic cerebral angiogram on 11/28/23 due to stenotic findings on CTA, which revealed non flow limiting stenosis bilateral carotid arteries, and no carotid angioplasty/stenting was warranted. Symptoms of distal numbness improved overtime and patient hemodynamically stable. She has been medically stable. She has severe limitation of both shoulders at baseline.  Of note, the patient states that she was recently evaluated by a rheumatologist and was told that despite being on Remicade for years, that she has osteoarthritis and never had rheumatoid arthritis.     Living situation: Lives with family (daughter, son-in-law, grandkids) in private house, 0 Steps to enter, 12 Steps inside with stair lift.  Prior Level of Function: Ambulating independent with RW and rollator, Independent with ADL/iADLs    Evaluated by bedside physical therapy and occupational therapy. Patient is minimum to moderate assistance for bed mobility and transfers, ambulating 25 feet x3 with rolling walker minimum assistance.  Upper body dressing  and lower body dressing moderate/ max assistance, Prior to admission patient was independent with mobility and ambulation with a rolling walker, and independent in all ADLs, IADLs. Evaluated by physical medicine and rehabilitation consult and deemed an appropriate candidate for inpatient rehabilitation facility. Admitted to [facility] on  11/30/23   (30 Nov 2023 12:31)    I examined the patient and reviewed the chart. There have been no significant changes since my history and physical except where documented below.    TODAY'S SUBJECTIVE & REVIEW OF SYMPTOMS:  No acute overnight events.   Patient seen and examined bedside this morning. No acute complaints.   She is pending therapy for today. She has no pain at the moment.   Moving bowel and bladder appropriately  Vital signs reviewed, and stable.    Review of Systems:   Constitutional:    [  X ] WNL           [   ] poor appetite   [   ] insomnia   [   ] tired   Cardio:                [ X  ] WNL           [   ] CP   [   ] DEE   [   ] palpitations               Resp:                   [  X ] WNL           [   ] SOB   [   ] cough   [   ] wheezing   GI:                        [X   ] WNL           [   ] constipation   [   ] diarrhea   [   ] abdominal pain   [   ] nausea   [   ] emesis                                :                      [   ] WNL           [   ] LOPEZ  [   ] dysuria   [ X  ] difficulty voiding             Endo:                   [  X ] WNL          [   ] polyuria   [   ] temperature intolerance                 Skin:                     [  X ] WNL          [   ] pain   [   ] wound   [   ] rash   MSK:                    [   ] WNL          [   ] muscle pain   [  X ] joint pain/ stiffness shoulder, hips, knees, ankles/ feet     [   ] muscle tenderness   [   ] swelling   Neuro:                 [   ] WNL          [   ] HA   [   ] change in vision   [   ] tremor   [ X  ] weakness/ unsteady gait    [   ]dysphagia              Cognitive:           [ X  ] WNL           [   ]confusion      Psych:                  [ X  ] WNL           [   ] hallucinations   [   ]agitation   [   ] delusion   [   ]depression      PHYSICAL EXAM    Vital Signs Last 24 Hrs  T(C): 36.3 (01 Dec 2023 05:10), Max: 37.1 (30 Nov 2023 20:05)  T(F): 97.4 (01 Dec 2023 05:10), Max: 98.7 (30 Nov 2023 20:05)  HR: 71 (01 Dec 2023 05:10) (67 - 71)  BP: 141/66 (01 Dec 2023 05:10) (128/60 - 155/70)  BP(mean): 100 (30 Nov 2023 21:26) (100 - 100)  RR: 20 (01 Dec 2023 05:10) (18 - 20)  SpO2: 98% (30 Nov 2023 13:30) (98% - 98%)    General:[ X  ] NAD, Resting Comfortable,   [   ] other:                                HEENT: [  X ] NC/AT, EOMI, PERRL , Normal Conjunctivae,   [   ] other:  Cardio: [ X  ] RRR   [ X  ] other:  Grade 3 crescendo-decrescendo  systolic murumur auscultated in R 2nd intercostal space. RRR                             Pulm: [ X  ] No Respiratory Distress,  Lungs CTAB,   [   ] other:                       Abdomen: [X   ]ND/NT, Soft,   [   ] other:    : [  X ] NO LOPEZ CATHETER,        [   ] LOPEZ CATHETER- no meatal tear, no discharge,                                         MSK: [   ] No joint swelling, Full ROM,   [ X  ] other: Functional PROM both shoulders. (-) 30 deg. passive DF both ankles     No AROM of R shoulder, 0-30 degrees AROM of L shoulder (2/2 OA)                                   Ext: [ X ]   No C/C/E, No calf tenderness,   [   ]other:    Skin: [  X ] intact,   [   ] other:                                                                   Neurological Examination:  Cognitive: [   X ] AAO x 3,   [    ]  other:                                                                      Attention:  [    ] intact,   [ X   ]  other:   Impairment in tasks that require concentration and attention                Memory: [    ] intact,    [ X   ]  other:   2/3 with short term recall  Mood/Affect: [   X ] wnl,    [    ]  other:                                                                             Communication: [   X ]Fluent, no dysarthria, following commands:  [    ] other:   CN II - XII:  [  X  ] intact,  [    ] other:                                                                                        Motor:   RIGHT UE: [   ] WNL,  [X   ] other: 4-/5 shoulder abduction (limited ROM due to OA), 4+/5 elbow flexion/extension, 4+/5 finger  strength   LEFT    UE: [   ] WNL,  [ X  ] other: 4-/5 shoulder abduction (limited ROM due to OA), 4+/5 elbow flexion/extension, 4+/5 finger  strength   RIGHT LE: [   ] WNL,  [ X  ] other:  5-/5 hip flexion, 5/5 knee flexion/extension, 2-/5 dorsiflexion due to limited passive range  LEFT    LE: [   ] WNL,  [  X ] other: 5-/5 hip flexion, 5/5 knee flexion/extension, 2-/5  dorsiflexion due to limited passive range    Tone: [  X  ] wnl,   [    ]  other:  DTRs: [  X ]symmetric, [   ] other:  Coordination:   [    ] intact,   [  X  ] other: Incoordinate with FNF bilaterally (worse R>L UE)                                                                          Sensory: [  X  ] Intact to light touch, temperature   [    ] other:    Decreased proprioception bilateral lower extremities  (-) wilson garcia, clonus b/l extremities    albuterol    90 MICROgram(s) HFA Inhaler 2 Puff(s) Inhalation every 6 hours PRN  aspirin enteric coated 81 milliGRAM(s) Oral daily  atenolol  Tablet 50 milliGRAM(s) Oral daily  atorvastatin 80 milliGRAM(s) Oral at bedtime  chlorhexidine 2% Cloths 1 Application(s) Topical <User Schedule>  cyanocobalamin 1000 MICROGram(s) Oral every 24 hours  folic acid 1 milliGRAM(s) Oral daily  heparin   Injectable 5000 Unit(s) SubCutaneous every 12 hours  losartan 100 milliGRAM(s) Oral at bedtime  oxybutynin 5 milliGRAM(s) Oral two times a day  pantoprazole    Tablet 40 milliGRAM(s) Oral before breakfast  polyethylene glycol 3350 17 Gram(s) Oral at bedtime  senna 2 Tablet(s) Oral at bedtime  ticagrelor 90 milliGRAM(s) Oral every 12 hours      RECENT LABS/IMAGING                        10.5   4.92  )-----------( 176      ( 01 Dec 2023 04:30 )             34.0     12-01    143  |  109  |  20  ----------------------------<  96  4.1   |  22  |  1.1    Ca    8.7      01 Dec 2023 04:30  Phos  3.9     11-30  Mg     1.7     12-01    TPro  5.8<L>  /  Alb  3.2<L>  /  TBili  0.5  /  DBili  x   /  AST  14  /  ALT  17  /  AlkPhos  70  12-01      Urinalysis Basic - ( 01 Dec 2023 04:30 )    Color: x / Appearance: x / SG: x / pH: x  Gluc: 96 mg/dL / Ketone: x  / Bili: x / Urobili: x   Blood: x / Protein: x / Nitrite: x   Leuk Esterase: x / RBC: x / WBC x   Sq Epi: x / Non Sq Epi: x / Bacteria: x

## 2023-12-01 NOTE — PROGRESS NOTE ADULT - ASSESSMENT
Neuropsychology Consult   Pain: Denied  Location: N/A Ratin/10  Intervention: N/A   Orientation: WFL; AOx4    Arousal Level: Alert   Behavior: Cooperative, pleasant   Affect Range:  Euthymic, congruent and full in range    Needed: No   Attention: WFL, no redirection needed   Insight into illness/deficits: Good      Neuropsychology service was consulted to evaluate cognitive functioning of this 83-year-old righthand dominant female patient following admission to [facility] secondary left thalamic CVA.  Per chart and patient report, she has a PMHx of OA, HTN, HLD, and heart murmur.  CVA was associated with gait ataxia and peripheral extremity numbness on presentation to ED late last month.  She reported no other neurologic history and no psychiatric history.  Her report did not evidence MDD or other affective disorder, however, she did express some sadness given loss x3 over recent years (, followed by sister, followed by brother).  She was reportedly premorbidly independent in ADLs and IADLs (though her daughter shops for the household) and has been ambulating for the past 5 years or so using a rollator or walker.  She denied any premorbid cognitive or affective changes and reports to experiencing some memory difficulty since the stroke. Patient denied any history of SHIIP.  She denied any history of nicotine, alcohol, or other substance use.  Patient previously worked as a hairdresser (retired since ) and completed high school.  She lives with her daughter, Rayna ([phone]), son-in-law, Henri, and two grandchildren (contact to Pt’s family was attempted today but unsuccessful at time of this writing).  Patient is  and has no other children.  She wears glasses for reading and has a history of cataract s/p correction.  She is hard of hearing bilaterally and does not use or have interest in hearing aides.  She identified goals to increase strength/mobility to walk and to return to baseline independence.     Patient seen at bedside today resting in bed in NAD and denied any pain. Her thought process was linear and goal oriented.  Patient reported her mood as “good” with generally euthymic presentation/congruent affect.     Premorbid Functioning:   ADLs: independent , ambulating with rollator/walker   IADLs: independent   Psychiatric History/Treatment: denied   Cognition: changes denied   Substance Use: denied     Current Status:   Mood Changes: Denied   Cognition Changes: Some STM changes reported   Behavior Changes: Patient denied      Patient’s Primary Language:  English    a) Changes in understanding primary language after Neurological event. ? No    b) Preferred language for health care information? English     Brain Injury / Cognitive Behavioral Protocol Education Provided: Not Applicable      Patient was evaluated and the following neuropsychological measures were given: the Cognistat, Clock Drawing Test     Results:   Orientation: Patient was fully oriented to all spheres (AOx4)     Leaning/Memory: Patient encoded 4/4 list items in a verbal list learning task in 4 trials. Performance appeared impacted by hearing difficulty. Immediate recall for 2 geometrical figures was poor (0/1).     Recall after a delay was 2/4 registered words; she did not recall the 2 words she did not spontaneous produce with semantic cues (0/2), but did so with multiple-choice options (2/2), suggesting intact storage w some level of retrieval difficulty.     Attention:     Basic auditory attention was somewhat below expectation as patient reproduced strings of digits of up to 5 numbers forward.     Executive Functioning/Working Memory:  Processing speed appeared somewhat intact.  She recalled strings of up to 3 digits in reverse order.  She followed three-step commands accurately.  Clock drawing was mildly impaired (7/10) due to significant minor spatial distortion in number placement and incorrect hand placement/lacking representation of relative size difference.  Patient produced 2/2 full-credit responses on questions related to safety and judgment.  She completed 2/5 math problems correctly.  She gave 4/5 correct responses to a verbal abstraction task.  Some level of impulsive responding, perseverative responses, and difficulties shifting/maintaining set were observed indicative of mild-moderate frontal dysexecutive features.     Language: Spontaneous speech was normal for rate, pitch and prosody.  A single paraphasic error was observed during the session.  Patient repeated 6/7 sentences accurately with difficulty observed in utterances of greater complexity.  Confrontation naming was intact (9/10 items named correctly; benefitting from semantic cue for single item not named spontaneously). Her description of a complex visual scene was adequate.  Written expression was adequate as she wrote her name and a sentence correctly.     Visuospatial skills: No evidence of deficient visuospatial functioning was observed.  Spatial arrangement on clock drawing task was mildly distorted but indicative of mild planning difficulty rather than gross visuospatial deficit.     Summary: Overall, the current evaluation evidences mild to moderate involvement in working memory and executive functions relative to largely intact other cognitive processes.  Deficits becomes more apparent as task difficulty increases. Memory performance was somewhat below expectation, but appears mediated by frontal processes, I.e., weaker attention and executive control.  Cognitive profile is consistent with thalamic CVA etiology. Patient will be maintained on NP service for additional cognitive evaluation/support and appears to be a very good candidate for cognitive remediation.  Outpatient neuropsychological evaluation should be considered to further characterize cognitive profile and to provide a comprehensive list of updated recommendations upon discharge.  Given current findings, patient would benefit from being given additional time to express a choice, simplified, clear language, repetition and teach-backs, reduced distractions, and memory-assistive cues/reminders.  Feedback will be given to treatment team.       Goals: Assess cognition, support mood/positive coping, patient/family education/cognitive remediation   Plan: Neuropsychology service 1-3 times weekly at 30-60 minutes per session

## 2023-12-02 RX ADMIN — CHLORHEXIDINE GLUCONATE 1 APPLICATION(S): 213 SOLUTION TOPICAL at 05:59

## 2023-12-02 RX ADMIN — PANTOPRAZOLE SODIUM 40 MILLIGRAM(S): 20 TABLET, DELAYED RELEASE ORAL at 06:13

## 2023-12-02 RX ADMIN — ATORVASTATIN CALCIUM 80 MILLIGRAM(S): 80 TABLET, FILM COATED ORAL at 21:36

## 2023-12-02 RX ADMIN — LOSARTAN POTASSIUM 100 MILLIGRAM(S): 100 TABLET, FILM COATED ORAL at 21:36

## 2023-12-02 RX ADMIN — TICAGRELOR 90 MILLIGRAM(S): 90 TABLET ORAL at 05:59

## 2023-12-02 RX ADMIN — Medication 81 MILLIGRAM(S): at 12:17

## 2023-12-02 RX ADMIN — MAGNESIUM OXIDE 400 MG ORAL TABLET 400 MILLIGRAM(S): 241.3 TABLET ORAL at 17:08

## 2023-12-02 RX ADMIN — TICAGRELOR 90 MILLIGRAM(S): 90 TABLET ORAL at 17:08

## 2023-12-02 RX ADMIN — ATENOLOL 50 MILLIGRAM(S): 25 TABLET ORAL at 05:59

## 2023-12-02 RX ADMIN — HEPARIN SODIUM 5000 UNIT(S): 5000 INJECTION INTRAVENOUS; SUBCUTANEOUS at 17:08

## 2023-12-02 RX ADMIN — HEPARIN SODIUM 5000 UNIT(S): 5000 INJECTION INTRAVENOUS; SUBCUTANEOUS at 06:00

## 2023-12-02 RX ADMIN — MAGNESIUM OXIDE 400 MG ORAL TABLET 400 MILLIGRAM(S): 241.3 TABLET ORAL at 08:14

## 2023-12-02 RX ADMIN — SENNA PLUS 2 TABLET(S): 8.6 TABLET ORAL at 21:36

## 2023-12-02 RX ADMIN — FESOTERODINE FUMARATE 4 MILLIGRAM(S): 8 TABLET, FILM COATED, EXTENDED RELEASE ORAL at 12:18

## 2023-12-02 RX ADMIN — PREGABALIN 1000 MICROGRAM(S): 225 CAPSULE ORAL at 12:17

## 2023-12-02 RX ADMIN — Medication 1 MILLIGRAM(S): at 12:17

## 2023-12-02 NOTE — PROGRESS NOTE ADULT - SUBJECTIVE AND OBJECTIVE BOX
Patient is a 83y old  Female who presents with a chief complaint of Rehab of left thalamic lacunar infarct resulting in ataxic gait, incoordination, and ADL dysfunction. (30 Nov 2023 12:31)      HPI:  83 year old female with PMHx of arthritis, hypertension, hyperlipidemia who presented to the ED for gait ataxia and peripheral extremity numbness. Patient reported that 3 days PTA she received a flu shot, and over the weekend while cleaning experienced onset of RUE pain/weakness, in which she rested on the cough and then after trying to get up endorsed problems with balance and experiencing distal numbness in bother her hands and feet, prompting presentation to the ED. On presentation, imaging revealed L thalamic acute lacunar infarct. CTA revealed marked calcification at the right and left common carotid artery bifurcations with severe stenosis at the level of the right carotid bulb, left CC bifurcation and ast the left carotid bulb. Patient underwent diagnostic cerebral angiogram on 11/28/23 due to stenotic findings on CTA, which revealed non flow limiting stenosis bilateral carotid arteries, and no carotid angioplasty/stenting was warranted. Symptoms of distal numbness improved overtime and patient hemodynamically stable. She has been medically stable. She has severe limitation of both shoulders at baseline.  Of note, the patient states that she was recently evaluated by a rheumatologist and was told that despite being on Remicade for years, that she has osteoarthritis and never had rheumatoid arthritis.     Living situation: Lives with family (daughter, son-in-law, grandkids) in private house, 0 Steps to enter, 12 Steps inside with stair lift.  Prior Level of Function: Ambulating independent with RW and rollator, Independent with ADL/iADLs    Evaluated by bedside physical therapy and occupational therapy. Patient is minimum to moderate assistance for bed mobility and transfers, ambulating 25 feet x3 with rolling walker minimum assistance.  Upper body dressing  and lower body dressing moderate/ max assistance, Prior to admission patient was independent with mobility and ambulation with a rolling walker, and independent in all ADLs, IADLs. Evaluated by physical medicine and rehabilitation consult and deemed an appropriate candidate for inpatient rehabilitation facility. Admitted to [facility] on  11/30/23   (30 Nov 2023 12:31)    I examined the patient and reviewed the chart. There have been no significant changes since my history and physical except where documented below.    TODAY'S SUBJECTIVE & REVIEW OF SYMPTOMS:  No acute overnight events. Pending iron studies for Monday.   Patient seen and examined bedside this morning. No acute complaints.   Participating and tolerating therapy. She has no pain at the moment.   Moving bowel and bladder appropriately  Vital signs reviewed, and stable.    Review of Systems:   Constitutional:    [  X ] WNL           [   ] poor appetite   [   ] insomnia   [   ] tired   Cardio:                [ X  ] WNL           [   ] CP   [   ] DEE   [   ] palpitations               Resp:                   [  X ] WNL           [   ] SOB   [   ] cough   [   ] wheezing   GI:                        [X   ] WNL           [   ] constipation   [   ] diarrhea   [   ] abdominal pain   [   ] nausea   [   ] emesis                                :                      [   ] WNL           [   ] LOPEZ  [   ] dysuria   [ X  ] difficulty voiding             Endo:                   [  X ] WNL          [   ] polyuria   [   ] temperature intolerance                 Skin:                     [  X ] WNL          [   ] pain   [   ] wound   [   ] rash   MSK:                    [   ] WNL          [   ] muscle pain   [  X ] joint pain/ stiffness shoulder, hips, knees, ankles/ feet     [   ] muscle tenderness   [   ] swelling   Neuro:                 [   ] WNL          [   ] HA   [   ] change in vision   [   ] tremor   [ X  ] weakness/ unsteady gait    [   ]dysphagia              Cognitive:           [ X  ] WNL           [   ]confusion      Psych:                  [ X  ] WNL           [   ] hallucinations   [   ]agitation   [   ] delusion   [   ]depression      PHYSICAL EXAM  Vital Signs Last 24 Hrs  T(C): 36.4 (02 Dec 2023 12:30), Max: 37 (01 Dec 2023 20:09)  T(F): 97.5 (02 Dec 2023 12:30), Max: 98.6 (01 Dec 2023 20:09)  HR: 57 (02 Dec 2023 12:30) (57 - 96)  BP: 108/54 (02 Dec 2023 12:30) (108/54 - 166/70)  BP(mean): --  RR: 18 (02 Dec 2023 12:30) (18 - 18)  SpO2: --    General:[ X  ] NAD, Resting Comfortable,   [   ] other:                                HEENT: [  X ] NC/AT, EOMI, PERRL , Normal Conjunctivae,   [   ] other:  Cardio: [ X  ] RRR   [ X  ] other:  Grade 3 crescendo-decrescendo  systolic murmur auscultated in R 2nd intercostal space. RRR                             Pulm: [ X  ] No Respiratory Distress,  Lungs CTAB,   [   ] other:                       Abdomen: [X   ]ND/NT, Soft,   [   ] other:    : [  X ] NO LOPEZ CATHETER,        [   ] LOPEZ CATHETER- no meatal tear, no discharge,                                         MSK: [   ] No joint swelling, Full ROM,   [ X  ] other: Functional PROM both shoulders. (-) 30 deg. passive DF both ankles     No AROM of R shoulder, 0-30 degrees AROM of L shoulder (2/2 OA)                                   Ext: [ X ]   No C/C/E, No calf tenderness,   [   ]other:    Skin: [  X ] intact,   [   ] other:                                                                   Neurological Examination:  Cognitive: [   X ] AAO x 3,   [    ]  other:                                                                      Attention:  [    ] intact,   [ X   ]  other:   Impairment in tasks that require concentration and attention                Memory: [    ] intact,    [ X   ]  other:   2/3 with short term recall  Mood/Affect: [   X ] wnl,    [    ]  other:                                                                             Communication: [   X ]Fluent, no dysarthria, following commands:  [    ] other:   CN II - XII:  [  X  ] intact,  [    ] other:                                                                                        Motor:   RIGHT UE: [   ] WNL,  [X   ] other: 4-/5 shoulder abduction (limited ROM due to OA), 4+/5 elbow flexion/extension, 4+/5 finger  strength   LEFT    UE: [   ] WNL,  [ X  ] other: 4-/5 shoulder abduction (limited ROM due to OA), 4+/5 elbow flexion/extension, 4+/5 finger  strength   RIGHT LE: [   ] WNL,  [ X  ] other:  5-/5 hip flexion, 5/5 knee flexion/extension, 2-/5 dorsiflexion due to limited passive range  LEFT    LE: [   ] WNL,  [  X ] other: 5-/5 hip flexion, 5/5 knee flexion/extension, 2-/5  dorsiflexion due to limited passive range    Tone: [  X  ] wnl,   [    ]  other:  DTRs: [  X ]symmetric, [   ] other:  Coordination:   [    ] intact,   [  X  ] other: Incoordinate with FNF bilaterally (worse R>L UE)                                                                          Sensory: [  X  ] Intact to light touch, temperature   [    ] other:    Decreased proprioception bilateral lower extremities  (-) garcia, babinski, clonus b/l extremities    MEDICATIONS  (STANDING):  aspirin enteric coated 81 milliGRAM(s) Oral daily  atenolol  Tablet 50 milliGRAM(s) Oral daily  atorvastatin 80 milliGRAM(s) Oral at bedtime  chlorhexidine 2% Cloths 1 Application(s) Topical <User Schedule>  cyanocobalamin 1000 MICROGram(s) Oral every 24 hours  fesoterodine ER Tablet 4 milliGRAM(s) Oral <User Schedule>  folic acid 1 milliGRAM(s) Oral daily  heparin   Injectable 5000 Unit(s) SubCutaneous every 12 hours  losartan 100 milliGRAM(s) Oral at bedtime  magnesium oxide 400 milliGRAM(s) Oral two times a day with meals  pantoprazole    Tablet 40 milliGRAM(s) Oral before breakfast  polyethylene glycol 3350 17 Gram(s) Oral at bedtime  senna 2 Tablet(s) Oral at bedtime  ticagrelor 90 milliGRAM(s) Oral every 12 hours    MEDICATIONS  (PRN):  albuterol    90 MICROgram(s) HFA Inhaler 2 Puff(s) Inhalation every 6 hours PRN for bronchospasm    RECENT LABS/IMAGING                          10.5   4.92  )-----------( 176      ( 01 Dec 2023 04:30 )             34.0     12-01    143  |  109  |  20  ----------------------------<  96  4.1   |  22  |  1.1    Ca    8.7      01 Dec 2023 04:30  Mg     1.7     12-01    TPro  5.8<L>  /  Alb  3.2<L>  /  TBili  0.5  /  DBili  x   /  AST  14  /  ALT  17  /  AlkPhos  70  12-01      Urinalysis Basic - ( 01 Dec 2023 04:30 )    Color: x / Appearance: x / SG: x / pH: x  Gluc: 96 mg/dL / Ketone: x  / Bili: x / Urobili: x   Blood: x / Protein: x / Nitrite: x   Leuk Esterase: x / RBC: x / WBC x   Sq Epi: x / Non Sq Epi: x / Bacteria: x       Patient is a 83y old  Female who presents with a chief complaint of Rehab of left thalamic lacunar infarct resulting in ataxic gait, incoordination, and ADL dysfunction. (30 Nov 2023 12:31)      HPI:  83 year old female with PMHx of arthritis, hypertension, hyperlipidemia who presented to the ED for gait ataxia and peripheral extremity numbness. Patient reported that 3 days PTA she received a flu shot, and over the weekend while cleaning experienced onset of RUE pain/weakness, in which she rested on the cough and then after trying to get up endorsed problems with balance and experiencing distal numbness in bother her hands and feet, prompting presentation to the ED. On presentation, imaging revealed L thalamic acute lacunar infarct. CTA revealed marked calcification at the right and left common carotid artery bifurcations with severe stenosis at the level of the right carotid bulb, left CC bifurcation and ast the left carotid bulb. Patient underwent diagnostic cerebral angiogram on 11/28/23 due to stenotic findings on CTA, which revealed non flow limiting stenosis bilateral carotid arteries, and no carotid angioplasty/stenting was warranted. Symptoms of distal numbness improved overtime and patient hemodynamically stable. She has been medically stable. She has severe limitation of both shoulders at baseline.  Of note, the patient states that she was recently evaluated by a rheumatologist and was told that despite being on Remicade for years, that she has osteoarthritis and never had rheumatoid arthritis.     Living situation: Lives with family (daughter, son-in-law, grandkids) in private house, 0 Steps to enter, 12 Steps inside with stair lift.  Prior Level of Function: Ambulating independent with RW and rollator, Independent with ADL/iADLs    Evaluated by bedside physical therapy and occupational therapy. Patient is minimum to moderate assistance for bed mobility and transfers, ambulating 25 feet x3 with rolling walker minimum assistance.  Upper body dressing  and lower body dressing moderate/ max assistance, Prior to admission patient was independent with mobility and ambulation with a rolling walker, and independent in all ADLs, IADLs. Evaluated by physical medicine and rehabilitation consult and deemed an appropriate candidate for inpatient rehabilitation facility. Admitted to [facility] on  11/30/23   (30 Nov 2023 12:31)    I examined the patient and reviewed the chart. There have been no significant changes since my history and physical except where documented below.    TODAY'S SUBJECTIVE & REVIEW OF SYMPTOMS:  No acute overnight events. Pending iron studies for Monday.   Patient seen and examined bedside this morning. No acute complaints.   Participating and tolerating therapy. She has no pain at the moment.   Moving bowel and bladder appropriately  Vital signs reviewed, and stable.    Review of Systems:   Constitutional:    [  X ] WNL           [   ] poor appetite   [   ] insomnia   [   ] tired   Cardio:                [ X  ] WNL           [   ] CP   [   ] DEE   [   ] palpitations               Resp:                   [  X ] WNL           [   ] SOB   [   ] cough   [   ] wheezing   GI:                        [X   ] WNL           [   ] constipation   [   ] diarrhea   [   ] abdominal pain   [   ] nausea   [   ] emesis                                :                      [   ] WNL           [   ] LOPEZ  [   ] dysuria   [ X  ] difficulty voiding             Endo:                   [  X ] WNL          [   ] polyuria   [   ] temperature intolerance                 Skin:                     [  X ] WNL          [   ] pain   [   ] wound   [   ] rash   MSK:                    [   ] WNL          [   ] muscle pain   [  X ] joint pain/ stiffness shoulder, hips, knees, ankles/ feet     [   ] muscle tenderness   [   ] swelling   Neuro:                 [   ] WNL          [   ] HA   [   ] change in vision   [   ] tremor   [ X  ] weakness/ unsteady gait    [   ]dysphagia              Cognitive:           [ X  ] WNL           [   ]confusion      Psych:                  [ X  ] WNL           [   ] hallucinations   [   ]agitation   [   ] delusion   [   ]depression      PHYSICAL EXAM  Vital Signs Last 24 Hrs  T(C): 36.4 (02 Dec 2023 12:30), Max: 37 (01 Dec 2023 20:09)  T(F): 97.5 (02 Dec 2023 12:30), Max: 98.6 (01 Dec 2023 20:09)  HR: 57 (02 Dec 2023 12:30) (57 - 96)  BP: 108/54 (02 Dec 2023 12:30) (108/54 - 166/70)  BP(mean): --  RR: 18 (02 Dec 2023 12:30) (18 - 18)  SpO2: --    General:[ X  ] NAD, Resting Comfortable,   [   ] other:                                HEENT: [  X ] NC/AT, EOMI, PERRL , Normal Conjunctivae,   [   ] other:  Cardio: [ X  ] RRR   [ X  ] other:  Grade 3 crescendo-decrescendo  systolic murmur auscultated in R 2nd intercostal space. RRR                             Pulm: [ X  ] No Respiratory Distress,  Lungs CTAB,   [   ] other:                       Abdomen: [X   ]ND/NT, Soft,   [   ] other:    : [  X ] NO LOPEZ CATHETER,        [   ] LOPEZ CATHETER- no meatal tear, no discharge,                                         MSK: [   ] No joint swelling, Full ROM,   [ X  ] other: Functional PROM both shoulders. (-) 30 deg. passive DF both ankles     No AROM of R shoulder, 0-30 degrees AROM of L shoulder (2/2 OA)                                   Ext: [ X ]   No C/C/E, No calf tenderness,   [   ]other:    Skin: [  X ] intact,   [   ] other:                                                                   Neurological Examination:  Cognitive: [   X ] AAO x 3,   [    ]  other:                                                                      Attention:  [    ] intact,   [ X   ]  other:   Impairment in tasks that require concentration and attention                Memory: [    ] intact,    [ X   ]  other:   2/3 with short term recall  Mood/Affect: [   X ] wnl,    [    ]  other:                                                                             Communication: [   X ]Fluent, no dysarthria, following commands:  [    ] other:   CN II - XII:  [  X  ] intact,  [    ] other:                                                                                        Motor:   RIGHT UE: [   ] WNL,  [X   ] other: 4-/5 shoulder abduction (limited ROM due to OA), 4+/5 elbow flexion/extension, 4+/5 finger  strength   LEFT    UE: [   ] WNL,  [ X  ] other: 4-/5 shoulder abduction (limited ROM due to OA), 4+/5 elbow flexion/extension, 4+/5 finger  strength   RIGHT LE: [   ] WNL,  [ X  ] other:  5-/5 hip flexion, 5/5 knee flexion/extension, 2-/5 dorsiflexion due to limited passive range  LEFT    LE: [   ] WNL,  [  X ] other: 5-/5 hip flexion, 5/5 knee flexion/extension, 2-/5  dorsiflexion due to limited passive range    Tone: [  X  ] wnl,   [    ]  other:  DTRs: [  X ]symmetric, [   ] other:  Coordination:   [    ] intact,   [  X  ] other: Incoordinate with FNF bilaterally (worse R>L UE)                                                                          Sensory: [  X  ] Intact to light touch, temperature   [    ] other:    Decreased proprioception bilateral lower extremities  (-) garcia, babinski, clonus b/l extremities    MEDICATIONS  (STANDING):  aspirin enteric coated 81 milliGRAM(s) Oral daily  atenolol  Tablet 50 milliGRAM(s) Oral daily  atorvastatin 80 milliGRAM(s) Oral at bedtime  chlorhexidine 2% Cloths 1 Application(s) Topical <User Schedule>  cyanocobalamin 1000 MICROGram(s) Oral every 24 hours  fesoterodine ER Tablet 4 milliGRAM(s) Oral <User Schedule>  folic acid 1 milliGRAM(s) Oral daily  heparin   Injectable 5000 Unit(s) SubCutaneous every 12 hours  losartan 100 milliGRAM(s) Oral at bedtime  magnesium oxide 400 milliGRAM(s) Oral two times a day with meals  pantoprazole    Tablet 40 milliGRAM(s) Oral before breakfast  polyethylene glycol 3350 17 Gram(s) Oral at bedtime  senna 2 Tablet(s) Oral at bedtime  ticagrelor 90 milliGRAM(s) Oral every 12 hours    MEDICATIONS  (PRN):  albuterol    90 MICROgram(s) HFA Inhaler 2 Puff(s) Inhalation every 6 hours PRN for bronchospasm    RECENT LABS/IMAGING                          10.5   4.92  )-----------( 176      ( 01 Dec 2023 04:30 )             34.0     12-01    143  |  109  |  20  ----------------------------<  96  4.1   |  22  |  1.1    Ca    8.7      01 Dec 2023 04:30  Mg     1.7     12-01    TPro  5.8<L>  /  Alb  3.2<L>  /  TBili  0.5  /  DBili  x   /  AST  14  /  ALT  17  /  AlkPhos  70  12-01      Urinalysis Basic - ( 01 Dec 2023 04:30 )    Color: x / Appearance: x / SG: x / pH: x  Gluc: 96 mg/dL / Ketone: x  / Bili: x / Urobili: x   Blood: x / Protein: x / Nitrite: x   Leuk Esterase: x / RBC: x / WBC x   Sq Epi: x / Non Sq Epi: x / Bacteria: x       Patient is a 83y old  Female who presents with a chief complaint of Rehab of left thalamic lacunar infarct resulting in ataxic gait, incoordination, and ADL dysfunction. (30 Nov 2023 12:31)      HPI:  83 year old female with PMHx of arthritis, hypertension, hyperlipidemia who presented to the ED for gait ataxia and peripheral extremity numbness. Patient reported that 3 days PTA she received a flu shot, and over the weekend while cleaning experienced onset of RUE pain/weakness, in which she rested on the cough and then after trying to get up endorsed problems with balance and experiencing distal numbness in bother her hands and feet, prompting presentation to the ED. On presentation, imaging revealed L thalamic acute lacunar infarct. CTA revealed marked calcification at the right and left common carotid artery bifurcations with severe stenosis at the level of the right carotid bulb, left CC bifurcation and ast the left carotid bulb. Patient underwent diagnostic cerebral angiogram on 11/28/23 due to stenotic findings on CTA, which revealed non flow limiting stenosis bilateral carotid arteries, and no carotid angioplasty/stenting was warranted. Symptoms of distal numbness improved overtime and patient hemodynamically stable. She has been medically stable. She has severe limitation of both shoulders at baseline.  Of note, the patient states that she was recently evaluated by a rheumatologist and was told that despite being on Remicade for years, that she has osteoarthritis and never had rheumatoid arthritis.     Living situation: Lives with family (daughter, son-in-law, grandkids) in private house, 0 Steps to enter, 12 Steps inside with stair lift.  Prior Level of Function: Ambulating independent with RW and rollator, Independent with ADL/iADLs    Evaluated by bedside physical therapy and occupational therapy. Patient is minimum to moderate assistance for bed mobility and transfers, ambulating 25 feet x3 with rolling walker minimum assistance.  Upper body dressing  and lower body dressing moderate/ max assistance, Prior to admission patient was independent with mobility and ambulation with a rolling walker, and independent in all ADLs, IADLs. Evaluated by physical medicine and rehabilitation consult and deemed an appropriate candidate for inpatient rehabilitation facility. Admitted to [facility] on  11/30/23   (30 Nov 2023 12:31)    I examined the patient and reviewed the chart. There have been no significant changes since my history and physical except where documented below.    TODAY'S SUBJECTIVE & REVIEW OF SYMPTOMS:  No acute overnight events. Pending iron studies for Monday.   Patient seen and examined bedside this morning. No acute complaints.   Participating and tolerating therapy. She has no pain at the moment.   Moving bowel and bladder appropriately  Vital signs reviewed, and stable.    Review of Systems:   Constitutional:    [  X ] WNL           [   ] poor appetite   [   ] insomnia   [   ] tired   Cardio:                [ X  ] WNL           [   ] CP   [   ] DEE   [   ] palpitations               Resp:                   [  X ] WNL           [   ] SOB   [   ] cough   [   ] wheezing   GI:                        [X   ] WNL           [   ] constipation   [   ] diarrhea   [   ] abdominal pain   [   ] nausea   [   ] emesis                                :                      [   ] WNL           [   ] LOPEZ  [   ] dysuria   [ X  ] difficulty voiding             Endo:                   [  X ] WNL          [   ] polyuria   [   ] temperature intolerance                 Skin:                     [  X ] WNL          [   ] pain   [   ] wound   [   ] rash   MSK:                    [   ] WNL          [   ] muscle pain   [  X ] joint pain/ stiffness shoulder, hips, knees, ankles/ feet     [   ] muscle tenderness   [   ] swelling   Neuro:                 [   ] WNL          [   ] HA   [   ] change in vision   [   ] tremor   [ X  ] weakness/ unsteady gait    [   ]dysphagia              Cognitive:           [ X  ] WNL           [   ]confusion      Psych:                  [ X  ] WNL           [   ] hallucinations   [   ]agitation   [   ] delusion   [   ]depression      PHYSICAL EXAM  Vital Signs Last 24 Hrs  T(C): 36.4 (02 Dec 2023 12:30), Max: 37 (01 Dec 2023 20:09)  T(F): 97.5 (02 Dec 2023 12:30), Max: 98.6 (01 Dec 2023 20:09)  HR: 57 (02 Dec 2023 12:30) (57 - 96)  BP: 108/54 (02 Dec 2023 12:30) (108/54 - 166/70)  BP(mean): --  RR: 18 (02 Dec 2023 12:30) (18 - 18)  SpO2: --    General:[ X  ] NAD, Resting Comfortable,   [   ] other:                                HEENT: [  X ] NC/AT, EOMI, PERRL , Normal Conjunctivae,   [   ] other:  Cardio: [ X  ] RRR   [ X  ] other:  Grade 3 crescendo-decrescendo  systolic murmur auscultated in R 2nd intercostal space. RRR                             Pulm: [ X  ] No Respiratory Distress,  Lungs CTAB,   [   ] other:                       Abdomen: [X   ]ND/NT, Soft,   [   ] other:    : [  X ] NO LOPEZ CATHETER,        [   ] LOPEZ CATHETER- no meatal tear, no discharge,                                         MSK: [   ] No joint swelling, Full ROM,   [ X  ] other: Functional PROM both shoulders. (-) 30 deg. passive DF both ankles     No AROM of R shoulder, 0-30 degrees AROM of L shoulder (2/2 OA)                                   Ext: [ X ]   No C/C/E, No calf tenderness,   [   ]other:    Skin: [  X ] intact,   [   ] other:                                                                   Neurological Examination:  Cognitive: [   X ] AAO x 3,   [    ]  other:                                                                      Attention:  [    ] intact,   [ X   ]  other:   Impairment in tasks that require concentration and attention                Memory: [    ] intact,    [ X   ]  other:   2/3 with short term recall  Mood/Affect: [   X ] wnl,    [    ]  other:                                                                             Communication: [   X ]Fluent, no dysarthria, following commands:  [    ] other:   CN II - XII:  [  X  ] intact,  [    ] other:                                                                                        Motor:   RIGHT UE: [   ] WNL,  [X   ] other: 4-/5 shoulder abduction (limited ROM due to OA), 4+/5 elbow flexion/extension, 4+/5 finger  strength   LEFT    UE: [   ] WNL,  [ X  ] other: 4-/5 shoulder abduction (limited ROM due to OA), 4+/5 elbow flexion/extension, 4+/5 finger  strength   RIGHT LE: [   ] WNL,  [ X  ] other:  5-/5 hip flexion, 5/5 knee flexion/extension, 2-/5 dorsiflexion due to limited passive range  LEFT    LE: [   ] WNL,  [  X ] other: 5-/5 hip flexion, 5/5 knee flexion/extension, 2-/5  dorsiflexion due to limited passive range    Tone: [  X  ] wnl,   [    ]  other:  DTRs: [  X ]symmetric, [   ] other:  Coordination:   [    ] intact,   [  X  ] other: Incoordinate with FNF bilaterally (worse R>L UE)                                                                          Sensory: [  X  ] Intact to light touch, temperature   [    ] other:    Decreased proprioception bilateral lower extremities  (-) garcia, babinski, clonus b/l extremities    MEDICATIONS  (STANDING):  aspirin enteric coated 81 milliGRAM(s) Oral daily  atenolol  Tablet 50 milliGRAM(s) Oral daily  atorvastatin 80 milliGRAM(s) Oral at bedtime  chlorhexidine 2% Cloths 1 Application(s) Topical <User Schedule>  cyanocobalamin 1000 MICROGram(s) Oral every 24 hours  fesoterodine ER Tablet 4 milliGRAM(s) Oral <User Schedule>  folic acid 1 milliGRAM(s) Oral daily  heparin   Injectable 5000 Unit(s) SubCutaneous every 12 hours  losartan 100 milliGRAM(s) Oral at bedtime  magnesium oxide 400 milliGRAM(s) Oral two times a day with meals  pantoprazole    Tablet 40 milliGRAM(s) Oral before breakfast  polyethylene glycol 3350 17 Gram(s) Oral at bedtime  senna 2 Tablet(s) Oral at bedtime  ticagrelor 90 milliGRAM(s) Oral every 12 hours    MEDICATIONS  (PRN):  albuterol    90 MICROgram(s) HFA Inhaler 2 Puff(s) Inhalation every 6 hours PRN for bronchospasm    RECENT LABS/IMAGING                          10.5   4.92  )-----------( 176      ( 01 Dec 2023 04:30 )             34.0     12-01    143  |  109  |  20  ----------------------------<  96  4.1   |  22  |  1.1    Ca    8.7      01 Dec 2023 04:30  Mg     1.7     12-01    TPro  5.8<L>  /  Alb  3.2<L>  /  TBili  0.5  /  DBili  x   /  AST  14  /  ALT  17  /  AlkPhos  70  12-01      Urinalysis Basic - ( 01 Dec 2023 04:30 )    Color: x / Appearance: x / SG: x / pH: x  Gluc: 96 mg/dL / Ketone: x  / Bili: x / Urobili: x   Blood: x / Protein: x / Nitrite: x   Leuk Esterase: x / RBC: x / WBC x   Sq Epi: x / Non Sq Epi: x / Bacteria: x

## 2023-12-02 NOTE — PROGRESS NOTE ADULT - ASSESSMENT
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 s/p DSA with no stenting.     #Rehab of left thalamic lacunar infarct in the setting of bilateral carotid artery stenosis resulting in ataxic gait, incoordination and ADL dysfunction.   *MRI Brain: Left thalamic acute lacunar infarct. No acute hemorrhage  s/p DSA 11/28: non flow limiting stenosis bilateral carotid arteries. No carotid angioplasty/stenting warranted.     - continue inpatient Rehab   - Patient is on full rehab program of 3 hours a day of PT, OT, and/or SLP, for 5-6 days a week, for a total of 15 hours a week.  - Precautions/restrictions: Safety precautions, fall precautions  - Pain control: Tylenol 650mg q6hr PRN  - C/w  ASA 81 mg once daily & Brillinta 90mg qD.  (Plavix switched to Brilinta 90mg twice daily (per neuroendovascular request) given PRU result of 278. Patient will stay on that regimen for 90 days.  - c/w atorvastatin 80mg once daily    #Peripheral Polyneuropathy of unknown etiology  - significant loss of proprioception and cold sensitivity in both distal LEs.   - possibly associated with Remicade use  - DDx include acute inflammatory demyelinating polyneuropathy, metabolic demyelinization (2/2 vitamin deficiency), autoimmune disorder  - Additional testing: ESR 57 , CRP <3.0, Vitamin b12, folate >20, HbA1C 5.3, , Lyme antibody, TSH 2.19, IgA LEVEL, Ganglioside antibodies including anti GM1 (-), GM2 (+), GD1a (-), GD1b (-), and GQ1b (-), SPEP, UPEP, Vitb12 413, VitB1, Vit-E, Homocysteine, Methylmalonic acid. Copper, Zinc, HIV (-), RPR (-)     #HTN  - Losartan 100mg QD  - Atenolol 50mg QD  -Continue to monitor hemodynamics    #Hx of Asthma  -States no flares in the past several years. Albuterol at home  -c/w Proventil q6hr PRN    #Arthritis  -Patient has history of RA diagnosis in which she was taking Remicade for many years, however from recent rheumatology evaluation outpatient by Dr. Cruz, diagnosed as OA, not RA  - severe pain and limited mobility of tj ankles and shoulders and deformities of hands and knees.  -PT/OT to     #Aortic valve stenosis, mod to severe  -Outpatient followup with Dr. Patel.   TTE: EF 50-55% with moderate to severe aortic valve stenosis.    #HLD  -Atorvastatin 80mg QD    #Urinary incontinence  Patient has history of urinary frequency and incontinent episodes at home  -Currently on Oxybutynin 5mg BID however patient reports dry mouth and poor tolerance to med  -Takes Fesoterdoine Fumerate 4mg at home. Will follow up for non formulary     #macrocytic anemia  - stable, no overt bleeding  - fu iron studies   - transfuse if hemoglobin < 7      -Pain control: Tylenol PRN    -GI/Bowel Mgmt: Senna, Miralax    -Bladder management: Urinary incontinent, will monitor and adjust meds     -Skin:  No active issues at this time    -FEN will monitor and supplement    - Diet: DASH       Precautions / PROPHYLAXIS:      - Falls    - Ortho: Weight bearing status: wbat      - DVT prophylaxis: Heparin BID

## 2023-12-03 RX ADMIN — PREGABALIN 1000 MICROGRAM(S): 225 CAPSULE ORAL at 12:10

## 2023-12-03 RX ADMIN — Medication 1 MILLIGRAM(S): at 12:09

## 2023-12-03 RX ADMIN — ATORVASTATIN CALCIUM 80 MILLIGRAM(S): 80 TABLET, FILM COATED ORAL at 21:27

## 2023-12-03 RX ADMIN — MAGNESIUM OXIDE 400 MG ORAL TABLET 400 MILLIGRAM(S): 241.3 TABLET ORAL at 17:05

## 2023-12-03 RX ADMIN — SENNA PLUS 2 TABLET(S): 8.6 TABLET ORAL at 21:28

## 2023-12-03 RX ADMIN — HEPARIN SODIUM 5000 UNIT(S): 5000 INJECTION INTRAVENOUS; SUBCUTANEOUS at 06:38

## 2023-12-03 RX ADMIN — Medication 81 MILLIGRAM(S): at 12:09

## 2023-12-03 RX ADMIN — LOSARTAN POTASSIUM 100 MILLIGRAM(S): 100 TABLET, FILM COATED ORAL at 21:28

## 2023-12-03 RX ADMIN — TICAGRELOR 90 MILLIGRAM(S): 90 TABLET ORAL at 05:53

## 2023-12-03 RX ADMIN — PANTOPRAZOLE SODIUM 40 MILLIGRAM(S): 20 TABLET, DELAYED RELEASE ORAL at 05:53

## 2023-12-03 RX ADMIN — ATENOLOL 50 MILLIGRAM(S): 25 TABLET ORAL at 05:53

## 2023-12-03 RX ADMIN — FESOTERODINE FUMARATE 4 MILLIGRAM(S): 8 TABLET, FILM COATED, EXTENDED RELEASE ORAL at 12:10

## 2023-12-03 RX ADMIN — TICAGRELOR 90 MILLIGRAM(S): 90 TABLET ORAL at 17:06

## 2023-12-03 RX ADMIN — MAGNESIUM OXIDE 400 MG ORAL TABLET 400 MILLIGRAM(S): 241.3 TABLET ORAL at 08:26

## 2023-12-03 RX ADMIN — HEPARIN SODIUM 5000 UNIT(S): 5000 INJECTION INTRAVENOUS; SUBCUTANEOUS at 17:06

## 2023-12-03 RX ADMIN — CHLORHEXIDINE GLUCONATE 1 APPLICATION(S): 213 SOLUTION TOPICAL at 05:54

## 2023-12-03 NOTE — PROGRESS NOTE ADULT - ASSESSMENT
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 s/p DSA with no stenting.     #Rehab of left thalamic lacunar infarct in the setting of bilateral carotid artery stenosis resulting in ataxic gait, incoordination and ADL dysfunction.   *MRI Brain: Left thalamic acute lacunar infarct. No acute hemorrhage  s/p DSA 11/28: non flow limiting stenosis bilateral carotid arteries. No carotid angioplasty/stenting warranted.     - continue inpatient Rehab   - Patient is on full rehab program of 3 hours a day of PT, OT, and/or SLP, for 5-6 days a week, for a total of 15 hours a week.  - Precautions/restrictions: Safety precautions, fall precautions  - Pain control: Tylenol 650mg q6hr PRN  - C/w  ASA 81 mg once daily & Brillinta 90mg qD.  (Plavix switched to Brilinta 90mg twice daily (per neuroendovascular request) given PRU result of 278. Patient will stay on that regimen for 90 days.  - c/w atorvastatin 80mg once daily    #Peripheral Polyneuropathy of unknown etiology  - significant loss of proprioception and cold sensitivity in both distal LEs.   - possibly associated with Remicade use  - DDx include acute inflammatory demyelinating polyneuropathy, metabolic demyelinization (2/2 vitamin deficiency), autoimmune disorder  - Additional testing: ESR 57 , CRP <3.0, Vitamin b12, folate >20, HbA1C 5.3, , Lyme antibody, TSH 2.19, IgA LEVEL, Ganglioside antibodies including anti GM1 (-), GM2 (+), GD1a (-), GD1b (-), and GQ1b (-), SPEP, UPEP, Vitb12 413, VitB1, Vit-E, Homocysteine, Methylmalonic acid. Copper, Zinc, HIV (-), RPR (-)     #HTN  - Losartan 100mg QD  - Atenolol 50mg QD  -Continue to monitor hemodynamics    #Hx of Asthma  -States no flares in the past several years. Albuterol at home  -c/w Proventil q6hr PRN    #Arthritis  -Patient has history of RA diagnosis in which she was taking Remicade for many years, however from recent rheumatology evaluation outpatient by Dr. Cruz, diagnosed as OA, not RA  - severe pain and limited mobility of tj ankles and shoulders and deformities of hands and knees.  -PT/OT      #Aortic valve stenosis, mod to severe  -Outpatient followup with Dr. Patel.   TTE: EF 50-55% with moderate to severe aortic valve stenosis.    #HLD  -Atorvastatin 80mg QD    #Urinary incontinence (chronic)  Patient has history of urinary frequency and incontinent episodes at home  -Discontinued Oxybutynin 5mg BID (patient reports dry mouth and poor tolerance to med)  -Started Fesoterdoine Fumerate 4mg (home, non formulary)    #macrocytic anemia  - stable, no overt bleeding  - fu iron studies   - transfuse if hemoglobin < 7      -Pain control: Tylenol PRN    -GI/Bowel Mgmt: Senna, Miralax    -Bladder management: Urinary incontinent, will monitor and adjust meds     -Skin:  No active issues at this time    -FEN will monitor and supplement    - Diet: DASH       Precautions / PROPHYLAXIS:      - Falls    - Ortho: Weight bearing status: wbat      - DVT prophylaxis: Heparin BID

## 2023-12-03 NOTE — PROGRESS NOTE ADULT - SUBJECTIVE AND OBJECTIVE BOX
Patient is a 83y old  Female who presents with a chief complaint of Rehab of left thalamic lacunar infarct resulting in ataxic gait, incoordination, and ADL dysfunction. (30 Nov 2023 12:31)      HPI:  83 year old female with PMHx of arthritis, hypertension, hyperlipidemia who presented to the ED for gait ataxia and peripheral extremity numbness. Patient reported that 3 days PTA she received a flu shot, and over the weekend while cleaning experienced onset of RUE pain/weakness, in which she rested on the cough and then after trying to get up endorsed problems with balance and experiencing distal numbness in bother her hands and feet, prompting presentation to the ED. On presentation, imaging revealed L thalamic acute lacunar infarct. CTA revealed marked calcification at the right and left common carotid artery bifurcations with severe stenosis at the level of the right carotid bulb, left CC bifurcation and ast the left carotid bulb. Patient underwent diagnostic cerebral angiogram on 11/28/23 due to stenotic findings on CTA, which revealed non flow limiting stenosis bilateral carotid arteries, and no carotid angioplasty/stenting was warranted. Symptoms of distal numbness improved overtime and patient hemodynamically stable. She has been medically stable. She has severe limitation of both shoulders at baseline.  Of note, the patient states that she was recently evaluated by a rheumatologist and was told that despite being on Remicade for years, that she has osteoarthritis and never had rheumatoid arthritis.     Living situation: Lives with family (daughter, son-in-law, grandkids) in private house, 0 Steps to enter, 12 Steps inside with stair lift.  Prior Level of Function: Ambulating independent with RW and rollator, Independent with ADL/iADLs    Evaluated by bedside physical therapy and occupational therapy. Patient is minimum to moderate assistance for bed mobility and transfers, ambulating 25 feet x3 with rolling walker minimum assistance.  Upper body dressing  and lower body dressing moderate/ max assistance, Prior to admission patient was independent with mobility and ambulation with a rolling walker, and independent in all ADLs, IADLs. Evaluated by physical medicine and rehabilitation consult and deemed an appropriate candidate for inpatient rehabilitation facility. Admitted to [facility] on  11/30/23   (30 Nov 2023 12:31)    I examined the patient and reviewed the chart. There have been no significant changes since my history and physical except where documented below.    TODAY'S SUBJECTIVE & REVIEW OF SYMPTOMS:  No acute overnight events.   Patient seen this morning, no complaints at the moment, she is doing well.   Participating and tolerating therapy.   Moving bowel and bladder appropriately  Vital signs reviewed, and stable.    Review of Systems:   Constitutional:    [  X ] WNL           [   ] poor appetite   [   ] insomnia   [   ] tired   Cardio:                [ X  ] WNL           [   ] CP   [   ] DEE   [   ] palpitations               Resp:                   [  X ] WNL           [   ] SOB   [   ] cough   [   ] wheezing   GI:                        [X   ] WNL           [   ] constipation   [   ] diarrhea   [   ] abdominal pain   [   ] nausea   [   ] emesis                                :                      [   ] WNL           [   ] LOPEZ  [   ] dysuria   [ X  ] difficulty voiding             Endo:                   [  X ] WNL          [   ] polyuria   [   ] temperature intolerance                 Skin:                     [  X ] WNL          [   ] pain   [   ] wound   [   ] rash   MSK:                    [   ] WNL          [   ] muscle pain   [  X ] joint pain/ stiffness shoulder, hips, knees, ankles/ feet     [   ] muscle tenderness   [   ] swelling   Neuro:                 [   ] WNL          [   ] HA   [   ] change in vision   [   ] tremor   [ X  ] weakness/ unsteady gait    [   ]dysphagia              Cognitive:           [ X  ] WNL           [   ]confusion      Psych:                  [ X  ] WNL           [   ] hallucinations   [   ]agitation   [   ] delusion   [   ]depression      PHYSICAL EXAM  Vital Signs Last 24 Hrs  T(C): 36.2 (03 Dec 2023 05:15), Max: 36.4 (02 Dec 2023 12:30)  T(F): 97.2 (03 Dec 2023 05:15), Max: 97.6 (02 Dec 2023 20:30)  HR: 69 (03 Dec 2023 05:15) (57 - 69)  BP: 155/67 (03 Dec 2023 05:15) (108/54 - 155/67)  BP(mean): --  RR: 19 (03 Dec 2023 05:15) (18 - 19)  SpO2: --    General:[ X  ] NAD, Resting Comfortable,   [   ] other:                                HEENT: [  X ] NC/AT, EOMI, PERRL , Normal Conjunctivae,   [   ] other:  Cardio: [ X  ] RRR   [ X  ] other:  Grade 3 crescendo-decrescendo  systolic murmur auscultated in R 2nd intercostal space. RRR                             Pulm: [ X  ] No Respiratory Distress,  Lungs CTAB,   [   ] other:                       Abdomen: [X   ]ND/NT, Soft,   [   ] other:    : [  X ] NO LOPEZ CATHETER,        [   ] LOPEZ CATHETER- no meatal tear, no discharge,                                         MSK: [   ] No joint swelling, Full ROM,   [ X  ] other: Functional PROM both shoulders. (-) 30 deg. passive DF both ankles     No AROM of R shoulder, 0-30 degrees AROM of L shoulder (2/2 OA)                                   Ext: [ X ]   No C/C/E, No calf tenderness,   [   ]other:    Skin: [  X ] intact,   [   ] other:                                                                   Neurological Examination:  Cognitive: [   X ] AAO x 3,   [    ]  other:                                                                      Attention:  [    ] intact,   [ X   ]  other:   Impairment in tasks that require concentration and attention                Memory: [    ] intact,    [ X   ]  other:   2/3 with short term recall  Mood/Affect: [   X ] wnl,    [    ]  other:                                                                             Communication: [   X ]Fluent, no dysarthria, following commands:  [    ] other:   CN II - XII:  [  X  ] intact,  [    ] other:                                                                                        Motor:   RIGHT UE: [   ] WNL,  [X   ] other: 4-/5 shoulder abduction (limited ROM due to OA), 4+/5 elbow flexion/extension, 4+/5 finger  strength   LEFT    UE: [   ] WNL,  [ X  ] other: 4-/5 shoulder abduction (limited ROM due to OA), 4+/5 elbow flexion/extension, 4+/5 finger  strength   RIGHT LE: [   ] WNL,  [ X  ] other:  5-/5 hip flexion, 5/5 knee flexion/extension, 2-/5 dorsiflexion due to limited passive range  LEFT    LE: [   ] WNL,  [  X ] other: 5-/5 hip flexion, 5/5 knee flexion/extension, 2-/5  dorsiflexion due to limited passive range    Tone: [  X  ] wnl,   [    ]  other:  DTRs: [  X ]symmetric, [   ] other:  Coordination:   [    ] intact,   [  X  ] other: Incoordinate with FNF bilaterally (worse R>L UE)                                                                          Sensory: [  X  ] Intact to light touch, temperature   [    ] other:    Decreased proprioception bilateral lower extremities  (-) garcia, babinski, clonus b/l extremities    MEDICATIONS  (STANDING):  aspirin enteric coated 81 milliGRAM(s) Oral daily  atenolol  Tablet 50 milliGRAM(s) Oral daily  atorvastatin 80 milliGRAM(s) Oral at bedtime  chlorhexidine 2% Cloths 1 Application(s) Topical <User Schedule>  cyanocobalamin 1000 MICROGram(s) Oral every 24 hours  fesoterodine ER Tablet 4 milliGRAM(s) Oral <User Schedule>  folic acid 1 milliGRAM(s) Oral daily  heparin   Injectable 5000 Unit(s) SubCutaneous every 12 hours  losartan 100 milliGRAM(s) Oral at bedtime  magnesium oxide 400 milliGRAM(s) Oral two times a day with meals  pantoprazole    Tablet 40 milliGRAM(s) Oral before breakfast  polyethylene glycol 3350 17 Gram(s) Oral at bedtime  senna 2 Tablet(s) Oral at bedtime  ticagrelor 90 milliGRAM(s) Oral every 12 hours    MEDICATIONS  (PRN):  albuterol    90 MICROgram(s) HFA Inhaler 2 Puff(s) Inhalation every 6 hours PRN for bronchospasm      RECENT LABS/IMAGING     Patient is a 83y old  Female who presents with a chief complaint of Rehab of left thalamic lacunar infarct resulting in ataxic gait, incoordination, and ADL dysfunction. (30 Nov 2023 12:31)      HPI:  83 year old female with PMHx of arthritis, hypertension, hyperlipidemia who presented to the ED for gait ataxia and peripheral extremity numbness. Patient reported that 3 days PTA she received a flu shot, and over the weekend while cleaning experienced onset of RUE pain/weakness, in which she rested on the cough and then after trying to get up endorsed problems with balance and experiencing distal numbness in bother her hands and feet, prompting presentation to the ED. On presentation, imaging revealed L thalamic acute lacunar infarct. CTA revealed marked calcification at the right and left common carotid artery bifurcations with severe stenosis at the level of the right carotid bulb, left CC bifurcation and ast the left carotid bulb. Patient underwent diagnostic cerebral angiogram on 11/28/23 due to stenotic findings on CTA, which revealed non flow limiting stenosis bilateral carotid arteries, and no carotid angioplasty/stenting was warranted. Symptoms of distal numbness improved overtime and patient hemodynamically stable. She has been medically stable. She has severe limitation of both shoulders at baseline.  Of note, the patient states that she was recently evaluated by a rheumatologist and was told that despite being on Remicade for years, that she has osteoarthritis and never had rheumatoid arthritis.     Living situation: Lives with family (daughter, son-in-law, grandkids) in private house, 0 Steps to enter, 12 Steps inside with stair lift.  Prior Level of Function: Ambulating independent with RW and rollator, Independent with ADL/iADLs    Evaluated by bedside physical therapy and occupational therapy. Patient is minimum to moderate assistance for bed mobility and transfers, ambulating 25 feet x3 with rolling walker minimum assistance.  Upper body dressing  and lower body dressing moderate/ max assistance, Prior to admission patient was independent with mobility and ambulation with a rolling walker, and independent in all ADLs, IADLs. Evaluated by physical medicine and rehabilitation consult and deemed an appropriate candidate for inpatient rehabilitation facility. Admitted to [facility] on  11/30/23   (30 Nov 2023 12:31)    I examined the patient and reviewed the chart. There have been no significant changes since my history and physical except where documented below.    TODAY'S SUBJECTIVE & REVIEW OF SYMPTOMS:  No acute overnight events.   Patient seen this morning, no complaints at the moment, she is doing well.   Participating and tolerating therapy.   Moving bowel and bladder appropriately  Vital signs reviewed, and stable.    Review of Systems:   Constitutional:    [  X ] WNL           [   ] poor appetite   [   ] insomnia   [   ] tired   Cardio:                [ X  ] WNL           [   ] CP   [   ] DEE   [   ] palpitations               Resp:                   [  X ] WNL           [   ] SOB   [   ] cough   [   ] wheezing   GI:                        [X   ] WNL           [   ] constipation   [   ] diarrhea   [   ] abdominal pain   [   ] nausea   [   ] emesis                                :                      [   ] WNL           [   ] LOPEZ  [   ] dysuria   [ X  ] difficulty voiding             Endo:                   [  X ] WNL          [   ] polyuria   [   ] temperature intolerance                 Skin:                     [  X ] WNL          [   ] pain   [   ] wound   [   ] rash   MSK:                    [   ] WNL          [   ] muscle pain   [  X ] joint pain/ stiffness shoulder, hips, knees, ankles/ feet     [   ] muscle tenderness   [   ] swelling   Neuro:                 [   ] WNL          [   ] HA   [   ] change in vision   [   ] tremor   [ X  ] weakness/ unsteady gait    [   ]dysphagia              Cognitive:           [ X  ] WNL           [   ]confusion      Psych:                  [ X  ] WNL           [   ] hallucinations   [   ]agitation   [   ] delusion   [   ]depression      PHYSICAL EXAM  Vital Signs Last 24 Hrs  T(C): 36.2 (03 Dec 2023 05:15), Max: 36.4 (02 Dec 2023 12:30)  T(F): 97.2 (03 Dec 2023 05:15), Max: 97.6 (02 Dec 2023 20:30)  HR: 69 (03 Dec 2023 05:15) (57 - 69)  BP: 155/67 (03 Dec 2023 05:15) (108/54 - 155/67)  BP(mean): --  RR: 19 (03 Dec 2023 05:15) (18 - 19)  SpO2: --    General:[ X  ] NAD, Resting Comfortable,   [   ] other:                                HEENT: [  X ] NC/AT, EOMI, PERRL , Normal Conjunctivae,   [   ] other:  Cardio: [ X  ] RRR   [ X  ] other:  Grade 3 crescendo-decrescendo  systolic murmur auscultated in R 2nd intercostal space. RRR                             Pulm: [ X  ] No Respiratory Distress,  Lungs CTAB,   [   ] other:                       Abdomen: [X   ]ND/NT, Soft,   [   ] other:    : [  X ] NO LOPEZ CATHETER,        [   ] LOPEZ CATHETER- no meatal tear, no discharge,                                         MSK: [   ] No joint swelling, Full ROM,   [ X  ] other: Functional PROM both shoulders. (-) 30 deg. passive DF both ankles     No AROM of R shoulder, 0-30 degrees AROM of L shoulder (2/2 OA)                                   Ext: [ X ]   No C/C/E, No calf tenderness,   [   ]other:    Skin: [  X ] intact,   [   ] other:                                                                   Neurological Examination:  Cognitive: [   X ] AAO x 3,   [    ]  other:                                                                      Attention:  [    ] intact,   [ X   ]  other:   Impairment in tasks that require concentration and attention                Memory: [    ] intact,    [ X   ]  other:   2/3 with short term recall  Mood/Affect: [   X ] wnl,    [    ]  other:                                                                             Communication: [   X ]Fluent, no dysarthria, following commands:  [    ] other:   CN II - XII:  [  X  ] intact,  [    ] other:                                                                                        Motor:   RIGHT UE: [   ] WNL,  [X   ] other: 4-/5 shoulder abduction (limited ROM due to OA), 4+/5 elbow flexion/extension, 4+/5 finger  strength   LEFT    UE: [   ] WNL,  [ X  ] other: 4-/5 shoulder abduction (limited ROM due to OA), 4+/5 elbow flexion/extension, 4+/5 finger  strength   RIGHT LE: [   ] WNL,  [ X  ] other:  5-/5 hip flexion, 5/5 knee flexion/extension, 2-/5 dorsiflexion due to limited passive range  LEFT    LE: [   ] WNL,  [  X ] other: 5-/5 hip flexion, 5/5 knee flexion/extension, 2-/5  dorsiflexion due to limited passive range    Tone: [  X  ] wnl,   [    ]  other:  DTRs: [  X ]symmetric, [   ] other:  Coordination:   [    ] intact,   [  X  ] other: Incoordinate with FNF bilaterally (worse R>L UE)                                                                          Sensory: [  X  ] Intact to light touch, temperature   [    ] other:    Decreased proprioception bilateral lower extremities  (-) garcia, babinski, clonus b/l extremities    MEDICATIONS  (STANDING):  aspirin enteric coated 81 milliGRAM(s) Oral daily  atenolol  Tablet 50 milliGRAM(s) Oral daily  atorvastatin 80 milliGRAM(s) Oral at bedtime  chlorhexidine 2% Cloths 1 Application(s) Topical <User Schedule>  cyanocobalamin 1000 MICROGram(s) Oral every 24 hours  fesoterodine ER Tablet 4 milliGRAM(s) Oral <User Schedule>  folic acid 1 milliGRAM(s) Oral daily  heparin   Injectable 5000 Unit(s) SubCutaneous every 12 hours  losartan 100 milliGRAM(s) Oral at bedtime  magnesium oxide 400 milliGRAM(s) Oral two times a day with meals  pantoprazole    Tablet 40 milliGRAM(s) Oral before breakfast  polyethylene glycol 3350 17 Gram(s) Oral at bedtime  senna 2 Tablet(s) Oral at bedtime  ticagrelor 90 milliGRAM(s) Oral every 12 hours    MEDICATIONS  (PRN):  albuterol    90 MICROgram(s) HFA Inhaler 2 Puff(s) Inhalation every 6 hours PRN for bronchospasm      RECENT LABS/IMAGING     Patient is a 83y old  Female who presents with a chief complaint of Rehab of left thalamic lacunar infarct resulting in ataxic gait, incoordination, and ADL dysfunction. (30 Nov 2023 12:31)      HPI:  83 year old female with PMHx of arthritis, hypertension, hyperlipidemia who presented to the ED for gait ataxia and peripheral extremity numbness. Patient reported that 3 days PTA she received a flu shot, and over the weekend while cleaning experienced onset of RUE pain/weakness, in which she rested on the cough and then after trying to get up endorsed problems with balance and experiencing distal numbness in bother her hands and feet, prompting presentation to the ED. On presentation, imaging revealed L thalamic acute lacunar infarct. CTA revealed marked calcification at the right and left common carotid artery bifurcations with severe stenosis at the level of the right carotid bulb, left CC bifurcation and ast the left carotid bulb. Patient underwent diagnostic cerebral angiogram on 11/28/23 due to stenotic findings on CTA, which revealed non flow limiting stenosis bilateral carotid arteries, and no carotid angioplasty/stenting was warranted. Symptoms of distal numbness improved overtime and patient hemodynamically stable. She has been medically stable. She has severe limitation of both shoulders at baseline.  Of note, the patient states that she was recently evaluated by a rheumatologist and was told that despite being on Remicade for years, that she has osteoarthritis and never had rheumatoid arthritis.     Living situation: Lives with family (daughter, son-in-law, grandkids) in private house, 0 Steps to enter, 12 Steps inside with stair lift.  Prior Level of Function: Ambulating independent with RW and rollator, Independent with ADL/iADLs    Evaluated by bedside physical therapy and occupational therapy. Patient is minimum to moderate assistance for bed mobility and transfers, ambulating 25 feet x3 with rolling walker minimum assistance.  Upper body dressing  and lower body dressing moderate/ max assistance, Prior to admission patient was independent with mobility and ambulation with a rolling walker, and independent in all ADLs, IADLs. Evaluated by physical medicine and rehabilitation consult and deemed an appropriate candidate for inpatient rehabilitation facility. Admitted to [facility] on  11/30/23   (30 Nov 2023 12:31)    I examined the patient and reviewed the chart. There have been no significant changes since my history and physical except where documented below.    TODAY'S SUBJECTIVE & REVIEW OF SYMPTOMS:  No acute overnight events.   Patient seen this morning, no complaints at the moment, she is doing well.   Participating and tolerating therapy.   Moving bowel and bladder appropriately  Vital signs reviewed, and stable.    Review of Systems:   Constitutional:    [  X ] WNL           [   ] poor appetite   [   ] insomnia   [   ] tired   Cardio:                [ X  ] WNL           [   ] CP   [   ] DEE   [   ] palpitations               Resp:                   [  X ] WNL           [   ] SOB   [   ] cough   [   ] wheezing   GI:                        [X   ] WNL           [   ] constipation   [   ] diarrhea   [   ] abdominal pain   [   ] nausea   [   ] emesis                                :                      [   ] WNL           [   ] LOPEZ  [   ] dysuria   [ X  ] difficulty voiding             Endo:                   [  X ] WNL          [   ] polyuria   [   ] temperature intolerance                 Skin:                     [  X ] WNL          [   ] pain   [   ] wound   [   ] rash   MSK:                    [   ] WNL          [   ] muscle pain   [  X ] joint pain/ stiffness shoulder, hips, knees, ankles/ feet     [   ] muscle tenderness   [   ] swelling   Neuro:                 [   ] WNL          [   ] HA   [   ] change in vision   [   ] tremor   [ X  ] weakness/ unsteady gait    [   ]dysphagia              Cognitive:           [ X  ] WNL           [   ]confusion      Psych:                  [ X  ] WNL           [   ] hallucinations   [   ]agitation   [   ] delusion   [   ]depression      PHYSICAL EXAM  Vital Signs Last 24 Hrs  T(C): 36.2 (03 Dec 2023 05:15), Max: 36.4 (02 Dec 2023 12:30)  T(F): 97.2 (03 Dec 2023 05:15), Max: 97.6 (02 Dec 2023 20:30)  HR: 69 (03 Dec 2023 05:15) (57 - 69)  BP: 155/67 (03 Dec 2023 05:15) (108/54 - 155/67)  BP(mean): --  RR: 19 (03 Dec 2023 05:15) (18 - 19)  SpO2: --    General:[ X  ] NAD, Resting Comfortable,   [   ] other:                                HEENT: [  X ] NC/AT, EOMI, PERRL , Normal Conjunctivae,   [   ] other:  Cardio: [ X  ] RRR   [ X  ] other:  Grade 3 crescendo-decrescendo  systolic murmur auscultated in R 2nd intercostal space. RRR                             Pulm: [ X  ] No Respiratory Distress,  Lungs CTAB,   [   ] other:                       Abdomen: [X   ]ND/NT, Soft,   [   ] other:    : [  X ] NO LOPEZ CATHETER,        [   ] LOPEZ CATHETER- no meatal tear, no discharge,                                         MSK: [   ] No joint swelling, Full ROM,   [ X  ] other: Functional PROM both shoulders. (-) 30 deg. passive DF both ankles     No AROM of R shoulder, 0-30 degrees AROM of L shoulder (2/2 OA)                                   Ext: [ X ]   No C/C/E, No calf tenderness,   [   ]other:    Skin: [  X ] intact,   [   ] other:                                                                   Neurological Examination:  Cognitive: [   X ] AAO x 3,   [    ]  other:                                                                      Attention:  [    ] intact,   [ X   ]  other:   Impairment in tasks that require concentration and attention                Memory: [    ] intact,    [ X   ]  other:   2/3 with short term recall  Mood/Affect: [   X ] wnl,    [    ]  other:                                                                             Communication: [   X ]Fluent, no dysarthria, following commands:  [    ] other:   CN II - XII:  [  X  ] intact,  [    ] other:                                                                                        Motor:   RIGHT UE: [   ] WNL,  [X   ] other: 4-/5 shoulder abduction (limited ROM due to OA), 4+/5 elbow flexion/extension, 4+/5 finger  strength   LEFT    UE: [   ] WNL,  [ X  ] other: 4-/5 shoulder abduction (limited ROM due to OA), 4+/5 elbow flexion/extension, 4+/5 finger  strength   RIGHT LE: [   ] WNL,  [ X  ] other:  5-/5 hip flexion, 5/5 knee flexion/extension, 2-/5 dorsiflexion due to limited passive range  LEFT    LE: [   ] WNL,  [  X ] other: 5-/5 hip flexion, 5/5 knee flexion/extension, 2-/5  dorsiflexion due to limited passive range    Tone: [  X  ] wnl,   [    ]  other:  DTRs: [  X ]symmetric, [   ] other:  Coordination:   [    ] intact,   [  X  ] other: Incoordinate with FNF bilaterally (worse R>L UE)                                                                          Sensory: [  X  ] Intact to light touch, temperature   [    ] other:    Decreased proprioception bilateral lower extremities  (-) garcia, babinski, clonus b/l extremities    MEDICATIONS  (STANDING):  aspirin enteric coated 81 milliGRAM(s) Oral daily  atenolol  Tablet 50 milliGRAM(s) Oral daily  atorvastatin 80 milliGRAM(s) Oral at bedtime  chlorhexidine 2% Cloths 1 Application(s) Topical <User Schedule>  cyanocobalamin 1000 MICROGram(s) Oral every 24 hours  fesoterodine ER Tablet 4 milliGRAM(s) Oral <User Schedule>  folic acid 1 milliGRAM(s) Oral daily  heparin   Injectable 5000 Unit(s) SubCutaneous every 12 hours  losartan 100 milliGRAM(s) Oral at bedtime  magnesium oxide 400 milliGRAM(s) Oral two times a day with meals  pantoprazole    Tablet 40 milliGRAM(s) Oral before breakfast  polyethylene glycol 3350 17 Gram(s) Oral at bedtime  senna 2 Tablet(s) Oral at bedtime  ticagrelor 90 milliGRAM(s) Oral every 12 hours    MEDICATIONS  (PRN):  albuterol    90 MICROgram(s) HFA Inhaler 2 Puff(s) Inhalation every 6 hours PRN for bronchospasm      RECENT LABS/IMAGING     Patient is a 83y old  Female who presents with a chief complaint of Rehab of left thalamic lacunar infarct resulting in ataxic gait, incoordination, and ADL dysfunction. (30 Nov 2023 12:31)      HPI:  83 year old female with PMHx of arthritis, hypertension, hyperlipidemia who presented to the ED for gait ataxia and peripheral extremity numbness. Patient reported that 3 days PTA she received a flu shot, and over the weekend while cleaning experienced onset of RUE pain/weakness, in which she rested on the cough and then after trying to get up endorsed problems with balance and experiencing distal numbness in bother her hands and feet, prompting presentation to the ED. On presentation, imaging revealed L thalamic acute lacunar infarct. CTA revealed marked calcification at the right and left common carotid artery bifurcations with severe stenosis at the level of the right carotid bulb, left CC bifurcation and ast the left carotid bulb. Patient underwent diagnostic cerebral angiogram on 11/28/23 due to stenotic findings on CTA, which revealed non flow limiting stenosis bilateral carotid arteries, and no carotid angioplasty/stenting was warranted. Symptoms of distal numbness improved overtime and patient hemodynamically stable. She has been medically stable. She has severe limitation of both shoulders at baseline.  Of note, the patient states that she was recently evaluated by a rheumatologist and was told that despite being on Remicade for years, that she has osteoarthritis and never had rheumatoid arthritis.     Living situation: Lives with family (daughter, son-in-law, grandkids) in private house, 0 Steps to enter, 12 Steps inside with stair lift.  Prior Level of Function: Ambulating independent with RW and rollator, Independent with ADL/iADLs    Evaluated by bedside physical therapy and occupational therapy. Patient is minimum to moderate assistance for bed mobility and transfers, ambulating 25 feet x3 with rolling walker minimum assistance.  Upper body dressing  and lower body dressing moderate/ max assistance, Prior to admission patient was independent with mobility and ambulation with a rolling walker, and independent in all ADLs, IADLs. Evaluated by physical medicine and rehabilitation consult and deemed an appropriate candidate for inpatient rehabilitation facility. Admitted to [facility] on  11/30/23   (30 Nov 2023 12:31)      TODAY'S SUBJECTIVE & REVIEW OF SYMPTOMS:  No acute overnight events.   Patient seen this morning, no complaints at the moment, she is doing well.   Participating and tolerating therapy.   Moving bowel and bladder appropriately  Vital signs reviewed, and stable.    Review of Systems:   Constitutional:    [  X ] WNL           [   ] poor appetite   [   ] insomnia   [   ] tired   Cardio:                [ X  ] WNL           [   ] CP   [   ] DEE   [   ] palpitations               Resp:                   [  X ] WNL           [   ] SOB   [   ] cough   [   ] wheezing   GI:                        [X   ] WNL           [   ] constipation   [   ] diarrhea   [   ] abdominal pain   [   ] nausea   [   ] emesis                                :                      [   ] WNL           [   ] LOPEZ  [   ] dysuria   [ X  ] difficulty voiding             Endo:                   [  X ] WNL          [   ] polyuria   [   ] temperature intolerance                 Skin:                     [  X ] WNL          [   ] pain   [   ] wound   [   ] rash   MSK:                    [   ] WNL          [   ] muscle pain   [  X ] joint pain/ stiffness shoulder, hips, knees, ankles/ feet     [   ] muscle tenderness   [   ] swelling   Neuro:                 [   ] WNL          [   ] HA   [   ] change in vision   [   ] tremor   [ X  ] weakness/ unsteady gait    [   ]dysphagia              Cognitive:           [ X  ] WNL           [   ]confusion      Psych:                  [ X  ] WNL           [   ] hallucinations   [   ]agitation   [   ] delusion   [   ]depression      PHYSICAL EXAM  Vital Signs Last 24 Hrs  T(C): 36.2 (03 Dec 2023 05:15), Max: 36.4 (02 Dec 2023 12:30)  T(F): 97.2 (03 Dec 2023 05:15), Max: 97.6 (02 Dec 2023 20:30)  HR: 69 (03 Dec 2023 05:15) (57 - 69)  BP: 155/67 (03 Dec 2023 05:15) (108/54 - 155/67)  BP(mean): --  RR: 19 (03 Dec 2023 05:15) (18 - 19)  SpO2: --    General:[ X  ] NAD, Resting Comfortable,   [   ] other:                                HEENT: [  X ] NC/AT, EOMI, PERRL , Normal Conjunctivae,   [   ] other:  Cardio: [ X  ] RRR   [ X  ] other:  Grade 3 crescendo-decrescendo  systolic murmur auscultated in R 2nd intercostal space. RRR                             Pulm: [ X  ] No Respiratory Distress,  Lungs CTAB,   [   ] other:                       Abdomen: [X   ]ND/NT, Soft,   [   ] other:    : [  X ] NO LOPEZ CATHETER,        [   ] LOPEZ CATHETER- no meatal tear, no discharge,                                         MSK: [   ] No joint swelling, Full ROM,   [ X  ] other: Functional PROM both shoulders. (-) 30 deg. passive DF both ankles     No AROM of R shoulder, 0-30 degrees AROM of L shoulder (2/2 OA)                                   Ext: [ X ]   No C/C/E, No calf tenderness,   [   ]other:    Skin: [  X ] intact,   [   ] other:                                                                   Neurological Examination:  Cognitive: [   X ] AAO x 3,   [    ]  other:                                                                      Attention:  [    ] intact,   [ X   ]  other:   Impairment in tasks that require concentration and attention                Memory: [    ] intact,    [ X   ]  other:   2/3 with short term recall  Mood/Affect: [   X ] wnl,    [    ]  other:                                                                             Communication: [   X ]Fluent, no dysarthria, following commands:  [    ] other:   CN II - XII:  [  X  ] intact,  [    ] other:                                                                                        Motor:   RIGHT UE: [   ] WNL,  [X   ] other: 4-/5 shoulder abduction (limited ROM due to OA), 4+/5 elbow flexion/extension, 4+/5 finger  strength   LEFT    UE: [   ] WNL,  [ X  ] other: 4-/5 shoulder abduction (limited ROM due to OA), 4+/5 elbow flexion/extension, 4+/5 finger  strength   RIGHT LE: [   ] WNL,  [ X  ] other:  5-/5 hip flexion, 5/5 knee flexion/extension, 2-/5 dorsiflexion due to limited passive range  LEFT    LE: [   ] WNL,  [  X ] other: 5-/5 hip flexion, 5/5 knee flexion/extension, 2-/5  dorsiflexion due to limited passive range    Tone: [  X  ] wnl,   [    ]  other:  DTRs: [  X ]symmetric, [   ] other:  Coordination:   [    ] intact,   [  X  ] other: Incoordinate with FNF bilaterally (worse R>L UE)                                                                          Sensory: [  X  ] Intact to light touch, temperature   [    ] other:    Decreased proprioception bilateral lower extremities  (-) garcia, babinski, clonus b/l extremities    MEDICATIONS  (STANDING):  aspirin enteric coated 81 milliGRAM(s) Oral daily  atenolol  Tablet 50 milliGRAM(s) Oral daily  atorvastatin 80 milliGRAM(s) Oral at bedtime  chlorhexidine 2% Cloths 1 Application(s) Topical <User Schedule>  cyanocobalamin 1000 MICROGram(s) Oral every 24 hours  fesoterodine ER Tablet 4 milliGRAM(s) Oral <User Schedule>  folic acid 1 milliGRAM(s) Oral daily  heparin   Injectable 5000 Unit(s) SubCutaneous every 12 hours  losartan 100 milliGRAM(s) Oral at bedtime  magnesium oxide 400 milliGRAM(s) Oral two times a day with meals  pantoprazole    Tablet 40 milliGRAM(s) Oral before breakfast  polyethylene glycol 3350 17 Gram(s) Oral at bedtime  senna 2 Tablet(s) Oral at bedtime  ticagrelor 90 milliGRAM(s) Oral every 12 hours    MEDICATIONS  (PRN):  albuterol    90 MICROgram(s) HFA Inhaler 2 Puff(s) Inhalation every 6 hours PRN for bronchospasm      RECENT LABS/IMAGING     Patient is a 83y old  Female who presents with a chief complaint of Rehab of left thalamic lacunar infarct resulting in ataxic gait, incoordination, and ADL dysfunction. (30 Nov 2023 12:31)      HPI:  83 year old female with PMHx of arthritis, hypertension, hyperlipidemia who presented to the ED for gait ataxia and peripheral extremity numbness. Patient reported that 3 days PTA she received a flu shot, and over the weekend while cleaning experienced onset of RUE pain/weakness, in which she rested on the cough and then after trying to get up endorsed problems with balance and experiencing distal numbness in bother her hands and feet, prompting presentation to the ED. On presentation, imaging revealed L thalamic acute lacunar infarct. CTA revealed marked calcification at the right and left common carotid artery bifurcations with severe stenosis at the level of the right carotid bulb, left CC bifurcation and ast the left carotid bulb. Patient underwent diagnostic cerebral angiogram on 11/28/23 due to stenotic findings on CTA, which revealed non flow limiting stenosis bilateral carotid arteries, and no carotid angioplasty/stenting was warranted. Symptoms of distal numbness improved overtime and patient hemodynamically stable. She has been medically stable. She has severe limitation of both shoulders at baseline.  Of note, the patient states that she was recently evaluated by a rheumatologist and was told that despite being on Remicade for years, that she has osteoarthritis and never had rheumatoid arthritis.     Living situation: Lives with family (daughter, son-in-law, grandkids) in private house, 0 Steps to enter, 12 Steps inside with stair lift.  Prior Level of Function: Ambulating independent with RW and rollator, Independent with ADL/iADLs    Evaluated by bedside physical therapy and occupational therapy. Patient is minimum to moderate assistance for bed mobility and transfers, ambulating 25 feet x3 with rolling walker minimum assistance.  Upper body dressing  and lower body dressing moderate/ max assistance, Prior to admission patient was independent with mobility and ambulation with a rolling walker, and independent in all ADLs, IADLs. Evaluated by physical medicine and rehabilitation consult and deemed an appropriate candidate for inpatient rehabilitation facility. Admitted to [facility] on  11/30/23   (30 Nov 2023 12:31)      TODAY'S SUBJECTIVE & REVIEW OF SYMPTOMS:  No acute overnight events.   Patient seen this morning, no complaints at the moment, she is doing well.   Participating and tolerating therapy.   Moving bowel and bladder appropriately  Vital signs reviewed, and stable.    Review of Systems:   Constitutional:    [  X ] WNL           [   ] poor appetite   [   ] insomnia   [   ] tired   Cardio:                [ X  ] WNL           [   ] CP   [   ] DEE   [   ] palpitations               Resp:                   [  X ] WNL           [   ] SOB   [   ] cough   [   ] wheezing   GI:                        [X   ] WNL           [   ] constipation   [   ] diarrhea   [   ] abdominal pain   [   ] nausea   [   ] emesis                                :                      [   ] WNL           [   ] LOPEZ  [   ] dysuria   [ X  ] difficulty voiding             Endo:                   [  X ] WNL          [   ] polyuria   [   ] temperature intolerance                 Skin:                     [  X ] WNL          [   ] pain   [   ] wound   [   ] rash   MSK:                    [   ] WNL          [   ] muscle pain   [  X ] joint pain/ stiffness shoulder, hips, knees, ankles/ feet     [   ] muscle tenderness   [   ] swelling   Neuro:                 [   ] WNL          [   ] HA   [   ] change in vision   [   ] tremor   [ X  ] weakness/ unsteady gait    [   ]dysphagia              Cognitive:           [ X  ] WNL           [   ]confusion      Psych:                  [ X  ] WNL           [   ] hallucinations   [   ]agitation   [   ] delusion   [   ]depression      PHYSICAL EXAM  Vital Signs Last 24 Hrs  T(C): 36.2 (03 Dec 2023 05:15), Max: 36.4 (02 Dec 2023 12:30)  T(F): 97.2 (03 Dec 2023 05:15), Max: 97.6 (02 Dec 2023 20:30)  HR: 69 (03 Dec 2023 05:15) (57 - 69)  BP: 155/67 (03 Dec 2023 05:15) (108/54 - 155/67)  BP(mean): --  RR: 19 (03 Dec 2023 05:15) (18 - 19)  SpO2: --    General:[ X  ] NAD, Resting Comfortable,   [   ] other:                                HEENT: [  X ] NC/AT, EOMI, PERRL , Normal Conjunctivae,   [   ] other:  Cardio: [ X  ] RRR   [ X  ] other:  Grade 3 crescendo-decrescendo  systolic murmur auscultated in R 2nd intercostal space. RRR                             Pulm: [ X  ] No Respiratory Distress,  Lungs CTAB,   [   ] other:                       Abdomen: [X   ]ND/NT, Soft,   [   ] other:    : [  X ] NO LOPEZ CATHETER,        [   ] LOPEZ CATHETER- no meatal tear, no discharge,                                         MSK: [   ] No joint swelling, Full ROM,   [ X  ] other: Functional PROM both shoulders. (-) 30 deg. passive DF both ankles     No AROM of R shoulder, 0-30 degrees AROM of L shoulder (2/2 OA)                                   Ext: [ X ]   No C/C/E, No calf tenderness,   [   ]other:    Skin: [  X ] intact,   [   ] other:                                                                   Neurological Examination:  Cognitive: [   X ] AAO x 3,   [    ]  other:                                                                      Attention:  [    ] intact,   [ X   ]  other:   Impairment in tasks that require concentration and attention                Memory: [    ] intact,    [ X   ]  other:   2/3 with short term recall  Mood/Affect: [   X ] wnl,    [    ]  other:                                                                             Communication: [   X ]Fluent, no dysarthria, following commands:  [    ] other:   CN II - XII:  [  X  ] intact,  [    ] other:                                                                                        Motor:   RIGHT UE: [   ] WNL,  [X   ] other: 4-/5 shoulder abduction (limited ROM due to OA), 4+/5 elbow flexion/extension, 4+/5 finger  strength   LEFT    UE: [   ] WNL,  [ X  ] other: 4-/5 shoulder abduction (limited ROM due to OA), 4+/5 elbow flexion/extension, 4+/5 finger  strength   RIGHT LE: [   ] WNL,  [ X  ] other:  5-/5 hip flexion, 5/5 knee flexion/extension, 2-/5 dorsiflexion due to limited passive range  LEFT    LE: [   ] WNL,  [  X ] other: 5-/5 hip flexion, 5/5 knee flexion/extension, 2-/5  dorsiflexion due to limited passive range    Tone: [  X  ] wnl,   [    ]  other:  DTRs: [  X ]symmetric, [   ] other:  Coordination:   [    ] intact,   [  X  ] other: Incoordinate with FNF bilaterally (worse R>L UE)                                                                          Sensory: [  X  ] Intact to light touch, temperature   [    ] other:    Decreased proprioception bilateral lower extremities  (-) garcia, babinski, clonus b/l extremities    MEDICATIONS  (STANDING):  aspirin enteric coated 81 milliGRAM(s) Oral daily  atenolol  Tablet 50 milliGRAM(s) Oral daily  atorvastatin 80 milliGRAM(s) Oral at bedtime  chlorhexidine 2% Cloths 1 Application(s) Topical <User Schedule>  cyanocobalamin 1000 MICROGram(s) Oral every 24 hours  fesoterodine ER Tablet 4 milliGRAM(s) Oral <User Schedule>  folic acid 1 milliGRAM(s) Oral daily  heparin   Injectable 5000 Unit(s) SubCutaneous every 12 hours  losartan 100 milliGRAM(s) Oral at bedtime  magnesium oxide 400 milliGRAM(s) Oral two times a day with meals  pantoprazole    Tablet 40 milliGRAM(s) Oral before breakfast  polyethylene glycol 3350 17 Gram(s) Oral at bedtime  senna 2 Tablet(s) Oral at bedtime  ticagrelor 90 milliGRAM(s) Oral every 12 hours    MEDICATIONS  (PRN):  albuterol    90 MICROgram(s) HFA Inhaler 2 Puff(s) Inhalation every 6 hours PRN for bronchospasm      RECENT LABS/IMAGING     Patient is a 83y old  Female who presents with a chief complaint of Rehab of left thalamic lacunar infarct resulting in ataxic gait, incoordination, and ADL dysfunction. (30 Nov 2023 12:31)      HPI:  83 year old female with PMHx of arthritis, hypertension, hyperlipidemia who presented to the ED for gait ataxia and peripheral extremity numbness. Patient reported that 3 days PTA she received a flu shot, and over the weekend while cleaning experienced onset of RUE pain/weakness, in which she rested on the cough and then after trying to get up endorsed problems with balance and experiencing distal numbness in bother her hands and feet, prompting presentation to the ED. On presentation, imaging revealed L thalamic acute lacunar infarct. CTA revealed marked calcification at the right and left common carotid artery bifurcations with severe stenosis at the level of the right carotid bulb, left CC bifurcation and ast the left carotid bulb. Patient underwent diagnostic cerebral angiogram on 11/28/23 due to stenotic findings on CTA, which revealed non flow limiting stenosis bilateral carotid arteries, and no carotid angioplasty/stenting was warranted. Symptoms of distal numbness improved overtime and patient hemodynamically stable. She has been medically stable. She has severe limitation of both shoulders at baseline.  Of note, the patient states that she was recently evaluated by a rheumatologist and was told that despite being on Remicade for years, that she has osteoarthritis and never had rheumatoid arthritis.     Living situation: Lives with family (daughter, son-in-law, grandkids) in private house, 0 Steps to enter, 12 Steps inside with stair lift.  Prior Level of Function: Ambulating independent with RW and rollator, Independent with ADL/iADLs    Evaluated by bedside physical therapy and occupational therapy. Patient is minimum to moderate assistance for bed mobility and transfers, ambulating 25 feet x3 with rolling walker minimum assistance.  Upper body dressing  and lower body dressing moderate/ max assistance, Prior to admission patient was independent with mobility and ambulation with a rolling walker, and independent in all ADLs, IADLs. Evaluated by physical medicine and rehabilitation consult and deemed an appropriate candidate for inpatient rehabilitation facility. Admitted to [facility] on  11/30/23   (30 Nov 2023 12:31)      TODAY'S SUBJECTIVE & REVIEW OF SYMPTOMS:  No acute overnight events.   Patient seen this morning, no complaints at the moment, she is doing well.   Participating and tolerating therapy.   Moving bowel and bladder appropriately  Vital signs reviewed, and stable.    Review of Systems:   Constitutional:    [  X ] WNL           [   ] poor appetite   [   ] insomnia   [   ] tired   Cardio:                [ X  ] WNL           [   ] CP   [   ] DEE   [   ] palpitations               Resp:                   [  X ] WNL           [   ] SOB   [   ] cough   [   ] wheezing   GI:                        [X   ] WNL           [   ] constipation   [   ] diarrhea   [   ] abdominal pain   [   ] nausea   [   ] emesis                                :                      [   ] WNL           [   ] LOPEZ  [   ] dysuria   [ X  ] difficulty voiding             Endo:                   [  X ] WNL          [   ] polyuria   [   ] temperature intolerance                 Skin:                     [  X ] WNL          [   ] pain   [   ] wound   [   ] rash   MSK:                    [   ] WNL          [   ] muscle pain   [  X ] joint pain/ stiffness shoulder, hips, knees, ankles/ feet     [   ] muscle tenderness   [   ] swelling   Neuro:                 [   ] WNL          [   ] HA   [   ] change in vision   [   ] tremor   [ X  ] weakness/ unsteady gait    [   ]dysphagia              Cognitive:           [ X  ] WNL           [   ]confusion      Psych:                  [ X  ] WNL           [   ] hallucinations   [   ]agitation   [   ] delusion   [   ]depression      PHYSICAL EXAM  Vital Signs Last 24 Hrs  T(C): 36.2 (03 Dec 2023 05:15), Max: 36.4 (02 Dec 2023 12:30)  T(F): 97.2 (03 Dec 2023 05:15), Max: 97.6 (02 Dec 2023 20:30)  HR: 69 (03 Dec 2023 05:15) (57 - 69)  BP: 155/67 (03 Dec 2023 05:15) (108/54 - 155/67)  BP(mean): --  RR: 19 (03 Dec 2023 05:15) (18 - 19)  SpO2: --    General:[ X  ] NAD, Resting Comfortable,   [   ] other:                                HEENT: [  X ] NC/AT, EOMI, PERRL , Normal Conjunctivae,   [   ] other:  Cardio: [ X  ] RRR   [ X  ] other:  Grade 3 crescendo-decrescendo  systolic murmur auscultated in R 2nd intercostal space. RRR                             Pulm: [ X  ] No Respiratory Distress,  Lungs CTAB,   [   ] other:                       Abdomen: [X   ]ND/NT, Soft,   [   ] other:    : [  X ] NO LOPEZ CATHETER,        [   ] LOPEZ CATHETER- no meatal tear, no discharge,                                         MSK: [   ] No joint swelling, Full ROM,   [ X  ] other: Functional PROM both shoulders. (-) 30 deg. passive DF both ankles     No AROM of R shoulder, 0-30 degrees AROM of L shoulder (2/2 OA)                                   Ext: [ X ]   No C/C/E, No calf tenderness,   [   ]other:    Skin: [  X ] intact,   [   ] other:                                                                   Neurological Examination:  Cognitive: [   X ] AAO x 3,   [    ]  other:                                                                      Attention:  [    ] intact,   [ X   ]  other:   Impairment in tasks that require concentration and attention                Memory: [    ] intact,    [ X   ]  other:   2/3 with short term recall  Mood/Affect: [   X ] wnl,    [    ]  other:                                                                             Communication: [   X ]Fluent, no dysarthria, following commands:  [    ] other:   CN II - XII:  [  X  ] intact,  [    ] other:                                                                                        Motor:   RIGHT UE: [   ] WNL,  [X   ] other: 4-/5 shoulder abduction (limited ROM due to OA), 4+/5 elbow flexion/extension, 4+/5 finger  strength   LEFT    UE: [   ] WNL,  [ X  ] other: 4-/5 shoulder abduction (limited ROM due to OA), 4+/5 elbow flexion/extension, 4+/5 finger  strength   RIGHT LE: [   ] WNL,  [ X  ] other:  5-/5 hip flexion, 5/5 knee flexion/extension, 2-/5 dorsiflexion due to limited passive range  LEFT    LE: [   ] WNL,  [  X ] other: 5-/5 hip flexion, 5/5 knee flexion/extension, 2-/5  dorsiflexion due to limited passive range    Tone: [  X  ] wnl,   [    ]  other:  DTRs: [  X ]symmetric, [   ] other:  Coordination:   [    ] intact,   [  X  ] other: Incoordinate with FNF bilaterally (worse R>L UE)                                                                          Sensory: [  X  ] Intact to light touch, temperature   [    ] other:    Decreased proprioception bilateral lower extremities  (-) garcia, babinski, clonus b/l extremities    MEDICATIONS  (STANDING):  aspirin enteric coated 81 milliGRAM(s) Oral daily  atenolol  Tablet 50 milliGRAM(s) Oral daily  atorvastatin 80 milliGRAM(s) Oral at bedtime  chlorhexidine 2% Cloths 1 Application(s) Topical <User Schedule>  cyanocobalamin 1000 MICROGram(s) Oral every 24 hours  fesoterodine ER Tablet 4 milliGRAM(s) Oral <User Schedule>  folic acid 1 milliGRAM(s) Oral daily  heparin   Injectable 5000 Unit(s) SubCutaneous every 12 hours  losartan 100 milliGRAM(s) Oral at bedtime  magnesium oxide 400 milliGRAM(s) Oral two times a day with meals  pantoprazole    Tablet 40 milliGRAM(s) Oral before breakfast  polyethylene glycol 3350 17 Gram(s) Oral at bedtime  senna 2 Tablet(s) Oral at bedtime  ticagrelor 90 milliGRAM(s) Oral every 12 hours    MEDICATIONS  (PRN):  albuterol    90 MICROgram(s) HFA Inhaler 2 Puff(s) Inhalation every 6 hours PRN for bronchospasm      RECENT LABS/IMAGING

## 2023-12-04 LAB
ALBUMIN SERPL ELPH-MCNC: 3.6 G/DL — SIGNIFICANT CHANGE UP (ref 3.5–5.2)
ALBUMIN SERPL ELPH-MCNC: 3.6 G/DL — SIGNIFICANT CHANGE UP (ref 3.5–5.2)
ALP SERPL-CCNC: 74 U/L — SIGNIFICANT CHANGE UP (ref 30–115)
ALP SERPL-CCNC: 74 U/L — SIGNIFICANT CHANGE UP (ref 30–115)
ALT FLD-CCNC: 18 U/L — SIGNIFICANT CHANGE UP (ref 0–41)
ALT FLD-CCNC: 18 U/L — SIGNIFICANT CHANGE UP (ref 0–41)
ANION GAP SERPL CALC-SCNC: 10 MMOL/L — SIGNIFICANT CHANGE UP (ref 7–14)
ANION GAP SERPL CALC-SCNC: 10 MMOL/L — SIGNIFICANT CHANGE UP (ref 7–14)
AST SERPL-CCNC: 21 U/L — SIGNIFICANT CHANGE UP (ref 0–41)
AST SERPL-CCNC: 21 U/L — SIGNIFICANT CHANGE UP (ref 0–41)
BASOPHILS # BLD AUTO: 0.05 K/UL — SIGNIFICANT CHANGE UP (ref 0–0.2)
BASOPHILS # BLD AUTO: 0.05 K/UL — SIGNIFICANT CHANGE UP (ref 0–0.2)
BASOPHILS NFR BLD AUTO: 1.3 % — HIGH (ref 0–1)
BASOPHILS NFR BLD AUTO: 1.3 % — HIGH (ref 0–1)
BILIRUB SERPL-MCNC: 0.4 MG/DL — SIGNIFICANT CHANGE UP (ref 0.2–1.2)
BILIRUB SERPL-MCNC: 0.4 MG/DL — SIGNIFICANT CHANGE UP (ref 0.2–1.2)
BUN SERPL-MCNC: 22 MG/DL — HIGH (ref 10–20)
BUN SERPL-MCNC: 22 MG/DL — HIGH (ref 10–20)
CALCIUM SERPL-MCNC: 9 MG/DL — SIGNIFICANT CHANGE UP (ref 8.4–10.4)
CALCIUM SERPL-MCNC: 9 MG/DL — SIGNIFICANT CHANGE UP (ref 8.4–10.4)
CHLORIDE SERPL-SCNC: 107 MMOL/L — SIGNIFICANT CHANGE UP (ref 98–110)
CHLORIDE SERPL-SCNC: 107 MMOL/L — SIGNIFICANT CHANGE UP (ref 98–110)
CO2 SERPL-SCNC: 23 MMOL/L — SIGNIFICANT CHANGE UP (ref 17–32)
CO2 SERPL-SCNC: 23 MMOL/L — SIGNIFICANT CHANGE UP (ref 17–32)
COPPER SERPL-MCNC: 136 UG/DL — SIGNIFICANT CHANGE UP (ref 80–158)
COPPER SERPL-MCNC: 136 UG/DL — SIGNIFICANT CHANGE UP (ref 80–158)
CREAT SERPL-MCNC: 1.2 MG/DL — SIGNIFICANT CHANGE UP (ref 0.7–1.5)
CREAT SERPL-MCNC: 1.2 MG/DL — SIGNIFICANT CHANGE UP (ref 0.7–1.5)
EGFR: 45 ML/MIN/1.73M2 — LOW
EGFR: 45 ML/MIN/1.73M2 — LOW
EOSINOPHIL # BLD AUTO: 0.14 K/UL — SIGNIFICANT CHANGE UP (ref 0–0.7)
EOSINOPHIL # BLD AUTO: 0.14 K/UL — SIGNIFICANT CHANGE UP (ref 0–0.7)
EOSINOPHIL NFR BLD AUTO: 3.8 % — SIGNIFICANT CHANGE UP (ref 0–8)
EOSINOPHIL NFR BLD AUTO: 3.8 % — SIGNIFICANT CHANGE UP (ref 0–8)
FERRITIN SERPL-MCNC: 401 NG/ML — HIGH (ref 13–330)
FERRITIN SERPL-MCNC: 401 NG/ML — HIGH (ref 13–330)
GLUCOSE SERPL-MCNC: 97 MG/DL — SIGNIFICANT CHANGE UP (ref 70–99)
GLUCOSE SERPL-MCNC: 97 MG/DL — SIGNIFICANT CHANGE UP (ref 70–99)
HCT VFR BLD CALC: 34.4 % — LOW (ref 37–47)
HCT VFR BLD CALC: 34.4 % — LOW (ref 37–47)
HGB BLD-MCNC: 10.7 G/DL — LOW (ref 12–16)
HGB BLD-MCNC: 10.7 G/DL — LOW (ref 12–16)
IMM GRANULOCYTES NFR BLD AUTO: 1.1 % — HIGH (ref 0.1–0.3)
IMM GRANULOCYTES NFR BLD AUTO: 1.1 % — HIGH (ref 0.1–0.3)
IRON SATN MFR SERPL: 24 % — SIGNIFICANT CHANGE UP (ref 15–50)
IRON SATN MFR SERPL: 24 % — SIGNIFICANT CHANGE UP (ref 15–50)
IRON SATN MFR SERPL: 46 UG/DL — SIGNIFICANT CHANGE UP (ref 35–150)
IRON SATN MFR SERPL: 46 UG/DL — SIGNIFICANT CHANGE UP (ref 35–150)
LYMPHOCYTES # BLD AUTO: 0.43 K/UL — LOW (ref 1.2–3.4)
LYMPHOCYTES # BLD AUTO: 0.43 K/UL — LOW (ref 1.2–3.4)
LYMPHOCYTES # BLD AUTO: 11.6 % — LOW (ref 20.5–51.1)
LYMPHOCYTES # BLD AUTO: 11.6 % — LOW (ref 20.5–51.1)
MAGNESIUM SERPL-MCNC: 1.9 MG/DL — SIGNIFICANT CHANGE UP (ref 1.8–2.4)
MAGNESIUM SERPL-MCNC: 1.9 MG/DL — SIGNIFICANT CHANGE UP (ref 1.8–2.4)
MCHC RBC-ENTMCNC: 31.1 G/DL — LOW (ref 32–37)
MCHC RBC-ENTMCNC: 31.1 G/DL — LOW (ref 32–37)
MCHC RBC-ENTMCNC: 31.8 PG — HIGH (ref 27–31)
MCHC RBC-ENTMCNC: 31.8 PG — HIGH (ref 27–31)
MCV RBC AUTO: 102.4 FL — HIGH (ref 81–99)
MCV RBC AUTO: 102.4 FL — HIGH (ref 81–99)
MONOCYTES # BLD AUTO: 0.43 K/UL — SIGNIFICANT CHANGE UP (ref 0.1–0.6)
MONOCYTES # BLD AUTO: 0.43 K/UL — SIGNIFICANT CHANGE UP (ref 0.1–0.6)
MONOCYTES NFR BLD AUTO: 11.6 % — HIGH (ref 1.7–9.3)
MONOCYTES NFR BLD AUTO: 11.6 % — HIGH (ref 1.7–9.3)
NEUTROPHILS # BLD AUTO: 2.62 K/UL — SIGNIFICANT CHANGE UP (ref 1.4–6.5)
NEUTROPHILS # BLD AUTO: 2.62 K/UL — SIGNIFICANT CHANGE UP (ref 1.4–6.5)
NEUTROPHILS NFR BLD AUTO: 70.6 % — SIGNIFICANT CHANGE UP (ref 42.2–75.2)
NEUTROPHILS NFR BLD AUTO: 70.6 % — SIGNIFICANT CHANGE UP (ref 42.2–75.2)
NRBC # BLD: 0 /100 WBCS — SIGNIFICANT CHANGE UP (ref 0–0)
NRBC # BLD: 0 /100 WBCS — SIGNIFICANT CHANGE UP (ref 0–0)
PLATELET # BLD AUTO: 171 K/UL — SIGNIFICANT CHANGE UP (ref 130–400)
PLATELET # BLD AUTO: 171 K/UL — SIGNIFICANT CHANGE UP (ref 130–400)
PMV BLD: 11.3 FL — HIGH (ref 7.4–10.4)
PMV BLD: 11.3 FL — HIGH (ref 7.4–10.4)
POTASSIUM SERPL-MCNC: 4.6 MMOL/L — SIGNIFICANT CHANGE UP (ref 3.5–5)
POTASSIUM SERPL-MCNC: 4.6 MMOL/L — SIGNIFICANT CHANGE UP (ref 3.5–5)
POTASSIUM SERPL-SCNC: 4.6 MMOL/L — SIGNIFICANT CHANGE UP (ref 3.5–5)
POTASSIUM SERPL-SCNC: 4.6 MMOL/L — SIGNIFICANT CHANGE UP (ref 3.5–5)
PROT SERPL-MCNC: 6.2 G/DL — SIGNIFICANT CHANGE UP (ref 6–8)
PROT SERPL-MCNC: 6.2 G/DL — SIGNIFICANT CHANGE UP (ref 6–8)
RBC # BLD: 3.36 M/UL — LOW (ref 4.2–5.4)
RBC # FLD: 13.1 % — SIGNIFICANT CHANGE UP (ref 11.5–14.5)
RBC # FLD: 13.1 % — SIGNIFICANT CHANGE UP (ref 11.5–14.5)
RETICS #: 83.7 K/UL — SIGNIFICANT CHANGE UP (ref 25–125)
RETICS #: 83.7 K/UL — SIGNIFICANT CHANGE UP (ref 25–125)
RETICS/RBC NFR: 2.5 % — HIGH (ref 0.5–1.5)
RETICS/RBC NFR: 2.5 % — HIGH (ref 0.5–1.5)
SODIUM SERPL-SCNC: 140 MMOL/L — SIGNIFICANT CHANGE UP (ref 135–146)
SODIUM SERPL-SCNC: 140 MMOL/L — SIGNIFICANT CHANGE UP (ref 135–146)
TIBC SERPL-MCNC: 190 UG/DL — LOW (ref 220–430)
TIBC SERPL-MCNC: 190 UG/DL — LOW (ref 220–430)
UIBC SERPL-MCNC: 144 UG/DL — SIGNIFICANT CHANGE UP (ref 110–370)
UIBC SERPL-MCNC: 144 UG/DL — SIGNIFICANT CHANGE UP (ref 110–370)
WBC # BLD: 3.71 K/UL — LOW (ref 4.8–10.8)
WBC # BLD: 3.71 K/UL — LOW (ref 4.8–10.8)
WBC # FLD AUTO: 3.71 K/UL — LOW (ref 4.8–10.8)
WBC # FLD AUTO: 3.71 K/UL — LOW (ref 4.8–10.8)

## 2023-12-04 RX ADMIN — MAGNESIUM OXIDE 400 MG ORAL TABLET 400 MILLIGRAM(S): 241.3 TABLET ORAL at 17:45

## 2023-12-04 RX ADMIN — Medication 1 MILLIGRAM(S): at 12:13

## 2023-12-04 RX ADMIN — ATORVASTATIN CALCIUM 80 MILLIGRAM(S): 80 TABLET, FILM COATED ORAL at 21:10

## 2023-12-04 RX ADMIN — PREGABALIN 1000 MICROGRAM(S): 225 CAPSULE ORAL at 12:14

## 2023-12-04 RX ADMIN — TICAGRELOR 90 MILLIGRAM(S): 90 TABLET ORAL at 17:46

## 2023-12-04 RX ADMIN — HEPARIN SODIUM 5000 UNIT(S): 5000 INJECTION INTRAVENOUS; SUBCUTANEOUS at 17:40

## 2023-12-04 RX ADMIN — MAGNESIUM OXIDE 400 MG ORAL TABLET 400 MILLIGRAM(S): 241.3 TABLET ORAL at 09:22

## 2023-12-04 RX ADMIN — HEPARIN SODIUM 5000 UNIT(S): 5000 INJECTION INTRAVENOUS; SUBCUTANEOUS at 06:21

## 2023-12-04 RX ADMIN — PANTOPRAZOLE SODIUM 40 MILLIGRAM(S): 20 TABLET, DELAYED RELEASE ORAL at 06:22

## 2023-12-04 RX ADMIN — ATENOLOL 50 MILLIGRAM(S): 25 TABLET ORAL at 06:21

## 2023-12-04 RX ADMIN — FESOTERODINE FUMARATE 4 MILLIGRAM(S): 8 TABLET, FILM COATED, EXTENDED RELEASE ORAL at 12:14

## 2023-12-04 RX ADMIN — CHLORHEXIDINE GLUCONATE 1 APPLICATION(S): 213 SOLUTION TOPICAL at 06:24

## 2023-12-04 RX ADMIN — LOSARTAN POTASSIUM 100 MILLIGRAM(S): 100 TABLET, FILM COATED ORAL at 21:10

## 2023-12-04 RX ADMIN — TICAGRELOR 90 MILLIGRAM(S): 90 TABLET ORAL at 06:23

## 2023-12-04 RX ADMIN — Medication 81 MILLIGRAM(S): at 12:13

## 2023-12-04 NOTE — PROGRESS NOTE ADULT - SUBJECTIVE AND OBJECTIVE BOX
Patient is a 83y old  Female who presents with a chief complaint of Rehab of left thalamic lacunar infarct resulting in ataxic gait, incoordination, and ADL dysfunction. (03 Dec 2023 09:35)      HPI:  83 year old female with PMHx of arthritis, hypertension, hyperlipidemia who presented to the ED for gait ataxia and peripheral extremity numbness. Patient reported that 3 days PTA she received a flu shot, and over the weekend while cleaning experienced onset of RUE pain/weakness, in which she rested on the cough and then after trying to get up endorsed problems with balance and experiencing distal numbness in bother her hands and feet, prompting presentation to the ED. On presentation, imaging revealed L thalamic acute lacunar infarct. CTA revealed marked calcification at the right and left common carotid artery bifurcations with severe stenosis at the level of the right carotid bulb, left CC bifurcation and ast the left carotid bulb. Patient underwent diagnostic cerebral angiogram on 11/28/23 due to stenotic findings on CTA, which revealed non flow limiting stenosis bilateral carotid arteries, and no carotid angioplasty/stenting was warranted. Symptoms of distal numbness improved overtime and patient hemodynamically stable. She has been medically stable. She has severe limitation of both shoulders at baseline.  Of note, the patient states that she was recently evaluated by a rheumatologist and was told that despite being on Remicade for years, that she has osteoarthritis and never had rheumatoid arthritis.     Living situation: Lives with family (daughter, son-in-law, grandkids) in private house, 0 Steps to enter, 12 Steps inside with stair lift.  Prior Level of Function: Ambulating independent with RW and rollator, Independent with ADL/iADLs    Evaluated by bedside physical therapy and occupational therapy. Patient is minimum to moderate assistance for bed mobility and transfers, ambulating 25 feet x3 with rolling walker minimum assistance.  Upper body dressing  and lower body dressing moderate/ max assistance, Prior to admission patient was independent with mobility and ambulation with a rolling walker, and independent in all ADLs, IADLs. Evaluated by physical medicine and rehabilitation consult and deemed an appropriate candidate for inpatient rehabilitation facility. Admitted to [facility] on  11/30/23   (30 Nov 2023 12:31)      TODAY'S SUBJECTIVE & REVIEW OF SYMPTOMS  No acute overnight events.   Patient seen this morning, no complaints at the moment, she is doing well.   Participating and tolerating therapy.   Moving bowel and bladder appropriately  Vital signs reviewed, and stable.  Labs showed macrocytic anemia, normal iron saturation, absolute retic WNL.    PHYSICAL EXAM    Vital Signs Last 24 Hrs  T(C): 36.4 (04 Dec 2023 05:45), Max: 36.4 (03 Dec 2023 20:47)  T(F): 97.5 (04 Dec 2023 05:45), Max: 97.6 (03 Dec 2023 20:47)  HR: 68 (04 Dec 2023 05:45) (68 - 83)  BP: 136/64 (04 Dec 2023 05:45) (123/56 - 150/66)  BP(mean): --  RR: 18 (04 Dec 2023 05:45) (18 - 19)  SpO2: --      Review of Systems:   Constitutional:    [  X ] WNL           [   ] poor appetite   [   ] insomnia   [   ] tired   Cardio:                [ X  ] WNL           [   ] CP   [   ] DEE   [   ] palpitations               Resp:                   [  X ] WNL           [   ] SOB   [   ] cough   [   ] wheezing   GI:                        [X   ] WNL           [   ] constipation   [   ] diarrhea   [   ] abdominal pain   [   ] nausea   [   ] emesis                                :                      [   ] WNL           [   ] LOPEZ  [   ] dysuria   [ X  ] difficulty voiding             Endo:                   [  X ] WNL          [   ] polyuria   [   ] temperature intolerance                 Skin:                     [  X ] WNL          [   ] pain   [   ] wound   [   ] rash   MSK:                    [   ] WNL          [   ] muscle pain   [  X ] joint pain/ stiffness shoulder, hips, knees, ankles/ feet     [   ] muscle tenderness   [   ] swelling   Neuro:                 [   ] WNL          [   ] HA   [   ] change in vision   [   ] tremor   [ X  ] weakness/ unsteady gait    [   ]dysphagia              Cognitive:           [ X  ] WNL           [   ]confusion      Psych:                  [ X  ] WNL           [   ] hallucinations   [   ]agitation   [   ] delusion   [   ]depression    PHYSICAL EXAM  General:[ X  ] NAD, Resting Comfortable,   [   ] other:                                HEENT: [  X ] NC/AT, EOMI, PERRL , Normal Conjunctivae,   [   ] other:  Cardio: [ X  ] RRR   [ X  ] other:  Grade 3 crescendo-decrescendo  systolic murmur auscultated in R 2nd intercostal space. RRR                             Pulm: [ X  ] No Respiratory Distress,  Lungs CTAB,   [   ] other:                       Abdomen: [X   ]ND/NT, Soft,   [   ] other:    : [  X ] NO LOPEZ CATHETER,        [   ] LOPEZ CATHETER- no meatal tear, no discharge,                                         MSK: [   ] No joint swelling, Full ROM,   [ X  ] other: Functional PROM both shoulders. (-) 30 deg. passive DF both ankles     No AROM of R shoulder, 0-30 degrees AROM of L shoulder (2/2 OA)                                   Ext: [ X ]   No C/C/E, No calf tenderness,   [   ]other:    Skin: [  X ] intact,   [   ] other:                                                                   Neurological Examination:  Cognitive: [   X ] AAO x 3,   [    ]  other:                                                                      Attention:  [    ] intact,   [ X   ]  other:   Impairment in tasks that require concentration and attention                Memory: [    ] intact,    [ X   ]  other:   2/3 with short term recall  Mood/Affect: [   X ] wnl,    [    ]  other:                                                                             Communication: [   X ]Fluent, no dysarthria, following commands:  [    ] other:   CN II - XII:  [  X  ] intact,  [    ] other:                                                                                        Motor:   RIGHT UE: [   ] WNL,  [X   ] other: 4-/5 shoulder abduction (limited ROM due to OA), 4+/5 elbow flexion/extension, 4+/5 finger  strength   LEFT    UE: [   ] WNL,  [ X  ] other: 4-/5 shoulder abduction (limited ROM due to OA), 4+/5 elbow flexion/extension, 4+/5 finger  strength   RIGHT LE: [   ] WNL,  [ X  ] other:  5-/5 hip flexion, 5/5 knee flexion/extension, 2-/5 dorsiflexion due to limited passive range  LEFT    LE: [   ] WNL,  [  X ] other: 5-/5 hip flexion, 5/5 knee flexion/extension, 2-/5  dorsiflexion due to limited passive range    Tone: [  X  ] wnl,   [    ]  other:  DTRs: [  X ]symmetric, [   ] other:  Coordination:   [    ] intact,   [  X  ] other: Incoordinate with FNF bilaterally (worse R>L UE)                                                                          Sensory: [  X  ] Intact to light touch, temperature   [    ] other:    Decreased proprioception bilateral lower extremities  (-) garcia, babinski, clonus b/l extremities    albuterol    90 MICROgram(s) HFA Inhaler 2 Puff(s) Inhalation every 6 hours PRN  aspirin enteric coated 81 milliGRAM(s) Oral daily  atenolol  Tablet 50 milliGRAM(s) Oral daily  atorvastatin 80 milliGRAM(s) Oral at bedtime  chlorhexidine 2% Cloths 1 Application(s) Topical <User Schedule>  cyanocobalamin 1000 MICROGram(s) Oral every 24 hours  fesoterodine ER Tablet 4 milliGRAM(s) Oral <User Schedule>  folic acid 1 milliGRAM(s) Oral daily  heparin   Injectable 5000 Unit(s) SubCutaneous every 12 hours  losartan 100 milliGRAM(s) Oral at bedtime  magnesium oxide 400 milliGRAM(s) Oral two times a day with meals  pantoprazole    Tablet 40 milliGRAM(s) Oral before breakfast  polyethylene glycol 3350 17 Gram(s) Oral at bedtime  senna 2 Tablet(s) Oral at bedtime  ticagrelor 90 milliGRAM(s) Oral every 12 hours      RECENT LABS/IMAGING                        10.7   3.71  )-----------( 171      ( 04 Dec 2023 07:16 )             34.4     12-04    140  |  107  |  22<H>  ----------------------------<  97  4.6   |  23  |  1.2    Ca    9.0      04 Dec 2023 07:16  Mg     1.9     12-04    TPro  6.2  /  Alb  3.6  /  TBili  0.4  /  DBili  x   /  AST  21  /  ALT  18  /  AlkPhos  74  12-04             Patient is a 83y old  Female who presents with a chief complaint of Rehab of left thalamic lacunar infarct resulting in ataxic gait, incoordination, and ADL dysfunction. (03 Dec 2023 09:35)      HPI:  83 year old female with PMHx of arthritis, hypertension, hyperlipidemia who presented to the ED for gait ataxia and peripheral extremity numbness. Patient reported that 3 days PTA she received a flu shot, and over the weekend while cleaning experienced onset of RUE pain/weakness, in which she rested on the cough and then after trying to get up endorsed problems with balance and experiencing distal numbness in bother her hands and feet, prompting presentation to the ED. On presentation, imaging revealed L thalamic acute lacunar infarct. CTA revealed marked calcification at the right and left common carotid artery bifurcations with severe stenosis at the level of the right carotid bulb, left CC bifurcation and ast the left carotid bulb. Patient underwent diagnostic cerebral angiogram on 11/28/23 due to stenotic findings on CTA, which revealed non flow limiting stenosis bilateral carotid arteries, and no carotid angioplasty/stenting was warranted. Symptoms of distal numbness improved overtime and patient hemodynamically stable. She has been medically stable. She has severe limitation of both shoulders at baseline.  Of note, the patient states that she was recently evaluated by a rheumatologist and was told that despite being on Remicade for years, that she has osteoarthritis and never had rheumatoid arthritis.     Living situation: Lives with family (daughter, son-in-law, grandkids) in private house, 0 Steps to enter, 12 Steps inside with stair lift.  Prior Level of Function: Ambulating independent with RW and rollator, Independent with ADL/iADLs    Evaluated by bedside physical therapy and occupational therapy. Patient is minimum to moderate assistance for bed mobility and transfers, ambulating 25 feet x3 with rolling walker minimum assistance.  Upper body dressing  and lower body dressing moderate/ max assistance, Prior to admission patient was independent with mobility and ambulation with a rolling walker, and independent in all ADLs, IADLs. Evaluated by physical medicine and rehabilitation consult and deemed an appropriate candidate for inpatient rehabilitation facility. Admitted to [facility] on  11/30/23   (30 Nov 2023 12:31)      TODAY'S SUBJECTIVE & REVIEW OF SYMPTOMS  No acute overnight events.   Patient seen this morning, no complaints at the moment, she is doing well.   Participating and tolerating therapy.   Moving bowel and bladder appropriately  Vital signs reviewed, and stable.  Labs showed macrocytic anemia, normal iron saturation, absolute retic WNL.    PHYSICAL EXAM    Vital Signs Last 24 Hrs  T(C): 36.4 (04 Dec 2023 05:45), Max: 36.4 (03 Dec 2023 20:47)  T(F): 97.5 (04 Dec 2023 05:45), Max: 97.6 (03 Dec 2023 20:47)  HR: 68 (04 Dec 2023 05:45) (68 - 83)  BP: 136/64 (04 Dec 2023 05:45) (123/56 - 150/66)  BP(mean): --  RR: 18 (04 Dec 2023 05:45) (18 - 19)  SpO2: --      Review of Systems:   Constitutional:    [  X ] WNL           [   ] poor appetite   [   ] insomnia   [   ] tired   Cardio:                [ X  ] WNL           [   ] CP   [   ] DEE   [   ] palpitations               Resp:                   [  X ] WNL           [   ] SOB   [   ] cough   [   ] wheezing   GI:                        [X   ] WNL           [   ] constipation   [   ] diarrhea   [   ] abdominal pain   [   ] nausea   [   ] emesis                                :                      [   ] WNL           [   ] LOPEZ  [   ] dysuria   [ X  ] difficulty voiding             Endo:                   [  X ] WNL          [   ] polyuria   [   ] temperature intolerance                 Skin:                     [  X ] WNL          [   ] pain   [   ] wound   [   ] rash   MSK:                    [   ] WNL          [   ] muscle pain   [  X ] joint pain/ stiffness shoulder, hips, knees, ankles/ feet     [   ] muscle tenderness   [   ] swelling   Neuro:                 [   ] WNL          [   ] HA   [   ] change in vision   [   ] tremor   [ X  ] weakness/ unsteady gait    [   ]dysphagia              Cognitive:           [ X  ] WNL           [   ]confusion      Psych:                  [ X  ] WNL           [   ] hallucinations   [   ]agitation   [   ] delusion   [   ]depression    PHYSICAL EXAM  General:[ X  ] NAD, Resting Comfortable,   [   ] other:                                HEENT: [  X ] NC/AT, EOMI, PERRL , Normal Conjunctivae,   [   ] other:  Cardio: [ X  ] RRR   [ X  ] other:  Grade 3 crescendo-decrescendo  systolic murmur auscultated in R 2nd intercostal space. RRR                             Pulm: [ X  ] No Respiratory Distress,  Lungs CTAB,   [   ] other:                       Abdomen: [X   ]ND/NT, Soft,   [   ] other:    : [  X ] NO LOPEZ CATHETER,        [   ] LOPEZ CATHETER- no meatal tear, no discharge,                                         MSK: [   ] No joint swelling, Full ROM,   [ X  ] other: Functional PROM both shoulders. (-) 30 deg. passive DF both ankles     No AROM of R shoulder, 0-30 degrees AROM of L shoulder (2/2 OA)                                   Ext: [ X ]   No C/C/E, No calf tenderness,   [   ]other:    Skin: [  X ] intact,   [   ] other:                                                                   Neurological Examination:  Cognitive: [   X ] AAO x 3,   [    ]  other:                                                                      Attention:  [    ] intact,   [ X   ]  other:   Impairment in tasks that require concentration and attention                Memory: [    ] intact,    [ X   ]  other:   2/3 with short term recall  Mood/Affect: [   X ] wnl,    [    ]  other:                                                                             Communication: [   X ]Fluent, no dysarthria, following commands:  [    ] other:   CN II - XII:  [  X  ] intact,  [    ] other:                                                                                        Motor:   RIGHT UE: [   ] WNL,  [X   ] other: 4-/5 shoulder abduction (limited ROM due to OA), 4+/5 elbow flexion/extension, 4+/5 finger  strength   LEFT    UE: [   ] WNL,  [ X  ] other: 4-/5 shoulder abduction (limited ROM due to OA), 4+/5 elbow flexion/extension, 4+/5 finger  strength   RIGHT LE: [   ] WNL,  [ X  ] other:  5-/5 hip flexion, 5/5 knee flexion/extension, 2-/5 dorsiflexion due to limited passive range  LEFT    LE: [   ] WNL,  [  X ] other: 5-/5 hip flexion, 5/5 knee flexion/extension, 2-/5  dorsiflexion due to limited passive range    Tone: [  X  ] wnl,   [    ]  other:  DTRs: [  X ]symmetric, [   ] other:  Coordination:   [    ] intact,   [  X  ] other: Incoordinate with FNF bilaterally (worse R>L UE)                                                                          Sensory: [  X  ] Intact to light touch, temperature   [    ] other:    Decreased proprioception bilateral lower extremities  (-) garcia, babinski, clonus b/l extremities    albuterol    90 MICROgram(s) HFA Inhaler 2 Puff(s) Inhalation every 6 hours PRN  aspirin enteric coated 81 milliGRAM(s) Oral daily  atenolol  Tablet 50 milliGRAM(s) Oral daily  atorvastatin 80 milliGRAM(s) Oral at bedtime  chlorhexidine 2% Cloths 1 Application(s) Topical <User Schedule>  cyanocobalamin 1000 MICROGram(s) Oral every 24 hours  fesoterodine ER Tablet 4 milliGRAM(s) Oral <User Schedule>  folic acid 1 milliGRAM(s) Oral daily  heparin   Injectable 5000 Unit(s) SubCutaneous every 12 hours  losartan 100 milliGRAM(s) Oral at bedtime  magnesium oxide 400 milliGRAM(s) Oral two times a day with meals  pantoprazole    Tablet 40 milliGRAM(s) Oral before breakfast  polyethylene glycol 3350 17 Gram(s) Oral at bedtime  senna 2 Tablet(s) Oral at bedtime  ticagrelor 90 milliGRAM(s) Oral every 12 hours      RECENT LABS/IMAGING                        10.7   3.71  )-----------( 171      ( 04 Dec 2023 07:16 )             34.4     12-04    140  |  107  |  22<H>  ----------------------------<  97  4.6   |  23  |  1.2    Ca    9.0      04 Dec 2023 07:16  Mg     1.9     12-04    TPro  6.2  /  Alb  3.6  /  TBili  0.4  /  DBili  x   /  AST  21  /  ALT  18  /  AlkPhos  74  12-04

## 2023-12-04 NOTE — PROGRESS NOTE ADULT - ASSESSMENT
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 s/p DSA with no stenting.     #Rehab of left thalamic lacunar infarct in the setting of bilateral carotid artery stenosis resulting in ataxic gait, incoordination and ADL dysfunction.   *MRI Brain: Left thalamic acute lacunar infarct. No acute hemorrhage  s/p DSA 11/28: non flow limiting stenosis bilateral carotid arteries. No carotid angioplasty/stenting warranted.     - continue inpatient Rehab   - Patient is on full rehab program of 3 hours a day of PT, OT, and/or SLP, for 5-6 days a week, for a total of 15 hours a week.  - Precautions/restrictions: Safety precautions, fall precautions  - Pain control: Tylenol 650mg q6hr PRN  - C/w  ASA 81 mg once daily & Brillinta 90mg qD.  (Plavix switched to Brilinta 90mg twice daily (per neuroendovascular request) given PRU result of 278. Patient will stay on that regimen for 90 days.  - c/w atorvastatin 80mg once daily    #Peripheral Polyneuropathy of unknown etiology  - significant loss of proprioception and cold sensitivity in both distal LEs.   - possibly associated with Remicade use  - DDx include acute inflammatory demyelinating polyneuropathy, metabolic demyelinization (2/2 vitamin deficiency), autoimmune disorder  - Additional testing: ESR 57 , CRP <3.0, Vitamin b12, folate >20, HbA1C 5.3, , Lyme antibody, TSH 2.19, IgA LEVEL, Ganglioside antibodies including anti GM1 (-), GM2 (+), GD1a (-), GD1b (-), and GQ1b (-), SPEP, UPEP, Vitb12 413, VitB1, Vit-E, Homocysteine, Methylmalonic acid. Copper, Zinc, HIV (-), RPR (-)     #HTN  - Losartan 100mg QD  - Atenolol 50mg QD  -Continue to monitor hemodynamics    #Hx of Asthma  -States no flares in the past several years. Albuterol at home  -c/w Proventil q6hr PRN    #Arthritis  -Patient has history of RA diagnosis in which she was taking Remicade for many years, however from recent rheumatology evaluation outpatient by Dr. Cruz, diagnosed as OA, not RA  - severe pain and limited mobility of tj ankles and shoulders and deformities of hands and knees.  -PT/OT      #Aortic valve stenosis, mod to severe  -Outpatient followup with Dr. Patel.   TTE: EF 50-55% with moderate to severe aortic valve stenosis.    #HLD  -Atorvastatin 80mg QD    #Urinary incontinence (chronic)  Patient has history of urinary frequency and incontinent episodes at home  -Discontinued Oxybutynin 5mg BID (patient reports dry mouth and poor tolerance to med)  -Started Fesoterdoine Fumerate 4mg (home, non formulary)    #macrocytic anemia  - stable, no overt bleeding  - iron studies 12/4/23 - , Retic 2.5%, absolute retic WNL, total iron 46, , TIBC 190  - transfuse if hemoglobin < 7      -Pain control: Tylenol PRN    -GI/Bowel Mgmt: Senna, Miralax    -Bladder management: Urinary incontinent, will monitor and adjust meds     -Skin:  No active issues at this time    -FEN will monitor and supplement    - Diet: DASH       Precautions / PROPHYLAXIS:      - Falls    - Ortho: Weight bearing status: wbat      - DVT prophylaxis: Heparin BID

## 2023-12-05 LAB
ZINC SERPL-MCNC: 64 UG/DL — SIGNIFICANT CHANGE UP (ref 44–115)
ZINC SERPL-MCNC: 64 UG/DL — SIGNIFICANT CHANGE UP (ref 44–115)

## 2023-12-05 RX ADMIN — Medication 81 MILLIGRAM(S): at 12:07

## 2023-12-05 RX ADMIN — TICAGRELOR 90 MILLIGRAM(S): 90 TABLET ORAL at 06:04

## 2023-12-05 RX ADMIN — Medication 1 MILLIGRAM(S): at 12:08

## 2023-12-05 RX ADMIN — TICAGRELOR 90 MILLIGRAM(S): 90 TABLET ORAL at 17:30

## 2023-12-05 RX ADMIN — LOSARTAN POTASSIUM 100 MILLIGRAM(S): 100 TABLET, FILM COATED ORAL at 21:11

## 2023-12-05 RX ADMIN — ATORVASTATIN CALCIUM 80 MILLIGRAM(S): 80 TABLET, FILM COATED ORAL at 21:11

## 2023-12-05 RX ADMIN — PREGABALIN 1000 MICROGRAM(S): 225 CAPSULE ORAL at 12:08

## 2023-12-05 RX ADMIN — HEPARIN SODIUM 5000 UNIT(S): 5000 INJECTION INTRAVENOUS; SUBCUTANEOUS at 06:04

## 2023-12-05 RX ADMIN — FESOTERODINE FUMARATE 4 MILLIGRAM(S): 8 TABLET, FILM COATED, EXTENDED RELEASE ORAL at 12:09

## 2023-12-05 RX ADMIN — ATENOLOL 50 MILLIGRAM(S): 25 TABLET ORAL at 06:04

## 2023-12-05 RX ADMIN — CHLORHEXIDINE GLUCONATE 1 APPLICATION(S): 213 SOLUTION TOPICAL at 05:49

## 2023-12-05 RX ADMIN — PANTOPRAZOLE SODIUM 40 MILLIGRAM(S): 20 TABLET, DELAYED RELEASE ORAL at 06:04

## 2023-12-05 RX ADMIN — HEPARIN SODIUM 5000 UNIT(S): 5000 INJECTION INTRAVENOUS; SUBCUTANEOUS at 17:25

## 2023-12-05 NOTE — PROGRESS NOTE ADULT - SUBJECTIVE AND OBJECTIVE BOX
Patient is a 83y old  Female who presents with a chief complaint of Rehab of left thalamic lacunar infarct resulting in ataxic gait, incoordination, and ADL dysfunction. (04 Dec 2023 09:07)      HPI:  83 year old female with PMHx of arthritis, hypertension, hyperlipidemia who presented to the ED for gait ataxia and peripheral extremity numbness. Patient reported that 3 days PTA she received a flu shot, and over the weekend while cleaning experienced onset of RUE pain/weakness, in which she rested on the cough and then after trying to get up endorsed problems with balance and experiencing distal numbness in bother her hands and feet, prompting presentation to the ED. On presentation, imaging revealed L thalamic acute lacunar infarct. CTA revealed marked calcification at the right and left common carotid artery bifurcations with severe stenosis at the level of the right carotid bulb, left CC bifurcation and ast the left carotid bulb. Patient underwent diagnostic cerebral angiogram on 11/28/23 due to stenotic findings on CTA, which revealed non flow limiting stenosis bilateral carotid arteries, and no carotid angioplasty/stenting was warranted. Symptoms of distal numbness improved overtime and patient hemodynamically stable. She has been medically stable. She has severe limitation of both shoulders at baseline.  Of note, the patient states that she was recently evaluated by a rheumatologist and was told that despite being on Remicade for years, that she has osteoarthritis and never had rheumatoid arthritis.     Living situation: Lives with family (daughter, son-in-law, grandkids) in private house, 0 Steps to enter, 12 Steps inside with stair lift.  Prior Level of Function: Ambulating independent with RW and rollator, Independent with ADL/iADLs    Evaluated by bedside physical therapy and occupational therapy. Patient is minimum to moderate assistance for bed mobility and transfers, ambulating 25 feet x3 with rolling walker minimum assistance.  Upper body dressing  and lower body dressing moderate/ max assistance, Prior to admission patient was independent with mobility and ambulation with a rolling walker, and independent in all ADLs, IADLs. Evaluated by physical medicine and rehabilitation consult and deemed an appropriate candidate for inpatient rehabilitation facility. Admitted to [facility] on  11/30/23   (30 Nov 2023 12:31)      TODAY'S SUBJECTIVE & REVIEW OF SYMPTOMS  Patient was seen and assessed at bedside. No overnight events.   Patient denies any complaints at this time.   Tolerating PT/OT.   Tolerating oral diet. Voiding and passing stool spontaneously.   Vital signs reviewed.    Review of Systems:   Constitutional:    [  X ] WNL           [   ] poor appetite   [   ] insomnia   [   ] tired   Cardio:                [ X  ] WNL           [   ] CP   [   ] DEE   [   ] palpitations               Resp:                   [  X ] WNL           [   ] SOB   [   ] cough   [   ] wheezing   GI:                        [X   ] WNL           [   ] constipation   [   ] diarrhea   [   ] abdominal pain   [   ] nausea   [   ] emesis                                :                      [   ] WNL           [   ] LOPEZ  [   ] dysuria   [ X  ] difficulty voiding             Endo:                   [  X ] WNL          [   ] polyuria   [   ] temperature intolerance                 Skin:                     [  X ] WNL          [   ] pain   [   ] wound   [   ] rash   MSK:                    [   ] WNL          [   ] muscle pain   [  X ] joint pain/ stiffness shoulder, hips, knees, ankles/ feet     [   ] muscle tenderness   [   ] swelling   Neuro:                 [   ] WNL          [   ] HA   [   ] change in vision   [   ] tremor   [ X  ] weakness/ unsteady gait    [   ]dysphagia              Cognitive:           [ X  ] WNL           [   ]confusion      Psych:                  [ X  ] WNL           [   ] hallucinations   [   ]agitation   [   ] delusion   [   ]depression    PHYSICAL EXAM    Vital Signs Last 24 Hrs  T(C): 36 (05 Dec 2023 05:59), Max: 37.4 (04 Dec 2023 20:10)  T(F): 96.8 (05 Dec 2023 05:59), Max: 99.4 (04 Dec 2023 20:10)  HR: 70 (05 Dec 2023 05:59) (70 - 89)  BP: 152/69 (05 Dec 2023 05:59) (110/53 - 153/64)  BP(mean): 89 (05 Dec 2023 05:59) (78 - 89)  RR: 20 (05 Dec 2023 05:59) (18 - 20)  SpO2: --    General:[ X  ] NAD, Resting Comfortable,   [   ] other:                                HEENT: [  X ] NC/AT, EOMI, PERRL , Normal Conjunctivae,   [   ] other:  Cardio: [ X  ] RRR   [ X  ] other:  Grade 3 crescendo-decrescendo  systolic murmur auscultated in R 2nd intercostal space. RRR                             Pulm: [ X  ] No Respiratory Distress,  Lungs CTAB,   [   ] other:                       Abdomen: [X   ]ND/NT, Soft,   [   ] other:    : [  X ] NO LOPEZ CATHETER,        [   ] LOPEZ CATHETER- no meatal tear, no discharge,                                         MSK: [   ] No joint swelling, Full ROM,   [ X  ] other: Functional PROM both shoulders. (-) 30 deg. passive DF both ankles     No AROM of R shoulder, 0-30 degrees AROM of L shoulder (2/2 OA)                                   Ext: [ X ]   No C/C/E, No calf tenderness,   [   ]other:    Skin: [  X ] intact,   [   ] other:                                                                   Neurological Examination:  Cognitive: [   X ] AAO x 3,   [    ]  other:                                                                      Attention:  [    ] intact,   [ X   ]  other:   Impairment in tasks that require concentration and attention                Memory: [    ] intact,    [ X   ]  other:   2/3 with short term recall  Mood/Affect: [   X ] wnl,    [    ]  other:                                                                             Communication: [   X ]Fluent, no dysarthria, following commands:  [    ] other:   CN II - XII:  [  X  ] intact,  [    ] other:                                                                                        Motor:   RIGHT UE: [   ] WNL,  [X   ] other: 4-/5 shoulder abduction (limited ROM due to OA), 4+/5 elbow flexion/extension, 4+/5 finger  strength   LEFT    UE: [   ] WNL,  [ X  ] other: 4-/5 shoulder abduction (limited ROM due to OA), 4+/5 elbow flexion/extension, 4+/5 finger  strength   RIGHT LE: [   ] WNL,  [ X  ] other:  5-/5 hip flexion, 5/5 knee flexion/extension, 2-/5 dorsiflexion due to limited passive range  LEFT    LE: [   ] WNL,  [  X ] other: 5-/5 hip flexion, 5/5 knee flexion/extension, 2-/5  dorsiflexion due to limited passive range    Tone: [  X  ] wnl,   [    ]  other:  DTRs: [  X ]symmetric, [   ] other:  Coordination:   [    ] intact,   [  X  ] other: Incoordinate with FNF bilaterally (worse R>L UE)                                                                          Sensory: [  X  ] Intact to light touch, temperature   [    ] other:    Decreased proprioception bilateral lower extremities  (-) garcia, babinski, clonus b/l extremities        albuterol    90 MICROgram(s) HFA Inhaler 2 Puff(s) Inhalation every 6 hours PRN  aspirin enteric coated 81 milliGRAM(s) Oral daily  atenolol  Tablet 50 milliGRAM(s) Oral daily  atorvastatin 80 milliGRAM(s) Oral at bedtime  chlorhexidine 2% Cloths 1 Application(s) Topical <User Schedule>  cyanocobalamin 1000 MICROGram(s) Oral every 24 hours  fesoterodine ER Tablet 4 milliGRAM(s) Oral <User Schedule>  folic acid 1 milliGRAM(s) Oral daily  heparin   Injectable 5000 Unit(s) SubCutaneous every 12 hours  losartan 100 milliGRAM(s) Oral at bedtime  pantoprazole    Tablet 40 milliGRAM(s) Oral before breakfast  polyethylene glycol 3350 17 Gram(s) Oral at bedtime  senna 2 Tablet(s) Oral at bedtime  ticagrelor 90 milliGRAM(s) Oral every 12 hours      RECENT LABS/IMAGING                        10.7   3.71  )-----------( 171      ( 04 Dec 2023 07:16 )             34.4     12-04    140  |  107  |  22<H>  ----------------------------<  97  4.6   |  23  |  1.2    Ca    9.0      04 Dec 2023 07:16  Mg     1.9     12-04    TPro  6.2  /  Alb  3.6  /  TBili  0.4  /  DBili  x   /  AST  21  /  ALT  18  /  AlkPhos  74  12-04             Patient is a 83y old  Female who presents with a chief complaint of Rehab of left thalamic lacunar infarct resulting in ataxic gait, incoordination, and ADL dysfunction. (04 Dec 2023 09:07)      HPI:  83 year old female with PMHx of arthritis, hypertension, hyperlipidemia who presented to the ED for gait ataxia and peripheral extremity numbness. Patient reported that 3 days PTA she received a flu shot, and over the weekend while cleaning experienced onset of RUE pain/weakness, in which she rested on the cough and then after trying to get up endorsed problems with balance and experiencing distal numbness in bother her hands and feet, prompting presentation to the ED. On presentation, imaging revealed L thalamic acute lacunar infarct. CTA revealed marked calcification at the right and left common carotid artery bifurcations with severe stenosis at the level of the right carotid bulb, left CC bifurcation and ast the left carotid bulb. Patient underwent diagnostic cerebral angiogram on 11/28/23 due to stenotic findings on CTA, which revealed non flow limiting stenosis bilateral carotid arteries, and no carotid angioplasty/stenting was warranted. Symptoms of distal numbness improved overtime and patient hemodynamically stable. She has been medically stable. She has severe limitation of both shoulders at baseline.  Of note, the patient states that she was recently evaluated by a rheumatologist and was told that despite being on Remicade for years, that she has osteoarthritis and never had rheumatoid arthritis.     Living situation: Lives with family (daughter, son-in-law, grandkids) in private house, 0 Steps to enter, 12 Steps inside with stair lift.  Prior Level of Function: Ambulating independent with RW and rollator, Independent with ADL/iADLs    Evaluated by bedside physical therapy and occupational therapy. Patient is minimum to moderate assistance for bed mobility and transfers, ambulating 25 feet x3 with rolling walker minimum assistance.  Upper body dressing  and lower body dressing moderate/ max assistance, Prior to admission patient was independent with mobility and ambulation with a rolling walker, and independent in all ADLs, IADLs. Evaluated by physical medicine and rehabilitation consult and deemed an appropriate candidate for inpatient rehabilitation facility. Admitted to [facility] on  11/30/23   (30 Nov 2023 12:31)      TODAY'S SUBJECTIVE & REVIEW OF SYMPTOMS  Patient was seen and assessed at bedside. No overnight events.   Patient denies any complaints at this time.   Tolerating PT/OT.   Tolerating oral diet. Voiding and passing stool spontaneously.   Vital signs reviewed.    Review of Systems:   Constitutional:    [  X ] WNL           [   ] poor appetite   [   ] insomnia   [   ] tired   Cardio:                [ X  ] WNL           [   ] CP   [   ] DEE   [   ] palpitations               Resp:                   [  X ] WNL           [   ] SOB   [   ] cough   [   ] wheezing   GI:                        [X   ] WNL           [   ] constipation   [   ] diarrhea   [   ] abdominal pain   [   ] nausea   [   ] emesis                                :                      [   ] WNL           [   ] LOPEZ  [   ] dysuria   [ X  ] difficulty voiding             Endo:                   [  X ] WNL          [   ] polyuria   [   ] temperature intolerance                 Skin:                     [  X ] WNL          [   ] pain   [   ] wound   [   ] rash   MSK:                    [   ] WNL          [   ] muscle pain   [  X ] joint pain/ stiffness shoulder, hips, knees, ankles/ feet     [   ] muscle tenderness   [   ] swelling   Neuro:                 [   ] WNL          [   ] HA   [   ] change in vision   [   ] tremor   [ X  ] weakness/ unsteady gait    [   ]dysphagia              Cognitive:           [ X  ] WNL           [   ]confusion      Psych:                  [ X  ] WNL           [   ] hallucinations   [   ]agitation   [   ] delusion   [   ]depression    PHYSICAL EXAM    Vital Signs Last 24 Hrs  T(C): 36 (05 Dec 2023 05:59), Max: 37.4 (04 Dec 2023 20:10)  T(F): 96.8 (05 Dec 2023 05:59), Max: 99.4 (04 Dec 2023 20:10)  HR: 70 (05 Dec 2023 05:59) (70 - 89)  BP: 152/69 (05 Dec 2023 05:59) (110/53 - 153/64)  BP(mean): 89 (05 Dec 2023 05:59) (78 - 89)  RR: 20 (05 Dec 2023 05:59) (18 - 20)  SpO2: --    General:[ X  ] NAD, Resting Comfortable,   [   ] other:                                HEENT: [  X ] NC/AT, EOMI, PERRL , Normal Conjunctivae,   [   ] other:  Cardio: [ X  ] RRR   [ X  ] other:  Grade 3 crescendo-decrescendo  systolic murmur auscultated in R 2nd intercostal space. RRR                             Pulm: [ X  ] No Respiratory Distress,  Lungs CTAB,   [   ] other:                       Abdomen: [X   ]ND/NT, Soft,   [   ] other:    : [  X ] NO LOPEZ CATHETER,        [   ] LOPEZ CATHETER- no meatal tear, no discharge,                                         MSK: [   ] No joint swelling, Full ROM,   [ X  ] other: Functional PROM both shoulders. (-) 30 deg. passive DF both ankles     No AROM of R shoulder, 0-30 degrees AROM of L shoulder (2/2 OA)                                   Ext: [ X ]   No C/C/E, No calf tenderness,   [   ]other:    Skin: [  X ] intact,   [   ] other:                                                                   Neurological Examination:  Cognitive: [   X ] AAO x 3,   [    ]  other:                                                                      Attention:  [    ] intact,   [ X   ]  other:   Impairment in tasks that require concentration and attention                Memory: [    ] intact,    [ X   ]  other:   2/3 with short term recall  Mood/Affect: [   X ] wnl,    [    ]  other:                                                                             Communication: [   X ]Fluent, no dysarthria, following commands:  [    ] other:   CN II - XII:  [  X  ] intact,  [    ] other:                                                                                        Motor:   RIGHT UE: [   ] WNL,  [X   ] other: 4-/5 shoulder abduction (limited ROM due to OA), 4+/5 elbow flexion/extension, 4+/5 finger  strength   LEFT    UE: [   ] WNL,  [ X  ] other: 4-/5 shoulder abduction (limited ROM due to OA), 4+/5 elbow flexion/extension, 4+/5 finger  strength   RIGHT LE: [   ] WNL,  [ X  ] other:  5-/5 hip flexion, 5/5 knee flexion/extension, 2-/5 dorsiflexion due to limited passive range  LEFT    LE: [   ] WNL,  [  X ] other: 5-/5 hip flexion, 5/5 knee flexion/extension, 2-/5  dorsiflexion due to limited passive range    Tone: [  X  ] wnl,   [    ]  other:  DTRs: [  X ]symmetric, [   ] other:  Coordination:   [    ] intact,   [  X  ] other: Incoordinate with FNF bilaterally (worse R>L UE)                                                                          Sensory: [  X  ] Intact to light touch, temperature   [    ] other:    Decreased proprioception bilateral lower extremities  (-) garcia, babinski, clonus b/l extremities        albuterol    90 MICROgram(s) HFA Inhaler 2 Puff(s) Inhalation every 6 hours PRN  aspirin enteric coated 81 milliGRAM(s) Oral daily  atenolol  Tablet 50 milliGRAM(s) Oral daily  atorvastatin 80 milliGRAM(s) Oral at bedtime  chlorhexidine 2% Cloths 1 Application(s) Topical <User Schedule>  cyanocobalamin 1000 MICROGram(s) Oral every 24 hours  fesoterodine ER Tablet 4 milliGRAM(s) Oral <User Schedule>  folic acid 1 milliGRAM(s) Oral daily  heparin   Injectable 5000 Unit(s) SubCutaneous every 12 hours  losartan 100 milliGRAM(s) Oral at bedtime  pantoprazole    Tablet 40 milliGRAM(s) Oral before breakfast  polyethylene glycol 3350 17 Gram(s) Oral at bedtime  senna 2 Tablet(s) Oral at bedtime  ticagrelor 90 milliGRAM(s) Oral every 12 hours      RECENT LABS/IMAGING                        10.7   3.71  )-----------( 171      ( 04 Dec 2023 07:16 )             34.4     12-04    140  |  107  |  22<H>  ----------------------------<  97  4.6   |  23  |  1.2    Ca    9.0      04 Dec 2023 07:16  Mg     1.9     12-04    TPro  6.2  /  Alb  3.6  /  TBili  0.4  /  DBili  x   /  AST  21  /  ALT  18  /  AlkPhos  74  12-04             Patient is a 83y old  Female who presents with a chief complaint of Rehab of left thalamic lacunar infarct resulting in ataxic gait, incoordination, and ADL dysfunction. (04 Dec 2023 09:07)      HPI:  83 year old female with PMHx of arthritis, hypertension, hyperlipidemia who presented to the ED for gait ataxia and peripheral extremity numbness. Patient reported that 3 days PTA she received a flu shot, and over the weekend while cleaning experienced onset of RUE pain/weakness, in which she rested on the cough and then after trying to get up endorsed problems with balance and experiencing distal numbness in bother her hands and feet, prompting presentation to the ED. On presentation, imaging revealed L thalamic acute lacunar infarct. CTA revealed marked calcification at the right and left common carotid artery bifurcations with severe stenosis at the level of the right carotid bulb, left CC bifurcation and ast the left carotid bulb. Patient underwent diagnostic cerebral angiogram on 11/28/23 due to stenotic findings on CTA, which revealed non flow limiting stenosis bilateral carotid arteries, and no carotid angioplasty/stenting was warranted. Symptoms of distal numbness improved overtime and patient hemodynamically stable. She has been medically stable. She has severe limitation of both shoulders at baseline.  Of note, the patient states that she was recently evaluated by a rheumatologist and was told that despite being on Remicade for years, that she has osteoarthritis and never had rheumatoid arthritis.     Living situation: Lives with family (daughter, son-in-law, grandkids) in private house, 0 Steps to enter, 12 Steps inside with stair lift.  Prior Level of Function: Ambulating independent with RW and rollator, Independent with ADL/iADLs    Evaluated by bedside physical therapy and occupational therapy. Patient is minimum to moderate assistance for bed mobility and transfers, ambulating 25 feet x3 with rolling walker minimum assistance.  Upper body dressing  and lower body dressing moderate/ max assistance, Prior to admission patient was independent with mobility and ambulation with a rolling walker, and independent in all ADLs, IADLs. Evaluated by physical medicine and rehabilitation consult and deemed an appropriate candidate for inpatient rehabilitation facility. Admitted to [facility] on  11/30/23   (30 Nov 2023 12:31)      TODAY'S SUBJECTIVE & REVIEW OF SYMPTOMS  Patient was seen and assessed at bedside. No overnight events.   Patient denies any complaints at this time.   Tolerating PT/OT.   Tolerating oral diet. Voiding and passing stool spontaneously.   Vital signs reviewed.    Current Level of Function:  Bed Mobility: partial assist  Transfers: moderate assist  Gait:  modA 50ft with RW  Lower body dressing: modA  Toileting: modA      Review of Systems:   Constitutional:    [  X ] WNL           [   ] poor appetite   [   ] insomnia   [   ] tired   Cardio:                [ X  ] WNL           [   ] CP   [   ] DEE   [   ] palpitations               Resp:                   [  X ] WNL           [   ] SOB   [   ] cough   [   ] wheezing   GI:                        [X   ] WNL           [   ] constipation   [   ] diarrhea   [   ] abdominal pain   [   ] nausea   [   ] emesis                                :                      [   ] WNL           [   ] LOPEZ  [   ] dysuria   [ X  ] difficulty voiding             Endo:                   [  X ] WNL          [   ] polyuria   [   ] temperature intolerance                 Skin:                     [  X ] WNL          [   ] pain   [   ] wound   [   ] rash   MSK:                    [   ] WNL          [   ] muscle pain   [  X ] joint pain/ stiffness shoulder, hips, knees, ankles/ feet     [   ] muscle tenderness   [   ] swelling   Neuro:                 [   ] WNL          [   ] HA   [   ] change in vision   [   ] tremor   [ X  ] weakness/ unsteady gait    [   ]dysphagia              Cognitive:           [ X  ] WNL           [   ]confusion      Psych:                  [ X  ] WNL           [   ] hallucinations   [   ]agitation   [   ] delusion   [   ]depression    PHYSICAL EXAM    Vital Signs Last 24 Hrs  T(C): 36 (05 Dec 2023 05:59), Max: 37.4 (04 Dec 2023 20:10)  T(F): 96.8 (05 Dec 2023 05:59), Max: 99.4 (04 Dec 2023 20:10)  HR: 70 (05 Dec 2023 05:59) (70 - 89)  BP: 152/69 (05 Dec 2023 05:59) (110/53 - 153/64)  BP(mean): 89 (05 Dec 2023 05:59) (78 - 89)  RR: 20 (05 Dec 2023 05:59) (18 - 20)  SpO2: --    General:[ X  ] NAD, Resting Comfortable,   [   ] other:                                HEENT: [  X ] NC/AT, EOMI, PERRL , Normal Conjunctivae,   [   ] other:  Cardio: [ X  ] RRR   [ X  ] other:  Grade 3 crescendo-decrescendo  systolic murmur auscultated in R 2nd intercostal space. RRR                             Pulm: [ X  ] No Respiratory Distress,  Lungs CTAB,   [   ] other:                       Abdomen: [X   ]ND/NT, Soft,   [   ] other:    : [  X ] NO LOPEZ CATHETER,        [   ] LOPEZ CATHETER- no meatal tear, no discharge,                                         MSK: [   ] No joint swelling, Full ROM,   [ X  ] other: Functional PROM both shoulders. (-) 30 deg. passive DF both ankles     No AROM of R shoulder, 0-30 degrees AROM of L shoulder (2/2 OA)                                   Ext: [ X ]   No C/C/E, No calf tenderness,   [   ]other:    Skin: [  X ] intact,   [   ] other:                                                                   Neurological Examination:  Cognitive: [   X ] AAO x 3,   [    ]  other:                                                                      Attention:  [    ] intact,   [ X   ]  other:   Impairment in tasks that require concentration and attention                Memory: [    ] intact,    [ X   ]  other:   2/3 with short term recall  Mood/Affect: [   X ] wnl,    [    ]  other:                                                                             Communication: [   X ]Fluent, no dysarthria, following commands:  [    ] other:   CN II - XII:  [  X  ] intact,  [    ] other:                                                                                        Motor:   RIGHT UE: [   ] WNL,  [X   ] other: 4-/5 shoulder abduction (limited ROM due to OA), 4+/5 elbow flexion/extension, 4+/5 finger  strength   LEFT    UE: [   ] WNL,  [ X  ] other: 4-/5 shoulder abduction (limited ROM due to OA), 4+/5 elbow flexion/extension, 4+/5 finger  strength   RIGHT LE: [   ] WNL,  [ X  ] other:  5-/5 hip flexion, 5/5 knee flexion/extension, 2-/5 dorsiflexion due to limited passive range  LEFT    LE: [   ] WNL,  [  X ] other: 5-/5 hip flexion, 5/5 knee flexion/extension, 2-/5  dorsiflexion due to limited passive range    Tone: [  X  ] wnl,   [    ]  other:  DTRs: [  X ]symmetric, [   ] other:  Coordination:   [    ] intact,   [  X  ] other: Incoordinate with FNF bilaterally (worse R>L UE)                                                                          Sensory: [  X  ] Intact to light touch, temperature   [    ] other:    Decreased proprioception bilateral lower extremities  (-) garcia, babinski, clonus b/l extremities        albuterol    90 MICROgram(s) HFA Inhaler 2 Puff(s) Inhalation every 6 hours PRN  aspirin enteric coated 81 milliGRAM(s) Oral daily  atenolol  Tablet 50 milliGRAM(s) Oral daily  atorvastatin 80 milliGRAM(s) Oral at bedtime  chlorhexidine 2% Cloths 1 Application(s) Topical <User Schedule>  cyanocobalamin 1000 MICROGram(s) Oral every 24 hours  fesoterodine ER Tablet 4 milliGRAM(s) Oral <User Schedule>  folic acid 1 milliGRAM(s) Oral daily  heparin   Injectable 5000 Unit(s) SubCutaneous every 12 hours  losartan 100 milliGRAM(s) Oral at bedtime  pantoprazole    Tablet 40 milliGRAM(s) Oral before breakfast  polyethylene glycol 3350 17 Gram(s) Oral at bedtime  senna 2 Tablet(s) Oral at bedtime  ticagrelor 90 milliGRAM(s) Oral every 12 hours      RECENT LABS/IMAGING                        10.7   3.71  )-----------( 171      ( 04 Dec 2023 07:16 )             34.4     12-04    140  |  107  |  22<H>  ----------------------------<  97  4.6   |  23  |  1.2    Ca    9.0      04 Dec 2023 07:16  Mg     1.9     12-04    TPro  6.2  /  Alb  3.6  /  TBili  0.4  /  DBili  x   /  AST  21  /  ALT  18  /  AlkPhos  74  12-04             Patient is a 83y old  Female who presents with a chief complaint of Rehab of left thalamic lacunar infarct resulting in ataxic gait, incoordination, and ADL dysfunction. (04 Dec 2023 09:07)      HPI:  83 year old female with PMHx of arthritis, hypertension, hyperlipidemia who presented to the ED for gait ataxia and peripheral extremity numbness. Patient reported that 3 days PTA she received a flu shot, and over the weekend while cleaning experienced onset of RUE pain/weakness, in which she rested on the cough and then after trying to get up endorsed problems with balance and experiencing distal numbness in bother her hands and feet, prompting presentation to the ED. On presentation, imaging revealed L thalamic acute lacunar infarct. CTA revealed marked calcification at the right and left common carotid artery bifurcations with severe stenosis at the level of the right carotid bulb, left CC bifurcation and ast the left carotid bulb. Patient underwent diagnostic cerebral angiogram on 11/28/23 due to stenotic findings on CTA, which revealed non flow limiting stenosis bilateral carotid arteries, and no carotid angioplasty/stenting was warranted. Symptoms of distal numbness improved overtime and patient hemodynamically stable. She has been medically stable. She has severe limitation of both shoulders at baseline.  Of note, the patient states that she was recently evaluated by a rheumatologist and was told that despite being on Remicade for years, that she has osteoarthritis and never had rheumatoid arthritis.     Living situation: Lives with family (daughter, son-in-law, grandkids) in private house, 0 Steps to enter, 12 Steps inside with stair lift.  Prior Level of Function: Ambulating independent with RW and rollator, Independent with ADL/iADLs    Evaluated by bedside physical therapy and occupational therapy. Patient is minimum to moderate assistance for bed mobility and transfers, ambulating 25 feet x3 with rolling walker minimum assistance.  Upper body dressing  and lower body dressing moderate/ max assistance, Prior to admission patient was independent with mobility and ambulation with a rolling walker, and independent in all ADLs, IADLs. Evaluated by physical medicine and rehabilitation consult and deemed an appropriate candidate for inpatient rehabilitation facility. Admitted to [facility] on  11/30/23   (30 Nov 2023 12:31)      TODAY'S SUBJECTIVE & REVIEW OF SYMPTOMS  Patient was seen and assessed at bedside. No overnight events.   Patient denies any complaints at this time.   Tolerating PT/OT.   Tolerating oral diet. Voiding and passing stool spontaneously.   Vital signs reviewed.    Current Level of Function:  Bed Mobility: partial assist  Transfers: moderate assist  Gait:  modA 50ft with RW  Lower body dressing: modA  Toileting: modA      Review of Systems:   Constitutional:    [  X ] WNL           [   ] poor appetite   [   ] insomnia   [   ] tired   Cardio:                [ X  ] WNL           [   ] CP   [   ] DEE   [   ] palpitations               Resp:                   [  X ] WNL           [   ] SOB   [   ] cough   [   ] wheezing   GI:                        [X   ] WNL           [   ] constipation   [   ] diarrhea   [   ] abdominal pain   [   ] nausea   [   ] emesis                                :                      [   ] WNL           [   ] LOPZE  [   ] dysuria   [ X  ] difficulty voiding             Endo:                   [  X ] WNL          [   ] polyuria   [   ] temperature intolerance                 Skin:                     [  X ] WNL          [   ] pain   [   ] wound   [   ] rash   MSK:                    [   ] WNL          [   ] muscle pain   [  X ] joint pain/ stiffness shoulder, hips, knees, ankles/ feet     [   ] muscle tenderness   [   ] swelling   Neuro:                 [   ] WNL          [   ] HA   [   ] change in vision   [   ] tremor   [ X  ] weakness/ unsteady gait    [   ]dysphagia              Cognitive:           [ X  ] WNL           [   ]confusion      Psych:                  [ X  ] WNL           [   ] hallucinations   [   ]agitation   [   ] delusion   [   ]depression    PHYSICAL EXAM    Vital Signs Last 24 Hrs  T(C): 36 (05 Dec 2023 05:59), Max: 37.4 (04 Dec 2023 20:10)  T(F): 96.8 (05 Dec 2023 05:59), Max: 99.4 (04 Dec 2023 20:10)  HR: 70 (05 Dec 2023 05:59) (70 - 89)  BP: 152/69 (05 Dec 2023 05:59) (110/53 - 153/64)  BP(mean): 89 (05 Dec 2023 05:59) (78 - 89)  RR: 20 (05 Dec 2023 05:59) (18 - 20)  SpO2: --    General:[ X  ] NAD, Resting Comfortable,   [   ] other:                                HEENT: [  X ] NC/AT, EOMI, PERRL , Normal Conjunctivae,   [   ] other:  Cardio: [ X  ] RRR   [ X  ] other:  Grade 3 crescendo-decrescendo  systolic murmur auscultated in R 2nd intercostal space. RRR                             Pulm: [ X  ] No Respiratory Distress,  Lungs CTAB,   [   ] other:                       Abdomen: [X   ]ND/NT, Soft,   [   ] other:    : [  X ] NO LOPEZ CATHETER,        [   ] LOPEZ CATHETER- no meatal tear, no discharge,                                         MSK: [   ] No joint swelling, Full ROM,   [ X  ] other: Functional PROM both shoulders. (-) 30 deg. passive DF both ankles     No AROM of R shoulder, 0-30 degrees AROM of L shoulder (2/2 OA)                                   Ext: [ X ]   No C/C/E, No calf tenderness,   [   ]other:    Skin: [  X ] intact,   [   ] other:                                                                   Neurological Examination:  Cognitive: [   X ] AAO x 3,   [    ]  other:                                                                      Attention:  [    ] intact,   [ X   ]  other:   Impairment in tasks that require concentration and attention                Memory: [    ] intact,    [ X   ]  other:   2/3 with short term recall  Mood/Affect: [   X ] wnl,    [    ]  other:                                                                             Communication: [   X ]Fluent, no dysarthria, following commands:  [    ] other:   CN II - XII:  [  X  ] intact,  [    ] other:                                                                                        Motor:   RIGHT UE: [   ] WNL,  [X   ] other: 4-/5 shoulder abduction (limited ROM due to OA), 4+/5 elbow flexion/extension, 4+/5 finger  strength   LEFT    UE: [   ] WNL,  [ X  ] other: 4-/5 shoulder abduction (limited ROM due to OA), 4+/5 elbow flexion/extension, 4+/5 finger  strength   RIGHT LE: [   ] WNL,  [ X  ] other:  5-/5 hip flexion, 5/5 knee flexion/extension, 2-/5 dorsiflexion due to limited passive range  LEFT    LE: [   ] WNL,  [  X ] other: 5-/5 hip flexion, 5/5 knee flexion/extension, 2-/5  dorsiflexion due to limited passive range    Tone: [  X  ] wnl,   [    ]  other:  DTRs: [  X ]symmetric, [   ] other:  Coordination:   [    ] intact,   [  X  ] other: Incoordinate with FNF bilaterally (worse R>L UE)                                                                          Sensory: [  X  ] Intact to light touch, temperature   [    ] other:    Decreased proprioception bilateral lower extremities  (-) garcia, babinski, clonus b/l extremities        albuterol    90 MICROgram(s) HFA Inhaler 2 Puff(s) Inhalation every 6 hours PRN  aspirin enteric coated 81 milliGRAM(s) Oral daily  atenolol  Tablet 50 milliGRAM(s) Oral daily  atorvastatin 80 milliGRAM(s) Oral at bedtime  chlorhexidine 2% Cloths 1 Application(s) Topical <User Schedule>  cyanocobalamin 1000 MICROGram(s) Oral every 24 hours  fesoterodine ER Tablet 4 milliGRAM(s) Oral <User Schedule>  folic acid 1 milliGRAM(s) Oral daily  heparin   Injectable 5000 Unit(s) SubCutaneous every 12 hours  losartan 100 milliGRAM(s) Oral at bedtime  pantoprazole    Tablet 40 milliGRAM(s) Oral before breakfast  polyethylene glycol 3350 17 Gram(s) Oral at bedtime  senna 2 Tablet(s) Oral at bedtime  ticagrelor 90 milliGRAM(s) Oral every 12 hours      RECENT LABS/IMAGING                        10.7   3.71  )-----------( 171      ( 04 Dec 2023 07:16 )             34.4     12-04    140  |  107  |  22<H>  ----------------------------<  97  4.6   |  23  |  1.2    Ca    9.0      04 Dec 2023 07:16  Mg     1.9     12-04    TPro  6.2  /  Alb  3.6  /  TBili  0.4  /  DBili  x   /  AST  21  /  ALT  18  /  AlkPhos  74  12-04

## 2023-12-06 RX ADMIN — PREGABALIN 1000 MICROGRAM(S): 225 CAPSULE ORAL at 13:16

## 2023-12-06 RX ADMIN — FESOTERODINE FUMARATE 4 MILLIGRAM(S): 8 TABLET, FILM COATED, EXTENDED RELEASE ORAL at 13:16

## 2023-12-06 RX ADMIN — TICAGRELOR 90 MILLIGRAM(S): 90 TABLET ORAL at 17:02

## 2023-12-06 RX ADMIN — Medication 81 MILLIGRAM(S): at 13:16

## 2023-12-06 RX ADMIN — HEPARIN SODIUM 5000 UNIT(S): 5000 INJECTION INTRAVENOUS; SUBCUTANEOUS at 05:42

## 2023-12-06 RX ADMIN — HEPARIN SODIUM 5000 UNIT(S): 5000 INJECTION INTRAVENOUS; SUBCUTANEOUS at 17:02

## 2023-12-06 RX ADMIN — PANTOPRAZOLE SODIUM 40 MILLIGRAM(S): 20 TABLET, DELAYED RELEASE ORAL at 05:43

## 2023-12-06 RX ADMIN — LOSARTAN POTASSIUM 100 MILLIGRAM(S): 100 TABLET, FILM COATED ORAL at 21:37

## 2023-12-06 RX ADMIN — ATENOLOL 50 MILLIGRAM(S): 25 TABLET ORAL at 05:42

## 2023-12-06 RX ADMIN — SENNA PLUS 2 TABLET(S): 8.6 TABLET ORAL at 21:37

## 2023-12-06 RX ADMIN — ATORVASTATIN CALCIUM 80 MILLIGRAM(S): 80 TABLET, FILM COATED ORAL at 21:37

## 2023-12-06 RX ADMIN — Medication 1 MILLIGRAM(S): at 13:15

## 2023-12-06 RX ADMIN — TICAGRELOR 90 MILLIGRAM(S): 90 TABLET ORAL at 05:42

## 2023-12-06 RX ADMIN — CHLORHEXIDINE GLUCONATE 1 APPLICATION(S): 213 SOLUTION TOPICAL at 05:39

## 2023-12-06 NOTE — PROGRESS NOTE ADULT - SUBJECTIVE AND OBJECTIVE BOX
Patient is a 83y old  Female who presents with a chief complaint of Rehab of left thalamic lacunar infarct resulting in ataxic gait, incoordination, and ADL dysfunction. (05 Dec 2023 10:27)      HPI:  83 year old female with PMHx of arthritis, hypertension, hyperlipidemia who presented to the ED for gait ataxia and peripheral extremity numbness. Patient reported that 3 days PTA she received a flu shot, and over the weekend while cleaning experienced onset of RUE pain/weakness, in which she rested on the cough and then after trying to get up endorsed problems with balance and experiencing distal numbness in bother her hands and feet, prompting presentation to the ED. On presentation, imaging revealed L thalamic acute lacunar infarct. CTA revealed marked calcification at the right and left common carotid artery bifurcations with severe stenosis at the level of the right carotid bulb, left CC bifurcation and ast the left carotid bulb. Patient underwent diagnostic cerebral angiogram on 11/28/23 due to stenotic findings on CTA, which revealed non flow limiting stenosis bilateral carotid arteries, and no carotid angioplasty/stenting was warranted. Symptoms of distal numbness improved overtime and patient hemodynamically stable. She has been medically stable. She has severe limitation of both shoulders at baseline.  Of note, the patient states that she was recently evaluated by a rheumatologist and was told that despite being on Remicade for years, that she has osteoarthritis and never had rheumatoid arthritis.     Living situation: Lives with family (daughter, son-in-law, grandkids) in private house, 0 Steps to enter, 12 Steps inside with stair lift.  Prior Level of Function: Ambulating independent with RW and rollator, Independent with ADL/iADLs    Evaluated by bedside physical therapy and occupational therapy. Patient is minimum to moderate assistance for bed mobility and transfers, ambulating 25 feet x3 with rolling walker minimum assistance.  Upper body dressing  and lower body dressing moderate/ max assistance, Prior to admission patient was independent with mobility and ambulation with a rolling walker, and independent in all ADLs, IADLs. Evaluated by physical medicine and rehabilitation consult and deemed an appropriate candidate for inpatient rehabilitation facility. Admitted to [facility] on  11/30/23   (30 Nov 2023 12:31)      TODAY'S SUBJECTIVE & REVIEW OF SYMPTOMS  Patient was seen and assessed at bedside. No overnight events.   Patient denies any complaints at this time.   Tolerating PT/OT.   Tolerating oral diet. Voiding and passing stool spontaneously.   Vital signs reviewed. 1x BP of 162/67. Will continue to monitor as it appears to be a one time elevation.    Current Level of Function:  Bed Mobility: partial assist  Transfers: moderate assist  Gait:  modA 50ft with RW  Lower body dressing: modA  Toileting: modA    Review of Systems:   Constitutional:    [  X ] WNL           [   ] poor appetite   [   ] insomnia   [   ] tired   Cardio:                [ X  ] WNL           [   ] CP   [   ] DEE   [   ] palpitations               Resp:                   [  X ] WNL           [   ] SOB   [   ] cough   [   ] wheezing   GI:                        [X   ] WNL           [   ] constipation   [   ] diarrhea   [   ] abdominal pain   [   ] nausea   [   ] emesis                                :                      [   ] WNL           [   ] LOPEZ  [   ] dysuria   [ X  ] difficulty voiding             Endo:                   [  X ] WNL          [   ] polyuria   [   ] temperature intolerance                 Skin:                     [  X ] WNL          [   ] pain   [   ] wound   [   ] rash   MSK:                    [   ] WNL          [   ] muscle pain   [  X ] joint pain/ stiffness shoulder, hips, knees, ankles/ feet     [   ] muscle tenderness   [   ] swelling   Neuro:                 [   ] WNL          [   ] HA   [   ] change in vision   [   ] tremor   [ X  ] weakness/ unsteady gait    [   ]dysphagia              Cognitive:           [ X  ] WNL           [   ]confusion      Psych:                  [ X  ] WNL           [   ] hallucinations   [   ]agitation   [   ] delusion   [   ]depression    PHYSICAL EXAM    Vital Signs Last 24 Hrs  T(C): 35.6 (06 Dec 2023 05:22), Max: 36.3 (05 Dec 2023 13:53)  T(F): 96.1 (06 Dec 2023 05:22), Max: 97.3 (05 Dec 2023 13:53)  HR: 69 (06 Dec 2023 05:22) (65 - 72)  BP: 162/67 (06 Dec 2023 05:22) (124/57 - 162/67)  BP(mean): --  RR: 18 (06 Dec 2023 05:22) (18 - 19)  SpO2: --    General:[ X  ] NAD, Resting Comfortable,   [   ] other:                                HEENT: [  X ] NC/AT, EOMI, PERRL , Normal Conjunctivae,   [   ] other:  Cardio: [ X  ] RRR   [ X  ] other:  Grade 3 crescendo-decrescendo  systolic murmur auscultated in R 2nd intercostal space. RRR                             Pulm: [ X  ] No Respiratory Distress,  Lungs CTAB,   [   ] other:                       Abdomen: [X   ]ND/NT, Soft,   [   ] other:    : [  X ] NO LOPEZ CATHETER,        [   ] LOPEZ CATHETER- no meatal tear, no discharge,                                         MSK: [   ] No joint swelling, Full ROM,   [ X  ] other: Functional PROM both shoulders. (-) 30 deg. passive DF both ankles     No AROM of R shoulder, 0-30 degrees AROM of L shoulder (2/2 OA)                                   Ext: [ X ]   No C/C/E, No calf tenderness,   [   ]other:    Skin: [  X ] intact,   [   ] other:                                                                   Neurological Examination:  Cognitive: [   X ] AAO x 3,   [    ]  other:                                                                      Attention:  [    ] intact,   [ X   ]  other:   Impairment in tasks that require concentration and attention                Memory: [    ] intact,    [ X   ]  other:   2/3 with short term recall  Mood/Affect: [   X ] wnl,    [    ]  other:                                                                             Communication: [   X ]Fluent, no dysarthria, following commands:  [    ] other:   CN II - XII:  [  X  ] intact,  [    ] other:                                                                                        Motor:   RIGHT UE: [   ] WNL,  [X   ] other: 4-/5 shoulder abduction (limited ROM due to OA), 4+/5 elbow flexion/extension, 4+/5 finger  strength   LEFT    UE: [   ] WNL,  [ X  ] other: 4-/5 shoulder abduction (limited ROM due to OA), 4+/5 elbow flexion/extension, 4+/5 finger  strength   RIGHT LE: [   ] WNL,  [ X  ] other:  5-/5 hip flexion, 5/5 knee flexion/extension, 2-/5 dorsiflexion due to limited passive range  LEFT    LE: [   ] WNL,  [  X ] other: 5-/5 hip flexion, 5/5 knee flexion/extension, 2-/5  dorsiflexion due to limited passive range    Tone: [  X  ] wnl,   [    ]  other:  DTRs: [  X ]symmetric, [   ] other:  Coordination:   [    ] intact,   [  X  ] other: Incoordinate with FNF bilaterally (worse R>L UE)                                                                          Sensory: [  X  ] Intact to light touch, temperature   [    ] other:    Decreased proprioception bilateral lower extremities  (-) garcia, babinski, clonus b/l extremities      albuterol    90 MICROgram(s) HFA Inhaler 2 Puff(s) Inhalation every 6 hours PRN  aspirin enteric coated 81 milliGRAM(s) Oral daily  atenolol  Tablet 50 milliGRAM(s) Oral daily  atorvastatin 80 milliGRAM(s) Oral at bedtime  chlorhexidine 2% Cloths 1 Application(s) Topical <User Schedule>  cyanocobalamin 1000 MICROGram(s) Oral every 24 hours  fesoterodine ER Tablet 4 milliGRAM(s) Oral <User Schedule>  folic acid 1 milliGRAM(s) Oral daily  heparin   Injectable 5000 Unit(s) SubCutaneous every 12 hours  losartan 100 milliGRAM(s) Oral at bedtime  pantoprazole    Tablet 40 milliGRAM(s) Oral before breakfast  polyethylene glycol 3350 17 Gram(s) Oral at bedtime  senna 2 Tablet(s) Oral at bedtime  ticagrelor 90 milliGRAM(s) Oral every 12 hours      RECENT LABS/IMAGING                        Patient is a 83y old  Female who presents with a chief complaint of Rehab of left thalamic lacunar infarct resulting in ataxic gait, incoordination, and ADL dysfunction. (05 Dec 2023 10:27)      HPI:  83 year old female with PMHx of arthritis, hypertension, hyperlipidemia who presented to the ED for gait ataxia and peripheral extremity numbness. Patient reported that 3 days PTA she received a flu shot, and over the weekend while cleaning experienced onset of RUE pain/weakness, in which she rested on the cough and then after trying to get up endorsed problems with balance and experiencing distal numbness in bother her hands and feet, prompting presentation to the ED. On presentation, imaging revealed L thalamic acute lacunar infarct. CTA revealed marked calcification at the right and left common carotid artery bifurcations with severe stenosis at the level of the right carotid bulb, left CC bifurcation and ast the left carotid bulb. Patient underwent diagnostic cerebral angiogram on 11/28/23 due to stenotic findings on CTA, which revealed non flow limiting stenosis bilateral carotid arteries, and no carotid angioplasty/stenting was warranted. Symptoms of distal numbness improved overtime and patient hemodynamically stable. She has been medically stable. She has severe limitation of both shoulders at baseline.  Of note, the patient states that she was recently evaluated by a rheumatologist and was told that despite being on Remicade for years, that she has osteoarthritis and never had rheumatoid arthritis.     Living situation: Lives with family (daughter, son-in-law, grandkids) in private house, 0 Steps to enter, 12 Steps inside with stair lift.  Prior Level of Function: Ambulating independent with RW and rollator, Independent with ADL/iADLs    Evaluated by bedside physical therapy and occupational therapy. Patient is minimum to moderate assistance for bed mobility and transfers, ambulating 25 feet x3 with rolling walker minimum assistance.  Upper body dressing  and lower body dressing moderate/ max assistance, Prior to admission patient was independent with mobility and ambulation with a rolling walker, and independent in all ADLs, IADLs. Evaluated by physical medicine and rehabilitation consult and deemed an appropriate candidate for inpatient rehabilitation facility. Admitted to [facility] on  11/30/23   (30 Nov 2023 12:31)      TODAY'S SUBJECTIVE & REVIEW OF SYMPTOMS  Patient was seen and assessed at bedside. No overnight events.   Patient denies any complaints at this time.   Tolerating PT/OT.   Tolerating oral diet. Voiding and passing stool spontaneously.   Vital signs reviewed. 1x BP of 162/67. Will continue to monitor as it appears to be a one time elevation.    Current Level of Function:  Bed Mobility: partial assist  Transfers: moderate assist  Gait:  modA 50ft with RW  Lower body dressing: modA  Toileting: modA    Review of Systems:   Constitutional:    [  X ] WNL           [   ] poor appetite   [   ] insomnia   [   ] tired   Cardio:                [ X  ] WNL           [   ] CP   [   ] DEE   [   ] palpitations               Resp:                   [  X ] WNL           [   ] SOB   [   ] cough   [   ] wheezing   GI:                        [X   ] WNL           [   ] constipation   [   ] diarrhea   [   ] abdominal pain   [   ] nausea   [   ] emesis                                :                      [   ] WNL           [   ] LOPEZ  [   ] dysuria   [ X  ] difficulty voiding             Endo:                   [  X ] WNL          [   ] polyuria   [   ] temperature intolerance                 Skin:                     [  X ] WNL          [   ] pain   [   ] wound   [   ] rash   MSK:                    [   ] WNL          [   ] muscle pain   [  X ] joint pain/ stiffness shoulder, hips, knees, ankles/ feet     [   ] muscle tenderness   [   ] swelling   Neuro:                 [   ] WNL          [   ] HA   [   ] change in vision   [   ] tremor   [ X  ] weakness/ unsteady gait    [   ]dysphagia              Cognitive:           [ X  ] WNL           [   ]confusion      Psych:                  [ X  ] WNL           [   ] hallucinations   [   ]agitation   [   ] delusion   [   ]depression    PHYSICAL EXAM    Vital Signs Last 24 Hrs  T(C): 35.6 (06 Dec 2023 05:22), Max: 36.3 (05 Dec 2023 13:53)  T(F): 96.1 (06 Dec 2023 05:22), Max: 97.3 (05 Dec 2023 13:53)  HR: 69 (06 Dec 2023 05:22) (65 - 72)  BP: 162/67 (06 Dec 2023 05:22) (124/57 - 162/67)  BP(mean): --  RR: 18 (06 Dec 2023 05:22) (18 - 19)  SpO2: --    General:[ X  ] NAD, Resting Comfortable,   [   ] other:                                HEENT: [  X ] NC/AT, EOMI, PERRL , Normal Conjunctivae,   [   ] other:  Cardio: [ X  ] RRR   [ X  ] other:  Grade 3 crescendo-decrescendo  systolic murmur auscultated in R 2nd intercostal space. RRR                             Pulm: [ X  ] No Respiratory Distress,  Lungs CTAB,   [   ] other:                       Abdomen: [X   ]ND/NT, Soft,   [   ] other:    : [  X ] NO LOPEZ CATHETER,        [   ] LOPEZ CATHETER- no meatal tear, no discharge,                                         MSK: [   ] No joint swelling, Full ROM,   [ X  ] other: Functional PROM both shoulders. (-) 30 deg. passive DF both ankles     No AROM of R shoulder, 0-30 degrees AROM of L shoulder (2/2 OA)                                   Ext: [ X ]   No C/C/E, No calf tenderness,   [   ]other:    Skin: [  X ] intact,   [   ] other:                                                                   Neurological Examination:  Cognitive: [   X ] AAO x 3,   [    ]  other:                                                                      Attention:  [    ] intact,   [ X   ]  other:   Impairment in tasks that require concentration and attention                Memory: [    ] intact,    [ X   ]  other:   2/3 with short term recall  Mood/Affect: [   X ] wnl,    [    ]  other:                                                                             Communication: [   X ]Fluent, no dysarthria, following commands:  [    ] other:   CN II - XII:  [  X  ] intact,  [    ] other:                                                                                        Motor:   RIGHT UE: [   ] WNL,  [X   ] other: 4-/5 shoulder abduction (limited ROM due to OA), 4+/5 elbow flexion/extension, 4+/5 finger  strength   LEFT    UE: [   ] WNL,  [ X  ] other: 4-/5 shoulder abduction (limited ROM due to OA), 4+/5 elbow flexion/extension, 4+/5 finger  strength   RIGHT LE: [   ] WNL,  [ X  ] other:  5-/5 hip flexion, 5/5 knee flexion/extension, 2-/5 dorsiflexion due to limited passive range  LEFT    LE: [   ] WNL,  [  X ] other: 5-/5 hip flexion, 5/5 knee flexion/extension, 2-/5  dorsiflexion due to limited passive range    Tone: [  X  ] wnl,   [    ]  other:  DTRs: [  X ]symmetric, [   ] other:  Coordination:   [    ] intact,   [  X  ] other: Incoordinate with FNF bilaterally (worse R>L UE)                                                                          Sensory: [  X  ] Intact to light touch, temperature   [    ] other:    Decreased proprioception bilateral lower extremities  (-) garcia, babinski, clonus b/l extremities      albuterol    90 MICROgram(s) HFA Inhaler 2 Puff(s) Inhalation every 6 hours PRN  aspirin enteric coated 81 milliGRAM(s) Oral daily  atenolol  Tablet 50 milliGRAM(s) Oral daily  atorvastatin 80 milliGRAM(s) Oral at bedtime  chlorhexidine 2% Cloths 1 Application(s) Topical <User Schedule>  cyanocobalamin 1000 MICROGram(s) Oral every 24 hours  fesoterodine ER Tablet 4 milliGRAM(s) Oral <User Schedule>  folic acid 1 milliGRAM(s) Oral daily  heparin   Injectable 5000 Unit(s) SubCutaneous every 12 hours  losartan 100 milliGRAM(s) Oral at bedtime  pantoprazole    Tablet 40 milliGRAM(s) Oral before breakfast  polyethylene glycol 3350 17 Gram(s) Oral at bedtime  senna 2 Tablet(s) Oral at bedtime  ticagrelor 90 milliGRAM(s) Oral every 12 hours      RECENT LABS/IMAGING                        Patient is a 83y old  Female who presents with a chief complaint of Rehab of left thalamic lacunar infarct resulting in ataxic gait, incoordination, and ADL dysfunction. (05 Dec 2023 10:27)      HPI:  83 year old female with PMHx of arthritis, hypertension, hyperlipidemia who presented to the ED for gait ataxia and peripheral extremity numbness. Patient reported that 3 days PTA she received a flu shot, and over the weekend while cleaning experienced onset of RUE pain/weakness, in which she rested on the cough and then after trying to get up endorsed problems with balance and experiencing distal numbness in bother her hands and feet, prompting presentation to the ED. On presentation, imaging revealed L thalamic acute lacunar infarct. CTA revealed marked calcification at the right and left common carotid artery bifurcations with severe stenosis at the level of the right carotid bulb, left CC bifurcation and ast the left carotid bulb. Patient underwent diagnostic cerebral angiogram on 11/28/23 due to stenotic findings on CTA, which revealed non flow limiting stenosis bilateral carotid arteries, and no carotid angioplasty/stenting was warranted. Symptoms of distal numbness improved overtime and patient hemodynamically stable. She has been medically stable. She has severe limitation of both shoulders at baseline.  Of note, the patient states that she was recently evaluated by a rheumatologist and was told that despite being on Remicade for years, that she has osteoarthritis and never had rheumatoid arthritis.     Living situation: Lives with family (daughter, son-in-law, grandkids) in private house, 0 Steps to enter, 12 Steps inside with stair lift.  Prior Level of Function: Ambulating independent with RW and rollator, Independent with ADL/iADLs    Evaluated by bedside physical therapy and occupational therapy. Patient is minimum to moderate assistance for bed mobility and transfers, ambulating 25 feet x3 with rolling walker minimum assistance.  Upper body dressing  and lower body dressing moderate/ max assistance, Prior to admission patient was independent with mobility and ambulation with a rolling walker, and independent in all ADLs, IADLs. Evaluated by physical medicine and rehabilitation consult and deemed an appropriate candidate for inpatient rehabilitation facility. Admitted to [facility] on  11/30/23   (30 Nov 2023 12:31)      TODAY'S SUBJECTIVE & REVIEW OF SYMPTOMS  Patient was seen and assessed at bedside. No overnight events.   Patient denies any complaints at this time.   Tolerating PT/OT.   Tolerating oral diet. Voiding and passing stool spontaneously.   Vital signs reviewed. 1x BP of 162/67. Will continue to monitor as it appears to be a one time elevation.    Current Level of Function:  Bed Mobility: partial assist  Transfers: moderate assist  Gait:  partial assist 75ft with RW  Stairs: 1 step moderate assist  Upper body dressing: partial assist  Lower body dressing: partial assist    Review of Systems:   Constitutional:    [  X ] WNL           [   ] poor appetite   [   ] insomnia   [   ] tired   Cardio:                [ X  ] WNL           [   ] CP   [   ] DEE   [   ] palpitations               Resp:                   [  X ] WNL           [   ] SOB   [   ] cough   [   ] wheezing   GI:                        [X   ] WNL           [   ] constipation   [   ] diarrhea   [   ] abdominal pain   [   ] nausea   [   ] emesis                                :                      [   ] WNL           [   ] LOPEZ  [   ] dysuria   [ X  ] difficulty voiding             Endo:                   [  X ] WNL          [   ] polyuria   [   ] temperature intolerance                 Skin:                     [  X ] WNL          [   ] pain   [   ] wound   [   ] rash   MSK:                    [   ] WNL          [   ] muscle pain   [  X ] joint pain/ stiffness shoulder, hips, knees, ankles/ feet     [   ] muscle tenderness   [   ] swelling   Neuro:                 [   ] WNL          [   ] HA   [   ] change in vision   [   ] tremor   [ X  ] weakness/ unsteady gait    [   ]dysphagia              Cognitive:           [ X  ] WNL           [   ]confusion      Psych:                  [ X  ] WNL           [   ] hallucinations   [   ]agitation   [   ] delusion   [   ]depression    PHYSICAL EXAM    Vital Signs Last 24 Hrs  T(C): 35.6 (06 Dec 2023 05:22), Max: 36.3 (05 Dec 2023 13:53)  T(F): 96.1 (06 Dec 2023 05:22), Max: 97.3 (05 Dec 2023 13:53)  HR: 69 (06 Dec 2023 05:22) (65 - 72)  BP: 162/67 (06 Dec 2023 05:22) (124/57 - 162/67)  BP(mean): --  RR: 18 (06 Dec 2023 05:22) (18 - 19)  SpO2: --    General:[ X  ] NAD, Resting Comfortable,   [   ] other:                                HEENT: [  X ] NC/AT, EOMI, PERRL , Normal Conjunctivae,   [   ] other:  Cardio: [ X  ] RRR   [ X  ] other:  Grade 3 crescendo-decrescendo  systolic murmur auscultated in R 2nd intercostal space. RRR                             Pulm: [ X  ] No Respiratory Distress,  Lungs CTAB,   [   ] other:                       Abdomen: [X   ]ND/NT, Soft,   [   ] other:    : [  X ] NO LOPEZ CATHETER,        [   ] LOPEZ CATHETER- no meatal tear, no discharge,                                         MSK: [   ] No joint swelling, Full ROM,   [ X  ] other: Functional PROM both shoulders. (-) 30 deg. passive DF both ankles     No AROM of R shoulder, 0-30 degrees AROM of L shoulder (2/2 OA)                                   Ext: [ X ]   No C/C/E, No calf tenderness,   [   ]other:    Skin: [  X ] intact,   [   ] other:                                                                   Neurological Examination:  Cognitive: [   X ] AAO x 3,   [    ]  other:                                                                      Attention:  [    ] intact,   [ X   ]  other:   Impairment in tasks that require concentration and attention                Memory: [    ] intact,    [ X   ]  other:   2/3 with short term recall  Mood/Affect: [   X ] wnl,    [    ]  other:                                                                             Communication: [   X ]Fluent, no dysarthria, following commands:  [    ] other:   CN II - XII:  [  X  ] intact,  [    ] other:                                                                                        Motor:   RIGHT UE: [   ] WNL,  [X   ] other: 4-/5 shoulder abduction (limited ROM due to OA), 4+/5 elbow flexion/extension, 4+/5 finger  strength   LEFT    UE: [   ] WNL,  [ X  ] other: 4-/5 shoulder abduction (limited ROM due to OA), 4+/5 elbow flexion/extension, 4+/5 finger  strength   RIGHT LE: [   ] WNL,  [ X  ] other:  5-/5 hip flexion, 5/5 knee flexion/extension, 2-/5 dorsiflexion due to limited passive range  LEFT    LE: [   ] WNL,  [  X ] other: 5-/5 hip flexion, 5/5 knee flexion/extension, 2-/5  dorsiflexion due to limited passive range    Tone: [  X  ] wnl,   [    ]  other:  DTRs: [  X ]symmetric, [   ] other:  Coordination:   [    ] intact,   [  X  ] other: Incoordinate with FNF bilaterally (worse R>L UE)                                                                          Sensory: [  X  ] Intact to light touch, temperature   [    ] other:    Decreased proprioception bilateral lower extremities  (-) garcia, babinski, clonus b/l extremities      albuterol    90 MICROgram(s) HFA Inhaler 2 Puff(s) Inhalation every 6 hours PRN  aspirin enteric coated 81 milliGRAM(s) Oral daily  atenolol  Tablet 50 milliGRAM(s) Oral daily  atorvastatin 80 milliGRAM(s) Oral at bedtime  chlorhexidine 2% Cloths 1 Application(s) Topical <User Schedule>  cyanocobalamin 1000 MICROGram(s) Oral every 24 hours  fesoterodine ER Tablet 4 milliGRAM(s) Oral <User Schedule>  folic acid 1 milliGRAM(s) Oral daily  heparin   Injectable 5000 Unit(s) SubCutaneous every 12 hours  losartan 100 milliGRAM(s) Oral at bedtime  pantoprazole    Tablet 40 milliGRAM(s) Oral before breakfast  polyethylene glycol 3350 17 Gram(s) Oral at bedtime  senna 2 Tablet(s) Oral at bedtime  ticagrelor 90 milliGRAM(s) Oral every 12 hours      RECENT LABS/IMAGING                        Patient is a 83y old  Female who presents with a chief complaint of Rehab of left thalamic lacunar infarct resulting in ataxic gait, incoordination, and ADL dysfunction. (05 Dec 2023 10:27)      HPI:  83 year old female with PMHx of arthritis, hypertension, hyperlipidemia who presented to the ED for gait ataxia and peripheral extremity numbness. Patient reported that 3 days PTA she received a flu shot, and over the weekend while cleaning experienced onset of RUE pain/weakness, in which she rested on the cough and then after trying to get up endorsed problems with balance and experiencing distal numbness in bother her hands and feet, prompting presentation to the ED. On presentation, imaging revealed L thalamic acute lacunar infarct. CTA revealed marked calcification at the right and left common carotid artery bifurcations with severe stenosis at the level of the right carotid bulb, left CC bifurcation and ast the left carotid bulb. Patient underwent diagnostic cerebral angiogram on 11/28/23 due to stenotic findings on CTA, which revealed non flow limiting stenosis bilateral carotid arteries, and no carotid angioplasty/stenting was warranted. Symptoms of distal numbness improved overtime and patient hemodynamically stable. She has been medically stable. She has severe limitation of both shoulders at baseline.  Of note, the patient states that she was recently evaluated by a rheumatologist and was told that despite being on Remicade for years, that she has osteoarthritis and never had rheumatoid arthritis.     Living situation: Lives with family (daughter, son-in-law, grandkids) in private house, 0 Steps to enter, 12 Steps inside with stair lift.  Prior Level of Function: Ambulating independent with RW and rollator, Independent with ADL/iADLs    Evaluated by bedside physical therapy and occupational therapy. Patient is minimum to moderate assistance for bed mobility and transfers, ambulating 25 feet x3 with rolling walker minimum assistance.  Upper body dressing  and lower body dressing moderate/ max assistance, Prior to admission patient was independent with mobility and ambulation with a rolling walker, and independent in all ADLs, IADLs. Evaluated by physical medicine and rehabilitation consult and deemed an appropriate candidate for inpatient rehabilitation facility. Admitted to [facility] on  11/30/23   (30 Nov 2023 12:31)      TODAY'S SUBJECTIVE & REVIEW OF SYMPTOMS  Patient was seen and assessed at bedside. No overnight events.   Patient denies any complaints at this time.   Tolerating PT/OT.   Tolerating oral diet. Voiding and passing stool spontaneously.   Vital signs reviewed. 1x BP of 162/67. Will continue to monitor as it appears to be a one time elevation.    Current Level of Function:  Bed Mobility: partial assist  Transfers: moderate assist  Gait:  partial assist 75ft with RW  Stairs: 1 step moderate assist  Upper body dressing: partial assist  Lower body dressing: partial assist    Review of Systems:   Constitutional:    [  X ] WNL           [   ] poor appetite   [   ] insomnia   [   ] tired   Cardio:                [ X  ] WNL           [   ] CP   [   ] DEE   [   ] palpitations               Resp:                   [  X ] WNL           [   ] SOB   [   ] cough   [   ] wheezing   GI:                        [X   ] WNL           [   ] constipation   [   ] diarrhea   [   ] abdominal pain   [   ] nausea   [   ] emesis                                :                      [   ] WNL           [   ] LOPEZ  [   ] dysuria   [ X  ] difficulty voiding             Endo:                   [  X ] WNL          [   ] polyuria   [   ] temperature intolerance                 Skin:                     [  X ] WNL          [   ] pain   [   ] wound   [   ] rash   MSK:                    [   ] WNL          [   ] muscle pain   [  X ] joint pain/ stiffness shoulder, hips, knees, ankles/ feet     [   ] muscle tenderness   [   ] swelling   Neuro:                 [   ] WNL          [   ] HA   [   ] change in vision   [   ] tremor   [ X  ] weakness/ unsteady gait    [   ]dysphagia              Cognitive:           [ X  ] WNL           [   ]confusion      Psych:                  [ X  ] WNL           [   ] hallucinations   [   ]agitation   [   ] delusion   [   ]depression    PHYSICAL EXAM    Vital Signs Last 24 Hrs  T(C): 35.6 (06 Dec 2023 05:22), Max: 36.3 (05 Dec 2023 13:53)  T(F): 96.1 (06 Dec 2023 05:22), Max: 97.3 (05 Dec 2023 13:53)  HR: 69 (06 Dec 2023 05:22) (65 - 72)  BP: 162/67 (06 Dec 2023 05:22) (124/57 - 162/67)  BP(mean): --  RR: 18 (06 Dec 2023 05:22) (18 - 19)  SpO2: --    General:[ X  ] NAD, Resting Comfortable,   [   ] other:                                HEENT: [  X ] NC/AT, EOMI, PERRL , Normal Conjunctivae,   [   ] other:  Cardio: [ X  ] RRR   [ X  ] other:  Grade 3 crescendo-decrescendo  systolic murmur auscultated in R 2nd intercostal space. RRR                             Pulm: [ X  ] No Respiratory Distress,  Lungs CTAB,   [   ] other:                       Abdomen: [X   ]ND/NT, Soft,   [   ] other:    : [  X ] NO LOPEZ CATHETER,        [   ] LOPEZ CATHETER- no meatal tear, no discharge,                                         MSK: [   ] No joint swelling, Full ROM,   [ X  ] other: Functional PROM both shoulders. (-) 30 deg. passive DF both ankles     No AROM of R shoulder, 0-30 degrees AROM of L shoulder (2/2 OA)                                   Ext: [ X ]   No C/C/E, No calf tenderness,   [   ]other:    Skin: [  X ] intact,   [   ] other:                                                                   Neurological Examination:  Cognitive: [   X ] AAO x 3,   [    ]  other:                                                                      Attention:  [    ] intact,   [ X   ]  other:   Impairment in tasks that require concentration and attention                Memory: [    ] intact,    [ X   ]  other:   2/3 with short term recall  Mood/Affect: [   X ] wnl,    [    ]  other:                                                                             Communication: [   X ]Fluent, no dysarthria, following commands:  [    ] other:   CN II - XII:  [  X  ] intact,  [    ] other:                                                                                        Motor:   RIGHT UE: [   ] WNL,  [X   ] other: 4-/5 shoulder abduction (limited ROM due to OA), 4+/5 elbow flexion/extension, 4+/5 finger  strength   LEFT    UE: [   ] WNL,  [ X  ] other: 4-/5 shoulder abduction (limited ROM due to OA), 4+/5 elbow flexion/extension, 4+/5 finger  strength   RIGHT LE: [   ] WNL,  [ X  ] other:  5-/5 hip flexion, 5/5 knee flexion/extension, 2-/5 dorsiflexion due to limited passive range  LEFT    LE: [   ] WNL,  [  X ] other: 5-/5 hip flexion, 5/5 knee flexion/extension, 2-/5  dorsiflexion due to limited passive range    Tone: [  X  ] wnl,   [    ]  other:  DTRs: [  X ]symmetric, [   ] other:  Coordination:   [    ] intact,   [  X  ] other: Incoordinate with FNF bilaterally (worse R>L UE)                                                                          Sensory: [  X  ] Intact to light touch, temperature   [    ] other:    Decreased proprioception bilateral lower extremities  (-) garcia, babinski, clonus b/l extremities      albuterol    90 MICROgram(s) HFA Inhaler 2 Puff(s) Inhalation every 6 hours PRN  aspirin enteric coated 81 milliGRAM(s) Oral daily  atenolol  Tablet 50 milliGRAM(s) Oral daily  atorvastatin 80 milliGRAM(s) Oral at bedtime  chlorhexidine 2% Cloths 1 Application(s) Topical <User Schedule>  cyanocobalamin 1000 MICROGram(s) Oral every 24 hours  fesoterodine ER Tablet 4 milliGRAM(s) Oral <User Schedule>  folic acid 1 milliGRAM(s) Oral daily  heparin   Injectable 5000 Unit(s) SubCutaneous every 12 hours  losartan 100 milliGRAM(s) Oral at bedtime  pantoprazole    Tablet 40 milliGRAM(s) Oral before breakfast  polyethylene glycol 3350 17 Gram(s) Oral at bedtime  senna 2 Tablet(s) Oral at bedtime  ticagrelor 90 milliGRAM(s) Oral every 12 hours      RECENT LABS/IMAGING

## 2023-12-07 RX ADMIN — CHLORHEXIDINE GLUCONATE 1 APPLICATION(S): 213 SOLUTION TOPICAL at 05:46

## 2023-12-07 RX ADMIN — ATENOLOL 50 MILLIGRAM(S): 25 TABLET ORAL at 05:45

## 2023-12-07 RX ADMIN — PANTOPRAZOLE SODIUM 40 MILLIGRAM(S): 20 TABLET, DELAYED RELEASE ORAL at 05:45

## 2023-12-07 RX ADMIN — TICAGRELOR 90 MILLIGRAM(S): 90 TABLET ORAL at 17:52

## 2023-12-07 RX ADMIN — TICAGRELOR 90 MILLIGRAM(S): 90 TABLET ORAL at 05:45

## 2023-12-07 RX ADMIN — Medication 1 MILLIGRAM(S): at 12:43

## 2023-12-07 RX ADMIN — ATORVASTATIN CALCIUM 80 MILLIGRAM(S): 80 TABLET, FILM COATED ORAL at 21:37

## 2023-12-07 RX ADMIN — LOSARTAN POTASSIUM 100 MILLIGRAM(S): 100 TABLET, FILM COATED ORAL at 21:38

## 2023-12-07 RX ADMIN — PREGABALIN 1000 MICROGRAM(S): 225 CAPSULE ORAL at 12:44

## 2023-12-07 RX ADMIN — HEPARIN SODIUM 5000 UNIT(S): 5000 INJECTION INTRAVENOUS; SUBCUTANEOUS at 05:45

## 2023-12-07 RX ADMIN — FESOTERODINE FUMARATE 4 MILLIGRAM(S): 8 TABLET, FILM COATED, EXTENDED RELEASE ORAL at 12:46

## 2023-12-07 RX ADMIN — Medication 81 MILLIGRAM(S): at 12:43

## 2023-12-07 RX ADMIN — HEPARIN SODIUM 5000 UNIT(S): 5000 INJECTION INTRAVENOUS; SUBCUTANEOUS at 17:56

## 2023-12-07 NOTE — PROGRESS NOTE ADULT - SUBJECTIVE AND OBJECTIVE BOX
Patient is a 83y old  Female who presents with a chief complaint of Rehab of left thalamic lacunar infarct resulting in ataxic gait, incoordination, and ADL dysfunction. (06 Dec 2023 09:05)      HPI:  83 year old female with PMHx of arthritis, hypertension, hyperlipidemia who presented to the ED for gait ataxia and peripheral extremity numbness. Patient reported that 3 days PTA she received a flu shot, and over the weekend while cleaning experienced onset of RUE pain/weakness, in which she rested on the cough and then after trying to get up endorsed problems with balance and experiencing distal numbness in bother her hands and feet, prompting presentation to the ED. On presentation, imaging revealed L thalamic acute lacunar infarct. CTA revealed marked calcification at the right and left common carotid artery bifurcations with severe stenosis at the level of the right carotid bulb, left CC bifurcation and ast the left carotid bulb. Patient underwent diagnostic cerebral angiogram on 11/28/23 due to stenotic findings on CTA, which revealed non flow limiting stenosis bilateral carotid arteries, and no carotid angioplasty/stenting was warranted. Symptoms of distal numbness improved overtime and patient hemodynamically stable. She has been medically stable. She has severe limitation of both shoulders at baseline.  Of note, the patient states that she was recently evaluated by a rheumatologist and was told that despite being on Remicade for years, that she has osteoarthritis and never had rheumatoid arthritis.     Living situation: Lives with family (daughter, son-in-law, grandkids) in private house, 0 Steps to enter, 12 Steps inside with stair lift.  Prior Level of Function: Ambulating independent with RW and rollator, Independent with ADL/iADLs    Evaluated by bedside physical therapy and occupational therapy. Patient is minimum to moderate assistance for bed mobility and transfers, ambulating 25 feet x3 with rolling walker minimum assistance.  Upper body dressing  and lower body dressing moderate/ max assistance, Prior to admission patient was independent with mobility and ambulation with a rolling walker, and independent in all ADLs, IADLs. Evaluated by physical medicine and rehabilitation consult and deemed an appropriate candidate for inpatient rehabilitation facility. Admitted to [facility] on  11/30/23   (30 Nov 2023 12:31)      TODAY'S SUBJECTIVE & REVIEW OF SYMPTOMS  Patient was seen and assessed at bedside. No overnight events.   Patient denies any complaints at this time.   Tolerating PT/OT.   Tolerating oral diet. Voiding and passing stool spontaneously.   Vital signs reviewed.    Current Level of Function:  Bed Mobility: partial assist  Transfers: moderate assist  Gait:  partial assist 75ft with RW  Stairs: 1 step moderate assist  Upper body dressing: partial assist  Lower body dressing: partial assist    Review of Systems:   Constitutional:    [  X ] WNL           [   ] poor appetite   [   ] insomnia   [   ] tired   Cardio:                [ X  ] WNL           [   ] CP   [   ] DEE   [   ] palpitations               Resp:                   [  X ] WNL           [   ] SOB   [   ] cough   [   ] wheezing   GI:                        [X   ] WNL           [   ] constipation   [   ] diarrhea   [   ] abdominal pain   [   ] nausea   [   ] emesis                                :                      [   ] WNL           [   ] LOPEZ  [   ] dysuria   [ X  ] difficulty voiding             Endo:                   [  X ] WNL          [   ] polyuria   [   ] temperature intolerance                 Skin:                     [  X ] WNL          [   ] pain   [   ] wound   [   ] rash   MSK:                    [   ] WNL          [   ] muscle pain   [  X ] joint pain/ stiffness shoulder, hips, knees, ankles/ feet     [   ] muscle tenderness   [   ] swelling   Neuro:                 [   ] WNL          [   ] HA   [   ] change in vision   [   ] tremor   [ X  ] weakness/ unsteady gait    [   ]dysphagia              Cognitive:           [ X  ] WNL           [   ]confusion      Psych:                  [ X  ] WNL           [   ] hallucinations   [   ]agitation   [   ] delusion   [   ]depression    PHYSICAL EXAM    Vital Signs Last 24 Hrs  T(C): 36.5 (07 Dec 2023 05:17), Max: 36.5 (07 Dec 2023 05:17)  T(F): 97.7 (07 Dec 2023 05:17), Max: 97.7 (07 Dec 2023 05:17)  HR: 80 (07 Dec 2023 05:17) (66 - 80)  BP: 130/60 (07 Dec 2023 05:17) (130/60 - 138/63)  BP(mean): --  RR: 18 (07 Dec 2023 05:17) (18 - 18)  SpO2: --      General:[ X  ] NAD, Resting Comfortable,   [   ] other:                                HEENT: [  X ] NC/AT, EOMI, PERRL , Normal Conjunctivae,   [   ] other:  Cardio: [ X  ] RRR   [ X  ] other:  Grade 3 crescendo-decrescendo  systolic murmur auscultated in R 2nd intercostal space. RRR                             Pulm: [ X  ] No Respiratory Distress,  Lungs CTAB,   [   ] other:                       Abdomen: [X   ]ND/NT, Soft,   [   ] other:    : [  X ] NO LOPEZ CATHETER,        [   ] LOPEZ CATHETER- no meatal tear, no discharge,                                         MSK: [   ] No joint swelling, Full ROM,   [ X  ] other: Functional PROM both shoulders. (-) 30 deg. passive DF both ankles     No AROM of R shoulder, 0-30 degrees AROM of L shoulder (2/2 OA)                                   Ext: [ X ]   No C/C/E, No calf tenderness,   [   ]other:    Skin: [  X ] intact,   [   ] other:                                                                   Neurological Examination:  Cognitive: [   X ] AAO x 3,   [    ]  other:                                                                      Attention:  [    ] intact,   [ X   ]  other:   Impairment in tasks that require concentration and attention                Memory: [    ] intact,    [ X   ]  other:   2/3 with short term recall  Mood/Affect: [   X ] wnl,    [    ]  other:                                                                             Communication: [   X ]Fluent, no dysarthria, following commands:  [    ] other:   CN II - XII:  [  X  ] intact,  [    ] other:                                                                                        Motor:   RIGHT UE: [   ] WNL,  [X   ] other: 4-/5 shoulder abduction (limited ROM due to OA), 4+/5 elbow flexion/extension, 4+/5 finger  strength   LEFT    UE: [   ] WNL,  [ X  ] other: 4-/5 shoulder abduction (limited ROM due to OA), 4+/5 elbow flexion/extension, 4+/5 finger  strength   RIGHT LE: [   ] WNL,  [ X  ] other:  5-/5 hip flexion, 5/5 knee flexion/extension, 2-/5 dorsiflexion due to limited passive range  LEFT    LE: [   ] WNL,  [  X ] other: 5-/5 hip flexion, 5/5 knee flexion/extension, 2-/5  dorsiflexion due to limited passive range    Tone: [  X  ] wnl,   [    ]  other:  DTRs: [  X ]symmetric, [   ] other:  Coordination:   [    ] intact,   [  X  ] other: Incoordinate with FNF bilaterally (worse R>L UE)                                                                          Sensory: [  X  ] Intact to light touch, temperature   [    ] other:    Decreased proprioception bilateral lower extremities  (-) garcia, babinski, clonus b/l extremities      albuterol    90 MICROgram(s) HFA Inhaler 2 Puff(s) Inhalation every 6 hours PRN  aspirin enteric coated 81 milliGRAM(s) Oral daily  atenolol  Tablet 50 milliGRAM(s) Oral daily  atorvastatin 80 milliGRAM(s) Oral at bedtime  chlorhexidine 2% Cloths 1 Application(s) Topical <User Schedule>  cyanocobalamin 1000 MICROGram(s) Oral every 24 hours  fesoterodine ER Tablet 4 milliGRAM(s) Oral <User Schedule>  folic acid 1 milliGRAM(s) Oral daily  heparin   Injectable 5000 Unit(s) SubCutaneous every 12 hours  losartan 100 milliGRAM(s) Oral at bedtime  pantoprazole    Tablet 40 milliGRAM(s) Oral before breakfast  polyethylene glycol 3350 17 Gram(s) Oral at bedtime  senna 2 Tablet(s) Oral at bedtime  ticagrelor 90 milliGRAM(s) Oral every 12 hours      RECENT LABS/IMAGING                        Patient is a 83y old  Female who presents with a chief complaint of Rehab of left thalamic lacunar infarct resulting in ataxic gait, incoordination, and ADL dysfunction. (06 Dec 2023 09:05)      HPI:  83 year old female with PMHx of arthritis, hypertension, hyperlipidemia who presented to the ED for gait ataxia and peripheral extremity numbness. Patient reported that 3 days PTA she received a flu shot, and over the weekend while cleaning experienced onset of RUE pain/weakness, in which she rested on the cough and then after trying to get up endorsed problems with balance and experiencing distal numbness in bother her hands and feet, prompting presentation to the ED. On presentation, imaging revealed L thalamic acute lacunar infarct. CTA revealed marked calcification at the right and left common carotid artery bifurcations with severe stenosis at the level of the right carotid bulb, left CC bifurcation and ast the left carotid bulb. Patient underwent diagnostic cerebral angiogram on 11/28/23 due to stenotic findings on CTA, which revealed non flow limiting stenosis bilateral carotid arteries, and no carotid angioplasty/stenting was warranted. Symptoms of distal numbness improved overtime and patient hemodynamically stable. She has been medically stable. She has severe limitation of both shoulders at baseline.  Of note, the patient states that she was recently evaluated by a rheumatologist and was told that despite being on Remicade for years, that she has osteoarthritis and never had rheumatoid arthritis.     Living situation: Lives with family (daughter, son-in-law, grandkids) in private house, 0 Steps to enter, 12 Steps inside with stair lift.  Prior Level of Function: Ambulating independent with RW and rollator, Independent with ADL/iADLs    Evaluated by bedside physical therapy and occupational therapy. Patient is minimum to moderate assistance for bed mobility and transfers, ambulating 25 feet x3 with rolling walker minimum assistance.  Upper body dressing  and lower body dressing moderate/ max assistance, Prior to admission patient was independent with mobility and ambulation with a rolling walker, and independent in all ADLs, IADLs. Evaluated by physical medicine and rehabilitation consult and deemed an appropriate candidate for inpatient rehabilitation facility. Admitted to [facility] on  11/30/23   (30 Nov 2023 12:31)      TODAY'S SUBJECTIVE & REVIEW OF SYMPTOMS  Patient was seen and assessed at bedside. No overnight events.   Patient denies any complaints at this time.   Tolerating PT/OT.   Tolerating oral diet. Voiding and passing stool spontaneously.   Vital signs reviewed.    Current Level of Function:  Bed Mobility: partial assist  Transfers: moderate assist  Gait:  partial assist 75ft with RW  Stairs: 1 step moderate assist  Upper body dressing: partial assist  Lower body dressing: partial assist    Review of Systems:   Constitutional:    [  X ] WNL           [   ] poor appetite   [   ] insomnia   [   ] tired   Cardio:                [ X  ] WNL           [   ] CP   [   ] DEE   [   ] palpitations               Resp:                   [  X ] WNL           [   ] SOB   [   ] cough   [   ] wheezing   GI:                        [X   ] WNL           [   ] constipation   [   ] diarrhea   [   ] abdominal pain   [   ] nausea   [   ] emesis                                :                      [   ] WNL           [   ] LOPEZ  [   ] dysuria   [ X  ] difficulty voiding             Endo:                   [  X ] WNL          [   ] polyuria   [   ] temperature intolerance                 Skin:                     [  X ] WNL          [   ] pain   [   ] wound   [   ] rash   MSK:                    [   ] WNL          [   ] muscle pain   [  X ] joint pain/ stiffness shoulder, hips, knees, ankles/ feet     [   ] muscle tenderness   [   ] swelling   Neuro:                 [   ] WNL          [   ] HA   [   ] change in vision   [   ] tremor   [ X  ] weakness/ unsteady gait    [   ]dysphagia              Cognitive:           [ X  ] WNL           [   ]confusion      Psych:                  [ X  ] WNL           [   ] hallucinations   [   ]agitation   [   ] delusion   [   ]depression    PHYSICAL EXAM    Vital Signs Last 24 Hrs  T(C): 36.5 (07 Dec 2023 05:17), Max: 36.5 (07 Dec 2023 05:17)  T(F): 97.7 (07 Dec 2023 05:17), Max: 97.7 (07 Dec 2023 05:17)  HR: 80 (07 Dec 2023 05:17) (66 - 80)  BP: 130/60 (07 Dec 2023 05:17) (130/60 - 138/63)  BP(mean): --  RR: 18 (07 Dec 2023 05:17) (18 - 18)  SpO2: --      General:[ X  ] NAD, Resting Comfortable,   [   ] other:                                HEENT: [  X ] NC/AT, EOMI, PERRL , Normal Conjunctivae,   [   ] other:  Cardio: [ X  ] RRR   [ X  ] other:  Grade 3 crescendo-decrescendo  systolic murmur auscultated in R 2nd intercostal space. RRR                             Pulm: [ X  ] No Respiratory Distress,  Lungs CTAB,   [   ] other:                       Abdomen: [X   ]ND/NT, Soft,   [   ] other:    : [  X ] NO LOPEZ CATHETER,        [   ] LOPEZ CATHETER- no meatal tear, no discharge,                                         MSK: [   ] No joint swelling, Full ROM,   [ X  ] other: Functional PROM both shoulders. (-) 30 deg. passive DF both ankles     No AROM of R shoulder, 0-30 degrees AROM of L shoulder (2/2 OA)                                   Ext: [ X ]   No C/C/E, No calf tenderness,   [   ]other:    Skin: [  X ] intact,   [   ] other:                                                                   Neurological Examination:  Cognitive: [   X ] AAO x 3,   [    ]  other:                                                                      Attention:  [    ] intact,   [ X   ]  other:   Impairment in tasks that require concentration and attention                Memory: [    ] intact,    [ X   ]  other:   2/3 with short term recall  Mood/Affect: [   X ] wnl,    [    ]  other:                                                                             Communication: [   X ]Fluent, no dysarthria, following commands:  [    ] other:   CN II - XII:  [  X  ] intact,  [    ] other:                                                                                        Motor:   RIGHT UE: [   ] WNL,  [X   ] other: 4-/5 shoulder abduction (limited ROM due to OA), 4+/5 elbow flexion/extension, 4+/5 finger  strength   LEFT    UE: [   ] WNL,  [ X  ] other: 4-/5 shoulder abduction (limited ROM due to OA), 4+/5 elbow flexion/extension, 4+/5 finger  strength   RIGHT LE: [   ] WNL,  [ X  ] other:  5-/5 hip flexion, 5/5 knee flexion/extension, 2-/5 dorsiflexion due to limited passive range  LEFT    LE: [   ] WNL,  [  X ] other: 5-/5 hip flexion, 5/5 knee flexion/extension, 2-/5  dorsiflexion due to limited passive range    Tone: [  X  ] wnl,   [    ]  other:  DTRs: [  X ]symmetric, [   ] other:  Coordination:   [    ] intact,   [  X  ] other: Incoordinate with FNF bilaterally (worse R>L UE)                                                                          Sensory: [  X  ] Intact to light touch, temperature   [    ] other:    Decreased proprioception bilateral lower extremities  (-) garcia, babinski, clonus b/l extremities      albuterol    90 MICROgram(s) HFA Inhaler 2 Puff(s) Inhalation every 6 hours PRN  aspirin enteric coated 81 milliGRAM(s) Oral daily  atenolol  Tablet 50 milliGRAM(s) Oral daily  atorvastatin 80 milliGRAM(s) Oral at bedtime  chlorhexidine 2% Cloths 1 Application(s) Topical <User Schedule>  cyanocobalamin 1000 MICROGram(s) Oral every 24 hours  fesoterodine ER Tablet 4 milliGRAM(s) Oral <User Schedule>  folic acid 1 milliGRAM(s) Oral daily  heparin   Injectable 5000 Unit(s) SubCutaneous every 12 hours  losartan 100 milliGRAM(s) Oral at bedtime  pantoprazole    Tablet 40 milliGRAM(s) Oral before breakfast  polyethylene glycol 3350 17 Gram(s) Oral at bedtime  senna 2 Tablet(s) Oral at bedtime  ticagrelor 90 milliGRAM(s) Oral every 12 hours      RECENT LABS/IMAGING

## 2023-12-08 RX ORDER — ACETAMINOPHEN 500 MG
650 TABLET ORAL EVERY 6 HOURS
Refills: 0 | Status: DISCONTINUED | OUTPATIENT
Start: 2023-12-08 | End: 2023-12-15

## 2023-12-08 RX ADMIN — TICAGRELOR 90 MILLIGRAM(S): 90 TABLET ORAL at 17:18

## 2023-12-08 RX ADMIN — SENNA PLUS 2 TABLET(S): 8.6 TABLET ORAL at 21:43

## 2023-12-08 RX ADMIN — ATENOLOL 50 MILLIGRAM(S): 25 TABLET ORAL at 05:59

## 2023-12-08 RX ADMIN — Medication 81 MILLIGRAM(S): at 12:46

## 2023-12-08 RX ADMIN — TICAGRELOR 90 MILLIGRAM(S): 90 TABLET ORAL at 06:00

## 2023-12-08 RX ADMIN — Medication 650 MILLIGRAM(S): at 19:33

## 2023-12-08 RX ADMIN — FESOTERODINE FUMARATE 4 MILLIGRAM(S): 8 TABLET, FILM COATED, EXTENDED RELEASE ORAL at 12:47

## 2023-12-08 RX ADMIN — Medication 1 MILLIGRAM(S): at 12:46

## 2023-12-08 RX ADMIN — ATORVASTATIN CALCIUM 80 MILLIGRAM(S): 80 TABLET, FILM COATED ORAL at 21:42

## 2023-12-08 RX ADMIN — LOSARTAN POTASSIUM 100 MILLIGRAM(S): 100 TABLET, FILM COATED ORAL at 21:43

## 2023-12-08 RX ADMIN — Medication 650 MILLIGRAM(S): at 19:01

## 2023-12-08 RX ADMIN — CHLORHEXIDINE GLUCONATE 1 APPLICATION(S): 213 SOLUTION TOPICAL at 06:01

## 2023-12-08 RX ADMIN — PANTOPRAZOLE SODIUM 40 MILLIGRAM(S): 20 TABLET, DELAYED RELEASE ORAL at 05:58

## 2023-12-08 RX ADMIN — HEPARIN SODIUM 5000 UNIT(S): 5000 INJECTION INTRAVENOUS; SUBCUTANEOUS at 17:18

## 2023-12-08 RX ADMIN — HEPARIN SODIUM 5000 UNIT(S): 5000 INJECTION INTRAVENOUS; SUBCUTANEOUS at 05:56

## 2023-12-08 RX ADMIN — PREGABALIN 1000 MICROGRAM(S): 225 CAPSULE ORAL at 12:46

## 2023-12-08 NOTE — PROGRESS NOTE ADULT - SUBJECTIVE AND OBJECTIVE BOX
Patient is a 83y old  Female who presents with a chief complaint of Rehab of left thalamic lacunar infarct resulting in ataxic gait, incoordination, and ADL dysfunction. (07 Dec 2023 09:10)      HPI:  83 year old female with PMHx of arthritis, hypertension, hyperlipidemia who presented to the ED for gait ataxia and peripheral extremity numbness. Patient reported that 3 days PTA she received a flu shot, and over the weekend while cleaning experienced onset of RUE pain/weakness, in which she rested on the cough and then after trying to get up endorsed problems with balance and experiencing distal numbness in bother her hands and feet, prompting presentation to the ED. On presentation, imaging revealed L thalamic acute lacunar infarct. CTA revealed marked calcification at the right and left common carotid artery bifurcations with severe stenosis at the level of the right carotid bulb, left CC bifurcation and ast the left carotid bulb. Patient underwent diagnostic cerebral angiogram on 11/28/23 due to stenotic findings on CTA, which revealed non flow limiting stenosis bilateral carotid arteries, and no carotid angioplasty/stenting was warranted. Symptoms of distal numbness improved overtime and patient hemodynamically stable. She has been medically stable. She has severe limitation of both shoulders at baseline.  Of note, the patient states that she was recently evaluated by a rheumatologist and was told that despite being on Remicade for years, that she has osteoarthritis and never had rheumatoid arthritis.     Living situation: Lives with family (daughter, son-in-law, grandkids) in private house, 0 Steps to enter, 12 Steps inside with stair lift.  Prior Level of Function: Ambulating independent with RW and rollator, Independent with ADL/iADLs    Evaluated by bedside physical therapy and occupational therapy. Patient is minimum to moderate assistance for bed mobility and transfers, ambulating 25 feet x3 with rolling walker minimum assistance.  Upper body dressing  and lower body dressing moderate/ max assistance, Prior to admission patient was independent with mobility and ambulation with a rolling walker, and independent in all ADLs, IADLs. Evaluated by physical medicine and rehabilitation consult and deemed an appropriate candidate for inpatient rehabilitation facility. Admitted to [facility] on  11/30/23   (30 Nov 2023 12:31)      TODAY'S SUBJECTIVE & REVIEW OF SYMPTOMS  Patient was seen and assessed at bedside. No overnight events.   Patient denies any complaints at this time.   Tolerating PT/OT.   Tolerating oral diet. Voiding and passing stool spontaneously.   Vital signs reviewed.    Current Level of Function:  Bed Mobility: touch assist  Transfers: partial assist  Gait:  touch assist 100ft with RW  Stairs: 10 steps partial assist  Upper body dressing: partial assist  Lower body dressing: partial assist    Review of Systems:   Constitutional:    [  X ] WNL           [   ] poor appetite   [   ] insomnia   [   ] tired   Cardio:                [ X  ] WNL           [   ] CP   [   ] DEE   [   ] palpitations               Resp:                   [  X ] WNL           [   ] SOB   [   ] cough   [   ] wheezing   GI:                        [X   ] WNL           [   ] constipation   [   ] diarrhea   [   ] abdominal pain   [   ] nausea   [   ] emesis                                :                      [   ] WNL           [   ] LOPEZ  [   ] dysuria   [ X  ] difficulty voiding             Endo:                   [  X ] WNL          [   ] polyuria   [   ] temperature intolerance                 Skin:                     [  X ] WNL          [   ] pain   [   ] wound   [   ] rash   MSK:                    [   ] WNL          [   ] muscle pain   [  X ] joint pain/ stiffness shoulder, hips, knees, ankles/ feet     [   ] muscle tenderness   [   ] swelling   Neuro:                 [   ] WNL          [   ] HA   [   ] change in vision   [   ] tremor   [ X  ] weakness/ unsteady gait    [   ]dysphagia              Cognitive:           [ X  ] WNL           [   ]confusion      Psych:                  [ X  ] WNL           [   ] hallucinations   [   ]agitation   [   ] delusion   [   ]depression      PHYSICAL EXAM    Vital Signs Last 24 Hrs  T(C): 36.3 (08 Dec 2023 04:34), Max: 36.6 (07 Dec 2023 13:53)  T(F): 97.3 (08 Dec 2023 04:34), Max: 97.9 (07 Dec 2023 13:53)  HR: 69 (08 Dec 2023 04:34) (67 - 73)  BP: 149/65 (08 Dec 2023 04:34) (112/58 - 149/65)  BP(mean): 93 (08 Dec 2023 04:34) (93 - 93)  RR: 18 (08 Dec 2023 04:34) (18 - 19)  SpO2: 98% (08 Dec 2023 04:34) (98% - 98%)      General:[ X  ] NAD, Resting Comfortable,   [   ] other:                                HEENT: [  X ] NC/AT, EOMI, PERRL , Normal Conjunctivae,   [   ] other:  Cardio: [ X  ] RRR   [ X  ] other:  Grade 3 crescendo-decrescendo  systolic murmur auscultated in R 2nd intercostal space. RRR                             Pulm: [ X  ] No Respiratory Distress,  Lungs CTAB,   [   ] other:                       Abdomen: [X   ]ND/NT, Soft,   [   ] other:    : [  X ] NO LOPEZ CATHETER,        [   ] LOPEZ CATHETER- no meatal tear, no discharge,                                         MSK: [   ] No joint swelling, Full ROM,   [ X  ] other: Functional PROM both shoulders. (-) 30 deg. passive DF both ankles     No AROM of R shoulder, 0-30 degrees AROM of L shoulder (2/2 OA)                                   Ext: [ X ]   No C/C/E, No calf tenderness,   [   ]other:    Skin: [  X ] intact,   [   ] other:                                                                   Neurological Examination:  Cognitive: [   X ] AAO x 3,   [    ]  other:                                                                      Attention:  [    ] intact,   [ X   ]  other:   Impairment in tasks that require concentration and attention                Memory: [    ] intact,    [ X   ]  other:   2/3 with short term recall  Mood/Affect: [   X ] wnl,    [    ]  other:                                                                             Communication: [   X ]Fluent, no dysarthria, following commands:  [    ] other:   CN II - XII:  [  X  ] intact,  [    ] other:                                                                                        Motor:   RIGHT UE: [   ] WNL,  [X   ] other: 4-/5 shoulder abduction (limited ROM due to OA), 4+/5 elbow flexion/extension, 4+/5 finger  strength   LEFT    UE: [   ] WNL,  [ X  ] other: 4-/5 shoulder abduction (limited ROM due to OA), 4+/5 elbow flexion/extension, 4+/5 finger  strength   RIGHT LE: [   ] WNL,  [ X  ] other:  5-/5 hip flexion, 5/5 knee flexion/extension, 2-/5 dorsiflexion due to limited passive range  LEFT    LE: [   ] WNL,  [  X ] other: 5-/5 hip flexion, 5/5 knee flexion/extension, 2-/5  dorsiflexion due to limited passive range    Tone: [  X  ] wnl,   [    ]  other:  DTRs: [  X ]symmetric, [   ] other:  Coordination:   [    ] intact,   [  X  ] other: Incoordinate with FNF bilaterally (worse R>L UE)                                                                          Sensory: [  X  ] Intact to light touch, temperature   [    ] other:    Decreased proprioception bilateral lower extremities  (-) garcia, babinski, clonus b/l extremities    albuterol    90 MICROgram(s) HFA Inhaler 2 Puff(s) Inhalation every 6 hours PRN  aspirin enteric coated 81 milliGRAM(s) Oral daily  atenolol  Tablet 50 milliGRAM(s) Oral daily  atorvastatin 80 milliGRAM(s) Oral at bedtime  chlorhexidine 2% Cloths 1 Application(s) Topical <User Schedule>  cyanocobalamin 1000 MICROGram(s) Oral every 24 hours  fesoterodine ER Tablet 4 milliGRAM(s) Oral <User Schedule>  folic acid 1 milliGRAM(s) Oral daily  heparin   Injectable 5000 Unit(s) SubCutaneous every 12 hours  losartan 100 milliGRAM(s) Oral at bedtime  pantoprazole    Tablet 40 milliGRAM(s) Oral before breakfast  polyethylene glycol 3350 17 Gram(s) Oral at bedtime  senna 2 Tablet(s) Oral at bedtime  ticagrelor 90 milliGRAM(s) Oral every 12 hours      RECENT LABS/IMAGING                        Patient is a 83y old  Female who presents with a chief complaint of Rehab of left thalamic lacunar infarct resulting in ataxic gait, incoordination, and ADL dysfunction. (07 Dec 2023 09:10)      HPI:  83 year old female with PMHx of arthritis, hypertension, hyperlipidemia who presented to the ED for gait ataxia and peripheral extremity numbness. Patient reported that 3 days PTA she received a flu shot, and over the weekend while cleaning experienced onset of RUE pain/weakness, in which she rested on the cough and then after trying to get up endorsed problems with balance and experiencing distal numbness in bother her hands and feet, prompting presentation to the ED. On presentation, imaging revealed L thalamic acute lacunar infarct. CTA revealed marked calcification at the right and left common carotid artery bifurcations with severe stenosis at the level of the right carotid bulb, left CC bifurcation and ast the left carotid bulb. Patient underwent diagnostic cerebral angiogram on 11/28/23 due to stenotic findings on CTA, which revealed non flow limiting stenosis bilateral carotid arteries, and no carotid angioplasty/stenting was warranted. Symptoms of distal numbness improved overtime and patient hemodynamically stable. She has been medically stable. She has severe limitation of both shoulders at baseline.  Of note, the patient states that she was recently evaluated by a rheumatologist and was told that despite being on Remicade for years, that she has osteoarthritis and never had rheumatoid arthritis.     Living situation: Lives with family (daughter, son-in-law, grandkids) in private house, 0 Steps to enter, 12 Steps inside with stair lift.  Prior Level of Function: Ambulating independent with RW and rollator, Independent with ADL/iADLs    Evaluated by bedside physical therapy and occupational therapy. Patient is minimum to moderate assistance for bed mobility and transfers, ambulating 25 feet x3 with rolling walker minimum assistance.  Upper body dressing  and lower body dressing moderate/ max assistance, Prior to admission patient was independent with mobility and ambulation with a rolling walker, and independent in all ADLs, IADLs. Evaluated by physical medicine and rehabilitation consult and deemed an appropriate candidate for inpatient rehabilitation facility. Admitted to [facility] on  11/30/23   (30 Nov 2023 12:31)      TODAY'S SUBJECTIVE & REVIEW OF SYMPTOMS  Patient was seen and assessed at bedside. No overnight events.   Patient denies any complaints at this time.   Tolerating PT/OT.   Tolerating oral diet. Voiding and passing stool spontaneously.   Vital signs reviewed.    Current Level of Function:  Bed Mobility: touch assist  Transfers: partial assist  Gait:  touch assist 100ft with RW  Stairs: 10 steps partial assist  Upper body dressing: partial assist  Lower body dressing: partial assist    Review of Systems:   Constitutional:    [  X ] WNL           [   ] poor appetite   [   ] insomnia   [   ] tired   Cardio:                [ X  ] WNL           [   ] CP   [   ] DEE   [   ] palpitations               Resp:                   [  X ] WNL           [   ] SOB   [   ] cough   [   ] wheezing   GI:                        [X   ] WNL           [   ] constipation   [   ] diarrhea   [   ] abdominal pain   [   ] nausea   [   ] emesis                                :                      [   ] WNL           [   ] LOPEZ  [   ] dysuria   [ X  ] difficulty voiding             Endo:                   [  X ] WNL          [   ] polyuria   [   ] temperature intolerance                 Skin:                     [  X ] WNL          [   ] pain   [   ] wound   [   ] rash   MSK:                    [   ] WNL          [   ] muscle pain   [  X ] joint pain/ stiffness shoulder, hips, knees, ankles/ feet     [   ] muscle tenderness   [   ] swelling   Neuro:                 [   ] WNL          [   ] HA   [   ] change in vision   [   ] tremor   [ X  ] weakness/ unsteady gait    [   ]dysphagia              Cognitive:           [ X  ] WNL           [   ]confusion      Psych:                  [ X  ] WNL           [   ] hallucinations   [   ]agitation   [   ] delusion   [   ]depression      PHYSICAL EXAM    Vital Signs Last 24 Hrs  T(C): 36.3 (08 Dec 2023 04:34), Max: 36.6 (07 Dec 2023 13:53)  T(F): 97.3 (08 Dec 2023 04:34), Max: 97.9 (07 Dec 2023 13:53)  HR: 69 (08 Dec 2023 04:34) (67 - 73)  BP: 149/65 (08 Dec 2023 04:34) (112/58 - 149/65)  BP(mean): 93 (08 Dec 2023 04:34) (93 - 93)  RR: 18 (08 Dec 2023 04:34) (18 - 19)  SpO2: 98% (08 Dec 2023 04:34) (98% - 98%)      General:[ X  ] NAD, Resting Comfortable,   [   ] other:                                HEENT: [  X ] NC/AT, EOMI, PERRL , Normal Conjunctivae,   [   ] other:  Cardio: [ X  ] RRR   [ X  ] other:  Grade 3 crescendo-decrescendo  systolic murmur auscultated in R 2nd intercostal space. RRR                             Pulm: [ X  ] No Respiratory Distress,  Lungs CTAB,   [   ] other:                       Abdomen: [X   ]ND/NT, Soft,   [   ] other:    : [  X ] NO LOPEZ CATHETER,        [   ] LOPEZ CATHETER- no meatal tear, no discharge,                                         MSK: [   ] No joint swelling, Full ROM,   [ X  ] other: Functional PROM both shoulders. (-) 30 deg. passive DF both ankles     No AROM of R shoulder, 0-30 degrees AROM of L shoulder (2/2 OA)                                   Ext: [ X ]   No C/C/E, No calf tenderness,   [   ]other:    Skin: [  X ] intact,   [   ] other:                                                                   Neurological Examination:  Cognitive: [   X ] AAO x 3,   [    ]  other:                                                                      Attention:  [    ] intact,   [ X   ]  other:   Impairment in tasks that require concentration and attention                Memory: [    ] intact,    [ X   ]  other:   2/3 with short term recall  Mood/Affect: [   X ] wnl,    [    ]  other:                                                                             Communication: [   X ]Fluent, no dysarthria, following commands:  [    ] other:   CN II - XII:  [  X  ] intact,  [    ] other:                                                                                        Motor:   RIGHT UE: [   ] WNL,  [X   ] other: 4-/5 shoulder abduction (limited ROM due to OA), 4+/5 elbow flexion/extension, 4+/5 finger  strength   LEFT    UE: [   ] WNL,  [ X  ] other: 4-/5 shoulder abduction (limited ROM due to OA), 4+/5 elbow flexion/extension, 4+/5 finger  strength   RIGHT LE: [   ] WNL,  [ X  ] other:  5-/5 hip flexion, 5/5 knee flexion/extension, 2-/5 dorsiflexion due to limited passive range  LEFT    LE: [   ] WNL,  [  X ] other: 5-/5 hip flexion, 5/5 knee flexion/extension, 2-/5  dorsiflexion due to limited passive range    Tone: [  X  ] wnl,   [    ]  other:  DTRs: [  X ]symmetric, [   ] other:  Coordination:   [    ] intact,   [  X  ] other: Incoordinate with FNF bilaterally (worse R>L UE)                                                                          Sensory: [  X  ] Intact to light touch, temperature   [    ] other:    Decreased proprioception bilateral lower extremities  (-) garcia, babinski, clonus b/l extremities    albuterol    90 MICROgram(s) HFA Inhaler 2 Puff(s) Inhalation every 6 hours PRN  aspirin enteric coated 81 milliGRAM(s) Oral daily  atenolol  Tablet 50 milliGRAM(s) Oral daily  atorvastatin 80 milliGRAM(s) Oral at bedtime  chlorhexidine 2% Cloths 1 Application(s) Topical <User Schedule>  cyanocobalamin 1000 MICROGram(s) Oral every 24 hours  fesoterodine ER Tablet 4 milliGRAM(s) Oral <User Schedule>  folic acid 1 milliGRAM(s) Oral daily  heparin   Injectable 5000 Unit(s) SubCutaneous every 12 hours  losartan 100 milliGRAM(s) Oral at bedtime  pantoprazole    Tablet 40 milliGRAM(s) Oral before breakfast  polyethylene glycol 3350 17 Gram(s) Oral at bedtime  senna 2 Tablet(s) Oral at bedtime  ticagrelor 90 milliGRAM(s) Oral every 12 hours      RECENT LABS/IMAGING

## 2023-12-08 NOTE — PROGRESS NOTE ADULT - ASSESSMENT
Neuropsychology Follow Up      Treatment focused on: Neuropsychological/ Cognitive Remediation   Pain: None Location: N/A   Orientation: [facility]   Arousal Level: Alert   Behavior: Cooperative, friendly   Affect/Mood Range: [facility]     Needed: No   #: N/A   Attention: [facility]   Insight into illness/deficits: [facility]       Patient was seen for a session following cognitive problems, including memory and attention secondary to CVA.     Cognitive remediation tasks were completed on: [employer]     Results:    Memory:    The patient had to memorize an mathematical operation (addition or subtraction) and then complete the calculation. She completed the equation with single-digit numbers. However, as the task instructed the patient to perform double-digit equations  (e.g., solving 23+41), she exhibited greater difficulty with mental arithmetic.          Attention/Executive Functioning:   The patient had to configure colored rings on a series of pegs of various sizes to match a target. The patient was instructed to follow a model while remembering the following rules: only move one ring at a time, and 2) the peg can only be placed on a larger figure.      She completed tasks involving three pegs. As the task progressed to four pegs, she needed prompting and repeated task instructions to complete the task. As the task became more complex, she struggled with higher-order executive processing, including initiation, problem-solving, and strategy formation. Patient also had difficulty remembering rules (e.g., moving one peg at a time) and required instructions be repeated.         Summary:   Patient evidenced preserved basic attention and learning, while her executive processes were weaker, mainly working memory and initiation. Compared to previous testing, the patient slightly improved basic attention and memory; however, her executive skills (shifting or maintaining mental set), working memory, and processing speed remain areas of weakness. The plan is to continue to monitor cognitive and provide cognitive remediation (memory and executive functioning tasks) moving forward.      Patient will require/benefit from memory aids such as reminders and writing things down (she benefits from visual input). It will be helpful to reduce the rate of presentation for any new information, and repetition of instruction might be necessary to ensure attention and comprehension.           Goals: Monitor cognition, support cognitive recovery and adjustment    Plan: Monitor cognition; provide feedback prior to discharge; 1-2 sessions weekly at 30-60 minutes each   Neuropsychology Follow Up      Treatment focused on: Neuropsychological/ Cognitive Remediation   Pain: None Location: N/A   Orientation: [facility]   Arousal Level: Alert   Behavior: Cooperative, friendly   Affect/Mood Range: [facility]     Needed: No   #: N/A   Attention: [facility]   Insight into illness/deficits: [facility]       Patient was seen for a session following cognitive problems, including memory and attention secondary to CVA.     Cognitive remediation tasks were completed on: [employer]     Results:    Memory:    The patient had to memorize an mathematical operation (addition or subtraction) and then complete the calculation. She completed the equation with single-digit numbers. However, as the task instructed the patient to perform double-digit equations  (e.g., solving 23+41), she exhibited greater difficulty with mental arithmetic.          Attention/Executive Functioning:   The patient had to configure colored rings on a series of pegs of various sizes to match a target. The patient was instructed to follow a model while remembering the following rules: only move one ring at a time, and 2) the peg can only be placed on a larger figure.      She completed tasks involving three pegs. As the task progressed to four pegs, she needed prompting and repeated task instructions to complete the task. As the task became more complex, she struggled with higher-order executive processing, including initiation, problem-solving, and strategy formation. Patient also had difficulty remembering rules (e.g., moving one peg at a time) and required instructions be repeated.         Summary:   Patient evidenced preserved basic attention and learning, while her executive processes were weaker, mainly working memory and initiation. Compared to previous testing, the patient slightly improved basic attention and memory; however, her executive skills (shifting or maintaining mental set), working memory, and processing speed remain areas of weakness. The plan is to continue to monitor cognitive and provide cognitive remediation (memory and executive functioning tasks) moving forward.      Patient will require/benefit from memory aids such as reminders and writing things down (she benefits from visual input). It will be helpful to reduce the rate of presentation for any new information, and repetition of instruction might be necessary to ensure attention and comprehension.           Goals: Monitor cognition, support cognitive recovery and adjustment    Plan: Monitor cognition; provide feedback prior to discharge; 1-2 sessions weekly at 30-60 minutes each

## 2023-12-09 RX ADMIN — ATORVASTATIN CALCIUM 80 MILLIGRAM(S): 80 TABLET, FILM COATED ORAL at 21:25

## 2023-12-09 RX ADMIN — Medication 81 MILLIGRAM(S): at 12:19

## 2023-12-09 RX ADMIN — TICAGRELOR 90 MILLIGRAM(S): 90 TABLET ORAL at 17:32

## 2023-12-09 RX ADMIN — LOSARTAN POTASSIUM 100 MILLIGRAM(S): 100 TABLET, FILM COATED ORAL at 21:25

## 2023-12-09 RX ADMIN — CHLORHEXIDINE GLUCONATE 1 APPLICATION(S): 213 SOLUTION TOPICAL at 05:57

## 2023-12-09 RX ADMIN — PREGABALIN 1000 MICROGRAM(S): 225 CAPSULE ORAL at 12:20

## 2023-12-09 RX ADMIN — HEPARIN SODIUM 5000 UNIT(S): 5000 INJECTION INTRAVENOUS; SUBCUTANEOUS at 05:56

## 2023-12-09 RX ADMIN — Medication 1 MILLIGRAM(S): at 12:19

## 2023-12-09 RX ADMIN — HEPARIN SODIUM 5000 UNIT(S): 5000 INJECTION INTRAVENOUS; SUBCUTANEOUS at 17:32

## 2023-12-09 RX ADMIN — PANTOPRAZOLE SODIUM 40 MILLIGRAM(S): 20 TABLET, DELAYED RELEASE ORAL at 05:56

## 2023-12-09 RX ADMIN — ATENOLOL 50 MILLIGRAM(S): 25 TABLET ORAL at 05:57

## 2023-12-09 RX ADMIN — TICAGRELOR 90 MILLIGRAM(S): 90 TABLET ORAL at 05:57

## 2023-12-09 RX ADMIN — FESOTERODINE FUMARATE 4 MILLIGRAM(S): 8 TABLET, FILM COATED, EXTENDED RELEASE ORAL at 12:19

## 2023-12-09 NOTE — PROGRESS NOTE ADULT - SUBJECTIVE AND OBJECTIVE BOX
Patient is a 83y old  Female who presents with a chief complaint of Rehab of left thalamic lacunar infarct resulting in ataxic gait, incoordination, and ADL dysfunction. (08 Dec 2023 13:39)      HPI:  83 year old female with PMHx of arthritis, hypertension, hyperlipidemia who presented to the ED for gait ataxia and peripheral extremity numbness. Patient reported that 3 days PTA she received a flu shot, and over the weekend while cleaning experienced onset of RUE pain/weakness, in which she rested on the cough and then after trying to get up endorsed problems with balance and experiencing distal numbness in bother her hands and feet, prompting presentation to the ED. On presentation, imaging revealed L thalamic acute lacunar infarct. CTA revealed marked calcification at the right and left common carotid artery bifurcations with severe stenosis at the level of the right carotid bulb, left CC bifurcation and ast the left carotid bulb. Patient underwent diagnostic cerebral angiogram on 11/28/23 due to stenotic findings on CTA, which revealed non flow limiting stenosis bilateral carotid arteries, and no carotid angioplasty/stenting was warranted. Symptoms of distal numbness improved overtime and patient hemodynamically stable. She has been medically stable. She has severe limitation of both shoulders at baseline.  Of note, the patient states that she was recently evaluated by a rheumatologist and was told that despite being on Remicade for years, that she has osteoarthritis and never had rheumatoid arthritis.     Living situation: Lives with family (daughter, son-in-law, grandkids) in private house, 0 Steps to enter, 12 Steps inside with stair lift.  Prior Level of Function: Ambulating independent with RW and rollator, Independent with ADL/iADLs    Evaluated by bedside physical therapy and occupational therapy. Patient is minimum to moderate assistance for bed mobility and transfers, ambulating 25 feet x3 with rolling walker minimum assistance.  Upper body dressing  and lower body dressing moderate/ max assistance, Prior to admission patient was independent with mobility and ambulation with a rolling walker, and independent in all ADLs, IADLs. Evaluated by physical medicine and rehabilitation consult and deemed an appropriate candidate for inpatient rehabilitation facility. Admitted to [facility] on  11/30/23   (30 Nov 2023 12:31)      TODAY'S SUBJECTIVE & REVIEW OF SYMPTOMS  Patient was seen and assessed at bedside. No overnight events.   Patient denies any complaints at this time.   Tolerating PT/OT.   Tolerating oral diet. Voiding and passing stool spontaneously.   Vital signs reviewed.    Current Level of Function:  Bed Mobility: touch assist  Transfers: partial assist  Gait:  touch assist 100ft with RW  Stairs: 10 steps partial assist  Upper body dressing: partial assist  Lower body dressing: partial assist    Review of Systems:   Constitutional:    [  X ] WNL           [   ] poor appetite   [   ] insomnia   [   ] tired   Cardio:                [ X  ] WNL           [   ] CP   [   ] DEE   [   ] palpitations               Resp:                   [  X ] WNL           [   ] SOB   [   ] cough   [   ] wheezing   GI:                        [X   ] WNL           [   ] constipation   [   ] diarrhea   [   ] abdominal pain   [   ] nausea   [   ] emesis                                :                      [   ] WNL           [   ] LOPEZ  [   ] dysuria   [ X  ] difficulty voiding             Endo:                   [  X ] WNL          [   ] polyuria   [   ] temperature intolerance                 Skin:                     [  X ] WNL          [   ] pain   [   ] wound   [   ] rash   MSK:                    [   ] WNL          [   ] muscle pain   [  X ] joint pain/ stiffness shoulder, hips, knees, ankles/ feet     [   ] muscle tenderness   [   ] swelling   Neuro:                 [   ] WNL          [   ] HA   [   ] change in vision   [   ] tremor   [ X  ] weakness/ unsteady gait    [   ]dysphagia              Cognitive:           [ X  ] WNL           [   ]confusion      Psych:                  [ X  ] WNL           [   ] hallucinations   [   ]agitation   [   ] delusion   [   ]depression        PHYSICAL EXAM    Vital Signs Last 24 Hrs  T(C): 36.4 (09 Dec 2023 04:55), Max: 36.9 (08 Dec 2023 20:50)  T(F): 97.6 (09 Dec 2023 04:55), Max: 98.4 (08 Dec 2023 20:50)  HR: 72 (09 Dec 2023 04:55) (62 - 72)  BP: 135/64 (09 Dec 2023 04:55) (103/49 - 135/64)  BP(mean): --  RR: 19 (09 Dec 2023 04:55) (18 - 19)  SpO2: --    General:[ X  ] NAD, Resting Comfortable,   [   ] other:                                HEENT: [  X ] NC/AT, EOMI, PERRL , Normal Conjunctivae,   [   ] other:  Cardio: [ X  ] RRR   [ X  ] other:  Grade 3 crescendo-decrescendo  systolic murmur auscultated in R 2nd intercostal space. RRR                             Pulm: [ X  ] No Respiratory Distress,  Lungs CTAB,   [   ] other:                       Abdomen: [X   ]ND/NT, Soft,   [   ] other:    : [  X ] NO LOPEZ CATHETER,        [   ] LOPEZ CATHETER- no meatal tear, no discharge,                                         MSK: [   ] No joint swelling, Full ROM,   [ X  ] other: Functional PROM both shoulders. (-) 30 deg. passive DF both ankles     No AROM of R shoulder, 0-30 degrees AROM of L shoulder (2/2 OA)                                   Ext: [ X ]   No C/C/E, No calf tenderness,   [   ]other:    Skin: [  X ] intact,   [   ] other:                                                                   Neurological Examination:  Cognitive: [   X ] AAO x 3,   [    ]  other:                                                                      Attention:  [    ] intact,   [ X   ]  other:   Impairment in tasks that require concentration and attention                Memory: [    ] intact,    [ X   ]  other:   2/3 with short term recall  Mood/Affect: [   X ] wnl,    [    ]  other:                                                                             Communication: [   X ]Fluent, no dysarthria, following commands:  [    ] other:   CN II - XII:  [  X  ] intact,  [    ] other:                                                                                        Motor:   RIGHT UE: [   ] WNL,  [X   ] other: 4-/5 shoulder abduction (limited ROM due to OA), 4+/5 elbow flexion/extension, 4+/5 finger  strength   LEFT    UE: [   ] WNL,  [ X  ] other: 4-/5 shoulder abduction (limited ROM due to OA), 4+/5 elbow flexion/extension, 4+/5 finger  strength   RIGHT LE: [   ] WNL,  [ X  ] other:  5-/5 hip flexion, 5/5 knee flexion/extension, 2-/5 dorsiflexion due to limited passive range  LEFT    LE: [   ] WNL,  [  X ] other: 5-/5 hip flexion, 5/5 knee flexion/extension, 2-/5  dorsiflexion due to limited passive range    Tone: [  X  ] wnl,   [    ]  other:  DTRs: [  X ]symmetric, [   ] other:  Coordination:   [    ] intact,   [  X  ] other: Incoordinate with FNF bilaterally (worse R>L UE)                                                                          Sensory: [  X  ] Intact to light touch, temperature   [    ] other:    Decreased proprioception bilateral lower extremities  (-) garcia, babinski, clonus b/l extremities          acetaminophen     Tablet .. 650 milliGRAM(s) Oral every 6 hours PRN  albuterol    90 MICROgram(s) HFA Inhaler 2 Puff(s) Inhalation every 6 hours PRN  aspirin enteric coated 81 milliGRAM(s) Oral daily  atenolol  Tablet 50 milliGRAM(s) Oral daily  atorvastatin 80 milliGRAM(s) Oral at bedtime  chlorhexidine 2% Cloths 1 Application(s) Topical <User Schedule>  cyanocobalamin 1000 MICROGram(s) Oral every 24 hours  fesoterodine ER Tablet 4 milliGRAM(s) Oral <User Schedule>  folic acid 1 milliGRAM(s) Oral daily  heparin   Injectable 5000 Unit(s) SubCutaneous every 12 hours  losartan 100 milliGRAM(s) Oral at bedtime  pantoprazole    Tablet 40 milliGRAM(s) Oral before breakfast  polyethylene glycol 3350 17 Gram(s) Oral at bedtime  senna 2 Tablet(s) Oral at bedtime  ticagrelor 90 milliGRAM(s) Oral every 12 hours      RECENT LABS/IMAGING                        Patient is a 83y old  Female who presents with a chief complaint of Rehab of left thalamic lacunar infarct resulting in ataxic gait, incoordination, and ADL dysfunction. (08 Dec 2023 13:39)      HPI:  83 year old female with PMHx of arthritis, hypertension, hyperlipidemia who presented to the ED for gait ataxia and peripheral extremity numbness. Patient reported that 3 days PTA she received a flu shot, and over the weekend while cleaning experienced onset of RUE pain/weakness, in which she rested on the cough and then after trying to get up endorsed problems with balance and experiencing distal numbness in bother her hands and feet, prompting presentation to the ED. On presentation, imaging revealed L thalamic acute lacunar infarct. CTA revealed marked calcification at the right and left common carotid artery bifurcations with severe stenosis at the level of the right carotid bulb, left CC bifurcation and ast the left carotid bulb. Patient underwent diagnostic cerebral angiogram on 11/28/23 due to stenotic findings on CTA, which revealed non flow limiting stenosis bilateral carotid arteries, and no carotid angioplasty/stenting was warranted. Symptoms of distal numbness improved overtime and patient hemodynamically stable. She has been medically stable. She has severe limitation of both shoulders at baseline.  Of note, the patient states that she was recently evaluated by a rheumatologist and was told that despite being on Remicade for years, that she has osteoarthritis and never had rheumatoid arthritis.     Living situation: Lives with family (daughter, son-in-law, grandkids) in private house, 0 Steps to enter, 12 Steps inside with stair lift.  Prior Level of Function: Ambulating independent with RW and rollator, Independent with ADL/iADLs    Evaluated by bedside physical therapy and occupational therapy. Patient is minimum to moderate assistance for bed mobility and transfers, ambulating 25 feet x3 with rolling walker minimum assistance.  Upper body dressing  and lower body dressing moderate/ max assistance, Prior to admission patient was independent with mobility and ambulation with a rolling walker, and independent in all ADLs, IADLs. Evaluated by physical medicine and rehabilitation consult and deemed an appropriate candidate for inpatient rehabilitation facility. Admitted to [facility] on  11/30/23   (30 Nov 2023 12:31)      TODAY'S SUBJECTIVE & REVIEW OF SYMPTOMS  Patient was seen and assessed at bedside. No overnight events.   Patient denies any complaints at this time.   Tolerating PT/OT.   Tolerating oral diet. Voiding and passing stool spontaneously.   Vital signs reviewed.    Current Level of Function:  Bed Mobility: touch assist  Transfers: partial assist  Gait:  touch assist 100ft with RW  Stairs: 10 steps partial assist  Upper body dressing: partial assist  Lower body dressing: partial assist    Review of Systems:   Constitutional:    [  X ] WNL           [   ] poor appetite   [   ] insomnia   [   ] tired   Cardio:                [ X  ] WNL           [   ] CP   [   ] DEE   [   ] palpitations               Resp:                   [  X ] WNL           [   ] SOB   [   ] cough   [   ] wheezing   GI:                        [X   ] WNL           [   ] constipation   [   ] diarrhea   [   ] abdominal pain   [   ] nausea   [   ] emesis                                :                      [   ] WNL           [   ] LOPEZ  [   ] dysuria   [ X  ] difficulty voiding             Endo:                   [  X ] WNL          [   ] polyuria   [   ] temperature intolerance                 Skin:                     [  X ] WNL          [   ] pain   [   ] wound   [   ] rash   MSK:                    [   ] WNL          [   ] muscle pain   [  X ] joint pain/ stiffness shoulder, hips, knees, ankles/ feet     [   ] muscle tenderness   [   ] swelling   Neuro:                 [   ] WNL          [   ] HA   [   ] change in vision   [   ] tremor   [ X  ] weakness/ unsteady gait    [   ]dysphagia              Cognitive:           [ X  ] WNL           [   ]confusion      Psych:                  [ X  ] WNL           [   ] hallucinations   [   ]agitation   [   ] delusion   [   ]depression        PHYSICAL EXAM    Vital Signs Last 24 Hrs  T(C): 36.4 (09 Dec 2023 04:55), Max: 36.9 (08 Dec 2023 20:50)  T(F): 97.6 (09 Dec 2023 04:55), Max: 98.4 (08 Dec 2023 20:50)  HR: 72 (09 Dec 2023 04:55) (62 - 72)  BP: 135/64 (09 Dec 2023 04:55) (103/49 - 135/64)  BP(mean): --  RR: 19 (09 Dec 2023 04:55) (18 - 19)  SpO2: --    General:[ X  ] NAD, Resting Comfortable,   [   ] other:                                HEENT: [  X ] NC/AT, EOMI, PERRL , Normal Conjunctivae,   [   ] other:  Cardio: [ X  ] RRR   [ X  ] other:  Grade 3 crescendo-decrescendo  systolic murmur auscultated in R 2nd intercostal space. RRR                             Pulm: [ X  ] No Respiratory Distress,  Lungs CTAB,   [   ] other:                       Abdomen: [X   ]ND/NT, Soft,   [   ] other:    : [  X ] NO LOPEZ CATHETER,        [   ] LOPEZ CATHETER- no meatal tear, no discharge,                                         MSK: [   ] No joint swelling, Full ROM,   [ X  ] other: Functional PROM both shoulders. (-) 30 deg. passive DF both ankles     No AROM of R shoulder, 0-30 degrees AROM of L shoulder (2/2 OA)                                   Ext: [ X ]   No C/C/E, No calf tenderness,   [   ]other:    Skin: [  X ] intact,   [   ] other:                                                                   Neurological Examination:  Cognitive: [   X ] AAO x 3,   [    ]  other:                                                                      Attention:  [    ] intact,   [ X   ]  other:   Impairment in tasks that require concentration and attention                Memory: [    ] intact,    [ X   ]  other:   2/3 with short term recall  Mood/Affect: [   X ] wnl,    [    ]  other:                                                                             Communication: [   X ]Fluent, no dysarthria, following commands:  [    ] other:   CN II - XII:  [  X  ] intact,  [    ] other:                                                                                        Motor:   RIGHT UE: [   ] WNL,  [X   ] other: 4-/5 shoulder abduction (limited ROM due to OA), 4+/5 elbow flexion/extension, 4+/5 finger  strength   LEFT    UE: [   ] WNL,  [ X  ] other: 4-/5 shoulder abduction (limited ROM due to OA), 4+/5 elbow flexion/extension, 4+/5 finger  strength   RIGHT LE: [   ] WNL,  [ X  ] other:  5-/5 hip flexion, 5/5 knee flexion/extension, 2-/5 dorsiflexion due to limited passive range  LEFT    LE: [   ] WNL,  [  X ] other: 5-/5 hip flexion, 5/5 knee flexion/extension, 2-/5  dorsiflexion due to limited passive range    Tone: [  X  ] wnl,   [    ]  other:  DTRs: [  X ]symmetric, [   ] other:  Coordination:   [    ] intact,   [  X  ] other: Incoordinate with FNF bilaterally (worse R>L UE)                                                                          Sensory: [  X  ] Intact to light touch, temperature   [    ] other:    Decreased proprioception bilateral lower extremities  (-) garcia, babinski, clonus b/l extremities          acetaminophen     Tablet .. 650 milliGRAM(s) Oral every 6 hours PRN  albuterol    90 MICROgram(s) HFA Inhaler 2 Puff(s) Inhalation every 6 hours PRN  aspirin enteric coated 81 milliGRAM(s) Oral daily  atenolol  Tablet 50 milliGRAM(s) Oral daily  atorvastatin 80 milliGRAM(s) Oral at bedtime  chlorhexidine 2% Cloths 1 Application(s) Topical <User Schedule>  cyanocobalamin 1000 MICROGram(s) Oral every 24 hours  fesoterodine ER Tablet 4 milliGRAM(s) Oral <User Schedule>  folic acid 1 milliGRAM(s) Oral daily  heparin   Injectable 5000 Unit(s) SubCutaneous every 12 hours  losartan 100 milliGRAM(s) Oral at bedtime  pantoprazole    Tablet 40 milliGRAM(s) Oral before breakfast  polyethylene glycol 3350 17 Gram(s) Oral at bedtime  senna 2 Tablet(s) Oral at bedtime  ticagrelor 90 milliGRAM(s) Oral every 12 hours      RECENT LABS/IMAGING

## 2023-12-10 RX ADMIN — LOSARTAN POTASSIUM 100 MILLIGRAM(S): 100 TABLET, FILM COATED ORAL at 21:10

## 2023-12-10 RX ADMIN — TICAGRELOR 90 MILLIGRAM(S): 90 TABLET ORAL at 06:05

## 2023-12-10 RX ADMIN — Medication 81 MILLIGRAM(S): at 12:35

## 2023-12-10 RX ADMIN — Medication 650 MILLIGRAM(S): at 13:27

## 2023-12-10 RX ADMIN — ATORVASTATIN CALCIUM 80 MILLIGRAM(S): 80 TABLET, FILM COATED ORAL at 21:10

## 2023-12-10 RX ADMIN — Medication 650 MILLIGRAM(S): at 12:37

## 2023-12-10 RX ADMIN — ATENOLOL 50 MILLIGRAM(S): 25 TABLET ORAL at 06:04

## 2023-12-10 RX ADMIN — PANTOPRAZOLE SODIUM 40 MILLIGRAM(S): 20 TABLET, DELAYED RELEASE ORAL at 06:04

## 2023-12-10 RX ADMIN — PREGABALIN 1000 MICROGRAM(S): 225 CAPSULE ORAL at 12:35

## 2023-12-10 RX ADMIN — CHLORHEXIDINE GLUCONATE 1 APPLICATION(S): 213 SOLUTION TOPICAL at 06:00

## 2023-12-10 RX ADMIN — HEPARIN SODIUM 5000 UNIT(S): 5000 INJECTION INTRAVENOUS; SUBCUTANEOUS at 17:18

## 2023-12-10 RX ADMIN — Medication 1 MILLIGRAM(S): at 12:35

## 2023-12-10 RX ADMIN — FESOTERODINE FUMARATE 4 MILLIGRAM(S): 8 TABLET, FILM COATED, EXTENDED RELEASE ORAL at 12:34

## 2023-12-10 RX ADMIN — TICAGRELOR 90 MILLIGRAM(S): 90 TABLET ORAL at 17:18

## 2023-12-10 RX ADMIN — HEPARIN SODIUM 5000 UNIT(S): 5000 INJECTION INTRAVENOUS; SUBCUTANEOUS at 06:05

## 2023-12-10 NOTE — PROGRESS NOTE ADULT - SUBJECTIVE AND OBJECTIVE BOX
Patient is a 83y old  Female who presents with a chief complaint of Rehab of left thalamic lacunar infarct resulting in ataxic gait, incoordination, and ADL dysfunction. (08 Dec 2023 13:39)      HPI:  83 year old female with PMHx of arthritis, hypertension, hyperlipidemia who presented to the ED for gait ataxia and peripheral extremity numbness. Patient reported that 3 days PTA she received a flu shot, and over the weekend while cleaning experienced onset of RUE pain/weakness, in which she rested on the cough and then after trying to get up endorsed problems with balance and experiencing distal numbness in bother her hands and feet, prompting presentation to the ED. On presentation, imaging revealed L thalamic acute lacunar infarct. CTA revealed marked calcification at the right and left common carotid artery bifurcations with severe stenosis at the level of the right carotid bulb, left CC bifurcation and ast the left carotid bulb. Patient underwent diagnostic cerebral angiogram on 11/28/23 due to stenotic findings on CTA, which revealed non flow limiting stenosis bilateral carotid arteries, and no carotid angioplasty/stenting was warranted. Symptoms of distal numbness improved overtime and patient hemodynamically stable. She has been medically stable. She has severe limitation of both shoulders at baseline.  Of note, the patient states that she was recently evaluated by a rheumatologist and was told that despite being on Remicade for years, that she has osteoarthritis and never had rheumatoid arthritis.     Living situation: Lives with family (daughter, son-in-law, grandkids) in private house, 0 Steps to enter, 12 Steps inside with stair lift.  Prior Level of Function: Ambulating independent with RW and rollator, Independent with ADL/iADLs    Evaluated by bedside physical therapy and occupational therapy. Patient is minimum to moderate assistance for bed mobility and transfers, ambulating 25 feet x3 with rolling walker minimum assistance.  Upper body dressing  and lower body dressing moderate/ max assistance, Prior to admission patient was independent with mobility and ambulation with a rolling walker, and independent in all ADLs, IADLs. Evaluated by physical medicine and rehabilitation consult and deemed an appropriate candidate for inpatient rehabilitation facility. Admitted to [facility] on  11/30/23   (30 Nov 2023 12:31)      TODAY'S SUBJECTIVE & REVIEW OF SYMPTOMS  Patient was seen and assessed at bedside. No overnight events. Doing well without complaints. Eating breakfast.   Tolerating PT/OT.   Tolerating oral diet. Voiding and passing stool spontaneously.   Vital signs reviewed.    Current Level of Function:  Bed Mobility: touch assist  Transfers: partial assist  Gait:  touch assist 100ft with RW  Stairs: 10 steps partial assist  Upper body dressing: partial assist  Lower body dressing: partial assist    Review of Systems:   Constitutional:    [  X ] WNL           [   ] poor appetite   [   ] insomnia   [   ] tired   Cardio:                [ X  ] WNL           [   ] CP   [   ] DEE   [   ] palpitations               Resp:                   [  X ] WNL           [   ] SOB   [   ] cough   [   ] wheezing   GI:                        [X   ] WNL           [   ] constipation   [   ] diarrhea   [   ] abdominal pain   [   ] nausea   [   ] emesis                                :                      [   ] WNL           [   ] LOPEZ  [   ] dysuria   [ X  ] difficulty voiding             Endo:                   [  X ] WNL          [   ] polyuria   [   ] temperature intolerance                 Skin:                     [  X ] WNL          [   ] pain   [   ] wound   [   ] rash   MSK:                    [   ] WNL          [   ] muscle pain   [  X ] joint pain/ stiffness shoulder, hips, knees, ankles/ feet     [   ] muscle tenderness   [   ] swelling   Neuro:                 [   ] WNL          [   ] HA   [   ] change in vision   [   ] tremor   [ X  ] weakness/ unsteady gait    [   ]dysphagia              Cognitive:           [ X  ] WNL           [   ]confusion      Psych:                  [ X  ] WNL           [   ] hallucinations   [   ]agitation   [   ] delusion   [   ]depression        PHYSICAL EXAM  Vital Signs Last 24 Hrs  T(C): 35.7 (10 Dec 2023 04:50), Max: 36.7 (09 Dec 2023 11:37)  T(F): 96.2 (10 Dec 2023 04:50), Max: 98.1 (09 Dec 2023 11:37)  HR: 76 (10 Dec 2023 04:50) (62 - 76)  BP: 149/80 (10 Dec 2023 04:50) (120/53 - 149/80)  BP(mean): --  RR: 20 (10 Dec 2023 04:50) (18 - 20)  SpO2: --    General:[ X  ] NAD, Resting Comfortable,   [   ] other:                                HEENT: [  X ] NC/AT, EOMI, PERRL , Normal Conjunctivae,   [   ] other:  Cardio: [ X  ] RRR   [ X  ] other:  Grade 3 crescendo-decrescendo  systolic murmur auscultated in R 2nd intercostal space. RRR                             Pulm: [ X  ] No Respiratory Distress,  Lungs CTAB,   [   ] other:                       Abdomen: [X   ]ND/NT, Soft,   [   ] other:    : [  X ] NO LOPEZ CATHETER,        [   ] LOPEZ CATHETER- no meatal tear, no discharge,                                         MSK: [   ] No joint swelling, Full ROM,   [ X  ] other: Functional PROM both shoulders. (-) 30 deg. passive DF both ankles     No AROM of R shoulder, 0-30 degrees AROM of L shoulder (2/2 OA)                                   Ext: [ X ]   No C/C/E, No calf tenderness,   [   ]other:    Skin: [  X ] intact,   [   ] other:                                                                   Neurological Examination:  Cognitive: [   X ] AAO x 3,   [    ]  other:                                                                      Attention:  [    ] intact,   [ X   ]  other:   Impairment in tasks that require concentration and attention                Memory: [    ] intact,    [ X   ]  other:   2/3 with short term recall  Mood/Affect: [   X ] wnl,    [    ]  other:                                                                             Communication: [   X ]Fluent, no dysarthria, following commands:  [    ] other:   CN II - XII:  [  X  ] intact,  [    ] other:                                                                                        Motor:   RIGHT UE: [   ] WNL,  [X   ] other: 4-/5 shoulder abduction (limited ROM due to OA), 4+/5 elbow flexion/extension, 4+/5 finger  strength   LEFT    UE: [   ] WNL,  [ X  ] other: 4-/5 shoulder abduction (limited ROM due to OA), 4+/5 elbow flexion/extension, 4+/5 finger  strength   RIGHT LE: [   ] WNL,  [ X  ] other:  5-/5 hip flexion, 5/5 knee flexion/extension, 2-/5 dorsiflexion due to limited passive range  LEFT    LE: [   ] WNL,  [  X ] other: 5-/5 hip flexion, 5/5 knee flexion/extension, 2-/5  dorsiflexion due to limited passive range    Tone: [  X  ] wnl,   [    ]  other:  DTRs: [  X ]symmetric, [   ] other:  Coordination:   [    ] intact,   [  X  ] other: Incoordinate with FNF bilaterally (worse R>L UE)                                                                          Sensory: [  X  ] Intact to light touch, temperature   [    ] other:    Decreased proprioception bilateral lower extremities  (-) garcia, babinski, clonus b/l extremities    MEDICATIONS  (STANDING):  aspirin enteric coated 81 milliGRAM(s) Oral daily  atenolol  Tablet 50 milliGRAM(s) Oral daily  atorvastatin 80 milliGRAM(s) Oral at bedtime  chlorhexidine 2% Cloths 1 Application(s) Topical <User Schedule>  cyanocobalamin 1000 MICROGram(s) Oral every 24 hours  fesoterodine ER Tablet 4 milliGRAM(s) Oral <User Schedule>  folic acid 1 milliGRAM(s) Oral daily  heparin   Injectable 5000 Unit(s) SubCutaneous every 12 hours  losartan 100 milliGRAM(s) Oral at bedtime  pantoprazole    Tablet 40 milliGRAM(s) Oral before breakfast  polyethylene glycol 3350 17 Gram(s) Oral at bedtime  senna 2 Tablet(s) Oral at bedtime  ticagrelor 90 milliGRAM(s) Oral every 12 hours    MEDICATIONS  (PRN):  acetaminophen     Tablet .. 650 milliGRAM(s) Oral every 6 hours PRN Temp greater or equal to 38C (100.4F), Mild Pain (1 - 3), Moderate Pain (4 - 6)  albuterol    90 MICROgram(s) HFA Inhaler 2 Puff(s) Inhalation every 6 hours PRN for bronchospasm      RECENT LABS/IMAGING    no new labs                   Patient is a 83y old  Female who presents with a chief complaint of Rehab of left thalamic lacunar infarct resulting in ataxic gait, incoordination, and ADL dysfunction. (08 Dec 2023 13:39)      HPI:  83 year old female with PMHx of arthritis, hypertension, hyperlipidemia who presented to the ED for gait ataxia and peripheral extremity numbness. Patient reported that 3 days PTA she received a flu shot, and over the weekend while cleaning experienced onset of RUE pain/weakness, in which she rested on the cough and then after trying to get up endorsed problems with balance and experiencing distal numbness in bother her hands and feet, prompting presentation to the ED. On presentation, imaging revealed L thalamic acute lacunar infarct. CTA revealed marked calcification at the right and left common carotid artery bifurcations with severe stenosis at the level of the right carotid bulb, left CC bifurcation and ast the left carotid bulb. Patient underwent diagnostic cerebral angiogram on 11/28/23 due to stenotic findings on CTA, which revealed non flow limiting stenosis bilateral carotid arteries, and no carotid angioplasty/stenting was warranted. Symptoms of distal numbness improved overtime and patient hemodynamically stable. She has been medically stable. She has severe limitation of both shoulders at baseline.  Of note, the patient states that she was recently evaluated by a rheumatologist and was told that despite being on Remicade for years, that she has osteoarthritis and never had rheumatoid arthritis.     Living situation: Lives with family (daughter, son-in-law, grandkids) in private house, 0 Steps to enter, 12 Steps inside with stair lift.  Prior Level of Function: Ambulating independent with RW and rollator, Independent with ADL/iADLs    Evaluated by bedside physical therapy and occupational therapy. Patient is minimum to moderate assistance for bed mobility and transfers, ambulating 25 feet x3 with rolling walker minimum assistance.  Upper body dressing  and lower body dressing moderate/ max assistance, Prior to admission patient was independent with mobility and ambulation with a rolling walker, and independent in all ADLs, IADLs. Evaluated by physical medicine and rehabilitation consult and deemed an appropriate candidate for inpatient rehabilitation facility. Admitted to [facility] on  11/30/23   (30 Nov 2023 12:31)      TODAY'S SUBJECTIVE & REVIEW OF SYMPTOMS  Patient was seen and assessed at bedside. No overnight events. Doing well without complaints. Eating breakfast.   Tolerating PT/OT.   Tolerating oral diet. Voiding and passing stool spontaneously.   Vital signs reviewed.    Current Level of Function:  Bed Mobility: touch assist  Transfers: partial assist  Gait:  touch assist 100ft with RW  Stairs: 10 steps partial assist  Upper body dressing: partial assist  Lower body dressing: partial assist    Review of Systems:   Constitutional:    [  X ] WNL           [   ] poor appetite   [   ] insomnia   [   ] tired   Cardio:                [ X  ] WNL           [   ] CP   [   ] DEE   [   ] palpitations               Resp:                   [  X ] WNL           [   ] SOB   [   ] cough   [   ] wheezing   GI:                        [X   ] WNL           [   ] constipation   [   ] diarrhea   [   ] abdominal pain   [   ] nausea   [   ] emesis                                :                      [   ] WNL           [   ] LOPEZ  [   ] dysuria   [ X  ] difficulty voiding             Endo:                   [  X ] WNL          [   ] polyuria   [   ] temperature intolerance                 Skin:                     [  X ] WNL          [   ] pain   [   ] wound   [   ] rash   MSK:                    [   ] WNL          [   ] muscle pain   [  X ] joint pain/ stiffness shoulder, hips, knees, ankles/ feet     [   ] muscle tenderness   [   ] swelling   Neuro:                 [   ] WNL          [   ] HA   [   ] change in vision   [   ] tremor   [ X  ] weakness/ unsteady gait    [   ]dysphagia              Cognitive:           [ X  ] WNL           [   ]confusion      Psych:                  [ X  ] WNL           [   ] hallucinations   [   ]agitation   [   ] delusion   [   ]depression        PHYSICAL EXAM  Vital Signs Last 24 Hrs  T(C): 35.7 (10 Dec 2023 04:50), Max: 36.7 (09 Dec 2023 11:37)  T(F): 96.2 (10 Dec 2023 04:50), Max: 98.1 (09 Dec 2023 11:37)  HR: 76 (10 Dec 2023 04:50) (62 - 76)  BP: 149/80 (10 Dec 2023 04:50) (120/53 - 149/80)  BP(mean): --  RR: 20 (10 Dec 2023 04:50) (18 - 20)  SpO2: --    General:[ X  ] NAD, Resting Comfortable,   [   ] other:                                HEENT: [  X ] NC/AT, EOMI, PERRL , Normal Conjunctivae,   [   ] other:  Cardio: [ X  ] RRR   [ X  ] other:  Grade 3 crescendo-decrescendo  systolic murmur auscultated in R 2nd intercostal space. RRR                             Pulm: [ X  ] No Respiratory Distress,  Lungs CTAB,   [   ] other:                       Abdomen: [X   ]ND/NT, Soft,   [   ] other:    : [  X ] NO LOPEZ CATHETER,        [   ] LOPEZ CATHETER- no meatal tear, no discharge,                                         MSK: [   ] No joint swelling, Full ROM,   [ X  ] other: Functional PROM both shoulders. (-) 30 deg. passive DF both ankles     No AROM of R shoulder, 0-30 degrees AROM of L shoulder (2/2 OA)                                   Ext: [ X ]   No C/C/E, No calf tenderness,   [   ]other:    Skin: [  X ] intact,   [   ] other:                                                                   Neurological Examination:  Cognitive: [   X ] AAO x 3,   [    ]  other:                                                                      Attention:  [    ] intact,   [ X   ]  other:   Impairment in tasks that require concentration and attention                Memory: [    ] intact,    [ X   ]  other:   2/3 with short term recall  Mood/Affect: [   X ] wnl,    [    ]  other:                                                                             Communication: [   X ]Fluent, no dysarthria, following commands:  [    ] other:   CN II - XII:  [  X  ] intact,  [    ] other:                                                                                        Motor:   RIGHT UE: [   ] WNL,  [X   ] other: 4-/5 shoulder abduction (limited ROM due to OA), 4+/5 elbow flexion/extension, 4+/5 finger  strength   LEFT    UE: [   ] WNL,  [ X  ] other: 4-/5 shoulder abduction (limited ROM due to OA), 4+/5 elbow flexion/extension, 4+/5 finger  strength   RIGHT LE: [   ] WNL,  [ X  ] other:  5-/5 hip flexion, 5/5 knee flexion/extension, 2-/5 dorsiflexion due to limited passive range  LEFT    LE: [   ] WNL,  [  X ] other: 5-/5 hip flexion, 5/5 knee flexion/extension, 2-/5  dorsiflexion due to limited passive range    Tone: [  X  ] wnl,   [    ]  other:  DTRs: [  X ]symmetric, [   ] other:  Coordination:   [    ] intact,   [  X  ] other: Incoordinate with FNF bilaterally (worse R>L UE)                                                                          Sensory: [  X  ] Intact to light touch, temperature   [    ] other:    Decreased proprioception bilateral lower extremities  (-) garcia, babinski, clonus b/l extremities    MEDICATIONS  (STANDING):  aspirin enteric coated 81 milliGRAM(s) Oral daily  atenolol  Tablet 50 milliGRAM(s) Oral daily  atorvastatin 80 milliGRAM(s) Oral at bedtime  chlorhexidine 2% Cloths 1 Application(s) Topical <User Schedule>  cyanocobalamin 1000 MICROGram(s) Oral every 24 hours  fesoterodine ER Tablet 4 milliGRAM(s) Oral <User Schedule>  folic acid 1 milliGRAM(s) Oral daily  heparin   Injectable 5000 Unit(s) SubCutaneous every 12 hours  losartan 100 milliGRAM(s) Oral at bedtime  pantoprazole    Tablet 40 milliGRAM(s) Oral before breakfast  polyethylene glycol 3350 17 Gram(s) Oral at bedtime  senna 2 Tablet(s) Oral at bedtime  ticagrelor 90 milliGRAM(s) Oral every 12 hours    MEDICATIONS  (PRN):  acetaminophen     Tablet .. 650 milliGRAM(s) Oral every 6 hours PRN Temp greater or equal to 38C (100.4F), Mild Pain (1 - 3), Moderate Pain (4 - 6)  albuterol    90 MICROgram(s) HFA Inhaler 2 Puff(s) Inhalation every 6 hours PRN for bronchospasm      RECENT LABS/IMAGING    no new labs

## 2023-12-11 ENCOUNTER — TRANSCRIPTION ENCOUNTER (OUTPATIENT)
Age: 83
End: 2023-12-11

## 2023-12-11 LAB
ALBUMIN SERPL ELPH-MCNC: 3.7 G/DL — SIGNIFICANT CHANGE UP (ref 3.5–5.2)
ALBUMIN SERPL ELPH-MCNC: 3.7 G/DL — SIGNIFICANT CHANGE UP (ref 3.5–5.2)
ALP SERPL-CCNC: 74 U/L — SIGNIFICANT CHANGE UP (ref 30–115)
ALP SERPL-CCNC: 74 U/L — SIGNIFICANT CHANGE UP (ref 30–115)
ALT FLD-CCNC: 26 U/L — SIGNIFICANT CHANGE UP (ref 0–41)
ALT FLD-CCNC: 26 U/L — SIGNIFICANT CHANGE UP (ref 0–41)
ANION GAP SERPL CALC-SCNC: 9 MMOL/L — SIGNIFICANT CHANGE UP (ref 7–14)
ANION GAP SERPL CALC-SCNC: 9 MMOL/L — SIGNIFICANT CHANGE UP (ref 7–14)
AST SERPL-CCNC: 22 U/L — SIGNIFICANT CHANGE UP (ref 0–41)
AST SERPL-CCNC: 22 U/L — SIGNIFICANT CHANGE UP (ref 0–41)
BASOPHILS # BLD AUTO: 0.05 K/UL — SIGNIFICANT CHANGE UP (ref 0–0.2)
BASOPHILS # BLD AUTO: 0.05 K/UL — SIGNIFICANT CHANGE UP (ref 0–0.2)
BASOPHILS NFR BLD AUTO: 0.7 % — SIGNIFICANT CHANGE UP (ref 0–1)
BASOPHILS NFR BLD AUTO: 0.7 % — SIGNIFICANT CHANGE UP (ref 0–1)
BILIRUB SERPL-MCNC: 0.3 MG/DL — SIGNIFICANT CHANGE UP (ref 0.2–1.2)
BILIRUB SERPL-MCNC: 0.3 MG/DL — SIGNIFICANT CHANGE UP (ref 0.2–1.2)
BUN SERPL-MCNC: 31 MG/DL — HIGH (ref 10–20)
BUN SERPL-MCNC: 31 MG/DL — HIGH (ref 10–20)
CALCIUM SERPL-MCNC: 8.9 MG/DL — SIGNIFICANT CHANGE UP (ref 8.4–10.4)
CALCIUM SERPL-MCNC: 8.9 MG/DL — SIGNIFICANT CHANGE UP (ref 8.4–10.4)
CHLORIDE SERPL-SCNC: 110 MMOL/L — SIGNIFICANT CHANGE UP (ref 98–110)
CHLORIDE SERPL-SCNC: 110 MMOL/L — SIGNIFICANT CHANGE UP (ref 98–110)
CO2 SERPL-SCNC: 22 MMOL/L — SIGNIFICANT CHANGE UP (ref 17–32)
CO2 SERPL-SCNC: 22 MMOL/L — SIGNIFICANT CHANGE UP (ref 17–32)
CREAT SERPL-MCNC: 1.1 MG/DL — SIGNIFICANT CHANGE UP (ref 0.7–1.5)
CREAT SERPL-MCNC: 1.1 MG/DL — SIGNIFICANT CHANGE UP (ref 0.7–1.5)
EGFR: 50 ML/MIN/1.73M2 — LOW
EGFR: 50 ML/MIN/1.73M2 — LOW
EOSINOPHIL # BLD AUTO: 0.24 K/UL — SIGNIFICANT CHANGE UP (ref 0–0.7)
EOSINOPHIL # BLD AUTO: 0.24 K/UL — SIGNIFICANT CHANGE UP (ref 0–0.7)
EOSINOPHIL NFR BLD AUTO: 3.6 % — SIGNIFICANT CHANGE UP (ref 0–8)
EOSINOPHIL NFR BLD AUTO: 3.6 % — SIGNIFICANT CHANGE UP (ref 0–8)
GLUCOSE SERPL-MCNC: 89 MG/DL — SIGNIFICANT CHANGE UP (ref 70–99)
GLUCOSE SERPL-MCNC: 89 MG/DL — SIGNIFICANT CHANGE UP (ref 70–99)
HCT VFR BLD CALC: 34 % — LOW (ref 37–47)
HCT VFR BLD CALC: 34 % — LOW (ref 37–47)
HGB BLD-MCNC: 10.3 G/DL — LOW (ref 12–16)
HGB BLD-MCNC: 10.3 G/DL — LOW (ref 12–16)
IMM GRANULOCYTES NFR BLD AUTO: 1 % — HIGH (ref 0.1–0.3)
IMM GRANULOCYTES NFR BLD AUTO: 1 % — HIGH (ref 0.1–0.3)
LYMPHOCYTES # BLD AUTO: 0.7 K/UL — LOW (ref 1.2–3.4)
LYMPHOCYTES # BLD AUTO: 0.7 K/UL — LOW (ref 1.2–3.4)
LYMPHOCYTES # BLD AUTO: 10.4 % — LOW (ref 20.5–51.1)
LYMPHOCYTES # BLD AUTO: 10.4 % — LOW (ref 20.5–51.1)
MAGNESIUM SERPL-MCNC: 1.7 MG/DL — LOW (ref 1.8–2.4)
MAGNESIUM SERPL-MCNC: 1.7 MG/DL — LOW (ref 1.8–2.4)
MCHC RBC-ENTMCNC: 30.3 G/DL — LOW (ref 32–37)
MCHC RBC-ENTMCNC: 30.3 G/DL — LOW (ref 32–37)
MCHC RBC-ENTMCNC: 31.1 PG — HIGH (ref 27–31)
MCHC RBC-ENTMCNC: 31.1 PG — HIGH (ref 27–31)
MCV RBC AUTO: 102.7 FL — HIGH (ref 81–99)
MCV RBC AUTO: 102.7 FL — HIGH (ref 81–99)
MONOCYTES # BLD AUTO: 0.39 K/UL — SIGNIFICANT CHANGE UP (ref 0.1–0.6)
MONOCYTES # BLD AUTO: 0.39 K/UL — SIGNIFICANT CHANGE UP (ref 0.1–0.6)
MONOCYTES NFR BLD AUTO: 5.8 % — SIGNIFICANT CHANGE UP (ref 1.7–9.3)
MONOCYTES NFR BLD AUTO: 5.8 % — SIGNIFICANT CHANGE UP (ref 1.7–9.3)
NEUTROPHILS # BLD AUTO: 5.31 K/UL — SIGNIFICANT CHANGE UP (ref 1.4–6.5)
NEUTROPHILS # BLD AUTO: 5.31 K/UL — SIGNIFICANT CHANGE UP (ref 1.4–6.5)
NEUTROPHILS NFR BLD AUTO: 78.5 % — HIGH (ref 42.2–75.2)
NEUTROPHILS NFR BLD AUTO: 78.5 % — HIGH (ref 42.2–75.2)
NRBC # BLD: 0 /100 WBCS — SIGNIFICANT CHANGE UP (ref 0–0)
NRBC # BLD: 0 /100 WBCS — SIGNIFICANT CHANGE UP (ref 0–0)
PLATELET # BLD AUTO: 187 K/UL — SIGNIFICANT CHANGE UP (ref 130–400)
PLATELET # BLD AUTO: 187 K/UL — SIGNIFICANT CHANGE UP (ref 130–400)
PMV BLD: 11.1 FL — HIGH (ref 7.4–10.4)
PMV BLD: 11.1 FL — HIGH (ref 7.4–10.4)
POTASSIUM SERPL-MCNC: 4.8 MMOL/L — SIGNIFICANT CHANGE UP (ref 3.5–5)
POTASSIUM SERPL-MCNC: 4.8 MMOL/L — SIGNIFICANT CHANGE UP (ref 3.5–5)
POTASSIUM SERPL-SCNC: 4.8 MMOL/L — SIGNIFICANT CHANGE UP (ref 3.5–5)
POTASSIUM SERPL-SCNC: 4.8 MMOL/L — SIGNIFICANT CHANGE UP (ref 3.5–5)
PROT SERPL-MCNC: 6.1 G/DL — SIGNIFICANT CHANGE UP (ref 6–8)
PROT SERPL-MCNC: 6.1 G/DL — SIGNIFICANT CHANGE UP (ref 6–8)
RBC # BLD: 3.31 M/UL — LOW (ref 4.2–5.4)
RBC # BLD: 3.31 M/UL — LOW (ref 4.2–5.4)
RBC # FLD: 13.3 % — SIGNIFICANT CHANGE UP (ref 11.5–14.5)
RBC # FLD: 13.3 % — SIGNIFICANT CHANGE UP (ref 11.5–14.5)
SODIUM SERPL-SCNC: 141 MMOL/L — SIGNIFICANT CHANGE UP (ref 135–146)
SODIUM SERPL-SCNC: 141 MMOL/L — SIGNIFICANT CHANGE UP (ref 135–146)
WBC # BLD: 6.76 K/UL — SIGNIFICANT CHANGE UP (ref 4.8–10.8)
WBC # BLD: 6.76 K/UL — SIGNIFICANT CHANGE UP (ref 4.8–10.8)
WBC # FLD AUTO: 6.76 K/UL — SIGNIFICANT CHANGE UP (ref 4.8–10.8)
WBC # FLD AUTO: 6.76 K/UL — SIGNIFICANT CHANGE UP (ref 4.8–10.8)

## 2023-12-11 RX ORDER — MAGNESIUM OXIDE 400 MG ORAL TABLET 241.3 MG
400 TABLET ORAL
Refills: 0 | Status: COMPLETED | OUTPATIENT
Start: 2023-12-11 | End: 2023-12-12

## 2023-12-11 RX ADMIN — TICAGRELOR 90 MILLIGRAM(S): 90 TABLET ORAL at 17:31

## 2023-12-11 RX ADMIN — ATENOLOL 50 MILLIGRAM(S): 25 TABLET ORAL at 06:24

## 2023-12-11 RX ADMIN — Medication 81 MILLIGRAM(S): at 11:52

## 2023-12-11 RX ADMIN — MAGNESIUM OXIDE 400 MG ORAL TABLET 400 MILLIGRAM(S): 241.3 TABLET ORAL at 17:31

## 2023-12-11 RX ADMIN — Medication 650 MILLIGRAM(S): at 08:08

## 2023-12-11 RX ADMIN — HEPARIN SODIUM 5000 UNIT(S): 5000 INJECTION INTRAVENOUS; SUBCUTANEOUS at 06:24

## 2023-12-11 RX ADMIN — Medication 1 MILLIGRAM(S): at 11:52

## 2023-12-11 RX ADMIN — HEPARIN SODIUM 5000 UNIT(S): 5000 INJECTION INTRAVENOUS; SUBCUTANEOUS at 17:32

## 2023-12-11 RX ADMIN — Medication 650 MILLIGRAM(S): at 08:55

## 2023-12-11 RX ADMIN — LOSARTAN POTASSIUM 100 MILLIGRAM(S): 100 TABLET, FILM COATED ORAL at 21:39

## 2023-12-11 RX ADMIN — CHLORHEXIDINE GLUCONATE 1 APPLICATION(S): 213 SOLUTION TOPICAL at 06:23

## 2023-12-11 RX ADMIN — PREGABALIN 1000 MICROGRAM(S): 225 CAPSULE ORAL at 11:51

## 2023-12-11 RX ADMIN — TICAGRELOR 90 MILLIGRAM(S): 90 TABLET ORAL at 06:24

## 2023-12-11 RX ADMIN — ATORVASTATIN CALCIUM 80 MILLIGRAM(S): 80 TABLET, FILM COATED ORAL at 21:39

## 2023-12-11 RX ADMIN — PANTOPRAZOLE SODIUM 40 MILLIGRAM(S): 20 TABLET, DELAYED RELEASE ORAL at 06:24

## 2023-12-11 RX ADMIN — FESOTERODINE FUMARATE 4 MILLIGRAM(S): 8 TABLET, FILM COATED, EXTENDED RELEASE ORAL at 11:51

## 2023-12-11 NOTE — DISCHARGE NOTE NURSING/CASE MANAGEMENT/SOCIAL WORK - NSDCPEFALRISK_GEN_ALL_CORE
For information on Fall & Injury Prevention, visit: [email or web address] OR  [email or web address] OR  https://www.cdc.gov/steadi/patient.html For information on Fall & Injury Prevention, visit: [email or web address] OR  [email or web address] OR  https://www.cdc.gov/steadi/patient.html

## 2023-12-11 NOTE — PROGRESS NOTE ADULT - SUBJECTIVE AND OBJECTIVE BOX
Patient is a 83y old  Female who presents with a chief complaint of Rehab of left thalamic lacunar infarct resulting in ataxic gait, incoordination, and ADL dysfunction. (10 Dec 2023 09:14)      HPI:  83 year old female with PMHx of arthritis, hypertension, hyperlipidemia who presented to the ED for gait ataxia and peripheral extremity numbness. Patient reported that 3 days PTA she received a flu shot, and over the weekend while cleaning experienced onset of RUE pain/weakness, in which she rested on the cough and then after trying to get up endorsed problems with balance and experiencing distal numbness in bother her hands and feet, prompting presentation to the ED. On presentation, imaging revealed L thalamic acute lacunar infarct. CTA revealed marked calcification at the right and left common carotid artery bifurcations with severe stenosis at the level of the right carotid bulb, left CC bifurcation and ast the left carotid bulb. Patient underwent diagnostic cerebral angiogram on 11/28/23 due to stenotic findings on CTA, which revealed non flow limiting stenosis bilateral carotid arteries, and no carotid angioplasty/stenting was warranted. Symptoms of distal numbness improved overtime and patient hemodynamically stable. She has been medically stable. She has severe limitation of both shoulders at baseline.  Of note, the patient states that she was recently evaluated by a rheumatologist and was told that despite being on Remicade for years, that she has osteoarthritis and never had rheumatoid arthritis.     Living situation: Lives with family (daughter, son-in-law, grandkids) in private house, 0 Steps to enter, 12 Steps inside with stair lift.  Prior Level of Function: Ambulating independent with RW and rollator, Independent with ADL/iADLs    Evaluated by bedside physical therapy and occupational therapy. Patient is minimum to moderate assistance for bed mobility and transfers, ambulating 25 feet x3 with rolling walker minimum assistance.  Upper body dressing  and lower body dressing moderate/ max assistance, Prior to admission patient was independent with mobility and ambulation with a rolling walker, and independent in all ADLs, IADLs. Evaluated by physical medicine and rehabilitation consult and deemed an appropriate candidate for inpatient rehabilitation facility. Admitted to [facility] on  11/30/23   (30 Nov 2023 12:31)      TODAY'S SUBJECTIVE & REVIEW OF SYMPTOMS  Patient was seen and assessed at bedside. No overnight events.   Patient denies any complaints at this time.   Tolerating PT/OT.   Tolerating oral diet. Voiding and passing stool spontaneously.   Vital signs reviewed.  Labs this morning showed BUN 31/1.1, Mg 1.7. Oral hydration encouraged, Mg repletion.      Current Level of Function:  Bed Mobility: touch assist  Transfers: partial assist  Gait:  touch assist 100ft with RW  Stairs: 10 steps partial assist  Upper body dressing: partial assist  Lower body dressing: partial assist    Review of Systems:   Constitutional:    [  X ] WNL           [   ] poor appetite   [   ] insomnia   [   ] tired   Cardio:                [ X  ] WNL           [   ] CP   [   ] DEE   [   ] palpitations               Resp:                   [  X ] WNL           [   ] SOB   [   ] cough   [   ] wheezing   GI:                        [X   ] WNL           [   ] constipation   [   ] diarrhea   [   ] abdominal pain   [   ] nausea   [   ] emesis                                :                      [   ] WNL           [   ] LOPEZ  [   ] dysuria   [ X  ] difficulty voiding             Endo:                   [  X ] WNL          [   ] polyuria   [   ] temperature intolerance                 Skin:                     [  X ] WNL          [   ] pain   [   ] wound   [   ] rash   MSK:                    [   ] WNL          [   ] muscle pain   [  X ] joint pain/ stiffness shoulder, hips, knees, ankles/ feet     [   ] muscle tenderness   [   ] swelling   Neuro:                 [   ] WNL          [   ] HA   [   ] change in vision   [   ] tremor   [ X  ] weakness/ unsteady gait    [   ]dysphagia              Cognitive:           [ X  ] WNL           [   ]confusion      Psych:                  [ X  ] WNL           [   ] hallucinations   [   ]agitation   [   ] delusion   [   ]depression        PHYSICAL EXAM    Vital Signs Last 24 Hrs  T(C): 35.6 (11 Dec 2023 04:58), Max: 37.2 (10 Dec 2023 12:59)  T(F): 96.1 (11 Dec 2023 04:58), Max: 99 (10 Dec 2023 12:59)  HR: 67 (11 Dec 2023 04:58) (61 - 69)  BP: 138/65 (11 Dec 2023 04:58) (109/55 - 138/65)  BP(mean): --  RR: 20 (11 Dec 2023 04:58) (18 - 20)  SpO2: --      General:[ X  ] NAD, Resting Comfortable,   [   ] other:                                HEENT: [  X ] NC/AT, EOMI, PERRL , Normal Conjunctivae,   [   ] other:  Cardio: [ X  ] RRR   [ X  ] other:  Grade 3 crescendo-decrescendo  systolic murmur auscultated in R 2nd intercostal space. RRR                             Pulm: [ X  ] No Respiratory Distress,  Lungs CTAB,   [   ] other:                       Abdomen: [X   ]ND/NT, Soft,   [   ] other:    : [  X ] NO LOPEZ CATHETER,        [   ] LOPEZ CATHETER- no meatal tear, no discharge,                                         MSK: [   ] No joint swelling, Full ROM,   [ X  ] other: Functional PROM both shoulders. (-) 30 deg. passive DF both ankles     No AROM of R shoulder, 0-30 degrees AROM of L shoulder (2/2 OA)                                   Ext: [ X ]   No C/C/E, No calf tenderness,   [   ]other:    Skin: [  X ] intact,   [   ] other:                                                                   Neurological Examination:  Cognitive: [   X ] AAO x 3,   [    ]  other:                                                                      Attention:  [    ] intact,   [ X   ]  other:   Impairment in tasks that require concentration and attention                Memory: [    ] intact,    [ X   ]  other:   2/3 with short term recall  Mood/Affect: [   X ] wnl,    [    ]  other:                                                                             Communication: [   X ]Fluent, no dysarthria, following commands:  [    ] other:   CN II - XII:  [  X  ] intact,  [    ] other:                                                                                        Motor:   RIGHT UE: [   ] WNL,  [X   ] other: 4-/5 shoulder abduction (limited ROM due to OA), 4+/5 elbow flexion/extension, 4+/5 finger  strength   LEFT    UE: [   ] WNL,  [ X  ] other: 4-/5 shoulder abduction (limited ROM due to OA), 4+/5 elbow flexion/extension, 4+/5 finger  strength   RIGHT LE: [   ] WNL,  [ X  ] other:  5-/5 hip flexion, 5/5 knee flexion/extension, 2-/5 dorsiflexion due to limited passive range  LEFT    LE: [   ] WNL,  [  X ] other: 5-/5 hip flexion, 5/5 knee flexion/extension, 2-/5  dorsiflexion due to limited passive range    Tone: [  X  ] wnl,   [    ]  other:  DTRs: [  X ]symmetric, [   ] other:  Coordination:   [    ] intact,   [  X  ] other: Incoordinate with FNF bilaterally (worse R>L UE)                                                                          Sensory: [  X  ] Intact to light touch, temperature   [    ] other:    Decreased proprioception bilateral lower extremities  (-) garcia, babinski, clonus b/l extremities      acetaminophen     Tablet .. 650 milliGRAM(s) Oral every 6 hours PRN  albuterol    90 MICROgram(s) HFA Inhaler 2 Puff(s) Inhalation every 6 hours PRN  aspirin enteric coated 81 milliGRAM(s) Oral daily  atenolol  Tablet 50 milliGRAM(s) Oral daily  atorvastatin 80 milliGRAM(s) Oral at bedtime  chlorhexidine 2% Cloths 1 Application(s) Topical <User Schedule>  cyanocobalamin 1000 MICROGram(s) Oral every 24 hours  fesoterodine ER Tablet 4 milliGRAM(s) Oral <User Schedule>  folic acid 1 milliGRAM(s) Oral daily  heparin   Injectable 5000 Unit(s) SubCutaneous every 12 hours  losartan 100 milliGRAM(s) Oral at bedtime  pantoprazole    Tablet 40 milliGRAM(s) Oral before breakfast  polyethylene glycol 3350 17 Gram(s) Oral at bedtime  senna 2 Tablet(s) Oral at bedtime  ticagrelor 90 milliGRAM(s) Oral every 12 hours      RECENT LABS/IMAGING                        10.3   6.76  )-----------( 187      ( 11 Dec 2023 06:46 )             34.0     12-11    141  |  110  |  31<H>  ----------------------------<  89  4.8   |  22  |  1.1    Ca    8.9      11 Dec 2023 06:46  Mg     1.7     12-11    TPro  6.1  /  Alb  3.7  /  TBili  0.3  /  DBili  x   /  AST  22  /  ALT  26  /  AlkPhos  74  12-11     Patient is a 83y old  Female who presents with a chief complaint of Rehab of left thalamic lacunar infarct resulting in ataxic gait, incoordination, and ADL dysfunction. (10 Dec 2023 09:14)      HPI:  83 year old female with PMHx of arthritis, hypertension, hyperlipidemia who presented to the ED for gait ataxia and peripheral extremity numbness. Patient reported that 3 days PTA she received a flu shot, and over the weekend while cleaning experienced onset of RUE pain/weakness, in which she rested on the cough and then after trying to get up endorsed problems with balance and experiencing distal numbness in bother her hands and feet, prompting presentation to the ED. On presentation, imaging revealed L thalamic acute lacunar infarct. CTA revealed marked calcification at the right and left common carotid artery bifurcations with severe stenosis at the level of the right carotid bulb, left CC bifurcation and ast the left carotid bulb. Patient underwent diagnostic cerebral angiogram on 11/28/23 due to stenotic findings on CTA, which revealed non flow limiting stenosis bilateral carotid arteries, and no carotid angioplasty/stenting was warranted. Symptoms of distal numbness improved overtime and patient hemodynamically stable. She has been medically stable. She has severe limitation of both shoulders at baseline.  Of note, the patient states that she was recently evaluated by a rheumatologist and was told that despite being on Remicade for years, that she has osteoarthritis and never had rheumatoid arthritis.     Living situation: Lives with family (daughter, son-in-law, grandkids) in private house, 0 Steps to enter, 12 Steps inside with stair lift.  Prior Level of Function: Ambulating independent with RW and rollator, Independent with ADL/iADLs    Evaluated by bedside physical therapy and occupational therapy. Patient is minimum to moderate assistance for bed mobility and transfers, ambulating 25 feet x3 with rolling walker minimum assistance.  Upper body dressing  and lower body dressing moderate/ max assistance, Prior to admission patient was independent with mobility and ambulation with a rolling walker, and independent in all ADLs, IADLs. Evaluated by physical medicine and rehabilitation consult and deemed an appropriate candidate for inpatient rehabilitation facility. Admitted to [facility] on  11/30/23   (30 Nov 2023 12:31)      TODAY'S SUBJECTIVE & REVIEW OF SYMPTOMS  Patient was seen and assessed at bedside. No overnight events.   Patient denies any complaints at this time.   Tolerating PT/OT.   Tolerating oral diet. Voiding and passing stool spontaneously.   Vital signs reviewed.  Labs this morning showed BUN 31/1.1, Mg 1.7. Oral hydration encouraged, Mg repletion.      Current Level of Function:  Bed Mobility: touch assist  Transfers: partial assist  Gait:  touch assist 100ft with RW  Stairs: 10 steps partial assist  Upper body dressing: partial assist  Lower body dressing: partial assist    Review of Systems:   Constitutional:    [  X ] WNL           [   ] poor appetite   [   ] insomnia   [   ] tired   Cardio:                [ X  ] WNL           [   ] CP   [   ] DEE   [   ] palpitations               Resp:                   [  X ] WNL           [   ] SOB   [   ] cough   [   ] wheezing   GI:                        [X   ] WNL           [   ] constipation   [   ] diarrhea   [   ] abdominal pain   [   ] nausea   [   ] emesis                                :                      [   ] WNL           [   ] LOPEZ  [   ] dysuria   [ X  ] difficulty voiding             Endo:                   [  X ] WNL          [   ] polyuria   [   ] temperature intolerance                 Skin:                     [  X ] WNL          [   ] pain   [   ] wound   [   ] rash   MSK:                    [   ] WNL          [   ] muscle pain   [  X ] joint pain/ stiffness shoulder, hips, knees, ankles/ feet     [   ] muscle tenderness   [   ] swelling   Neuro:                 [   ] WNL          [   ] HA   [   ] change in vision   [   ] tremor   [ X  ] weakness/ unsteady gait    [   ]dysphagia              Cognitive:           [ X  ] WNL           [   ]confusion      Psych:                  [ X  ] WNL           [   ] hallucinations   [   ]agitation   [   ] delusion   [   ]depression        PHYSICAL EXAM    Vital Signs Last 24 Hrs  T(C): 35.6 (11 Dec 2023 04:58), Max: 37.2 (10 Dec 2023 12:59)  T(F): 96.1 (11 Dec 2023 04:58), Max: 99 (10 Dec 2023 12:59)  HR: 67 (11 Dec 2023 04:58) (61 - 69)  BP: 138/65 (11 Dec 2023 04:58) (109/55 - 138/65)  BP(mean): --  RR: 20 (11 Dec 2023 04:58) (18 - 20)  SpO2: --      General:[ X  ] NAD, Resting Comfortable,   [   ] other:                                HEENT: [  X ] NC/AT, EOMI, PERRL , Normal Conjunctivae,   [   ] other:  Cardio: [ X  ] RRR   [ X  ] other:  Grade 3 crescendo-decrescendo  systolic murmur auscultated in R 2nd intercostal space. RRR                             Pulm: [ X  ] No Respiratory Distress,  Lungs CTAB,   [   ] other:                       Abdomen: [X   ]ND/NT, Soft,   [   ] other:    : [  X ] NO OLPEZ CATHETER,        [   ] LOPEZ CATHETER- no meatal tear, no discharge,                                         MSK: [   ] No joint swelling, Full ROM,   [ X  ] other: Functional PROM both shoulders. (-) 30 deg. passive DF both ankles     No AROM of R shoulder, 0-30 degrees AROM of L shoulder (2/2 OA)                                   Ext: [ X ]   No C/C/E, No calf tenderness,   [   ]other:    Skin: [  X ] intact,   [   ] other:                                                                   Neurological Examination:  Cognitive: [   X ] AAO x 3,   [    ]  other:                                                                      Attention:  [    ] intact,   [ X   ]  other:   Impairment in tasks that require concentration and attention                Memory: [    ] intact,    [ X   ]  other:   2/3 with short term recall  Mood/Affect: [   X ] wnl,    [    ]  other:                                                                             Communication: [   X ]Fluent, no dysarthria, following commands:  [    ] other:   CN II - XII:  [  X  ] intact,  [    ] other:                                                                                        Motor:   RIGHT UE: [   ] WNL,  [X   ] other: 4-/5 shoulder abduction (limited ROM due to OA), 4+/5 elbow flexion/extension, 4+/5 finger  strength   LEFT    UE: [   ] WNL,  [ X  ] other: 4-/5 shoulder abduction (limited ROM due to OA), 4+/5 elbow flexion/extension, 4+/5 finger  strength   RIGHT LE: [   ] WNL,  [ X  ] other:  5-/5 hip flexion, 5/5 knee flexion/extension, 2-/5 dorsiflexion due to limited passive range  LEFT    LE: [   ] WNL,  [  X ] other: 5-/5 hip flexion, 5/5 knee flexion/extension, 2-/5  dorsiflexion due to limited passive range    Tone: [  X  ] wnl,   [    ]  other:  DTRs: [  X ]symmetric, [   ] other:  Coordination:   [    ] intact,   [  X  ] other: Incoordinate with FNF bilaterally (worse R>L UE)                                                                          Sensory: [  X  ] Intact to light touch, temperature   [    ] other:    Decreased proprioception bilateral lower extremities  (-) garcia, babinski, clonus b/l extremities      acetaminophen     Tablet .. 650 milliGRAM(s) Oral every 6 hours PRN  albuterol    90 MICROgram(s) HFA Inhaler 2 Puff(s) Inhalation every 6 hours PRN  aspirin enteric coated 81 milliGRAM(s) Oral daily  atenolol  Tablet 50 milliGRAM(s) Oral daily  atorvastatin 80 milliGRAM(s) Oral at bedtime  chlorhexidine 2% Cloths 1 Application(s) Topical <User Schedule>  cyanocobalamin 1000 MICROGram(s) Oral every 24 hours  fesoterodine ER Tablet 4 milliGRAM(s) Oral <User Schedule>  folic acid 1 milliGRAM(s) Oral daily  heparin   Injectable 5000 Unit(s) SubCutaneous every 12 hours  losartan 100 milliGRAM(s) Oral at bedtime  pantoprazole    Tablet 40 milliGRAM(s) Oral before breakfast  polyethylene glycol 3350 17 Gram(s) Oral at bedtime  senna 2 Tablet(s) Oral at bedtime  ticagrelor 90 milliGRAM(s) Oral every 12 hours      RECENT LABS/IMAGING                        10.3   6.76  )-----------( 187      ( 11 Dec 2023 06:46 )             34.0     12-11    141  |  110  |  31<H>  ----------------------------<  89  4.8   |  22  |  1.1    Ca    8.9      11 Dec 2023 06:46  Mg     1.7     12-11    TPro  6.1  /  Alb  3.7  /  TBili  0.3  /  DBili  x   /  AST  22  /  ALT  26  /  AlkPhos  74  12-11     Patient is a 83y old  Female who presents with a chief complaint of Rehab of left thalamic lacunar infarct resulting in ataxic gait, incoordination, and ADL dysfunction. (10 Dec 2023 09:14)      HPI:  83 year old female with PMHx of arthritis, hypertension, hyperlipidemia who presented to the ED for gait ataxia and peripheral extremity numbness. Patient reported that 3 days PTA she received a flu shot, and over the weekend while cleaning experienced onset of RUE pain/weakness, in which she rested on the cough and then after trying to get up endorsed problems with balance and experiencing distal numbness in bother her hands and feet, prompting presentation to the ED. On presentation, imaging revealed L thalamic acute lacunar infarct. CTA revealed marked calcification at the right and left common carotid artery bifurcations with severe stenosis at the level of the right carotid bulb, left CC bifurcation and ast the left carotid bulb. Patient underwent diagnostic cerebral angiogram on 11/28/23 due to stenotic findings on CTA, which revealed non flow limiting stenosis bilateral carotid arteries, and no carotid angioplasty/stenting was warranted. Symptoms of distal numbness improved overtime and patient hemodynamically stable. She has been medically stable. She has severe limitation of both shoulders at baseline.  Of note, the patient states that she was recently evaluated by a rheumatologist and was told that despite being on Remicade for years, that she has osteoarthritis and never had rheumatoid arthritis.     Living situation: Lives with family (daughter, son-in-law, grandkids) in private house, 0 Steps to enter, 12 Steps inside with stair lift.  Prior Level of Function: Ambulating independent with RW and rollator, Independent with ADL/iADLs    Evaluated by bedside physical therapy and occupational therapy. Patient is minimum to moderate assistance for bed mobility and transfers, ambulating 25 feet x3 with rolling walker minimum assistance.  Upper body dressing  and lower body dressing moderate/ max assistance, Prior to admission patient was independent with mobility and ambulation with a rolling walker, and independent in all ADLs, IADLs. Evaluated by physical medicine and rehabilitation consult and deemed an appropriate candidate for inpatient rehabilitation facility. Admitted to [facility] on  11/30/23   (30 Nov 2023 12:31)      TODAY'S SUBJECTIVE & REVIEW OF SYMPTOMS  Patient was seen and assessed at bedside. No overnight events.   Patient denies any complaints at this time.   Tolerating PT/OT.   Tolerating oral diet. Voiding and passing stool spontaneously.   Vital signs reviewed.  Labs this morning showed BUN 31/1.1, Mg 1.7. Oral hydration encouraged, Mg repletion.      Current Level of Function:  Bed Mobility: supervision  Transfers: touch assist  Gait:  supervision 100ft with RW  Stairs: 12 steps touch assist  Upper body dressing: partial assist  Lower body dressing: partial assist    Review of Systems:   Constitutional:    [  X ] WNL           [   ] poor appetite   [   ] insomnia   [   ] tired   Cardio:                [ X  ] WNL           [   ] CP   [   ] DEE   [   ] palpitations               Resp:                   [  X ] WNL           [   ] SOB   [   ] cough   [   ] wheezing   GI:                        [X   ] WNL           [   ] constipation   [   ] diarrhea   [   ] abdominal pain   [   ] nausea   [   ] emesis                                :                      [   ] WNL           [   ] LOPEZ  [   ] dysuria   [ X  ] difficulty voiding             Endo:                   [  X ] WNL          [   ] polyuria   [   ] temperature intolerance                 Skin:                     [  X ] WNL          [   ] pain   [   ] wound   [   ] rash   MSK:                    [   ] WNL          [   ] muscle pain   [  X ] joint pain/ stiffness shoulder, hips, knees, ankles/ feet     [   ] muscle tenderness   [   ] swelling   Neuro:                 [   ] WNL          [   ] HA   [   ] change in vision   [   ] tremor   [ X  ] weakness/ unsteady gait    [   ]dysphagia              Cognitive:           [ X  ] WNL           [   ]confusion      Psych:                  [ X  ] WNL           [   ] hallucinations   [   ]agitation   [   ] delusion   [   ]depression        PHYSICAL EXAM    Vital Signs Last 24 Hrs  T(C): 35.6 (11 Dec 2023 04:58), Max: 37.2 (10 Dec 2023 12:59)  T(F): 96.1 (11 Dec 2023 04:58), Max: 99 (10 Dec 2023 12:59)  HR: 67 (11 Dec 2023 04:58) (61 - 69)  BP: 138/65 (11 Dec 2023 04:58) (109/55 - 138/65)  BP(mean): --  RR: 20 (11 Dec 2023 04:58) (18 - 20)  SpO2: --      General:[ X  ] NAD, Resting Comfortable,   [   ] other:                                HEENT: [  X ] NC/AT, EOMI, PERRL , Normal Conjunctivae,   [   ] other:  Cardio: [ X  ] RRR   [ X  ] other:  Grade 3 crescendo-decrescendo  systolic murmur auscultated in R 2nd intercostal space. RRR                             Pulm: [ X  ] No Respiratory Distress,  Lungs CTAB,   [   ] other:                       Abdomen: [X   ]ND/NT, Soft,   [   ] other:    : [  X ] NO LOPEZ CATHETER,        [   ] LOPEZ CATHETER- no meatal tear, no discharge,                                         MSK: [   ] No joint swelling, Full ROM,   [ X  ] other: Functional PROM both shoulders. (-) 30 deg. passive DF both ankles     No AROM of R shoulder, 0-30 degrees AROM of L shoulder (2/2 OA)                                   Ext: [ X ]   No C/C/E, No calf tenderness,   [   ]other:    Skin: [  X ] intact,   [   ] other:                                                                   Neurological Examination:  Cognitive: [   X ] AAO x 3,   [    ]  other:                                                                      Attention:  [    ] intact,   [ X   ]  other:   Impairment in tasks that require concentration and attention                Memory: [    ] intact,    [ X   ]  other:   2/3 with short term recall  Mood/Affect: [   X ] wnl,    [    ]  other:                                                                             Communication: [   X ]Fluent, no dysarthria, following commands:  [    ] other:   CN II - XII:  [  X  ] intact,  [    ] other:                                                                                        Motor:   RIGHT UE: [   ] WNL,  [X   ] other: 4-/5 shoulder abduction (limited ROM due to OA), 4+/5 elbow flexion/extension, 4+/5 finger  strength   LEFT    UE: [   ] WNL,  [ X  ] other: 4-/5 shoulder abduction (limited ROM due to OA), 4+/5 elbow flexion/extension, 4+/5 finger  strength   RIGHT LE: [   ] WNL,  [ X  ] other:  5-/5 hip flexion, 5/5 knee flexion/extension, 2-/5 dorsiflexion due to limited passive range  LEFT    LE: [   ] WNL,  [  X ] other: 5-/5 hip flexion, 5/5 knee flexion/extension, 2-/5  dorsiflexion due to limited passive range    Tone: [  X  ] wnl,   [    ]  other:  DTRs: [  X ]symmetric, [   ] other:  Coordination:   [    ] intact,   [  X  ] other: Incoordinate with FNF bilaterally (worse R>L UE)                                                                          Sensory: [  X  ] Intact to light touch, temperature   [    ] other:    Decreased proprioception bilateral lower extremities  (-) garcia, babinski, clonus b/l extremities      acetaminophen     Tablet .. 650 milliGRAM(s) Oral every 6 hours PRN  albuterol    90 MICROgram(s) HFA Inhaler 2 Puff(s) Inhalation every 6 hours PRN  aspirin enteric coated 81 milliGRAM(s) Oral daily  atenolol  Tablet 50 milliGRAM(s) Oral daily  atorvastatin 80 milliGRAM(s) Oral at bedtime  chlorhexidine 2% Cloths 1 Application(s) Topical <User Schedule>  cyanocobalamin 1000 MICROGram(s) Oral every 24 hours  fesoterodine ER Tablet 4 milliGRAM(s) Oral <User Schedule>  folic acid 1 milliGRAM(s) Oral daily  heparin   Injectable 5000 Unit(s) SubCutaneous every 12 hours  losartan 100 milliGRAM(s) Oral at bedtime  pantoprazole    Tablet 40 milliGRAM(s) Oral before breakfast  polyethylene glycol 3350 17 Gram(s) Oral at bedtime  senna 2 Tablet(s) Oral at bedtime  ticagrelor 90 milliGRAM(s) Oral every 12 hours      RECENT LABS/IMAGING                        10.3   6.76  )-----------( 187      ( 11 Dec 2023 06:46 )             34.0     12-11    141  |  110  |  31<H>  ----------------------------<  89  4.8   |  22  |  1.1    Ca    8.9      11 Dec 2023 06:46  Mg     1.7     12-11    TPro  6.1  /  Alb  3.7  /  TBili  0.3  /  DBili  x   /  AST  22  /  ALT  26  /  AlkPhos  74  12-11     Patient is a 83y old  Female who presents with a chief complaint of Rehab of left thalamic lacunar infarct resulting in ataxic gait, incoordination, and ADL dysfunction. (10 Dec 2023 09:14)      HPI:  83 year old female with PMHx of arthritis, hypertension, hyperlipidemia who presented to the ED for gait ataxia and peripheral extremity numbness. Patient reported that 3 days PTA she received a flu shot, and over the weekend while cleaning experienced onset of RUE pain/weakness, in which she rested on the cough and then after trying to get up endorsed problems with balance and experiencing distal numbness in bother her hands and feet, prompting presentation to the ED. On presentation, imaging revealed L thalamic acute lacunar infarct. CTA revealed marked calcification at the right and left common carotid artery bifurcations with severe stenosis at the level of the right carotid bulb, left CC bifurcation and ast the left carotid bulb. Patient underwent diagnostic cerebral angiogram on 11/28/23 due to stenotic findings on CTA, which revealed non flow limiting stenosis bilateral carotid arteries, and no carotid angioplasty/stenting was warranted. Symptoms of distal numbness improved overtime and patient hemodynamically stable. She has been medically stable. She has severe limitation of both shoulders at baseline.  Of note, the patient states that she was recently evaluated by a rheumatologist and was told that despite being on Remicade for years, that she has osteoarthritis and never had rheumatoid arthritis.     Living situation: Lives with family (daughter, son-in-law, grandkids) in private house, 0 Steps to enter, 12 Steps inside with stair lift.  Prior Level of Function: Ambulating independent with RW and rollator, Independent with ADL/iADLs    Evaluated by bedside physical therapy and occupational therapy. Patient is minimum to moderate assistance for bed mobility and transfers, ambulating 25 feet x3 with rolling walker minimum assistance.  Upper body dressing  and lower body dressing moderate/ max assistance, Prior to admission patient was independent with mobility and ambulation with a rolling walker, and independent in all ADLs, IADLs. Evaluated by physical medicine and rehabilitation consult and deemed an appropriate candidate for inpatient rehabilitation facility. Admitted to [facility] on  11/30/23   (30 Nov 2023 12:31)      TODAY'S SUBJECTIVE & REVIEW OF SYMPTOMS  Patient was seen and assessed at bedside. No overnight events.   Patient denies any complaints at this time.   Tolerating PT/OT.   Tolerating oral diet. Voiding and passing stool spontaneously.   Vital signs reviewed.  Labs this morning showed BUN 31/1.1, Mg 1.7. Oral hydration encouraged, Mg repletion.      Current Level of Function:  Bed Mobility: supervision  Transfers: touch assist  Gait:  supervision 100ft with RW  Stairs: 12 steps touch assist  Upper body dressing: partial assist  Lower body dressing: partial assist    Review of Systems:   Constitutional:    [  X ] WNL           [   ] poor appetite   [   ] insomnia   [   ] tired   Cardio:                [ X  ] WNL           [   ] CP   [   ] DEE   [   ] palpitations               Resp:                   [  X ] WNL           [   ] SOB   [   ] cough   [   ] wheezing   GI:                        [X   ] WNL           [   ] constipation   [   ] diarrhea   [   ] abdominal pain   [   ] nausea   [   ] emesis                                :                      [   ] WNL           [   ] LOPEZ  [   ] dysuria   [ X  ] difficulty voiding             Endo:                   [  X ] WNL          [   ] polyuria   [   ] temperature intolerance                 Skin:                     [  X ] WNL          [   ] pain   [   ] wound   [   ] rash   MSK:                    [   ] WNL          [   ] muscle pain   [  X ] joint pain/ stiffness shoulder, hips, knees, ankles/ feet     [   ] muscle tenderness   [   ] swelling   Neuro:                 [   ] WNL          [   ] HA   [   ] change in vision   [   ] tremor   [ X  ] weakness/ unsteady gait    [   ]dysphagia              Cognitive:           [ X  ] WNL           [   ]confusion      Psych:                  [ X  ] WNL           [   ] hallucinations   [   ]agitation   [   ] delusion   [   ]depression        PHYSICAL EXAM    Vital Signs Last 24 Hrs  T(C): 35.6 (11 Dec 2023 04:58), Max: 37.2 (10 Dec 2023 12:59)  T(F): 96.1 (11 Dec 2023 04:58), Max: 99 (10 Dec 2023 12:59)  HR: 67 (11 Dec 2023 04:58) (61 - 69)  BP: 138/65 (11 Dec 2023 04:58) (109/55 - 138/65)  BP(mean): --  RR: 20 (11 Dec 2023 04:58) (18 - 20)  SpO2: --      General:[ X  ] NAD, Resting Comfortable,   [   ] other:                                HEENT: [  X ] NC/AT, EOMI, PERRL , Normal Conjunctivae,   [   ] other:  Cardio: [ X  ] RRR   [ X  ] other:  Grade 3 crescendo-decrescendo  systolic murmur auscultated in R 2nd intercostal space. RRR                             Pulm: [ X  ] No Respiratory Distress,  Lungs CTAB,   [   ] other:                       Abdomen: [X   ]ND/NT, Soft,   [   ] other:    : [  X ] NO LOPEZ CATHETER,        [   ] LOPEZ CATHETER- no meatal tear, no discharge,                                         MSK: [   ] No joint swelling, Full ROM,   [ X  ] other: Functional PROM both shoulders. (-) 30 deg. passive DF both ankles     No AROM of R shoulder, 0-30 degrees AROM of L shoulder (2/2 OA)                                   Ext: [ X ]   No C/C/E, No calf tenderness,   [   ]other:    Skin: [  X ] intact,   [   ] other:                                                                   Neurological Examination:  Cognitive: [   X ] AAO x 3,   [    ]  other:                                                                      Attention:  [    ] intact,   [ X   ]  other:   Impairment in tasks that require concentration and attention                Memory: [    ] intact,    [ X   ]  other:   2/3 with short term recall  Mood/Affect: [   X ] wnl,    [    ]  other:                                                                             Communication: [   X ]Fluent, no dysarthria, following commands:  [    ] other:   CN II - XII:  [  X  ] intact,  [    ] other:                                                                                        Motor:   RIGHT UE: [   ] WNL,  [X   ] other: 4-/5 shoulder abduction (limited ROM due to OA), 4+/5 elbow flexion/extension, 4+/5 finger  strength   LEFT    UE: [   ] WNL,  [ X  ] other: 4-/5 shoulder abduction (limited ROM due to OA), 4+/5 elbow flexion/extension, 4+/5 finger  strength   RIGHT LE: [   ] WNL,  [ X  ] other:  5-/5 hip flexion, 5/5 knee flexion/extension, 2-/5 dorsiflexion due to limited passive range  LEFT    LE: [   ] WNL,  [  X ] other: 5-/5 hip flexion, 5/5 knee flexion/extension, 2-/5  dorsiflexion due to limited passive range    Tone: [  X  ] wnl,   [    ]  other:  DTRs: [  X ]symmetric, [   ] other:  Coordination:   [    ] intact,   [  X  ] other: Incoordinate with FNF bilaterally (worse R>L UE)                                                                          Sensory: [  X  ] Intact to light touch, temperature   [    ] other:    Decreased proprioception bilateral lower extremities  (-) garcia, babinski, clonus b/l extremities      acetaminophen     Tablet .. 650 milliGRAM(s) Oral every 6 hours PRN  albuterol    90 MICROgram(s) HFA Inhaler 2 Puff(s) Inhalation every 6 hours PRN  aspirin enteric coated 81 milliGRAM(s) Oral daily  atenolol  Tablet 50 milliGRAM(s) Oral daily  atorvastatin 80 milliGRAM(s) Oral at bedtime  chlorhexidine 2% Cloths 1 Application(s) Topical <User Schedule>  cyanocobalamin 1000 MICROGram(s) Oral every 24 hours  fesoterodine ER Tablet 4 milliGRAM(s) Oral <User Schedule>  folic acid 1 milliGRAM(s) Oral daily  heparin   Injectable 5000 Unit(s) SubCutaneous every 12 hours  losartan 100 milliGRAM(s) Oral at bedtime  pantoprazole    Tablet 40 milliGRAM(s) Oral before breakfast  polyethylene glycol 3350 17 Gram(s) Oral at bedtime  senna 2 Tablet(s) Oral at bedtime  ticagrelor 90 milliGRAM(s) Oral every 12 hours      RECENT LABS/IMAGING                        10.3   6.76  )-----------( 187      ( 11 Dec 2023 06:46 )             34.0     12-11    141  |  110  |  31<H>  ----------------------------<  89  4.8   |  22  |  1.1    Ca    8.9      11 Dec 2023 06:46  Mg     1.7     12-11    TPro  6.1  /  Alb  3.7  /  TBili  0.3  /  DBili  x   /  AST  22  /  ALT  26  /  AlkPhos  74  12-11

## 2023-12-11 NOTE — PROGRESS NOTE ADULT - ASSESSMENT
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 s/p DSA with no stenting.     #Rehab of left thalamic lacunar infarct in the setting of bilateral carotid artery stenosis resulting in ataxic gait, incoordination and ADL dysfunction.   *MRI Brain: Left thalamic acute lacunar infarct. No acute hemorrhage  s/p DSA 11/28: non flow limiting stenosis bilateral carotid arteries. No carotid angioplasty/stenting warranted.     - continue inpatient Rehab   - Patient is on full rehab program of 3 hours a day of PT, OT, and/or SLP, for 5-6 days a week, for a total of 15 hours a week.  - Precautions/restrictions: Safety precautions, fall precautions  - Pain control: Tylenol 650mg q6hr PRN  - C/w  ASA 81 mg once daily & Brillinta 90mg qD.  (Plavix switched to Brilinta 90mg twice daily (per neuroendovascular request) given PRU result of 278. Patient will stay on that regimen for 90 days.  - c/w atorvastatin 80mg once daily    #Peripheral Polyneuropathy of unknown etiology  - significant loss of proprioception and cold sensitivity in both distal LEs.   - possibly associated with Remicade use  - DDx include acute inflammatory demyelinating polyneuropathy, metabolic demyelinization (2/2 vitamin deficiency), autoimmune disorder  - Additional testing: ESR 57 , CRP <3.0, Vitamin b12, folate >20, HbA1C 5.3, , Lyme antibody, TSH 2.19, IgA LEVEL, Ganglioside antibodies including anti GM1 (-), GM2 (+), GD1a (-), GD1b (-), and GQ1b (-), SPEP, UPEP, Vitb12 413, VitB1, Vit-E, Homocysteine, Methylmalonic acid. Copper, Zinc, HIV (-), RPR (-)     #HTN  - Losartan 100mg QD  - Atenolol 50mg QD  -Continue to monitor hemodynamics    #Hx of Asthma  -States no flares in the past several years. Albuterol at home  -c/w Proventil q6hr PRN    #Arthritis  -Patient has history of RA diagnosis in which she was taking Remicade for many years, however from recent rheumatology evaluation outpatient by Dr. Cruz, diagnosed as OA, not RA  - severe pain and limited mobility of tj ankles and shoulders and deformities of hands and knees.  -PT/OT      #Aortic valve stenosis, mod to severe  -Outpatient followup with Dr. Patel.   TTE: EF 50-55% with moderate to severe aortic valve stenosis.    #HLD  -Atorvastatin 80mg QD    #Urinary incontinence (chronic)  Patient has history of urinary frequency and incontinent episodes at home  -Discontinued Oxybutynin 5mg BID (patient reports dry mouth and poor tolerance to med)  -Started Fesoterdoine Fumerate 4mg (home, non formulary)    #macrocytic anemia  - stable, no overt bleeding  - iron studies 12/4/23 - , Retic 2.5%, absolute retic WNL, total iron 46, , TIBC 190  - transfuse if hemoglobin < 7     #Hypomagnesemia  - repleted as appropriate, continue monitoring     -Pain control: Tylenol PRN    -GI/Bowel Mgmt: Senna, Miralax    -Bladder management: Urinary incontinent, will monitor and adjust meds     -Skin:  No active issues at this time    -FEN will monitor and supplement    - Diet: DASH       Precautions / PROPHYLAXIS:      - Falls    - Ortho: Weight bearing status: wbat      - DVT prophylaxis: Heparin BID

## 2023-12-11 NOTE — DISCHARGE NOTE NURSING/CASE MANAGEMENT/SOCIAL WORK - PATIENT PORTAL LINK FT
You can access the FollowMyHealth Patient Portal offered by [facility] by registering at the following website: [email or web address]. By joining [employer]s FollowMyHealth portal, you will also be able to view your health information using other applications (apps) compatible with our system. You can access the FollowMyHealth Patient Portal offered by [facility] by registering at the following website: [email or web address]. By joining [employer]’s FollowMyHealth portal, you will also be able to view your health information using other applications (apps) compatible with our system.

## 2023-12-12 RX ADMIN — PREGABALIN 1000 MICROGRAM(S): 225 CAPSULE ORAL at 11:44

## 2023-12-12 RX ADMIN — FESOTERODINE FUMARATE 4 MILLIGRAM(S): 8 TABLET, FILM COATED, EXTENDED RELEASE ORAL at 11:44

## 2023-12-12 RX ADMIN — HEPARIN SODIUM 5000 UNIT(S): 5000 INJECTION INTRAVENOUS; SUBCUTANEOUS at 06:07

## 2023-12-12 RX ADMIN — PANTOPRAZOLE SODIUM 40 MILLIGRAM(S): 20 TABLET, DELAYED RELEASE ORAL at 07:59

## 2023-12-12 RX ADMIN — Medication 81 MILLIGRAM(S): at 11:44

## 2023-12-12 RX ADMIN — TICAGRELOR 90 MILLIGRAM(S): 90 TABLET ORAL at 17:31

## 2023-12-12 RX ADMIN — Medication 1 MILLIGRAM(S): at 11:43

## 2023-12-12 RX ADMIN — TICAGRELOR 90 MILLIGRAM(S): 90 TABLET ORAL at 06:01

## 2023-12-12 RX ADMIN — CHLORHEXIDINE GLUCONATE 1 APPLICATION(S): 213 SOLUTION TOPICAL at 06:06

## 2023-12-12 RX ADMIN — ATENOLOL 50 MILLIGRAM(S): 25 TABLET ORAL at 06:00

## 2023-12-12 RX ADMIN — LOSARTAN POTASSIUM 100 MILLIGRAM(S): 100 TABLET, FILM COATED ORAL at 21:37

## 2023-12-12 RX ADMIN — MAGNESIUM OXIDE 400 MG ORAL TABLET 400 MILLIGRAM(S): 241.3 TABLET ORAL at 07:59

## 2023-12-12 RX ADMIN — ATORVASTATIN CALCIUM 80 MILLIGRAM(S): 80 TABLET, FILM COATED ORAL at 21:36

## 2023-12-12 RX ADMIN — HEPARIN SODIUM 5000 UNIT(S): 5000 INJECTION INTRAVENOUS; SUBCUTANEOUS at 17:31

## 2023-12-12 NOTE — PROGRESS NOTE ADULT - ASSESSMENT
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 s/p DSA with no stenting.     #Rehab of left thalamic lacunar infarct in the setting of bilateral carotid artery stenosis resulting in ataxic gait, incoordination and ADL dysfunction.   *MRI Brain: Left thalamic acute lacunar infarct. No acute hemorrhage  s/p DSA 11/28: non flow limiting stenosis bilateral carotid arteries. No carotid angioplasty/stenting warranted.     - continue inpatient Rehab   - Patient is on full rehab program of 3 hours a day of PT, OT, and/or SLP, for 5-6 days a week, for a total of 15 hours a week.  - Precautions/restrictions: Safety precautions, fall precautions  - Pain control: Tylenol 650mg q6hr PRN  - C/w  ASA 81 mg once daily & Brillinta 90mg qD.  (Plavix switched to Brilinta 90mg twice daily (per neuroendovascular request) given PRU result of 278. Patient will stay on that regimen for 90 days.  - c/w atorvastatin 80mg once daily    #Peripheral Polyneuropathy of unknown etiology  - significant loss of proprioception and cold sensitivity in both distal LEs.   - possibly associated with Remicade use  - DDx include acute inflammatory demyelinating polyneuropathy, metabolic demyelinization (2/2 vitamin deficiency), autoimmune disorder  - Additional testing: ESR 57 , CRP <3.0, Vitamin b12, folate >20, HbA1C 5.3, , Lyme antibody, TSH 2.19, IgA LEVEL, Ganglioside antibodies including anti GM1 (-), GM2 (+), GD1a (-), GD1b (-), and GQ1b (-), SPEP, UPEP, Vitb12 413, VitB1, Vit-E, Homocysteine, Methylmalonic acid. Copper, Zinc, HIV (-), RPR (-)     #HTN  - Losartan 100mg QD  - Atenolol 50mg QD  -Continue to monitor hemodynamics    #Hx of Asthma  -States no flares in the past several years. Albuterol at home  -c/w Proventil q6hr PRN    #Arthritis  -Patient has history of RA diagnosis in which she was taking Remicade for many years, however from recent rheumatology evaluation outpatient by Dr. Cruz, diagnosed as OA, not RA  - severe pain and limited mobility of tj ankles and shoulders and deformities of hands and knees.  -PT/OT      #Aortic valve stenosis, mod to severe  -Outpatient followup with Dr. Patel.   TTE: EF 50-55% with moderate to severe aortic valve stenosis.    #HLD  -Atorvastatin 80mg QD    #Urinary incontinence (chronic)  Patient has history of urinary frequency and incontinent episodes at home  -Discontinued Oxybutynin 5mg BID (patient reports dry mouth and poor tolerance to med)  -Started Fesoterdoine Fumerate 4mg (home, non formulary)    #macrocytic anemia  - stable, no overt bleeding  - iron studies 12/4/23 - , Retic 2.5%, absolute retic WNL, total iron 46, , TIBC 190  - transfuse if hemoglobin < 7     #Hypomagnesemia  - replete as appropriate, continue monitoring     -Pain control: Tylenol PRN    -GI/Bowel Mgmt: Senna, Miralax    -Bladder management: Urinary incontinent, will monitor and adjust meds     -Skin:  No active issues at this time    -FEN will monitor and supplement    - Diet: DASH       Precautions / PROPHYLAXIS:      - Falls    - Ortho: Weight bearing status: wbat      - DVT prophylaxis: Heparin BID

## 2023-12-12 NOTE — PROGRESS NOTE ADULT - SUBJECTIVE AND OBJECTIVE BOX
Patient is a 83y old  Female who presents with a chief complaint of Rehab of left thalamic lacunar infarct resulting in ataxic gait, incoordination, and ADL dysfunction. (10 Dec 2023 09:14)      HPI:  83 year old female with PMHx of arthritis, hypertension, hyperlipidemia who presented to the ED for gait ataxia and peripheral extremity numbness. Patient reported that 3 days PTA she received a flu shot, and over the weekend while cleaning experienced onset of RUE pain/weakness, in which she rested on the cough and then after trying to get up endorsed problems with balance and experiencing distal numbness in bother her hands and feet, prompting presentation to the ED. On presentation, imaging revealed L thalamic acute lacunar infarct. CTA revealed marked calcification at the right and left common carotid artery bifurcations with severe stenosis at the level of the right carotid bulb, left CC bifurcation and ast the left carotid bulb. Patient underwent diagnostic cerebral angiogram on 11/28/23 due to stenotic findings on CTA, which revealed non flow limiting stenosis bilateral carotid arteries, and no carotid angioplasty/stenting was warranted. Symptoms of distal numbness improved overtime and patient hemodynamically stable. She has been medically stable. She has severe limitation of both shoulders at baseline.  Of note, the patient states that she was recently evaluated by a rheumatologist and was told that despite being on Remicade for years, that she has osteoarthritis and never had rheumatoid arthritis.     Living situation: Lives with family (daughter, son-in-law, grandkids) in private house, 0 Steps to enter, 12 Steps inside with stair lift.  Prior Level of Function: Ambulating independent with RW and rollator, Independent with ADL/iADLs    Evaluated by bedside physical therapy and occupational therapy. Patient is minimum to moderate assistance for bed mobility and transfers, ambulating 25 feet x3 with rolling walker minimum assistance.  Upper body dressing  and lower body dressing moderate/ max assistance, Prior to admission patient was independent with mobility and ambulation with a rolling walker, and independent in all ADLs, IADLs. Evaluated by physical medicine and rehabilitation consult and deemed an appropriate candidate for inpatient rehabilitation facility. Admitted to [facility] on  11/30/23   (30 Nov 2023 12:31)      TODAY'S SUBJECTIVE & REVIEW OF SYMPTOMS  Patient was seen and assessed at bedside. No overnight events. Possible discharge on Friday.   Patient denies any complaints at this time.   Tolerating PT/OT.   Tolerating oral diet. Voiding and passing stool spontaneously.   Vital signs reviewed.      Current Level of Function:  Bed Mobility: supervision  Transfers: touch assist  Gait:  supervision 100ft with RW  Stairs: 12 steps touch assist  Upper body dressing: partial assist  Lower body dressing: partial assist    Review of Systems:   Constitutional:    [  X ] WNL           [   ] poor appetite   [   ] insomnia   [   ] tired   Cardio:                [ X  ] WNL           [   ] CP   [   ] DEE   [   ] palpitations               Resp:                   [  X ] WNL           [   ] SOB   [   ] cough   [   ] wheezing   GI:                        [X   ] WNL           [   ] constipation   [   ] diarrhea   [   ] abdominal pain   [   ] nausea   [   ] emesis                                :                      [   ] WNL           [   ] LOPEZ  [   ] dysuria   [ X  ] difficulty voiding             Endo:                   [  X ] WNL          [   ] polyuria   [   ] temperature intolerance                 Skin:                     [  X ] WNL          [   ] pain   [   ] wound   [   ] rash   MSK:                    [   ] WNL          [   ] muscle pain   [  X ] joint pain/ stiffness shoulder, hips, knees, ankles/ feet     [   ] muscle tenderness   [   ] swelling   Neuro:                 [   ] WNL          [   ] HA   [   ] change in vision   [   ] tremor   [ X  ] weakness/ unsteady gait    [   ]dysphagia              Cognitive:           [ X  ] WNL           [   ]confusion      Psych:                  [ X  ] WNL           [   ] hallucinations   [   ]agitation   [   ] delusion   [   ]depression        PHYSICAL EXAM  Vital Signs Last 24 Hrs  T(C): 35.8 (12 Dec 2023 05:10), Max: 36.3 (11 Dec 2023 15:16)  T(F): 96.4 (12 Dec 2023 05:10), Max: 97.4 (11 Dec 2023 15:16)  HR: 69 (12 Dec 2023 05:10) (69 - 70)  BP: 150/68 (12 Dec 2023 05:10) (136/62 - 150/68)  BP(mean): --  RR: 18 (12 Dec 2023 05:10) (18 - 19)  SpO2: --    General:[ X  ] NAD, Resting Comfortable,   [   ] other:                                HEENT: [  X ] NC/AT, EOMI, PERRL , Normal Conjunctivae,   [   ] other:  Cardio: [ X  ] RRR   [ X  ] other:  Grade 3 crescendo-decrescendo  systolic murmur auscultated in R 2nd intercostal space. RRR                             Pulm: [ X  ] No Respiratory Distress,  Lungs CTAB,   [   ] other:                       Abdomen: [X   ]ND/NT, Soft,   [   ] other:    : [  X ] NO LOPEZ CATHETER,        [   ] LOPEZ CATHETER- no meatal tear, no discharge,                                         MSK: [   ] No joint swelling, Full ROM,   [ X  ] other: Functional PROM both shoulders. (-) 30 deg. passive DF both ankles     No AROM of R shoulder, 0-30 degrees AROM of L shoulder (2/2 OA)                                   Ext: [ X ]   No C/C/E, No calf tenderness,   [   ]other:    Skin: [  X ] intact,   [   ] other:                                                                   Neurological Examination:  Cognitive: [   X ] AAO x 3,   [    ]  other:                                                                      Attention:  [    ] intact,   [ X   ]  other:   Impairment in tasks that require concentration and attention                Memory: [    ] intact,    [ X   ]  other:   2/3 with short term recall  Mood/Affect: [   X ] wnl,    [    ]  other:                                                                             Communication: [   X ]Fluent, no dysarthria, following commands:  [    ] other:   CN II - XII:  [  X  ] intact,  [    ] other:                                                                                        Motor:   RIGHT UE: [   ] WNL,  [X   ] other: 4-/5 shoulder abduction (limited ROM due to OA), 4+/5 elbow flexion/extension, 4+/5 finger  strength   LEFT    UE: [   ] WNL,  [ X  ] other: 4-/5 shoulder abduction (limited ROM due to OA), 4+/5 elbow flexion/extension, 4+/5 finger  strength   RIGHT LE: [   ] WNL,  [ X  ] other:  5-/5 hip flexion, 5/5 knee flexion/extension, 2-/5 dorsiflexion due to limited passive range  LEFT    LE: [   ] WNL,  [  X ] other: 5-/5 hip flexion, 5/5 knee flexion/extension, 2-/5  dorsiflexion due to limited passive range    Tone: [  X  ] wnl,   [    ]  other:  DTRs: [  X ]symmetric, [   ] other:  Coordination:   [    ] intact,   [  X  ] other: Incoordinate with FNF bilaterally (worse R>L UE)                                                                          Sensory: [  X  ] Intact to light touch, temperature   [    ] other:    Decreased proprioception bilateral lower extremities  (-) garcia, babinski, clonus b/l extremities    MEDICATIONS  (STANDING):  aspirin enteric coated 81 milliGRAM(s) Oral daily  atenolol  Tablet 50 milliGRAM(s) Oral daily  atorvastatin 80 milliGRAM(s) Oral at bedtime  chlorhexidine 2% Cloths 1 Application(s) Topical <User Schedule>  cyanocobalamin 1000 MICROGram(s) Oral every 24 hours  fesoterodine ER Tablet 4 milliGRAM(s) Oral <User Schedule>  folic acid 1 milliGRAM(s) Oral daily  heparin   Injectable 5000 Unit(s) SubCutaneous every 12 hours  losartan 100 milliGRAM(s) Oral at bedtime  pantoprazole    Tablet 40 milliGRAM(s) Oral before breakfast  polyethylene glycol 3350 17 Gram(s) Oral at bedtime  senna 2 Tablet(s) Oral at bedtime  ticagrelor 90 milliGRAM(s) Oral every 12 hours    MEDICATIONS  (PRN):  acetaminophen     Tablet .. 650 milliGRAM(s) Oral every 6 hours PRN Temp greater or equal to 38C (100.4F), Mild Pain (1 - 3), Moderate Pain (4 - 6)  albuterol    90 MICROgram(s) HFA Inhaler 2 Puff(s) Inhalation every 6 hours PRN for bronchospasm      RECENT LABS/IMAGING                                   10.3   6.76  )-----------( 187      ( 11 Dec 2023 06:46 )             34.0     12-11    141  |  110  |  31<H>  ----------------------------<  89  4.8   |  22  |  1.1    Ca    8.9      11 Dec 2023 06:46  Mg     1.7     12-11    TPro  6.1  /  Alb  3.7  /  TBili  0.3  /  DBili  x   /  AST  22  /  ALT  26  /  AlkPhos  74  12-11      Urinalysis Basic - ( 11 Dec 2023 06:46 )    Color: x / Appearance: x / SG: x / pH: x  Gluc: 89 mg/dL / Ketone: x  / Bili: x / Urobili: x   Blood: x / Protein: x / Nitrite: x   Leuk Esterase: x / RBC: x / WBC x   Sq Epi: x / Non Sq Epi: x / Bacteria: x           Patient is a 83y old  Female who presents with a chief complaint of Rehab of left thalamic lacunar infarct resulting in ataxic gait, incoordination, and ADL dysfunction. (10 Dec 2023 09:14)      HPI:  83 year old female with PMHx of arthritis, hypertension, hyperlipidemia who presented to the ED for gait ataxia and peripheral extremity numbness. Patient reported that 3 days PTA she received a flu shot, and over the weekend while cleaning experienced onset of RUE pain/weakness, in which she rested on the cough and then after trying to get up endorsed problems with balance and experiencing distal numbness in bother her hands and feet, prompting presentation to the ED. On presentation, imaging revealed L thalamic acute lacunar infarct. CTA revealed marked calcification at the right and left common carotid artery bifurcations with severe stenosis at the level of the right carotid bulb, left CC bifurcation and ast the left carotid bulb. Patient underwent diagnostic cerebral angiogram on 11/28/23 due to stenotic findings on CTA, which revealed non flow limiting stenosis bilateral carotid arteries, and no carotid angioplasty/stenting was warranted. Symptoms of distal numbness improved overtime and patient hemodynamically stable. She has been medically stable. She has severe limitation of both shoulders at baseline.  Of note, the patient states that she was recently evaluated by a rheumatologist and was told that despite being on Remicade for years, that she has osteoarthritis and never had rheumatoid arthritis.     Living situation: Lives with family (daughter, son-in-law, grandkids) in private house, 0 Steps to enter, 12 Steps inside with stair lift.  Prior Level of Function: Ambulating independent with RW and rollator, Independent with ADL/iADLs    Evaluated by bedside physical therapy and occupational therapy. Patient is minimum to moderate assistance for bed mobility and transfers, ambulating 25 feet x3 with rolling walker minimum assistance.  Upper body dressing  and lower body dressing moderate/ max assistance, Prior to admission patient was independent with mobility and ambulation with a rolling walker, and independent in all ADLs, IADLs. Evaluated by physical medicine and rehabilitation consult and deemed an appropriate candidate for inpatient rehabilitation facility. Admitted to [facility] on  11/30/23   (30 Nov 2023 12:31)      TODAY'S SUBJECTIVE & REVIEW OF SYMPTOMS  Patient was seen and assessed at bedside. No overnight events. Possible discharge on Friday.   Patient denies any complaints at this time.   Tolerating PT/OT.   Tolerating oral diet. Voiding and passing stool spontaneously.   Vital signs reviewed.      Current Level of Function:  Bed Mobility: supervision  Transfers: touch assist  Gait:  supervision 100ft with RW  Stairs: 12 steps touch assist  Upper body dressing: partial assist  Lower body dressing: partial assist    Review of Systems:   Constitutional:    [  X ] WNL           [   ] poor appetite   [   ] insomnia   [   ] tired   Cardio:                [ X  ] WNL           [   ] CP   [   ] DEE   [   ] palpitations               Resp:                   [  X ] WNL           [   ] SOB   [   ] cough   [   ] wheezing   GI:                        [X   ] WNL           [   ] constipation   [   ] diarrhea   [   ] abdominal pain   [   ] nausea   [   ] emesis                                :                      [   ] WNL           [   ] LOPEZ  [   ] dysuria   [ X  ] difficulty voiding             Endo:                   [  X ] WNL          [   ] polyuria   [   ] temperature intolerance                 Skin:                     [  X ] WNL          [   ] pain   [   ] wound   [   ] rash   MSK:                    [   ] WNL          [   ] muscle pain   [  X ] joint pain/ stiffness shoulder, hips, knees, ankles/ feet     [   ] muscle tenderness   [   ] swelling   Neuro:                 [   ] WNL          [   ] HA   [   ] change in vision   [   ] tremor   [ X  ] weakness/ unsteady gait    [   ]dysphagia              Cognitive:           [ X  ] WNL           [   ]confusion      Psych:                  [ X  ] WNL           [   ] hallucinations   [   ]agitation   [   ] delusion   [   ]depression        PHYSICAL EXAM  Vital Signs Last 24 Hrs  T(C): 35.8 (12 Dec 2023 05:10), Max: 36.3 (11 Dec 2023 15:16)  T(F): 96.4 (12 Dec 2023 05:10), Max: 97.4 (11 Dec 2023 15:16)  HR: 69 (12 Dec 2023 05:10) (69 - 70)  BP: 150/68 (12 Dec 2023 05:10) (136/62 - 150/68)  BP(mean): --  RR: 18 (12 Dec 2023 05:10) (18 - 19)  SpO2: --    General:[ X  ] NAD, Resting Comfortable,   [   ] other:                                HEENT: [  X ] NC/AT, EOMI, PERRL , Normal Conjunctivae,   [   ] other:  Cardio: [ X  ] RRR   [ X  ] other:  Grade 3 crescendo-decrescendo  systolic murmur auscultated in R 2nd intercostal space. RRR                             Pulm: [ X  ] No Respiratory Distress,  Lungs CTAB,   [   ] other:                       Abdomen: [X   ]ND/NT, Soft,   [   ] other:    : [  X ] NO LOPEZ CATHETER,        [   ] LOPEZ CATHETER- no meatal tear, no discharge,                                         MSK: [   ] No joint swelling, Full ROM,   [ X  ] other: Functional PROM both shoulders. (-) 30 deg. passive DF both ankles     No AROM of R shoulder, 0-30 degrees AROM of L shoulder (2/2 OA)                                   Ext: [ X ]   No C/C/E, No calf tenderness,   [   ]other:    Skin: [  X ] intact,   [   ] other:                                                                   Neurological Examination:  Cognitive: [   X ] AAO x 3,   [    ]  other:                                                                      Attention:  [    ] intact,   [ X   ]  other:   Impairment in tasks that require concentration and attention                Memory: [    ] intact,    [ X   ]  other:   2/3 with short term recall  Mood/Affect: [   X ] wnl,    [    ]  other:                                                                             Communication: [   X ]Fluent, no dysarthria, following commands:  [    ] other:   CN II - XII:  [  X  ] intact,  [    ] other:                                                                                        Motor:   RIGHT UE: [   ] WNL,  [X   ] other: 4-/5 shoulder abduction (limited ROM due to OA), 4+/5 elbow flexion/extension, 4+/5 finger  strength   LEFT    UE: [   ] WNL,  [ X  ] other: 4-/5 shoulder abduction (limited ROM due to OA), 4+/5 elbow flexion/extension, 4+/5 finger  strength   RIGHT LE: [   ] WNL,  [ X  ] other:  5-/5 hip flexion, 5/5 knee flexion/extension, 2-/5 dorsiflexion due to limited passive range  LEFT    LE: [   ] WNL,  [  X ] other: 5-/5 hip flexion, 5/5 knee flexion/extension, 2-/5  dorsiflexion due to limited passive range    Tone: [  X  ] wnl,   [    ]  other:  DTRs: [  X ]symmetric, [   ] other:  Coordination:   [    ] intact,   [  X  ] other: Incoordinate with FNF bilaterally (worse R>L UE)                                                                          Sensory: [  X  ] Intact to light touch, temperature   [    ] other:    Decreased proprioception bilateral lower extremities  (-) garcia, babinski, clonus b/l extremities    MEDICATIONS  (STANDING):  aspirin enteric coated 81 milliGRAM(s) Oral daily  atenolol  Tablet 50 milliGRAM(s) Oral daily  atorvastatin 80 milliGRAM(s) Oral at bedtime  chlorhexidine 2% Cloths 1 Application(s) Topical <User Schedule>  cyanocobalamin 1000 MICROGram(s) Oral every 24 hours  fesoterodine ER Tablet 4 milliGRAM(s) Oral <User Schedule>  folic acid 1 milliGRAM(s) Oral daily  heparin   Injectable 5000 Unit(s) SubCutaneous every 12 hours  losartan 100 milliGRAM(s) Oral at bedtime  pantoprazole    Tablet 40 milliGRAM(s) Oral before breakfast  polyethylene glycol 3350 17 Gram(s) Oral at bedtime  senna 2 Tablet(s) Oral at bedtime  ticagrelor 90 milliGRAM(s) Oral every 12 hours    MEDICATIONS  (PRN):  acetaminophen     Tablet .. 650 milliGRAM(s) Oral every 6 hours PRN Temp greater or equal to 38C (100.4F), Mild Pain (1 - 3), Moderate Pain (4 - 6)  albuterol    90 MICROgram(s) HFA Inhaler 2 Puff(s) Inhalation every 6 hours PRN for bronchospasm      RECENT LABS/IMAGING                                   10.3   6.76  )-----------( 187      ( 11 Dec 2023 06:46 )             34.0     12-11    141  |  110  |  31<H>  ----------------------------<  89  4.8   |  22  |  1.1    Ca    8.9      11 Dec 2023 06:46  Mg     1.7     12-11    TPro  6.1  /  Alb  3.7  /  TBili  0.3  /  DBili  x   /  AST  22  /  ALT  26  /  AlkPhos  74  12-11      Urinalysis Basic - ( 11 Dec 2023 06:46 )    Color: x / Appearance: x / SG: x / pH: x  Gluc: 89 mg/dL / Ketone: x  / Bili: x / Urobili: x   Blood: x / Protein: x / Nitrite: x   Leuk Esterase: x / RBC: x / WBC x   Sq Epi: x / Non Sq Epi: x / Bacteria: x

## 2023-12-13 RX ADMIN — ATENOLOL 50 MILLIGRAM(S): 25 TABLET ORAL at 06:00

## 2023-12-13 RX ADMIN — Medication 650 MILLIGRAM(S): at 21:43

## 2023-12-13 RX ADMIN — LOSARTAN POTASSIUM 100 MILLIGRAM(S): 100 TABLET, FILM COATED ORAL at 21:42

## 2023-12-13 RX ADMIN — FESOTERODINE FUMARATE 4 MILLIGRAM(S): 8 TABLET, FILM COATED, EXTENDED RELEASE ORAL at 11:35

## 2023-12-13 RX ADMIN — HEPARIN SODIUM 5000 UNIT(S): 5000 INJECTION INTRAVENOUS; SUBCUTANEOUS at 06:06

## 2023-12-13 RX ADMIN — HEPARIN SODIUM 5000 UNIT(S): 5000 INJECTION INTRAVENOUS; SUBCUTANEOUS at 17:37

## 2023-12-13 RX ADMIN — Medication 650 MILLIGRAM(S): at 22:47

## 2023-12-13 RX ADMIN — CHLORHEXIDINE GLUCONATE 1 APPLICATION(S): 213 SOLUTION TOPICAL at 05:58

## 2023-12-13 RX ADMIN — Medication 1 MILLIGRAM(S): at 11:35

## 2023-12-13 RX ADMIN — TICAGRELOR 90 MILLIGRAM(S): 90 TABLET ORAL at 17:38

## 2023-12-13 RX ADMIN — PREGABALIN 1000 MICROGRAM(S): 225 CAPSULE ORAL at 11:35

## 2023-12-13 RX ADMIN — TICAGRELOR 90 MILLIGRAM(S): 90 TABLET ORAL at 06:01

## 2023-12-13 RX ADMIN — Medication 81 MILLIGRAM(S): at 11:35

## 2023-12-13 RX ADMIN — ATORVASTATIN CALCIUM 80 MILLIGRAM(S): 80 TABLET, FILM COATED ORAL at 21:42

## 2023-12-13 RX ADMIN — PANTOPRAZOLE SODIUM 40 MILLIGRAM(S): 20 TABLET, DELAYED RELEASE ORAL at 06:00

## 2023-12-13 NOTE — PROGRESS NOTE ADULT - SUBJECTIVE AND OBJECTIVE BOX
Patient is a 83y old  Female who presents with a chief complaint of Rehab of left thalamic lacunar infarct resulting in ataxic gait, incoordination, and ADL dysfunction. (12 Dec 2023 11:13)      HPI:  83 year old female with PMHx of arthritis, hypertension, hyperlipidemia who presented to the ED for gait ataxia and peripheral extremity numbness. Patient reported that 3 days PTA she received a flu shot, and over the weekend while cleaning experienced onset of RUE pain/weakness, in which she rested on the cough and then after trying to get up endorsed problems with balance and experiencing distal numbness in bother her hands and feet, prompting presentation to the ED. On presentation, imaging revealed L thalamic acute lacunar infarct. CTA revealed marked calcification at the right and left common carotid artery bifurcations with severe stenosis at the level of the right carotid bulb, left CC bifurcation and ast the left carotid bulb. Patient underwent diagnostic cerebral angiogram on 11/28/23 due to stenotic findings on CTA, which revealed non flow limiting stenosis bilateral carotid arteries, and no carotid angioplasty/stenting was warranted. Symptoms of distal numbness improved overtime and patient hemodynamically stable. She has been medically stable. She has severe limitation of both shoulders at baseline.  Of note, the patient states that she was recently evaluated by a rheumatologist and was told that despite being on Remicade for years, that she has osteoarthritis and never had rheumatoid arthritis.     Living situation: Lives with family (daughter, son-in-law, grandkids) in private house, 0 Steps to enter, 12 Steps inside with stair lift.  Prior Level of Function: Ambulating independent with RW and rollator, Independent with ADL/iADLs    Evaluated by bedside physical therapy and occupational therapy. Patient is minimum to moderate assistance for bed mobility and transfers, ambulating 25 feet x3 with rolling walker minimum assistance.  Upper body dressing  and lower body dressing moderate/ max assistance, Prior to admission patient was independent with mobility and ambulation with a rolling walker, and independent in all ADLs, IADLs. Evaluated by physical medicine and rehabilitation consult and deemed an appropriate candidate for inpatient rehabilitation facility. Admitted to [facility] on  11/30/23   (30 Nov 2023 12:31)      TODAY'S SUBJECTIVE & REVIEW OF SYMPTOMS  Patient was seen and assessed at bedside. No overnight events.   Patient denies any complaints at this time.   Tolerating PT/OT.   Tolerating oral diet. Voiding and passing stool spontaneously.   Vital signs reviewed.    Current Level of Function:  Bed Mobility: supervision  Transfers: supervision  Gait: steady assist 150ft with RW  Upper body dressing: setup  Lower body dressing: setup    Review of Systems:   Constitutional:    [  X ] WNL           [   ] poor appetite   [   ] insomnia   [   ] tired   Cardio:                [ X  ] WNL           [   ] CP   [   ] DEE   [   ] palpitations               Resp:                   [  X ] WNL           [   ] SOB   [   ] cough   [   ] wheezing   GI:                        [X   ] WNL           [   ] constipation   [   ] diarrhea   [   ] abdominal pain   [   ] nausea   [   ] emesis                                :                      [   ] WNL           [   ] LOPEZ  [   ] dysuria   [ X  ] difficulty voiding             Endo:                   [  X ] WNL          [   ] polyuria   [   ] temperature intolerance                 Skin:                     [  X ] WNL          [   ] pain   [   ] wound   [   ] rash   MSK:                    [   ] WNL          [   ] muscle pain   [  X ] joint pain/ stiffness shoulder, hips, knees, ankles/ feet     [   ] muscle tenderness   [   ] swelling   Neuro:                 [   ] WNL          [   ] HA   [   ] change in vision   [   ] tremor   [ X  ] weakness/ unsteady gait    [   ]dysphagia              Cognitive:           [ X  ] WNL           [   ]confusion      Psych:                  [ X  ] WNL           [   ] hallucinations   [   ]agitation   [   ] delusion   [   ]depression          PHYSICAL EXAM    Vital Signs Last 24 Hrs  T(C): 36.3 (13 Dec 2023 05:10), Max: 37.2 (12 Dec 2023 13:53)  T(F): 97.4 (13 Dec 2023 05:10), Max: 99 (12 Dec 2023 13:53)  HR: 81 (13 Dec 2023 05:10) (75 - 81)  BP: 121/60 (13 Dec 2023 05:10) (114/54 - 138/59)  BP(mean): --  RR: 19 (13 Dec 2023 05:10) (18 - 19)  SpO2: --    General:[ X  ] NAD, Resting Comfortable,   [   ] other:                                HEENT: [  X ] NC/AT, EOMI, PERRL , Normal Conjunctivae,   [   ] other:  Cardio: [ X  ] RRR   [ X  ] other:  Grade 3 crescendo-decrescendo  systolic murmur auscultated in R 2nd intercostal space. RRR                             Pulm: [ X  ] No Respiratory Distress,  Lungs CTAB,   [   ] other:                       Abdomen: [X   ]ND/NT, Soft,   [   ] other:    : [  X ] NO LOPEZ CATHETER,        [   ] LOPEZ CATHETER- no meatal tear, no discharge,                                         MSK: [   ] No joint swelling, Full ROM,   [ X  ] other: Functional PROM both shoulders. (-) 30 deg. passive DF both ankles     No AROM of R shoulder, 0-30 degrees AROM of L shoulder (2/2 OA)                                   Ext: [ X ]   No C/C/E, No calf tenderness,   [   ]other:    Skin: [  X ] intact,   [   ] other:                                                                   Neurological Examination:  Cognitive: [   X ] AAO x 3,   [    ]  other:                                                                      Attention:  [    ] intact,   [ X   ]  other:   Impairment in tasks that require concentration and attention                Memory: [    ] intact,    [ X   ]  other:   2/3 with short term recall  Mood/Affect: [   X ] wnl,    [    ]  other:                                                                             Communication: [   X ]Fluent, no dysarthria, following commands:  [    ] other:   CN II - XII:  [  X  ] intact,  [    ] other:                                                                                        Motor:   RIGHT UE: [   ] WNL,  [X   ] other: 4-/5 shoulder abduction (limited ROM due to OA), 4+/5 elbow flexion/extension, 4+/5 finger  strength   LEFT    UE: [   ] WNL,  [ X  ] other: 4-/5 shoulder abduction (limited ROM due to OA), 4+/5 elbow flexion/extension, 4+/5 finger  strength   RIGHT LE: [   ] WNL,  [ X  ] other:  5-/5 hip flexion, 5/5 knee flexion/extension, 2-/5 dorsiflexion due to limited passive range  LEFT    LE: [   ] WNL,  [  X ] other: 5-/5 hip flexion, 5/5 knee flexion/extension, 2-/5  dorsiflexion due to limited passive range    Tone: [  X  ] wnl,   [    ]  other:  DTRs: [  X ]symmetric, [   ] other:  Coordination:   [    ] intact,   [  X  ] other: Incoordinate with FNF bilaterally (worse R>L UE)                                                                          Sensory: [  X  ] Intact to light touch, temperature   [    ] other:    Decreased proprioception bilateral lower extremities  (-) garcia, babinski, clonus b/l extremities        acetaminophen     Tablet .. 650 milliGRAM(s) Oral every 6 hours PRN  albuterol    90 MICROgram(s) HFA Inhaler 2 Puff(s) Inhalation every 6 hours PRN  aspirin enteric coated 81 milliGRAM(s) Oral daily  atenolol  Tablet 50 milliGRAM(s) Oral daily  atorvastatin 80 milliGRAM(s) Oral at bedtime  chlorhexidine 2% Cloths 1 Application(s) Topical <User Schedule>  cyanocobalamin 1000 MICROGram(s) Oral every 24 hours  fesoterodine ER Tablet 4 milliGRAM(s) Oral <User Schedule>  folic acid 1 milliGRAM(s) Oral daily  heparin   Injectable 5000 Unit(s) SubCutaneous every 12 hours  losartan 100 milliGRAM(s) Oral at bedtime  pantoprazole    Tablet 40 milliGRAM(s) Oral before breakfast  ticagrelor 90 milliGRAM(s) Oral every 12 hours      RECENT LABS/IMAGING                        Patient is a 83y old  Female who presents with a chief complaint of Rehab of left thalamic lacunar infarct resulting in ataxic gait, incoordination, and ADL dysfunction. (12 Dec 2023 11:13)      HPI:  83 year old female with PMHx of arthritis, hypertension, hyperlipidemia who presented to the ED for gait ataxia and peripheral extremity numbness. Patient reported that 3 days PTA she received a flu shot, and over the weekend while cleaning experienced onset of RUE pain/weakness, in which she rested on the cough and then after trying to get up endorsed problems with balance and experiencing distal numbness in bother her hands and feet, prompting presentation to the ED. On presentation, imaging revealed L thalamic acute lacunar infarct. CTA revealed marked calcification at the right and left common carotid artery bifurcations with severe stenosis at the level of the right carotid bulb, left CC bifurcation and ast the left carotid bulb. Patient underwent diagnostic cerebral angiogram on 11/28/23 due to stenotic findings on CTA, which revealed non flow limiting stenosis bilateral carotid arteries, and no carotid angioplasty/stenting was warranted. Symptoms of distal numbness improved overtime and patient hemodynamically stable. She has been medically stable. She has severe limitation of both shoulders at baseline.  Of note, the patient states that she was recently evaluated by a rheumatologist and was told that despite being on Remicade for years, that she has osteoarthritis and never had rheumatoid arthritis.     Living situation: Lives with family (daughter, son-in-law, grandkids) in private house, 0 Steps to enter, 12 Steps inside with stair lift.  Prior Level of Function: Ambulating independent with RW and rollator, Independent with ADL/iADLs    Evaluated by bedside physical therapy and occupational therapy. Patient is minimum to moderate assistance for bed mobility and transfers, ambulating 25 feet x3 with rolling walker minimum assistance.  Upper body dressing  and lower body dressing moderate/ max assistance, Prior to admission patient was independent with mobility and ambulation with a rolling walker, and independent in all ADLs, IADLs. Evaluated by physical medicine and rehabilitation consult and deemed an appropriate candidate for inpatient rehabilitation facility. Admitted to [facility] on  11/30/23   (30 Nov 2023 12:31)      TODAY'S SUBJECTIVE & REVIEW OF SYMPTOMS  Patient was seen and assessed at bedside. No overnight events.   Patient denies any complaints at this time.   Tolerating PT/OT.   Tolerating oral diet. Voiding and passing stool spontaneously.   Vital signs reviewed.    Current Level of Function:  Bed Mobility: supervision  Transfers: supervision  Gait: steady assist 150ft with RW  Upper body dressing: setup  Lower body dressing: setup    Review of Systems:   Constitutional:    [  X ] WNL           [   ] poor appetite   [   ] insomnia   [   ] tired   Cardio:                [ X  ] WNL           [   ] CP   [   ] DEE   [   ] palpitations               Resp:                   [  X ] WNL           [   ] SOB   [   ] cough   [   ] wheezing   GI:                        [X   ] WNL           [   ] constipation   [   ] diarrhea   [   ] abdominal pain   [   ] nausea   [   ] emesis                                :                      [   ] WNL           [   ] LOPEZ  [   ] dysuria   [ X  ] difficulty voiding             Endo:                   [  X ] WNL          [   ] polyuria   [   ] temperature intolerance                 Skin:                     [  X ] WNL          [   ] pain   [   ] wound   [   ] rash   MSK:                    [   ] WNL          [   ] muscle pain   [  X ] joint pain/ stiffness shoulder, hips, knees, ankles/ feet     [   ] muscle tenderness   [   ] swelling   Neuro:                 [   ] WNL          [   ] HA   [   ] change in vision   [   ] tremor   [ X  ] weakness/ unsteady gait    [   ]dysphagia              Cognitive:           [ X  ] WNL           [   ]confusion      Psych:                  [ X  ] WNL           [   ] hallucinations   [   ]agitation   [   ] delusion   [   ]depression          PHYSICAL EXAM    Vital Signs Last 24 Hrs  T(C): 36.3 (13 Dec 2023 05:10), Max: 37.2 (12 Dec 2023 13:53)  T(F): 97.4 (13 Dec 2023 05:10), Max: 99 (12 Dec 2023 13:53)  HR: 81 (13 Dec 2023 05:10) (75 - 81)  BP: 121/60 (13 Dec 2023 05:10) (114/54 - 138/59)  BP(mean): --  RR: 19 (13 Dec 2023 05:10) (18 - 19)  SpO2: --    General:[ X  ] NAD, Resting Comfortable,   [   ] other:                                HEENT: [  X ] NC/AT, EOMI, PERRL , Normal Conjunctivae,   [   ] other:  Cardio: [ X  ] RRR   [ X  ] other:  Grade 3 crescendo-decrescendo  systolic murmur auscultated in R 2nd intercostal space. RRR                             Pulm: [ X  ] No Respiratory Distress,  Lungs CTAB,   [   ] other:                       Abdomen: [X   ]ND/NT, Soft,   [   ] other:    : [  X ] NO LOPEZ CATHETER,        [   ] LOPEZ CATHETER- no meatal tear, no discharge,                                         MSK: [   ] No joint swelling, Full ROM,   [ X  ] other: Functional PROM both shoulders. (-) 30 deg. passive DF both ankles     No AROM of R shoulder, 0-30 degrees AROM of L shoulder (2/2 OA)                                   Ext: [ X ]   No C/C/E, No calf tenderness,   [   ]other:    Skin: [  X ] intact,   [   ] other:                                                                   Neurological Examination:  Cognitive: [   X ] AAO x 3,   [    ]  other:                                                                      Attention:  [    ] intact,   [ X   ]  other:   Impairment in tasks that require concentration and attention                Memory: [    ] intact,    [ X   ]  other:   2/3 with short term recall  Mood/Affect: [   X ] wnl,    [    ]  other:                                                                             Communication: [   X ]Fluent, no dysarthria, following commands:  [    ] other:   CN II - XII:  [  X  ] intact,  [    ] other:                                                                                        Motor:   RIGHT UE: [   ] WNL,  [X   ] other: 4-/5 shoulder abduction (limited ROM due to OA), 4+/5 elbow flexion/extension, 4+/5 finger  strength   LEFT    UE: [   ] WNL,  [ X  ] other: 4-/5 shoulder abduction (limited ROM due to OA), 4+/5 elbow flexion/extension, 4+/5 finger  strength   RIGHT LE: [   ] WNL,  [ X  ] other:  5-/5 hip flexion, 5/5 knee flexion/extension, 2-/5 dorsiflexion due to limited passive range  LEFT    LE: [   ] WNL,  [  X ] other: 5-/5 hip flexion, 5/5 knee flexion/extension, 2-/5  dorsiflexion due to limited passive range    Tone: [  X  ] wnl,   [    ]  other:  DTRs: [  X ]symmetric, [   ] other:  Coordination:   [    ] intact,   [  X  ] other: Incoordinate with FNF bilaterally (worse R>L UE)                                                                          Sensory: [  X  ] Intact to light touch, temperature   [    ] other:    Decreased proprioception bilateral lower extremities  (-) garcia, babinski, clonus b/l extremities        acetaminophen     Tablet .. 650 milliGRAM(s) Oral every 6 hours PRN  albuterol    90 MICROgram(s) HFA Inhaler 2 Puff(s) Inhalation every 6 hours PRN  aspirin enteric coated 81 milliGRAM(s) Oral daily  atenolol  Tablet 50 milliGRAM(s) Oral daily  atorvastatin 80 milliGRAM(s) Oral at bedtime  chlorhexidine 2% Cloths 1 Application(s) Topical <User Schedule>  cyanocobalamin 1000 MICROGram(s) Oral every 24 hours  fesoterodine ER Tablet 4 milliGRAM(s) Oral <User Schedule>  folic acid 1 milliGRAM(s) Oral daily  heparin   Injectable 5000 Unit(s) SubCutaneous every 12 hours  losartan 100 milliGRAM(s) Oral at bedtime  pantoprazole    Tablet 40 milliGRAM(s) Oral before breakfast  ticagrelor 90 milliGRAM(s) Oral every 12 hours      RECENT LABS/IMAGING                        Patient is a 83y old  Female who presents with a chief complaint of Rehab of left thalamic lacunar infarct resulting in ataxic gait, incoordination, and ADL dysfunction. (12 Dec 2023 11:13)      HPI:  83 year old female with PMHx of arthritis, hypertension, hyperlipidemia who presented to the ED for gait ataxia and peripheral extremity numbness. Patient reported that 3 days PTA she received a flu shot, and over the weekend while cleaning experienced onset of RUE pain/weakness, in which she rested on the cough and then after trying to get up endorsed problems with balance and experiencing distal numbness in bother her hands and feet, prompting presentation to the ED. On presentation, imaging revealed L thalamic acute lacunar infarct. CTA revealed marked calcification at the right and left common carotid artery bifurcations with severe stenosis at the level of the right carotid bulb, left CC bifurcation and ast the left carotid bulb. Patient underwent diagnostic cerebral angiogram on 11/28/23 due to stenotic findings on CTA, which revealed non flow limiting stenosis bilateral carotid arteries, and no carotid angioplasty/stenting was warranted. Symptoms of distal numbness improved overtime and patient hemodynamically stable. She has been medically stable. She has severe limitation of both shoulders at baseline.  Of note, the patient states that she was recently evaluated by a rheumatologist and was told that despite being on Remicade for years, that she has osteoarthritis and never had rheumatoid arthritis.     Living situation: Lives with family (daughter, son-in-law, grandkids) in private house, 0 Steps to enter, 12 Steps inside with stair lift.  Prior Level of Function: Ambulating independent with RW and rollator, Independent with ADL/iADLs    Evaluated by bedside physical therapy and occupational therapy. Patient is minimum to moderate assistance for bed mobility and transfers, ambulating 25 feet x3 with rolling walker minimum assistance.  Upper body dressing  and lower body dressing moderate/ max assistance, Prior to admission patient was independent with mobility and ambulation with a rolling walker, and independent in all ADLs, IADLs. Evaluated by physical medicine and rehabilitation consult and deemed an appropriate candidate for inpatient rehabilitation facility. Admitted to [facility] on  11/30/23   (30 Nov 2023 12:31)      TODAY'S SUBJECTIVE & REVIEW OF SYMPTOMS  Patient was seen and assessed at bedside. No overnight events.   Patient denies any complaints at this time.   Tolerating PT/OT.   Tolerating oral diet. Voiding and passing stool spontaneously.   Vital signs reviewed.    Current Level of Function:  Bed Mobility: supervision  Transfers: supervision  Gait: steady assist 150ft with RW  Stairs: 10 steps with partial assist  Upper body dressing: setup  Lower body dressing: setup    Review of Systems:   Constitutional:    [  X ] WNL           [   ] poor appetite   [   ] insomnia   [   ] tired   Cardio:                [ X  ] WNL           [   ] CP   [   ] DEE   [   ] palpitations               Resp:                   [  X ] WNL           [   ] SOB   [   ] cough   [   ] wheezing   GI:                        [X   ] WNL           [   ] constipation   [   ] diarrhea   [   ] abdominal pain   [   ] nausea   [   ] emesis                                :                      [   ] WNL           [   ] LOPEZ  [   ] dysuria   [ X  ] difficulty voiding             Endo:                   [  X ] WNL          [   ] polyuria   [   ] temperature intolerance                 Skin:                     [  X ] WNL          [   ] pain   [   ] wound   [   ] rash   MSK:                    [   ] WNL          [   ] muscle pain   [  X ] joint pain/ stiffness shoulder, hips, knees, ankles/ feet     [   ] muscle tenderness   [   ] swelling   Neuro:                 [   ] WNL          [   ] HA   [   ] change in vision   [   ] tremor   [ X  ] weakness/ unsteady gait    [   ]dysphagia              Cognitive:           [ X  ] WNL           [   ]confusion      Psych:                  [ X  ] WNL           [   ] hallucinations   [   ]agitation   [   ] delusion   [   ]depression          PHYSICAL EXAM    Vital Signs Last 24 Hrs  T(C): 36.3 (13 Dec 2023 05:10), Max: 37.2 (12 Dec 2023 13:53)  T(F): 97.4 (13 Dec 2023 05:10), Max: 99 (12 Dec 2023 13:53)  HR: 81 (13 Dec 2023 05:10) (75 - 81)  BP: 121/60 (13 Dec 2023 05:10) (114/54 - 138/59)  BP(mean): --  RR: 19 (13 Dec 2023 05:10) (18 - 19)  SpO2: --    General:[ X  ] NAD, Resting Comfortable,   [   ] other:                                HEENT: [  X ] NC/AT, EOMI, PERRL , Normal Conjunctivae,   [   ] other:  Cardio: [ X  ] RRR   [ X  ] other:  Grade 3 crescendo-decrescendo  systolic murmur auscultated in R 2nd intercostal space. RRR                             Pulm: [ X  ] No Respiratory Distress,  Lungs CTAB,   [   ] other:                       Abdomen: [X   ]ND/NT, Soft,   [   ] other:    : [  X ] NO LOPEZ CATHETER,        [   ] LOPEZ CATHETER- no meatal tear, no discharge,                                         MSK: [   ] No joint swelling, Full ROM,   [ X  ] other: Functional PROM both shoulders. (-) 30 deg. passive DF both ankles     No AROM of R shoulder, 0-30 degrees AROM of L shoulder (2/2 OA)                                   Ext: [ X ]   No C/C/E, No calf tenderness,   [   ]other:    Skin: [  X ] intact,   [   ] other:                                                                   Neurological Examination:  Cognitive: [   X ] AAO x 3,   [    ]  other:                                                                      Attention:  [    ] intact,   [ X   ]  other:   Impairment in tasks that require concentration and attention                Memory: [    ] intact,    [ X   ]  other:   2/3 with short term recall  Mood/Affect: [   X ] wnl,    [    ]  other:                                                                             Communication: [   X ]Fluent, no dysarthria, following commands:  [    ] other:   CN II - XII:  [  X  ] intact,  [    ] other:                                                                                        Motor:   RIGHT UE: [   ] WNL,  [X   ] other: 4-/5 shoulder abduction (limited ROM due to OA), 4+/5 elbow flexion/extension, 4+/5 finger  strength   LEFT    UE: [   ] WNL,  [ X  ] other: 4-/5 shoulder abduction (limited ROM due to OA), 4+/5 elbow flexion/extension, 4+/5 finger  strength   RIGHT LE: [   ] WNL,  [ X  ] other:  5-/5 hip flexion, 5/5 knee flexion/extension, 2-/5 dorsiflexion due to limited passive range  LEFT    LE: [   ] WNL,  [  X ] other: 5-/5 hip flexion, 5/5 knee flexion/extension, 2-/5  dorsiflexion due to limited passive range    Tone: [  X  ] wnl,   [    ]  other:  DTRs: [  X ]symmetric, [   ] other:  Coordination:   [    ] intact,   [  X  ] other: Incoordinate with FNF bilaterally (worse R>L UE)                                                                          Sensory: [  X  ] Intact to light touch, temperature   [    ] other:    Decreased proprioception bilateral lower extremities  (-) garcia, babinski, clonus b/l extremities        acetaminophen     Tablet .. 650 milliGRAM(s) Oral every 6 hours PRN  albuterol    90 MICROgram(s) HFA Inhaler 2 Puff(s) Inhalation every 6 hours PRN  aspirin enteric coated 81 milliGRAM(s) Oral daily  atenolol  Tablet 50 milliGRAM(s) Oral daily  atorvastatin 80 milliGRAM(s) Oral at bedtime  chlorhexidine 2% Cloths 1 Application(s) Topical <User Schedule>  cyanocobalamin 1000 MICROGram(s) Oral every 24 hours  fesoterodine ER Tablet 4 milliGRAM(s) Oral <User Schedule>  folic acid 1 milliGRAM(s) Oral daily  heparin   Injectable 5000 Unit(s) SubCutaneous every 12 hours  losartan 100 milliGRAM(s) Oral at bedtime  pantoprazole    Tablet 40 milliGRAM(s) Oral before breakfast  ticagrelor 90 milliGRAM(s) Oral every 12 hours      RECENT LABS/IMAGING                        Patient is a 83y old  Female who presents with a chief complaint of Rehab of left thalamic lacunar infarct resulting in ataxic gait, incoordination, and ADL dysfunction. (12 Dec 2023 11:13)      HPI:  83 year old female with PMHx of arthritis, hypertension, hyperlipidemia who presented to the ED for gait ataxia and peripheral extremity numbness. Patient reported that 3 days PTA she received a flu shot, and over the weekend while cleaning experienced onset of RUE pain/weakness, in which she rested on the cough and then after trying to get up endorsed problems with balance and experiencing distal numbness in bother her hands and feet, prompting presentation to the ED. On presentation, imaging revealed L thalamic acute lacunar infarct. CTA revealed marked calcification at the right and left common carotid artery bifurcations with severe stenosis at the level of the right carotid bulb, left CC bifurcation and ast the left carotid bulb. Patient underwent diagnostic cerebral angiogram on 11/28/23 due to stenotic findings on CTA, which revealed non flow limiting stenosis bilateral carotid arteries, and no carotid angioplasty/stenting was warranted. Symptoms of distal numbness improved overtime and patient hemodynamically stable. She has been medically stable. She has severe limitation of both shoulders at baseline.  Of note, the patient states that she was recently evaluated by a rheumatologist and was told that despite being on Remicade for years, that she has osteoarthritis and never had rheumatoid arthritis.     Living situation: Lives with family (daughter, son-in-law, grandkids) in private house, 0 Steps to enter, 12 Steps inside with stair lift.  Prior Level of Function: Ambulating independent with RW and rollator, Independent with ADL/iADLs    Evaluated by bedside physical therapy and occupational therapy. Patient is minimum to moderate assistance for bed mobility and transfers, ambulating 25 feet x3 with rolling walker minimum assistance.  Upper body dressing  and lower body dressing moderate/ max assistance, Prior to admission patient was independent with mobility and ambulation with a rolling walker, and independent in all ADLs, IADLs. Evaluated by physical medicine and rehabilitation consult and deemed an appropriate candidate for inpatient rehabilitation facility. Admitted to [facility] on  11/30/23   (30 Nov 2023 12:31)      TODAY'S SUBJECTIVE & REVIEW OF SYMPTOMS  Patient was seen and assessed at bedside. No overnight events.   Patient denies any complaints at this time.   Tolerating PT/OT.   Tolerating oral diet. Voiding and passing stool spontaneously.   Vital signs reviewed.    Current Level of Function:  Bed Mobility: supervision  Transfers: supervision  Gait: steady assist 150ft with RW  Stairs: 10 steps with partial assist  Upper body dressing: setup  Lower body dressing: setup    Review of Systems:   Constitutional:    [  X ] WNL           [   ] poor appetite   [   ] insomnia   [   ] tired   Cardio:                [ X  ] WNL           [   ] CP   [   ] DEE   [   ] palpitations               Resp:                   [  X ] WNL           [   ] SOB   [   ] cough   [   ] wheezing   GI:                        [X   ] WNL           [   ] constipation   [   ] diarrhea   [   ] abdominal pain   [   ] nausea   [   ] emesis                                :                      [   ] WNL           [   ] LOPEZ  [   ] dysuria   [ X  ] difficulty voiding             Endo:                   [  X ] WNL          [   ] polyuria   [   ] temperature intolerance                 Skin:                     [  X ] WNL          [   ] pain   [   ] wound   [   ] rash   MSK:                    [   ] WNL          [   ] muscle pain   [  X ] joint pain/ stiffness shoulder, hips, knees, ankles/ feet     [   ] muscle tenderness   [   ] swelling   Neuro:                 [   ] WNL          [   ] HA   [   ] change in vision   [   ] tremor   [ X  ] weakness/ unsteady gait    [   ]dysphagia              Cognitive:           [ X  ] WNL           [   ]confusion      Psych:                  [ X  ] WNL           [   ] hallucinations   [   ]agitation   [   ] delusion   [   ]depression          PHYSICAL EXAM    Vital Signs Last 24 Hrs  T(C): 36.3 (13 Dec 2023 05:10), Max: 37.2 (12 Dec 2023 13:53)  T(F): 97.4 (13 Dec 2023 05:10), Max: 99 (12 Dec 2023 13:53)  HR: 81 (13 Dec 2023 05:10) (75 - 81)  BP: 121/60 (13 Dec 2023 05:10) (114/54 - 138/59)  BP(mean): --  RR: 19 (13 Dec 2023 05:10) (18 - 19)  SpO2: --    General:[ X  ] NAD, Resting Comfortable,   [   ] other:                                HEENT: [  X ] NC/AT, EOMI, PERRL , Normal Conjunctivae,   [   ] other:  Cardio: [ X  ] RRR   [ X  ] other:  Grade 3 crescendo-decrescendo  systolic murmur auscultated in R 2nd intercostal space. RRR                             Pulm: [ X  ] No Respiratory Distress,  Lungs CTAB,   [   ] other:                       Abdomen: [X   ]ND/NT, Soft,   [   ] other:    : [  X ] NO LOPEZ CATHETER,        [   ] LOPEZ CATHETER- no meatal tear, no discharge,                                         MSK: [   ] No joint swelling, Full ROM,   [ X  ] other: Functional PROM both shoulders. (-) 30 deg. passive DF both ankles     No AROM of R shoulder, 0-30 degrees AROM of L shoulder (2/2 OA)                                   Ext: [ X ]   No C/C/E, No calf tenderness,   [   ]other:    Skin: [  X ] intact,   [   ] other:                                                                   Neurological Examination:  Cognitive: [   X ] AAO x 3,   [    ]  other:                                                                      Attention:  [    ] intact,   [ X   ]  other:   Impairment in tasks that require concentration and attention                Memory: [    ] intact,    [ X   ]  other:   2/3 with short term recall  Mood/Affect: [   X ] wnl,    [    ]  other:                                                                             Communication: [   X ]Fluent, no dysarthria, following commands:  [    ] other:   CN II - XII:  [  X  ] intact,  [    ] other:                                                                                        Motor:   RIGHT UE: [   ] WNL,  [X   ] other: 4-/5 shoulder abduction (limited ROM due to OA), 4+/5 elbow flexion/extension, 4+/5 finger  strength   LEFT    UE: [   ] WNL,  [ X  ] other: 4-/5 shoulder abduction (limited ROM due to OA), 4+/5 elbow flexion/extension, 4+/5 finger  strength   RIGHT LE: [   ] WNL,  [ X  ] other:  5-/5 hip flexion, 5/5 knee flexion/extension, 2-/5 dorsiflexion due to limited passive range  LEFT    LE: [   ] WNL,  [  X ] other: 5-/5 hip flexion, 5/5 knee flexion/extension, 2-/5  dorsiflexion due to limited passive range    Tone: [  X  ] wnl,   [    ]  other:  DTRs: [  X ]symmetric, [   ] other:  Coordination:   [    ] intact,   [  X  ] other: Incoordinate with FNF bilaterally (worse R>L UE)                                                                          Sensory: [  X  ] Intact to light touch, temperature   [    ] other:    Decreased proprioception bilateral lower extremities  (-) garcia, babinski, clonus b/l extremities        acetaminophen     Tablet .. 650 milliGRAM(s) Oral every 6 hours PRN  albuterol    90 MICROgram(s) HFA Inhaler 2 Puff(s) Inhalation every 6 hours PRN  aspirin enteric coated 81 milliGRAM(s) Oral daily  atenolol  Tablet 50 milliGRAM(s) Oral daily  atorvastatin 80 milliGRAM(s) Oral at bedtime  chlorhexidine 2% Cloths 1 Application(s) Topical <User Schedule>  cyanocobalamin 1000 MICROGram(s) Oral every 24 hours  fesoterodine ER Tablet 4 milliGRAM(s) Oral <User Schedule>  folic acid 1 milliGRAM(s) Oral daily  heparin   Injectable 5000 Unit(s) SubCutaneous every 12 hours  losartan 100 milliGRAM(s) Oral at bedtime  pantoprazole    Tablet 40 milliGRAM(s) Oral before breakfast  ticagrelor 90 milliGRAM(s) Oral every 12 hours      RECENT LABS/IMAGING

## 2023-12-13 NOTE — PROGRESS NOTE ADULT - ASSESSMENT
Neuropsychology Follow Up           Treatment focused on: Cognitive Remediation     Pain: Denied  Location: N/A  Ratin/10  Intervention: N/A     Orientation: [facility]     Arousal Level: Alert     Behavior: Cooperative, friendly     Affect/Mood Range: WFL       Needed: No   #: N/A     Attention: [facility]     Insight into illness/deficits: [facility]          Patient was seen for a cognitive remediation session following cognitive problems, including memory and attention secondary to CVA.           Cognitive remediation tasks were completed on: [employer]          Results:     Patient evidenced intact short-term verbal memory, as she was able to spontaneously recall and recognize target words.   With prompting, patient was able to spontaneously recall 2 additional words..  Patient evidence improved visuo-spatial short-term memory, as she correctly identified the location of to 3 words .          However, the patient had difficulty reproducing a pattern sequence from memory as she was unable to correctly recall the sequence.  She also added at least one extra path to each course, suggesting difficulty with distractor items.        Clinical Summary:     In sum, patient evidence improvement with short term memory, however, had difficulty at higher order, that is, with manipulation and sequences suggesting weak executive processes and processing. This is likely baseline attributed to aging related cognitive changes and mildly negatively impacted by stroke.   Overall, her attention and memory skills have improved and will reassess her cognition to confirm improvements.  Patient at this time require supervision in all complex activities such as all iadls in particular medication management.       Patient would  benefit from emotional/mood support.         Goals: Monitor cognition, support cognitive recovery and adjustment.       Plan: Provide feedback prior to discharge; 1-2 sessions weekly at 30-60 minutes each.       Neuropsychology Follow Up           Treatment focused on: Cognitive Remediation     Pain: Denied  Location: N/A  Ratin/10  Intervention: N/A     Orientation: [facility]     Arousal Level: Alert     Behavior: Cooperative, friendly     Affect/Mood Range: WFL       Needed: No   #: N/A     Attention: [facility]     Insight into illness/deficits: [facility]          Patient was seen for a cognitive remediation session following cognitive problems, including memory and attention secondary to CVA.           Cognitive remediation tasks were completed on: [employer]          Results:     Patient evidenced intact short-term verbal memory, as she was able to spontaneously recall and recognize target words.   With prompting, patient was able to spontaneously recall 2 additional words..  Patient evidence improved visuo-spatial short-term memory, as she correctly identified the location of to 3 words .          However, the patient had difficulty reproducing a pattern sequence from memory as she was unable to correctly recall the sequence.  She also added at least one extra path to each course, suggesting difficulty with distractor items.        Clinical Summary:     In sum, patient evidence improvement with short term memory, however, had difficulty at higher order, that is, with manipulation and sequences suggesting weak executive processes and processing. This is likely baseline attributed to aging related cognitive changes and mildly negatively impacted by stroke.   Overall, her attention and memory skills have improved and will reassess her cognition to confirm improvements.  Patient at this time require supervision in all complex activities such as all iadls in particular medication management.       Patient would  benefit from emotional/mood support.         Goals: Monitor cognition, support cognitive recovery and adjustment.       Plan: Provide feedback prior to discharge; 1-2 sessions weekly at 30-60 minutes each.

## 2023-12-14 ENCOUNTER — TRANSCRIPTION ENCOUNTER (OUTPATIENT)
Age: 83
End: 2023-12-14

## 2023-12-14 RX ORDER — TICAGRELOR 90 MG/1
1 TABLET ORAL
Qty: 60 | Refills: 0
Start: 2023-12-14 | End: 2024-01-12

## 2023-12-14 RX ORDER — PANTOPRAZOLE SODIUM 20 MG/1
1 TABLET, DELAYED RELEASE ORAL
Qty: 30 | Refills: 0
Start: 2023-12-14 | End: 2024-01-12

## 2023-12-14 RX ORDER — ACETAMINOPHEN 500 MG
2 TABLET ORAL
Qty: 0 | Refills: 0 | DISCHARGE
Start: 2023-12-14

## 2023-12-14 RX ORDER — FESOTERODINE FUMARATE 8 MG/1
1 TABLET, FILM COATED, EXTENDED RELEASE ORAL
Refills: 0 | DISCHARGE

## 2023-12-14 RX ORDER — ATORVASTATIN CALCIUM 80 MG/1
1 TABLET, FILM COATED ORAL
Qty: 30 | Refills: 0
Start: 2023-12-14 | End: 2024-01-12

## 2023-12-14 RX ORDER — PANTOPRAZOLE SODIUM 20 MG/1
1 TABLET, DELAYED RELEASE ORAL
Qty: 0 | Refills: 0 | DISCHARGE

## 2023-12-14 RX ORDER — FESOTERODINE FUMARATE 8 MG/1
1 TABLET, FILM COATED, EXTENDED RELEASE ORAL
Qty: 30 | Refills: 0
Start: 2023-12-14 | End: 2024-01-12

## 2023-12-14 RX ADMIN — HEPARIN SODIUM 5000 UNIT(S): 5000 INJECTION INTRAVENOUS; SUBCUTANEOUS at 17:34

## 2023-12-14 RX ADMIN — PANTOPRAZOLE SODIUM 40 MILLIGRAM(S): 20 TABLET, DELAYED RELEASE ORAL at 05:44

## 2023-12-14 RX ADMIN — PREGABALIN 1000 MICROGRAM(S): 225 CAPSULE ORAL at 12:48

## 2023-12-14 RX ADMIN — TICAGRELOR 90 MILLIGRAM(S): 90 TABLET ORAL at 17:35

## 2023-12-14 RX ADMIN — FESOTERODINE FUMARATE 4 MILLIGRAM(S): 8 TABLET, FILM COATED, EXTENDED RELEASE ORAL at 12:49

## 2023-12-14 RX ADMIN — HEPARIN SODIUM 5000 UNIT(S): 5000 INJECTION INTRAVENOUS; SUBCUTANEOUS at 05:44

## 2023-12-14 RX ADMIN — ATORVASTATIN CALCIUM 80 MILLIGRAM(S): 80 TABLET, FILM COATED ORAL at 21:21

## 2023-12-14 RX ADMIN — ATENOLOL 50 MILLIGRAM(S): 25 TABLET ORAL at 05:46

## 2023-12-14 RX ADMIN — Medication 81 MILLIGRAM(S): at 12:48

## 2023-12-14 RX ADMIN — CHLORHEXIDINE GLUCONATE 1 APPLICATION(S): 213 SOLUTION TOPICAL at 05:47

## 2023-12-14 RX ADMIN — Medication 650 MILLIGRAM(S): at 20:16

## 2023-12-14 RX ADMIN — LOSARTAN POTASSIUM 100 MILLIGRAM(S): 100 TABLET, FILM COATED ORAL at 21:21

## 2023-12-14 RX ADMIN — TICAGRELOR 90 MILLIGRAM(S): 90 TABLET ORAL at 05:46

## 2023-12-14 RX ADMIN — Medication 1 MILLIGRAM(S): at 12:48

## 2023-12-14 RX ADMIN — Medication 650 MILLIGRAM(S): at 20:17

## 2023-12-14 NOTE — DISCHARGE NOTE PROVIDER - HOSPITAL COURSE
HPI:  83 year old female with PMHx of arthritis, hypertension, hyperlipidemia who presented to the ED for gait ataxia and peripheral extremity numbness. Patient reported that 3 days PTA she received a flu shot, and over the weekend while cleaning experienced onset of RUE pain/weakness, in which she rested on the cough and then after trying to get up endorsed problems with balance and experiencing distal numbness in bother her hands and feet, prompting presentation to the ED. On presentation, imaging revealed L thalamic acute lacunar infarct. CTA revealed marked calcification at the right and left common carotid artery bifurcations with severe stenosis at the level of the right carotid bulb, left CC bifurcation and ast the left carotid bulb. Patient underwent diagnostic cerebral angiogram on 11/28/23 due to stenotic findings on CTA, which revealed non flow limiting stenosis bilateral carotid arteries, and no carotid angioplasty/stenting was warranted. Symptoms of distal numbness improved overtime and patient hemodynamically stable. She has been medically stable. She has severe limitation of both shoulders at baseline.  Of note, the patient states that she was recently evaluated by a rheumatologist and was told that despite being on Remicade for years, that she has osteoarthritis and never had rheumatoid arthritis. Patient had episode of diarrhea yesterday, improved today.  Patient denies any complaints at this time.   Tolerating PT/OT.   Tolerating oral diet. Voiding and passing stool spontaneously.Patient is anticipated for discharge tomorrow.Current Level of Function:  Bed Mobility: supervision  Transfers: supervision  Gait: steady assist 150ft with RW  Stairs: 10 steps with partial assist#Rehab of left thalamic lacunar infarct in the setting of bilateral carotid artery stenosis resulting in ataxic gait, incoordination and ADL dysfunction.   *MRI Brain: Left thalamic acute lacunar infarct. No acute hemorrhage  s/p DSA 11/28: non flow limiting stenosis bilateral carotid arteries. No carotid angioplasty/stenting warranted.     - continue inpatient Rehab   - Patient is on full rehab program of 3 hours a day of PT, OT, and/or SLP, for 5-6 days a week, for a total of 15 hours a week.  - Precautions/restrictions: Safety precautions, fall precautions  - Pain control: Tylenol 650mg q6hr PRN  - C/w  ASA 81 mg once daily & Brillinta 90mg qD.  (Plavix switched to Brilinta 90mg twice daily (per neuroendovascular request) given PRU result of 278. Patient will stay on that regimen for 90 days.  - c/w atorvastatin 80mg once daily    #Peripheral Polyneuropathy of unknown etiology  - significant loss of proprioception and cold sensitivity in both distal LEs.   - possibly associated with Remicade use  - DDx include acute inflammatory demyelinating polyneuropathy, metabolic demyelinization (2/2 vitamin deficiency), autoimmune disorder  - Additional testing: ESR 57 , CRP <3.0, Vitamin b12, folate >20, HbA1C 5.3, , Lyme antibody, TSH 2.19, IgA LEVEL, Ganglioside antibodies including anti GM1 (-), GM2 (+), GD1a (-), GD1b (-), and GQ1b (-), SPEP, UPEP, Vitb12 413, VitB1, Vit-E, Homocysteine, Methylmalonic acid. Copper, Zinc, HIV (-), RPR (-)     #HTN  - Losartan 100mg QD  - Atenolol 50mg QD  -Continue to monitor hemodynamics    #Hx of Asthma  -States no flares in the past several years. Albuterol at homeContinue to monitor hemodynamics    #Hx of Asthma  -States no flares in the past several years. Albuterol at home  -c/w Proventil q6hr PRN    #Arthritis  -Patient has history of RA diagnosis in which she was taking Remicade for many years, however from recent rheumatology evaluation outpatient by Dr. Cruz, diagnosed as OA, not RA  - severe pain and limited mobility of tj ankles and shoulders and deformities of hands and knees.  -PT/OT      #Aortic valve stenosis, mod to severe  -Outpatient followup with Dr. Patel.   TTE: EF 50-55% with moderate to severe aortic valve stenosis.    #HLD  -Atorvastatin 80mg QD    #Urinary incontinence (chronic)  Patient has history of urinary frequency and incontinent episodes at home  -Discontinued Oxybutynin 5mg BID (patient reports dry mouth and poor tolerance to med)  -Started Fesoterdoine Fumerate 4mg (home, non formulary)    #macrocytic anemia  - stable, no overt bleeding  - iron studies 12/4/23 - , Retic 2.5%, absolute retic WNL, total iron 46, , TIBC 190  - transfuse if hemoglobin < 7     #Hypomagnesemia  - replete as appropriate, continue monitoring     -Pain control: Tylenol PRN    -GI/Bowel Mgmt: Senna, Miralax    -Bladder management: Urinary incontinent, will monitor and adjust meds     -Skin:  No active issues at this time    -FEN will monitor and supplement    - Diet: DASH Ortho: Weight bearing status: wbat      - DVT prophylaxis: Heparin BID   HPI:  83 year old female with PMHx of arthritis, hypertension, hyperlipidemia who presented to the ED for gait ataxia and peripheral extremity numbness. Patient reported that 3 days PTA she received a flu shot, and over the weekend while cleaning experienced onset of RUE pain/weakness, in which she rested on the cough and then after trying to get up endorsed problems with balance and experiencing distal numbness in bother her hands and feet, prompting presentation to the ED. On presentation, imaging revealed L thalamic acute lacunar infarct. CTA revealed marked calcification at the right and left common carotid artery bifurcations with severe stenosis at the level of the right carotid bulb, left CC bifurcation and ast the left carotid bulb. Patient underwent diagnostic cerebral angiogram on 11/28/23 due to stenotic findings on CTA, which revealed non flow limiting stenosis bilateral carotid arteries, and no carotid angioplasty/stenting was warranted. Symptoms of distal numbness improved overtime and patient hemodynamically stable. She has been medically stable. She has severe limitation of both shoulders at baseline.  Of note, the patient states that she was recently evaluated by a rheumatologist and was told that despite being on Remicade for years, that she has osteoarthritis and never had rheumatoid arthritis. Patient had episode of diarrhea yesterday, improved today.  Patient denies any complaints at this time.   Tolerating PT/OT.   Tolerating oral diet. Voiding and passing stool spontaneously.Patient is anticipated for discharge tomorrow.Current Level of Function:  Bed Mobility: supervision  Transfers: supervision  Gait: steady assist 150ft with RW  Stairs: 10 steps with partial assist#Rehab of left thalamic lacunar infarct in the setting of bilateral carotid artery stenosis resulting in ataxic gait, incoordination and ADL dysfunction.   *MRI Brain: Left thalamic acute lacunar infarct. No acute hemorrhage  s/p DSA 11/28: non flow limiting stenosis bilateral carotid arteries. No carotid angioplasty/stenting warranted.     - continue inpatient Rehab   - Patient is on full rehab program of 3 hours a day of PT, OT, and/or SLP, for 5-6 days a week, for a total of 15 hours a week.  - Precautions/restrictions: Safety precautions, fall precautions  - Pain control: Tylenol 650mg q6hr PRN  - C/w  ASA 81 mg once daily & Brillinta 90mg qD.  (Plavix switched to Brilinta 90mg twice daily (per neuroendovascular request) given PRU result of 278. Patient will stay on that regimen for 90 days.  - c/w atorvastatin 80mg once daily    #Peripheral Polyneuropathy of unknown etiology  - significant loss of proprioception and cold sensitivity in both distal LEs.   - possibly associated with Remicade use  - DDx include acute inflammatory demyelinating polyneuropathy, metabolic demyelinization (2/2 vitamin deficiency), autoimmune disorder  - Additional testing: ESR 57 , CRP <3.0, Vitamin b12, folate >20, HbA1C 5.3, , Lyme antibody, TSH 2.19, IgA LEVEL, Ganglioside antibodies including anti GM1 (-), GM2 (+), GD1a (-), GD1b (-), and GQ1b (-), SPEP, UPEP, Vitb12 413, VitB1, Vit-E, Homocysteine, Methylmalonic acid. Copper, Zinc, HIV (-), RPR (-)     #HTN  - Losartan 100mg QD  - Atenolol 50mg QD  -Continue to monitor hemodynamics    #Hx of Asthma  -States no flares in the past several years. Albuterol at home  continue to monitor hemodynamics    #Hx of Asthma  -States no flares in the past several years. Albuterol at home  -c/w Proventil q6hr PRN    #Arthritis  -Patient has history of RA diagnosis in which she was taking Remicade for many years, however from recent rheumatology evaluation outpatient by Dr. Cruz, diagnosed as OA, not RA  - severe pain and limited mobility of tj ankles and shoulders and deformities of hands and knees.  -PT/OT      #Aortic valve stenosis, mod to severe  -Outpatient followup with Dr. Patel.   TTE: EF 50-55% with moderate to severe aortic valve stenosis.    #HLD  -Atorvastatin 80mg QD    #Urinary incontinence (chronic)  Patient has history of urinary frequency and incontinent episodes at home  -Discontinued Oxybutynin 5mg BID (patient reports dry mouth and poor tolerance to med)  -Started Fesoterdoine Fumerate 4mg (home, non formulary)    #macrocytic anemia  - stable, no overt bleeding  - iron studies 12/4/23 - , Retic 2.5%, absolute retic WNL, total iron 46, , TIBC 190  - transfuse if hemoglobin < 7     #Hypomagnesemia  - replete as appropriate, continue monitoring     -Pain control: Tylenol PRN    -GI/Bowel Mgmt: Senna, Miralax    -Bladder management: Urinary incontinent, will monitor and adjust meds     -Skin:  No active issues at this time    -FEN will monitor and supplement    - Diet: DASH Ortho: Weight bearing status: wbat      - DVT prophylaxis: Heparin BID.. she will be discharged home today with instructions to follow up with neurologist in 2 weeks, pmd and cardiologist in 2 to 4 weeks ,

## 2023-12-14 NOTE — DISCHARGE NOTE PROVIDER - NSDCMRMEDTOKEN_GEN_ALL_CORE_FT
acetaminophen 325 mg oral tablet: 2 tab(s) orally every 6 hours As needed Temp greater or equal to 38C (100.4F), Mild Pain (1 - 3), Moderate Pain (4 - 6)  Albuterol (Eqv-ProAir HFA) 90 mcg/inh inhalation aerosol: 2 puff(s) inhaled every 6 hours as needed for  bronchospasm  aspirin 81 mg oral delayed release tablet: 1 tab(s) orally once a day  atenolol 50 mg oral tablet: 1 tab(s) orally once a day  atorvastatin 80 mg oral tablet: 1 tab(s) orally once a day (at bedtime)  carbonyl iron 45 mg oral tablet: 1 tab(s) orally once a day  cyanocobalamin 1000 mcg oral tablet: 1 tab(s) orally every 24 hours  fesoterodine 4 mg oral tablet, extended release: 1 tab(s) orally once a day  folic acid 1 mg oral tablet: 1 tab(s) orally once a day  losartan 100 mg oral tablet: 1 tab(s) orally once a day (at bedtime)  pantoprazole 40 mg oral delayed release tablet: 1 tab(s) orally once a day  ticagrelor 90 mg oral tablet: 1 tab(s) orally every 12 hours   acetaminophen 325 mg oral tablet: 2 tab(s) orally every 6 hours As needed Temp greater or equal to 38C (100.4F), Mild Pain (1 - 3), Moderate Pain (4 - 6)  Albuterol (Eqv-ProAir HFA) 90 mcg/inh inhalation aerosol: 2 puff(s) inhaled every 6 hours as needed for  bronchospasm  aspirin 81 mg oral delayed release tablet: 1 tab(s) orally once a day  atenolol 50 mg oral tablet: 1 tab(s) orally once a day  atorvastatin 80 mg oral tablet: 1 tab(s) orally once a day (at bedtime)  carbonyl iron 45 mg oral tablet: 1 tab(s) orally once a day  cyanocobalamin 1000 mcg oral tablet: 1 tab(s) orally every 24 hours  fesoterodine 4 mg oral tablet, extended release: 1 tab(s) orally once a day  folic acid 1 mg oral tablet: 1 tab(s) orally once a day  losartan 100 mg oral tablet: 1 tab(s) orally once a day (at bedtime)  pantoprazole 40 mg oral delayed release tablet: 1 tab(s) orally once a day  ticagrelor 90 mg oral tablet: 1 tab(s) orally 2 times a day failed Plavix , need to be on  ticagrelor

## 2023-12-14 NOTE — PROGRESS NOTE ADULT - ASSESSMENT
Neuropsychology Follow Up        Treatment Session focused on: Cognitive functioning     Pain: None Location: N/A     Orientation: [facility]     Arousal Level: Alert     Behavior: Cooperative, friendly     Affect/Mood Range: [facility]      Needed: No   #: N/A     Attention: [facility]     Insight into illness/deficits: [facility]          Patient was seen for a re-evaluation session to determine cognitive functioning improvement, mainly in the areas of memory and executive functioning abilities secondary to a CVA.      Patient was evaluated and the following neuropsychological measures were given: Cognistat Active Form (re-evaluation)          Results:      Attention/Executive Functioning:      Her basic auditory attention was average, as she recalled a total of 5 numbers in forward sequencing. Her ability to recall numbers in reverse order was impaired (3 digits), which suggests no improvement (previous recall was 3 digits).      On tasks involving mental math, the patient’s ability to implement working memory and concentration was average; previous results were impaired (2/4).      Learning:      Verbal encoding for a list of words was intact (4/4 words recalled in 3 trials).           Memory:      She recalled all of the 4-list words spontaneously; whereas previous results evidenced weak spontaneous recall (2/4) and she required recognition cues.            The patient’s ability to copy an abstract design copy task was impaired, it was notable for poor attention to detail and mild distortions.          Visuospatial/constructional: Clock drawing demonstrated good planning and executive functioning skills, as the clock was correctly numbered. However, she did not indicate the correct time. Patient successfully wrote her name.           Clinical Summary:     Compared to her initial evaluation, she improved immediate and delayed memory recall and executive skills, mainly working memory. However, she exhibited continued weaknesses in executive processes, mainly in the areas of impulsivity and visuospatial skills. Patient will be referred for a more comprehensive neuropsychological evaluation. In the meantime, she will benefit from memory aids such as reminders, repetition of instructions (she benefits from verbal input). It will be helpful to reduce the presentation rate for any new information, and repetition of instructions might be necessary to ensure attention and comprehension. Given impulsiveness, she will require multiple redirections to take her time while completing tasks.         Goals: Monitor cognition, support cognitive recovery and adjustment; cognitive remediation         Plan: Monitor cognition; provide feedback prior to discharge; 1-3 sessions weekly at 30-60 minutes each    Neuropsychology Follow Up        Treatment Session focused on: Cognitive functioning     Pain: None Location: N/A     Orientation: [facility]     Arousal Level: Alert     Behavior: Cooperative, friendly     Affect/Mood Range: [facility]      Needed: No   #: N/A     Attention: [facility]     Insight into illness/deficits: [facility]          Patient was seen for a re-evaluation session to determine cognitive functioning improvement, mainly in the areas of memory and executive functioning abilities secondary to a CVA.      Patient was evaluated and the following neuropsychological measures were given: Cognistat Active Form (re-evaluation)          Results:      Attention/Executive Functioning:      Her basic auditory attention was average, as she recalled a total of 5 numbers in forward sequencing. Her ability to recall numbers in reverse order was impaired (3 digits), which suggests no improvement (previous recall was 3 digits).      On tasks involving mental math, the patient’s ability to implement working memory and concentration was average; previous results were impaired (2/4).      Learning:      Verbal encoding for a list of words was intact (4/4 words recalled in 3 trials).           Memory:      She recalled all of the 4-list words spontaneously; whereas previous results evidenced weak spontaneous recall (2/4) and she required recognition cues.            The patient’s ability to copy an abstract design copy task was impaired, it was notable for poor attention to detail and mild distortions.          Visuospatial/constructional: Clock drawing demonstrated good planning and executive functioning skills, as the clock was correctly numbered. However, she did not indicate the correct time. Patient successfully wrote her name.           Clinical Summary:     Compared to her initial evaluation, she improved immediate and delayed memory recall and executive skills, mainly working memory. However, she exhibited continued weaknesses in executive processes, mainly in the areas of impulsivity and visuospatial skills. Patient will be referred for a more comprehensive neuropsychological evaluation. In the meantime, she will benefit from memory aids such as reminders, repetition of instructions (she benefits from verbal input). It will be helpful to reduce the presentation rate for any new information, and repetition of instructions might be necessary to ensure attention and comprehension. Given impulsiveness, she will require multiple redirections to take her time while completing tasks.         Goals: Monitor cognition, support cognitive recovery and adjustment; cognitive remediation         Plan: Monitor cognition; provide feedback prior to discharge; 1-3 sessions weekly at 30-60 minutes each

## 2023-12-14 NOTE — DISCHARGE NOTE PROVIDER - NSDCQMSTROKERISK_NEU_ALL_CORE
Carotid stenosis/High blood pressure/High cholesterol Carotid stenosis/High blood pressure/High cholesterol/History of a stroke or TIA

## 2023-12-14 NOTE — DISCHARGE NOTE PROVIDER - NSDCCPCAREPLAN_GEN_ALL_CORE_FT
PRINCIPAL DISCHARGE DIAGNOSIS  Diagnosis: Ischemic stroke  Assessment and Plan of Treatment: You were admitted with weakness ,numness in extrmities and found to have a left thalamic acute infarct or stroke , You were seen by neuro , neuro endovascolar team , placed you on Asa and plavix changed to brilinta as per neruoendovascular team  since PRU test was 278, for 90 days then may be justasa , you need to get the advise from your neurodoctor  for this . continue Lipitor as neuro team recommended this ,( do not take zocor ) You also found to have ica stenosis in MRi and you underwent  diagnostic cerebral angiogram and found no obstruction in flow , no need for angiogram or stent , need to follow up with neuro and neuroendovascular team in 2 to 4 weeks .      SECONDARY DISCHARGE DIAGNOSES  Diagnosis: Hypertension  Assessment and Plan of Treatment: And Aaortic valve stenosis, stable blood pressure  on atenol and losartan with diet control ,2 gram sodium heart healthy diet , also EF or heart function is 50 to 55 % , please do your periodic follow  up with caridologist dr Patel as per  wilma ,    Diagnosis: Peripheral neuropathy  Assessment and Plan of Treatment: Idiopathic , possibly from Remicade use  in the past . it seems you had no RA but has Osteoarthritis . pleas econtinue physical therapy and pain meds as needed . continue b12 , folic acid  supplemet and iron pill as advised by your neurologist .    Diagnosis: Urinary bladder incontinence  Assessment and Plan of Treatment: You are Toviaz for this , helping you to prevent over active bladder incontinence .    Diagnosis: H/O macrocytic anemia  Assessment and Plan of Treatment: on iron pill , continue this and follow up with your pmd  in 2 weeks .    Diagnosis: Asthma  Assessment and Plan of Treatment: stable on prn albuterol inhalation     PRINCIPAL DISCHARGE DIAGNOSIS  Diagnosis: Ischemic stroke  Assessment and Plan of Treatment: You were admitted with weakness ,numness in extrmities and found to have a left thalamic acute infarct or stroke , You were seen by neuro , neuro endovascolar team , placed you on Asa, and Plavix changed to brilinta as per neruoendovascular team  since PRU test was 278, for 90 days then may be justasa , you need to get the advise from your neurodoctor  for this . continue Lipitor as neuro team recommended this ,( do not take zocor ) You also found to have ica stenosis in MRi and you underwent  diagnostic cerebral angiogram and found no obstruction in flow , no need for angiogram or stent , need to follow up with neuro and neuroendovascular team in 2 to 4 weeks . you should take brilinta for 90 days total ,  since [facility] causes more than 300 $ copayment , [facility] was contacted and making arrangement for free ,butonly able to get the med by tomorrow , patient knows to make arrangement to pick it up tomorrow .      SECONDARY DISCHARGE DIAGNOSES  Diagnosis: Hypertension  Assessment and Plan of Treatment: And Aaortic valve stenosis, stable blood pressure  on atenol and losartan with diet control ,2 gram sodium heart healthy diet , also EF or heart function is 50 to 55 % , please do your periodic follow  up with caridologist dr Patel as per  wilma ,    Diagnosis: Peripheral neuropathy  Assessment and Plan of Treatment: Idiopathic , possibly from Remicade use  in the past . it seems you had no RA but has Osteoarthritis . pleas econtinue physical therapy and pain meds as needed . continue b12 , folic acid  supplemet and iron pill as advised by your neurologist .    Diagnosis: Urinary bladder incontinence  Assessment and Plan of Treatment: You are Toviaz for this , helping you to prevent over active bladder incontinence .    Diagnosis: H/O macrocytic anemia  Assessment and Plan of Treatment: on iron pill , continue this and follow up with your pmd  in 2 weeks .    Diagnosis: Asthma  Assessment and Plan of Treatment: stable on prn albuterol inhalation

## 2023-12-14 NOTE — DISCHARGE NOTE PROVIDER - CARE PROVIDER_API CALL
Marco Antonio Duncan  Internal Medicine  [address] [place] [address]  Phone: [phone]  Fax: [phone]  Follow Up Time:     Henri Patel  Cardiovascular Disease  [address] [facility], [address]  [place] [address]  Phone: [phone]  Fax: [phone]  Follow Up Time:     Mariely [facility]ura [facility]  [address] [place] [address]  Phone: [phone]  Fax: [phone]  Follow Up Time:    Marco Antonio Duncan  Internal Medicine  [address] [place] [address]  Phone: [phone]  Fax: [phone]  Follow Up Time:     Henri Patel  Cardiovascular Disease  [address] [facility], [address]  [place] [address]  Phone: [phone]  Fax: [phone]  Follow Up Time:     Mariely Ventura [facility]  [address] [place] [address]  Phone: [phone]  Fax: [phone]  Follow Up Time:

## 2023-12-14 NOTE — DISCHARGE NOTE PROVIDER - PROVIDER TOKENS
PROVIDER:[TOKEN:[[id number]]],PROVIDER:[TOKEN:[[id number]]],PROVIDER:[TOKEN:[[id number]]] PROVIDER:[TOKEN:[[id number]]],PROVIDER:[TOKEN:[[id number]]],PROVIDER:[TOKEN:[[id number]]]

## 2023-12-14 NOTE — DISCHARGE NOTE PROVIDER - CARE PROVIDERS DIRECT ADDRESSES
,DirectAddress_Unknown,[email or web address]_Unknown,[email or web address]

## 2023-12-14 NOTE — PROGRESS NOTE ADULT - SUBJECTIVE AND OBJECTIVE BOX
Patient is a 83y old  Female who presents with a chief complaint of Rehab of left thalamic lacunar infarct resulting in ataxic gait, incoordination, and ADL dysfunction. (13 Dec 2023 14:19)      HPI:  83 year old female with PMHx of arthritis, hypertension, hyperlipidemia who presented to the ED for gait ataxia and peripheral extremity numbness. Patient reported that 3 days PTA she received a flu shot, and over the weekend while cleaning experienced onset of RUE pain/weakness, in which she rested on the cough and then after trying to get up endorsed problems with balance and experiencing distal numbness in bother her hands and feet, prompting presentation to the ED. On presentation, imaging revealed L thalamic acute lacunar infarct. CTA revealed marked calcification at the right and left common carotid artery bifurcations with severe stenosis at the level of the right carotid bulb, left CC bifurcation and ast the left carotid bulb. Patient underwent diagnostic cerebral angiogram on 11/28/23 due to stenotic findings on CTA, which revealed non flow limiting stenosis bilateral carotid arteries, and no carotid angioplasty/stenting was warranted. Symptoms of distal numbness improved overtime and patient hemodynamically stable. She has been medically stable. She has severe limitation of both shoulders at baseline.  Of note, the patient states that she was recently evaluated by a rheumatologist and was told that despite being on Remicade for years, that she has osteoarthritis and never had rheumatoid arthritis.     Living situation: Lives with family (daughter, son-in-law, grandkids) in private house, 0 Steps to enter, 12 Steps inside with stair lift.  Prior Level of Function: Ambulating independent with RW and rollator, Independent with ADL/iADLs    Evaluated by bedside physical therapy and occupational therapy. Patient is minimum to moderate assistance for bed mobility and transfers, ambulating 25 feet x3 with rolling walker minimum assistance.  Upper body dressing  and lower body dressing moderate/ max assistance, Prior to admission patient was independent with mobility and ambulation with a rolling walker, and independent in all ADLs, IADLs. Evaluated by physical medicine and rehabilitation consult and deemed an appropriate candidate for inpatient rehabilitation facility. Admitted to [facility] on  11/30/23   (30 Nov 2023 12:31)      TODAY'S SUBJECTIVE & REVIEW OF SYMPTOMS  Patient was seen and assessed at bedside. No overnight events.   Patient had episode of diarrhea yesterday, improved today.  Patient denies any complaints at this time.   Tolerating PT/OT.   Tolerating oral diet. Voiding and passing stool spontaneously.   Vital signs reviewed.  Patient is anticipated for discharge tomorrow.    Current Level of Function:  Bed Mobility: supervision  Transfers: supervision  Gait: steady assist 150ft with RW  Stairs: 10 steps with partial assist  Upper body dressing: setup  Lower body dressing: setup    Review of Systems:   Constitutional:    [  X ] WNL           [   ] poor appetite   [   ] insomnia   [   ] tired   Cardio:                [ X  ] WNL           [   ] CP   [   ] DEE   [   ] palpitations               Resp:                   [  X ] WNL           [   ] SOB   [   ] cough   [   ] wheezing   GI:                        [X   ] WNL           [   ] constipation   [   ] diarrhea   [   ] abdominal pain   [   ] nausea   [   ] emesis                                :                      [   ] WNL           [   ] LOPEZ  [   ] dysuria   [ X  ] difficulty voiding             Endo:                   [  X ] WNL          [   ] polyuria   [   ] temperature intolerance                 Skin:                     [  X ] WNL          [   ] pain   [   ] wound   [   ] rash   MSK:                    [   ] WNL          [   ] muscle pain   [  X ] joint pain/ stiffness shoulder, hips, knees, ankles/ feet     [   ] muscle tenderness   [   ] swelling   Neuro:                 [   ] WNL          [   ] HA   [   ] change in vision   [   ] tremor   [ X  ] weakness/ unsteady gait    [   ]dysphagia              Cognitive:           [ X  ] WNL           [   ]confusion      Psych:                  [ X  ] WNL           [   ] hallucinations   [   ]agitation   [   ] delusion   [   ]depression      PHYSICAL EXAM    Vital Signs Last 24 Hrs  T(C): 36.1 (14 Dec 2023 04:48), Max: 37 (13 Dec 2023 20:10)  T(F): 97 (14 Dec 2023 04:48), Max: 98.6 (13 Dec 2023 20:10)  HR: 74 (14 Dec 2023 04:48) (64 - 74)  BP: 123/61 (14 Dec 2023 04:48) (119/60 - 123/61)  BP(mean): --  RR: 18 (14 Dec 2023 04:48) (18 - 19)  SpO2: --    General:[ X  ] NAD, Resting Comfortable,   [   ] other:                                HEENT: [  X ] NC/AT, EOMI, PERRL , Normal Conjunctivae,   [   ] other:  Cardio: [ X  ] RRR   [ X  ] other:  Grade 3 crescendo-decrescendo  systolic murmur auscultated in R 2nd intercostal space. RRR                             Pulm: [ X  ] No Respiratory Distress,  Lungs CTAB,   [   ] other:                       Abdomen: [X   ]ND/NT, Soft,   [   ] other:    : [  X ] NO LOPEZ CATHETER,        [   ] LOPEZ CATHETER- no meatal tear, no discharge,                                         MSK: [   ] No joint swelling, Full ROM,   [ X  ] other: Functional PROM both shoulders. (-) 30 deg. passive DF both ankles     No AROM of R shoulder, 0-30 degrees AROM of L shoulder (2/2 OA)                                   Ext: [ X ]   No C/C/E, No calf tenderness,   [   ]other:    Skin: [  X ] intact,   [   ] other:                                                                   Neurological Examination:  Cognitive: [   X ] AAO x 3,   [    ]  other:                                                                      Attention:  [    ] intact,   [ X   ]  other:   Impairment in tasks that require concentration and attention                Memory: [    ] intact,    [ X   ]  other:   2/3 with short term recall  Mood/Affect: [   X ] wnl,    [    ]  other:                                                                             Communication: [   X ]Fluent, no dysarthria, following commands:  [    ] other:   CN II - XII:  [  X  ] intact,  [    ] other:                                                                                        Motor:   RIGHT UE: [   ] WNL,  [X   ] other: 4-/5 shoulder abduction (limited ROM due to OA), 4+/5 elbow flexion/extension, 4+/5 finger  strength   LEFT    UE: [   ] WNL,  [ X  ] other: 4-/5 shoulder abduction (limited ROM due to OA), 4+/5 elbow flexion/extension, 4+/5 finger  strength   RIGHT LE: [   ] WNL,  [ X  ] other:  5-/5 hip flexion, 5/5 knee flexion/extension, 2-/5 dorsiflexion due to limited passive range  LEFT    LE: [   ] WNL,  [  X ] other: 5-/5 hip flexion, 5/5 knee flexion/extension, 2-/5  dorsiflexion due to limited passive range    Tone: [  X  ] wnl,   [    ]  other:  DTRs: [  X ]symmetric, [   ] other:  Coordination:   [    ] intact,   [  X  ] other: Incoordinate with FNF bilaterally (worse R>L UE)                                                                          Sensory: [  X  ] Intact to light touch, temperature   [    ] other:    Decreased proprioception bilateral lower extremities  (-) garcia, babinski, clonus b/l extremities      acetaminophen     Tablet .. 650 milliGRAM(s) Oral every 6 hours PRN  albuterol    90 MICROgram(s) HFA Inhaler 2 Puff(s) Inhalation every 6 hours PRN  aspirin enteric coated 81 milliGRAM(s) Oral daily  atenolol  Tablet 50 milliGRAM(s) Oral daily  atorvastatin 80 milliGRAM(s) Oral at bedtime  chlorhexidine 2% Cloths 1 Application(s) Topical <User Schedule>  cyanocobalamin 1000 MICROGram(s) Oral every 24 hours  fesoterodine ER Tablet 4 milliGRAM(s) Oral <User Schedule>  folic acid 1 milliGRAM(s) Oral daily  heparin   Injectable 5000 Unit(s) SubCutaneous every 12 hours  losartan 100 milliGRAM(s) Oral at bedtime  pantoprazole    Tablet 40 milliGRAM(s) Oral before breakfast  ticagrelor 90 milliGRAM(s) Oral every 12 hours      RECENT LABS/IMAGING                        Patient is a 83y old  Female who presents with a chief complaint of Rehab of left thalamic lacunar infarct resulting in ataxic gait, incoordination, and ADL dysfunction. (13 Dec 2023 14:19)      HPI:  83 year old female with PMHx of arthritis, hypertension, hyperlipidemia who presented to the ED for gait ataxia and peripheral extremity numbness. Patient reported that 3 days PTA she received a flu shot, and over the weekend while cleaning experienced onset of RUE pain/weakness, in which she rested on the cough and then after trying to get up endorsed problems with balance and experiencing distal numbness in bother her hands and feet, prompting presentation to the ED. On presentation, imaging revealed L thalamic acute lacunar infarct. CTA revealed marked calcification at the right and left common carotid artery bifurcations with severe stenosis at the level of the right carotid bulb, left CC bifurcation and ast the left carotid bulb. Patient underwent diagnostic cerebral angiogram on 11/28/23 due to stenotic findings on CTA, which revealed non flow limiting stenosis bilateral carotid arteries, and no carotid angioplasty/stenting was warranted. Symptoms of distal numbness improved overtime and patient hemodynamically stable. She has been medically stable. She has severe limitation of both shoulders at baseline.  Of note, the patient states that she was recently evaluated by a rheumatologist and was told that despite being on Remicade for years, that she has osteoarthritis and never had rheumatoid arthritis.     Living situation: Lives with family (daughter, son-in-law, grandkids) in private house, 0 Steps to enter, 12 Steps inside with stair lift.  Prior Level of Function: Ambulating independent with RW and rollator, Independent with ADL/iADLs    Evaluated by bedside physical therapy and occupational therapy. Patient is minimum to moderate assistance for bed mobility and transfers, ambulating 25 feet x3 with rolling walker minimum assistance.  Upper body dressing  and lower body dressing moderate/ max assistance, Prior to admission patient was independent with mobility and ambulation with a rolling walker, and independent in all ADLs, IADLs. Evaluated by physical medicine and rehabilitation consult and deemed an appropriate candidate for inpatient rehabilitation facility. Admitted to [facility] on  11/30/23   (30 Nov 2023 12:31)      TODAY'S SUBJECTIVE & REVIEW OF SYMPTOMS  Patient was seen and assessed at bedside. No overnight events.   Patient had episode of diarrhea yesterday, improved today.  Patient denies any complaints at this time.   Tolerating PT/OT.   Tolerating oral diet. Voiding and passing stool spontaneously.   Vital signs reviewed.  Patient is anticipated for discharge tomorrow.    Current Level of Function:  Bed Mobility: supervision  Transfers: supervision  Gait: steady assist 150ft with RW  Stairs: 10 steps with partial assist  Upper body dressing: setup  Lower body dressing: setup    Review of Systems:   Constitutional:    [  X ] WNL           [   ] poor appetite   [   ] insomnia   [   ] tired   Cardio:                [ X  ] WNL           [   ] CP   [   ] DEE   [   ] palpitations               Resp:                   [  X ] WNL           [   ] SOB   [   ] cough   [   ] wheezing   GI:                        [X   ] WNL           [   ] constipation   [   ] diarrhea   [   ] abdominal pain   [   ] nausea   [   ] emesis                                :                      [   ] WNL           [   ] LOPEZ  [   ] dysuria   [ X  ] difficulty voiding             Endo:                   [  X ] WNL          [   ] polyuria   [   ] temperature intolerance                 Skin:                     [  X ] WNL          [   ] pain   [   ] wound   [   ] rash   MSK:                    [   ] WNL          [   ] muscle pain   [  X ] joint pain/ stiffness shoulder, hips, knees, ankles/ feet     [   ] muscle tenderness   [   ] swelling   Neuro:                 [   ] WNL          [   ] HA   [   ] change in vision   [   ] tremor   [ X  ] weakness/ unsteady gait    [   ]dysphagia              Cognitive:           [ X  ] WNL           [   ]confusion      Psych:                  [ X  ] WNL           [   ] hallucinations   [   ]agitation   [   ] delusion   [   ]depression      PHYSICAL EXAM    Vital Signs Last 24 Hrs  T(C): 36.1 (14 Dec 2023 04:48), Max: 37 (13 Dec 2023 20:10)  T(F): 97 (14 Dec 2023 04:48), Max: 98.6 (13 Dec 2023 20:10)  HR: 74 (14 Dec 2023 04:48) (64 - 74)  BP: 123/61 (14 Dec 2023 04:48) (119/60 - 123/61)  BP(mean): --  RR: 18 (14 Dec 2023 04:48) (18 - 19)  SpO2: --    General:[ X  ] NAD, Resting Comfortable,   [   ] other:                                HEENT: [  X ] NC/AT, EOMI, PERRL , Normal Conjunctivae,   [   ] other:  Cardio: [ X  ] RRR   [ X  ] other:  Grade 3 crescendo-decrescendo  systolic murmur auscultated in R 2nd intercostal space. RRR                             Pulm: [ X  ] No Respiratory Distress,  Lungs CTAB,   [   ] other:                       Abdomen: [X   ]ND/NT, Soft,   [   ] other:    : [  X ] NO LOPEZ CATHETER,        [   ] LOPEZ CATHETER- no meatal tear, no discharge,                                         MSK: [   ] No joint swelling, Full ROM,   [ X  ] other: Functional PROM both shoulders. (-) 30 deg. passive DF both ankles     No AROM of R shoulder, 0-30 degrees AROM of L shoulder (2/2 OA)                                   Ext: [ X ]   No C/C/E, No calf tenderness,   [   ]other:    Skin: [  X ] intact,   [   ] other:                                                                   Neurological Examination:  Cognitive: [   X ] AAO x 3,   [    ]  other:                                                                      Attention:  [    ] intact,   [ X   ]  other:   Impairment in tasks that require concentration and attention                Memory: [    ] intact,    [ X   ]  other:   2/3 with short term recall  Mood/Affect: [   X ] wnl,    [    ]  other:                                                                             Communication: [   X ]Fluent, no dysarthria, following commands:  [    ] other:   CN II - XII:  [  X  ] intact,  [    ] other:                                                                                        Motor:   RIGHT UE: [   ] WNL,  [X   ] other: 4-/5 shoulder abduction (limited ROM due to OA), 4+/5 elbow flexion/extension, 4+/5 finger  strength   LEFT    UE: [   ] WNL,  [ X  ] other: 4-/5 shoulder abduction (limited ROM due to OA), 4+/5 elbow flexion/extension, 4+/5 finger  strength   RIGHT LE: [   ] WNL,  [ X  ] other:  5-/5 hip flexion, 5/5 knee flexion/extension, 2-/5 dorsiflexion due to limited passive range  LEFT    LE: [   ] WNL,  [  X ] other: 5-/5 hip flexion, 5/5 knee flexion/extension, 2-/5  dorsiflexion due to limited passive range    Tone: [  X  ] wnl,   [    ]  other:  DTRs: [  X ]symmetric, [   ] other:  Coordination:   [    ] intact,   [  X  ] other: Incoordinate with FNF bilaterally (worse R>L UE)                                                                          Sensory: [  X  ] Intact to light touch, temperature   [    ] other:    Decreased proprioception bilateral lower extremities  (-) garcia, babinski, clonus b/l extremities      acetaminophen     Tablet .. 650 milliGRAM(s) Oral every 6 hours PRN  albuterol    90 MICROgram(s) HFA Inhaler 2 Puff(s) Inhalation every 6 hours PRN  aspirin enteric coated 81 milliGRAM(s) Oral daily  atenolol  Tablet 50 milliGRAM(s) Oral daily  atorvastatin 80 milliGRAM(s) Oral at bedtime  chlorhexidine 2% Cloths 1 Application(s) Topical <User Schedule>  cyanocobalamin 1000 MICROGram(s) Oral every 24 hours  fesoterodine ER Tablet 4 milliGRAM(s) Oral <User Schedule>  folic acid 1 milliGRAM(s) Oral daily  heparin   Injectable 5000 Unit(s) SubCutaneous every 12 hours  losartan 100 milliGRAM(s) Oral at bedtime  pantoprazole    Tablet 40 milliGRAM(s) Oral before breakfast  ticagrelor 90 milliGRAM(s) Oral every 12 hours      RECENT LABS/IMAGING

## 2023-12-14 NOTE — DISCHARGE NOTE PROVIDER - REASON FOR ADMISSION
Rehab of left thalamic lacunar infarct resulting in ataxic gait, incoordination, and ADL dysfunction.

## 2023-12-15 VITALS — SYSTOLIC BLOOD PRESSURE: 130 MMHG | HEART RATE: 68 BPM | DIASTOLIC BLOOD PRESSURE: 62 MMHG

## 2023-12-15 RX ORDER — TICAGRELOR 90 MG/1
1 TABLET ORAL
Refills: 0
Start: 2023-12-15

## 2023-12-15 RX ORDER — TICAGRELOR 90 MG/1
1 TABLET ORAL
Qty: 60 | Refills: 0
Start: 2023-12-15 | End: 2024-01-13

## 2023-12-15 RX ADMIN — ATENOLOL 50 MILLIGRAM(S): 25 TABLET ORAL at 05:54

## 2023-12-15 RX ADMIN — Medication 1 MILLIGRAM(S): at 12:17

## 2023-12-15 RX ADMIN — TICAGRELOR 90 MILLIGRAM(S): 90 TABLET ORAL at 17:08

## 2023-12-15 RX ADMIN — HEPARIN SODIUM 5000 UNIT(S): 5000 INJECTION INTRAVENOUS; SUBCUTANEOUS at 05:54

## 2023-12-15 RX ADMIN — FESOTERODINE FUMARATE 4 MILLIGRAM(S): 8 TABLET, FILM COATED, EXTENDED RELEASE ORAL at 12:17

## 2023-12-15 RX ADMIN — TICAGRELOR 90 MILLIGRAM(S): 90 TABLET ORAL at 05:54

## 2023-12-15 RX ADMIN — Medication 81 MILLIGRAM(S): at 12:17

## 2023-12-15 RX ADMIN — PANTOPRAZOLE SODIUM 40 MILLIGRAM(S): 20 TABLET, DELAYED RELEASE ORAL at 06:35

## 2023-12-15 RX ADMIN — CHLORHEXIDINE GLUCONATE 1 APPLICATION(S): 213 SOLUTION TOPICAL at 05:49

## 2023-12-15 RX ADMIN — PREGABALIN 1000 MICROGRAM(S): 225 CAPSULE ORAL at 12:17

## 2023-12-15 NOTE — PROGRESS NOTE ADULT - ASSESSMENT
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 s/p DSA with no stenting.     #Rehab of left thalamic lacunar infarct in the setting of bilateral carotid artery stenosis resulting in ataxic gait, incoordination and ADL dysfunction.   *MRI Brain: Left thalamic acute lacunar infarct. No acute hemorrhage  s/p DSA 11/28: non flow limiting stenosis bilateral carotid arteries. No carotid angioplasty/stenting warranted.     - continue inpatient Rehab   - Patient is on full rehab program of 3 hours a day of PT, OT, and/or SLP, for 5-6 days a week, for a total of 15 hours a week.  - Precautions/restrictions: Safety precautions, fall precautions  - Pain control: Tylenol 650mg q6hr PRN  - C/w  ASA 81 mg once daily & Brillinta 90mg qD.  (Plavix switched to Brilinta 90mg twice daily (per neuroendovascular request) given PRU result of 278. Patient will stay on that regimen for 90 days.  - c/w atorvastatin 80mg once daily    #Peripheral Polyneuropathy of unknown etiology  - significant loss of proprioception and cold sensitivity in both distal LEs.   - possibly associated with Remicade use  - DDx include acute inflammatory demyelinating polyneuropathy, metabolic demyelinization (2/2 vitamin deficiency), autoimmune disorder  - Additional testing: ESR 57 , CRP <3.0, Vitamin b12, folate >20, HbA1C 5.3, , Lyme antibody, TSH 2.19, IgA LEVEL, Ganglioside antibodies including anti GM1 (-), GM2 (+), GD1a (-), GD1b (-), and GQ1b (-), SPEP, UPEP, Vitb12 413, VitB1, Vit-E, Homocysteine, Methylmalonic acid. Copper, Zinc, HIV (-), RPR (-)     #HTN  - Losartan 100mg QD  - Atenolol 50mg QD  -Continue to monitor hemodynamics    #Hx of Asthma  -States no flares in the past several years. Albuterol at home  -c/w Proventil q6hr PRN    #Arthritis  -Patient has history of RA diagnosis in which she was taking Remicade for many years, however from recent rheumatology evaluation outpatient by Dr. Cruz, diagnosed as OA, not RA  - severe pain and limited mobility of tj ankles and shoulders and deformities of hands and knees.  -PT/OT      #Aortic valve stenosis, mod to severe  -Outpatient followup with Dr. Patel.   TTE: EF 50-55% with moderate to severe aortic valve stenosis.    #HLD  -Atorvastatin 80mg QD    #Urinary incontinence (chronic)  Patient has history of urinary frequency and incontinent episodes at home  -Discontinued Oxybutynin 5mg BID (patient reports dry mouth and poor tolerance to med)  -Started Fesoterdoine Fumerate 4mg (home, non formulary)    #macrocytic anemia  - stable, no overt bleeding  - iron studies 12/4/23 - , Retic 2.5%, absolute retic WNL, total iron 46, , TIBC 190  - transfuse if hemoglobin < 7     #Hypomagnesemia  - replete as appropriate, continue monitoring     -Pain control: Tylenol PRN    -GI/Bowel Mgmt: Senna, Miralax    -Bladder management: Urinary incontinent, will monitor and adjust meds     -Skin:  No active issues at this time    -FEN will monitor and supplement    - Diet: DASH       Precautions / PROPHYLAXIS:      - Falls    - Ortho: Weight bearing status: wbat      - DVT prophylaxis: Heparin BID   83 year old female with PMHx of arthritis, hypertension, hyperlipidemia who presented to the ED for gait ataxia and peripheral extremity numbness, found to have L thalamic lacunar infarct and bilateral calcification of common caroti artery bifurcations with severe stenosis at level of R carotid bulb and left CC bifurcation and left carotid bulb. s/p DSA with no stenting.     #Rehab of left thalamic lacunar infarct in the setting of bilateral carotid artery stenosis resulting in ataxic gait, incoordination and ADL dysfunction.   *MRI Brain: Left thalamic acute lacunar infarct. No acute hemorrhage  s/p DSA 11/28: non flow limiting stenosis bilateral carotid arteries. No carotid angioplasty/stenting warranted.     - Patient is on full rehab program of 3 hours a day of PT, OT, and/or SLP, for 5-6 days a week, for a total of 15 hours a week.  - Precautions/restrictions: Safety precautions, fall precautions  - Pain control: Tylenol 650mg q6hr PRN  - C/w  ASA 81 mg once daily & Brillinta 90mg qD.  (Plavix switched to Brilinta 90mg twice daily (per neuroendovascular request) given PRU result of 278. Patient will stay on that regimen for 90 days.  - c/w atorvastatin 80mg once daily  Stable for discharge home 12/15.     #Peripheral Polyneuropathy of unknown etiology  - significant loss of proprioception and cold sensitivity in both distal LEs.   - possibly associated with Remicade use  - DDx include acute inflammatory demyelinating polyneuropathy, metabolic demyelinization (2/2 vitamin deficiency), autoimmune disorder  - Additional testing: ESR 57 , CRP <3.0, Vitamin b12, folate >20, HbA1C 5.3, , Lyme antibody, TSH 2.19, IgA LEVEL, Ganglioside antibodies including anti GM1 (-), GM2 (+), GD1a (-), GD1b (-), and GQ1b (-), SPEP, UPEP, Vitb12 413, VitB1, Vit-E, Homocysteine, Methylmalonic acid. Copper, Zinc, HIV (-), RPR (-)     #HTN  - Losartan 100mg QD  - Atenolol 50mg QD  -Continue to monitor hemodynamics    #Hx of Asthma  -States no flares in the past several years. Albuterol at home  -c/w Proventil q6hr PRN    #Arthritis  -Patient has history of RA diagnosis in which she was taking Remicade for many years, however from recent rheumatology evaluation outpatient by Dr. Cruz, diagnosed as OA, not RA  - severe pain and limited mobility of tj ankles and shoulders and deformities of hands and knees.  -PT/OT      #Aortic valve stenosis, mod to severe  -Outpatient followup with Dr. Patel.   TTE: EF 50-55% with moderate to severe aortic valve stenosis.    #HLD  -Atorvastatin 80mg QD    #Urinary incontinence (chronic)  Patient has history of urinary frequency and incontinent episodes at home  -Discontinued Oxybutynin 5mg BID (patient reports dry mouth and poor tolerance to med)  -Started Fesoterdoine Fumerate 4mg (home, non formulary)    #macrocytic anemia  - stable, no overt bleeding  - iron studies 12/4/23 - , Retic 2.5%, absolute retic WNL, total iron 46, , TIBC 190  - transfuse if hemoglobin < 7     #Hypomagnesemia  - replete as appropriate, continue monitoring     -Pain control: Tylenol PRN    -GI/Bowel Mgmt: Senna, Miralax    -Bladder management: Urinary incontinent, will monitor and adjust meds     -Skin:  No active issues at this time    -FEN will monitor and supplement    - Diet: DASH       Precautions / PROPHYLAXIS:      - Falls    - Ortho: Weight bearing status: wbat      - DVT prophylaxis: Heparin BID

## 2023-12-15 NOTE — PROGRESS NOTE ADULT - REASON FOR ADMISSION
Rehab of left thalamic lacunar infarct resulting in ataxic gait, incoordination, and ADL dysfunction.
Rehab of left thalamic lacunar infarct in the setting of bilateral carotid artery stenosis resulting in ataxic gait, incoordination and ADL dysfunction.
Rehab of left thalamic lacunar infarct resulting in ataxic gait, incoordination, and ADL dysfunction.

## 2023-12-15 NOTE — PROGRESS NOTE ADULT - TIME BILLING
patient contact and cognitive follow up/therapy
Patient seen at bedside for psychodiagnostic evaluation; neurcognitive test administration/scoring/interpretation; feedback.
patient contact and cognitive follow up/ therapy
Patient seen at bedside for cognitive remediation session.
Patient seen and examined with the resident. We discussed the case. I have directed the care. I edited the note. The patient requires acute rehab with 3 hours of daily therapies at least 5 out of 7 days and close physiatry follow up. I participated in the rehab interdisciplinary team meeting; the patient progressed to ambulate 75 ft RW min assist.
Patient seen at bedside for psychoeducation and feedback session.

## 2023-12-15 NOTE — PROGRESS NOTE ADULT - ATTENDING COMMENTS
Patient seen and examined with the resident. We discussed the case. I have directed the care. I edited the note. The patient requires acute rehab with 3 hours of daily therapies at least 5 out of 7 days and close physiatry follow up.  #Rehab of left thalamic lacunar infarct in the setting of bilateral carotid artery stenosis resulting in ataxic gait, incoordination and ADL dysfunction.   *MRI Brain: Left thalamic acute lacunar infarct. No acute hemorrhage  s/p DSA 11/28: non flow limiting stenosis bilateral carotid arteries. No carotid angioplasty/stenting warranted.     -Admit to inpatient Rehab   - Patient is on full rehab program of 3 hours a day of PT, OT, and/or SLP, for 5-6 days a week, for a total of 15 hours a week.  - Precautions/restrictions: Safety precautions, fall precautions  - Pain control: Tylenol 650mg q6hr PRN  - C/w  ASA 81 mg once daily & Brillinta 90mg qD.  (Plavix switched to Brilinta 90mg twice daily (per neuroendovascular request) given PRU result of 278. Patient will stay on that regimen for 90 days.  - c/w atorvastatin 80mg once daily    #Peripheral Polyneuropathy of unknown etiology  - significant loss of proprioception and cold sensitivity in both distal LEs.   - possibly associated with Remicade use  - DDx include acute inflammatory demyelinating polyneuropathy, metabolic demyelinization (2/2 vitamin deficiency), autoimmune disorder  - Additional testing: ESR 57 , CRP <3.0, Vitamin b12, folate >20, HbA1C 5.3, , Lyme antibody, TSH 2.19, IgA LEVEL, Ganglioside antibodies including anti GM1 (-), GM2 (+), GD1a (-), GD1b (-), and GQ1b (-), SPEP, UPEP, Vitb12 413, VitB1, Vit-E, Homocysteine, Methylmalonic acid. Copper, Zinc, HIV (-), RPR (-)     #HTN  - Losartan 100mg QD  - Atenolol 50mg QD  -Continue to monitor hemodynamics    #Hx of Asthma  -States no flares in the past several years. Albuterol at home  -c/w Proventil q6hr PRN    #Arthritis  -Patient has history of RA diagnosis in which she was taking Remicade for many years, however from recent rheumatology evaluation outpatient by Dr. Cruz, diagnosed as OA, not RA  - severe pain and limited mobility of tj ankles and shoulders and deformities of hands and knees.  -PT/OT to     #Aortic valve stenosis, mod to severe  -Outpatient followup with Dr. Patel.   TTE: EF 50-55% with moderate to severe aortic valve stenosis.    #HLD  -Atorvastatin 80mg QD    #Urinary incontinence  Patient has history of urinary frequency and incontinent episodes at home  -Currently on Oxybutynin 5mg BID however patient reports dry mouth and poor tolerance to med  -Takes Fesoterdoine Fumerate 4mg at home. Will follow up for non formulary      -Pain control: Tylenol PRN    -GI/Bowel Mgmt: Senna, Miralax    -Bladder management: Urinary incontinent, will monitor and adjust meds     -Skin:  No active issues at this time    -FEN will monitor and supplemetn
Patient seen and examined with the resident. We discussed the case. I have directed the care. I edited the note. The patient requires acute rehab with 3 hours of daily therapies at least 5 out of 7 days and close physiatry follow up.  #Rehab of left thalamic lacunar infarct in the setting of bilateral carotid artery stenosis resulting in ataxic gait, incoordination and ADL dysfunction.   *MRI Brain: Left thalamic acute lacunar infarct. No acute hemorrhage  s/p DSA 11/28: non flow limiting stenosis bilateral carotid arteries. No carotid angioplasty/stenting warranted.     - continue inpatient Rehab   - Patient is on full rehab program of 3 hours a day of PT, OT, and/or SLP, for 5-6 days a week, for a total of 15 hours a week.  - Precautions/restrictions: Safety precautions, fall precautions  - Pain control: Tylenol 650mg q6hr PRN  - C/w  ASA 81 mg once daily & Brillinta 90mg qD.  (Plavix switched to Brilinta 90mg twice daily (per neuroendovascular request) given PRU result of 278. Patient will stay on that regimen for 90 days.  - c/w atorvastatin 80mg once daily    #Peripheral Polyneuropathy of unknown etiology  - significant loss of proprioception and cold sensitivity in both distal LEs.   - possibly associated with Remicade use  - DDx include acute inflammatory demyelinating polyneuropathy, metabolic demyelinization (2/2 vitamin deficiency), autoimmune disorder  - Additional testing: ESR 57 , CRP <3.0, Vitamin b12, folate >20, HbA1C 5.3, , Lyme antibody, TSH 2.19, IgA LEVEL, Ganglioside antibodies including anti GM1 (-), GM2 (+), GD1a (-), GD1b (-), and GQ1b (-), SPEP, UPEP, Vitb12 413, VitB1, Vit-E, Homocysteine, Methylmalonic acid. Copper, Zinc, HIV (-), RPR (-)     #HTN  - Losartan 100mg QD  - Atenolol 50mg QD  -Continue to monitor hemodynamics    #Hx of Asthma  -States no flares in the past several years. Albuterol at home  -c/w Proventil q6hr PRN    #Arthritis  -Patient has history of RA diagnosis in which she was taking Remicade for many years, however from recent rheumatology evaluation outpatient by Dr. Latvin, diagnosed as OA, not RA  - severe pain and limited mobility of tj ankles and shoulders and deformities of hands and knees.  -PT/OT      #Aortic valve stenosis, mod to severe  -Outpatient followup with Dr. Patel.   TTE: EF 50-55% with moderate to severe aortic valve stenosis.    #HLD  -Atorvastatin 80mg QD    #Urinary incontinence (chronic)  Patient has history of urinary frequency and incontinent episodes at home  -Discontinued Oxybutynin 5mg BID (patient reports dry mouth and poor tolerance to med)  -Started Fesoterdoine Fumerate 4mg (home, non formulary)    #macrocytic anemia  - stable, no overt bleeding  - iron studies 12/4/23 - , Retic 2.5%, absolute retic WNL, total iron 46, , TIBC 190  - transfuse if hemoglobin < 7
Patient seen and examined with the resident. We discussed the case. I have directed the care. I edited the note. The patient requires acute rehab with 3 hours of daily therapies at least 5 out of 7 days and close physiatry follow up. She is progressing in therapies.  #Rehab of left thalamic lacunar infarct in the setting of bilateral carotid artery stenosis resulting in ataxic gait, incoordination and ADL dysfunction.   *MRI Brain: Left thalamic acute lacunar infarct. No acute hemorrhage  s/p DSA 11/28: non flow limiting stenosis bilateral carotid arteries. No carotid angioplasty/stenting warranted.     - continue inpatient Rehab   - Patient is on full rehab program of 3 hours a day of PT, OT, and/or SLP, for 5-6 days a week, for a total of 15 hours a week.  - Precautions/restrictions: Safety precautions, fall precautions  - Pain control: Tylenol 650mg q6hr PRN  - C/w  ASA 81 mg once daily & Brillinta 90mg qD.  (Plavix switched to Brilinta 90mg twice daily (per neuroendovascular request) given PRU result of 278. Patient will stay on that regimen for 90 days.  - c/w atorvastatin 80mg once daily    #Peripheral Polyneuropathy of unknown etiology  - significant loss of proprioception and cold sensitivity in both distal LEs.   - possibly associated with Remicade use  - DDx include acute inflammatory demyelinating polyneuropathy, metabolic demyelinization (2/2 vitamin deficiency), autoimmune disorder  - Additional testing: ESR 57 , CRP <3.0, Vitamin b12, folate >20, HbA1C 5.3, , Lyme antibody, TSH 2.19, IgA LEVEL, Ganglioside antibodies including anti GM1 (-), GM2 (+), GD1a (-), GD1b (-), and GQ1b (-), SPEP, UPEP, Vitb12 413, VitB1, Vit-E, Homocysteine, Methylmalonic acid. Copper, Zinc, HIV (-), RPR (-)     #HTN  - Losartan 100mg QD  - Atenolol 50mg QD  -Continue to monitor hemodynamics    #Hx of Asthma  -States no flares in the past several years. Albuterol at home  -c/w Proventil q6hr PRN    #Arthritis  -Patient has history of RA diagnosis in which she was taking Remicade for many years, however from recent rheumatology evaluation outpatient by Dr. Cruz, diagnosed as OA, not RA  - severe pain and limited mobility of tj ankles and shoulders and deformities of hands and knees.  -PT/OT      #Aortic valve stenosis, mod to severe  -Outpatient followup with Dr. Patel.   TTE: EF 50-55% with moderate to severe aortic valve stenosis.    #HLD  -Atorvastatin 80mg QD    #Urinary incontinence (chronic)  Patient has history of urinary frequency and incontinent episodes at home  -Discontinued Oxybutynin 5mg BID (patient reports dry mouth and poor tolerance to med)  -Started Fesoterdoine Fumerate 4mg (home, non formulary)    #macrocytic anemia  - stable, no overt bleeding  - iron studies 12/4/23 - , Retic 2.5%, absolute retic WNL, total iron 46, , TIBC 190  - transfuse if hemoglobin < 7      -Pain control: Tylenol PRN    -GI/Bowel Mgmt: Senna, Miralax    -Bladder management: Urinary incontinent, will monitor and adjust meds     -Skin:  No active issues at this time    -FEN will monitor and supplement
Patient seen and examined with the resident. We discussed the case. I have directed the care. I edited the note. She progressed wonderfully on acute rehab and is stable for discharge home today.  #Rehab of left thalamic lacunar infarct in the setting of bilateral carotid artery stenosis resulting in ataxic gait, incoordination and ADL dysfunction.   *MRI Brain: Left thalamic acute lacunar infarct. No acute hemorrhage  s/p DSA 11/28: non flow limiting stenosis bilateral carotid arteries. No carotid angioplasty/stenting warranted.     - Patient is on full rehab program of 3 hours a day of PT, OT, and/or SLP, for 5-6 days a week, for a total of 15 hours a week.  - Precautions/restrictions: Safety precautions, fall precautions  - Pain control: Tylenol 650mg q6hr PRN  - C/w  ASA 81 mg once daily & Brillinta 90mg qD.  (Plavix switched to Brilinta 90mg twice daily (per neuroendovascular request) given PRU result of 278. Patient will stay on that regimen for 90 days.  - c/w atorvastatin 80mg once daily  Stable for discharge home 12/15.     #Peripheral Polyneuropathy of unknown etiology  - significant loss of proprioception and cold sensitivity in both distal LEs.   - possibly associated with Remicade use  - DDx include acute inflammatory demyelinating polyneuropathy, metabolic demyelinization (2/2 vitamin deficiency), autoimmune disorder  - Additional testing: ESR 57 , CRP <3.0, Vitamin b12, folate >20, HbA1C 5.3, , Lyme antibody, TSH 2.19, IgA LEVEL, Ganglioside antibodies including anti GM1 (-), GM2 (+), GD1a (-), GD1b (-), and GQ1b (-), SPEP, UPEP, Vitb12 413, VitB1, Vit-E, Homocysteine, Methylmalonic acid. Copper, Zinc, HIV (-), RPR (-)     #HTN  - Losartan 100mg QD  - Atenolol 50mg QD  -Continue to monitor hemodynamics    #Hx of Asthma  -States no flares in the past several years. Albuterol at home  -c/w Proventil q6hr PRN    #Arthritis  -Patient has history of RA diagnosis in which she was taking Remicade for many years, however from recent rheumatology evaluation outpatient by Dr. Cruz, diagnosed as OA, not RA  - severe pain and limited mobility of tj ankles and shoulders and deformities of hands and knees.  -PT/OT      #Aortic valve stenosis, mod to severe  -Outpatient followup with Dr. Patel.   TTE: EF 50-55% with moderate to severe aortic valve stenosis.    #HLD  -Atorvastatin 80mg QD    #Urinary incontinence (chronic)  Patient has history of urinary frequency and incontinent episodes at home  -Discontinued Oxybutynin 5mg BID (patient reports dry mouth and poor tolerance to med)  -Started Fesoterdoine Fumerate 4mg (home, non formulary)    #macrocytic anemia  - stable, no overt bleeding  - iron studies 12/4/23 - , Retic 2.5%, absolute retic WNL, total iron 46, , TIBC 190  - transfuse if hemoglobin < 7     #Hypomagnesemia  - replete as appropriate, continue monitoring     -Pain control: Tylenol PRN    -GI/Bowel Mgmt: Senna, Miralax    -Bladder management: Urinary incontinent, will monitor and adjust meds     -Skin:  No active issues at this time    -FEN will monitor and supplement    - Diet: DASH
Patient seen and examined with the resident. We discussed the case. I have directed the care. I edited the note. The patient requires acute rehab with 3 hours of daily therapies at least 5 out of 7 days and close physiatry follow up.  #Rehab of left thalamic lacunar infarct in the setting of bilateral carotid artery stenosis resulting in ataxic gait, incoordination and ADL dysfunction.   *MRI Brain: Left thalamic acute lacunar infarct. No acute hemorrhage  s/p DSA 11/28: non flow limiting stenosis bilateral carotid arteries. No carotid angioplasty/stenting warranted.     - continue inpatient Rehab   - Patient is on full rehab program of 3 hours a day of PT, OT, and/or SLP, for 5-6 days a week, for a total of 15 hours a week.  - Precautions/restrictions: Safety precautions, fall precautions  - Pain control: Tylenol 650mg q6hr PRN  - C/w  ASA 81 mg once daily & Brillinta 90mg qD.  (Plavix switched to Brilinta 90mg twice daily (per neuroendovascular request) given PRU result of 278. Patient will stay on that regimen for 90 days.  - c/w atorvastatin 80mg once daily    #Peripheral Polyneuropathy of unknown etiology  - significant loss of proprioception and cold sensitivity in both distal LEs.   - possibly associated with Remicade use  - DDx include acute inflammatory demyelinating polyneuropathy, metabolic demyelinization (2/2 vitamin deficiency), autoimmune disorder  - Additional testing: ESR 57 , CRP <3.0, Vitamin b12, folate >20, HbA1C 5.3, , Lyme antibody, TSH 2.19, IgA LEVEL, Ganglioside antibodies including anti GM1 (-), GM2 (+), GD1a (-), GD1b (-), and GQ1b (-), SPEP, UPEP, Vitb12 413, VitB1, Vit-E, Homocysteine, Methylmalonic acid. Copper, Zinc, HIV (-), RPR (-)     #HTN  - Losartan 100mg QD  - Atenolol 50mg QD  -Continue to monitor hemodynamics    #Hx of Asthma  -States no flares in the past several years. Albuterol at home  -c/w Proventil q6hr PRN    #Arthritis  -Patient has history of RA diagnosis in which she was taking Remicade for many years, however from recent rheumatology evaluation outpatient by Dr. Latvin, diagnosed as OA, not RA  - severe pain and limited mobility of tj ankles and shoulders and deformities of hands and knees.  -PT/OT      #Aortic valve stenosis, mod to severe  -Outpatient followup with Dr. Patel.   TTE: EF 50-55% with moderate to severe aortic valve stenosis.    #HLD  -Atorvastatin 80mg QD    #Urinary incontinence (chronic)  Patient has history of urinary frequency and incontinent episodes at home  -Discontinued Oxybutynin 5mg BID (patient reports dry mouth and poor tolerance to med)  -Started Fesoterdoine Fumerate 4mg (home, non formulary)    #macrocytic anemia  - stable, no overt bleeding  - iron studies 12/4/23 - , Retic 2.5%, absolute retic WNL, total iron 46, , TIBC 190  - transfuse if hemoglobin < 7     #Hypomagnesemia  - replete as appropriate, continue monitoring     -Pain control: Tylenol PRN    -GI/Bowel Mgmt: Senna, Miralax    -Bladder management: Urinary incontinent, will monitor and adjust meds     -Skin:  No active issues at this time
Patient seen and examined with the resident. We discussed the case. I have directed the care. I edited the note. The patient requires acute rehab with 3 hours of daily therapies at least 5 out of 7 days and close physiatry follow up.  #Rehab of left thalamic lacunar infarct in the setting of bilateral carotid artery stenosis resulting in ataxic gait, incoordination and ADL dysfunction.   *MRI Brain: Left thalamic acute lacunar infarct. No acute hemorrhage  s/p DSA 11/28: non flow limiting stenosis bilateral carotid arteries. No carotid angioplasty/stenting warranted.     - continue inpatient Rehab   - Patient is on full rehab program of 3 hours a day of PT, OT, and/or SLP, for 5-6 days a week, for a total of 15 hours a week.  - Precautions/restrictions: Safety precautions, fall precautions  - Pain control: Tylenol 650mg q6hr PRN  - C/w  ASA 81 mg once daily & Brillinta 90mg qD.  (Plavix switched to Brilinta 90mg twice daily (per neuroendovascular request) given PRU result of 278. Patient will stay on that regimen for 90 days.  - c/w atorvastatin 80mg once daily    #Peripheral Polyneuropathy of unknown etiology  - significant loss of proprioception and cold sensitivity in both distal LEs.   - possibly associated with Remicade use  - DDx include acute inflammatory demyelinating polyneuropathy, metabolic demyelinization (2/2 vitamin deficiency), autoimmune disorder  - Additional testing: ESR 57 , CRP <3.0, Vitamin b12, folate >20, HbA1C 5.3, , Lyme antibody, TSH 2.19, IgA LEVEL, Ganglioside antibodies including anti GM1 (-), GM2 (+), GD1a (-), GD1b (-), and GQ1b (-), SPEP, UPEP, Vitb12 413, VitB1, Vit-E, Homocysteine, Methylmalonic acid. Copper, Zinc, HIV (-), RPR (-)     #HTN  - Losartan 100mg QD  - Atenolol 50mg QD  -Continue to monitor hemodynamics    #Hx of Asthma  -States no flares in the past several years. Albuterol at home  -c/w Proventil q6hr PRN    #Arthritis  -Patient has history of RA diagnosis in which she was taking Remicade for many years, however from recent rheumatology evaluation outpatient by Dr. Latvin, diagnosed as OA, not RA  - severe pain and limited mobility of tj ankles and shoulders and deformities of hands and knees.  -PT/OT      #Aortic valve stenosis, mod to severe  -Outpatient followup with Dr. Patel.   TTE: EF 50-55% with moderate to severe aortic valve stenosis.    #HLD  -Atorvastatin 80mg QD    #Urinary incontinence (chronic)  Patient has history of urinary frequency and incontinent episodes at home  -Discontinued Oxybutynin 5mg BID (patient reports dry mouth and poor tolerance to med)  -Started Fesoterdoine Fumerate 4mg (home, non formulary)    #macrocytic anemia  - stable, no overt bleeding  - iron studies 12/4/23 - , Retic 2.5%, absolute retic WNL, total iron 46, , TIBC 190  - transfuse if hemoglobin < 7     #Hypomagnesemia  - replete as appropriate, continue monitoring     -Pain control: Tylenol PRN    -GI/Bowel Mgmt: Senna, Miralax    -Bladder management: Urinary incontinent, will monitor and adjust meds     -Skin:  No active issues at this time    -FEN will monitor an
Patient seen and examined with the resident. We discussed the case. I have directed the care. I edited the note. The patient requires acute rehab with 3 hours of daily therapies at least 5 out of 7 days and close physiatry follow up.  #Rehab of left thalamic lacunar infarct in the setting of bilateral carotid artery stenosis resulting in ataxic gait, incoordination and ADL dysfunction.   *MRI Brain: Left thalamic acute lacunar infarct. No acute hemorrhage  s/p DSA 11/28: non flow limiting stenosis bilateral carotid arteries. No carotid angioplasty/stenting warranted.     - continue inpatient Rehab   - Patient is on full rehab program of 3 hours a day of PT, OT, and/or SLP, for 5-6 days a week, for a total of 15 hours a week.  - Precautions/restrictions: Safety precautions, fall precautions  - Pain control: Tylenol 650mg q6hr PRN  - C/w  ASA 81 mg once daily & Brillinta 90mg qD.  (Plavix switched to Brilinta 90mg twice daily (per neuroendovascular request) given PRU result of 278. Patient will stay on that regimen for 90 days.  - c/w atorvastatin 80mg once daily    #Peripheral Polyneuropathy of unknown etiology  - significant loss of proprioception and cold sensitivity in both distal LEs.   - possibly associated with Remicade use  - DDx include acute inflammatory demyelinating polyneuropathy, metabolic demyelinization (2/2 vitamin deficiency), autoimmune disorder  - Additional testing: ESR 57 , CRP <3.0, Vitamin b12, folate >20, HbA1C 5.3, , Lyme antibody, TSH 2.19, IgA LEVEL, Ganglioside antibodies including anti GM1 (-), GM2 (+), GD1a (-), GD1b (-), and GQ1b (-), SPEP, UPEP, Vitb12 413, VitB1, Vit-E, Homocysteine, Methylmalonic acid. Copper, Zinc, HIV (-), RPR (-)     #HTN  - Losartan 100mg QD  - Atenolol 50mg QD  -Continue to monitor hemodynamics    #Hx of Asthma  -States no flares in the past several years. Albuterol at home  -c/w Proventil q6hr PRN    #Arthritis  -Patient has history of RA diagnosis in which she was taking Remicade for many years, however from recent rheumatology evaluation outpatient by Dr. Latvin, diagnosed as OA, not RA  - severe pain and limited mobility of tj ankles and shoulders and deformities of hands and knees.  -PT/OT      #Aortic valve stenosis, mod to severe  -Outpatient followup with Dr. Patel.   TTE: EF 50-55% with moderate to severe aortic valve stenosis.    #HLD  -Atorvastatin 80mg QD    #Urinary incontinence (chronic)  Patient has history of urinary frequency and incontinent episodes at home  -Discontinued Oxybutynin 5mg BID (patient reports dry mouth and poor tolerance to med)  -Started Fesoterdoine Fumerate 4mg (home, non formulary)    #macrocytic anemia  - stable, no overt bleeding  - iron studies 12/4/23 - , Retic 2.5%, absolute retic WNL, total iron 46, , TIBC 190  - transfuse if hemoglobin < 7     #Hypomagnesemia  - repleted as appropriate, continue monitoring     -Pain control: Tylenol PRN    -GI/Bowel Mgmt: Senna, Miralax    -Bladder management: Urinary incontinent, will monitor and adjust meds     -Skin:  No active issues at this time    -FEN will monitor and supplement
Patient seen and examined with the resident. We discussed the case. I have directed the care. I edited the note. The patient requires acute rehab with 3 hours of daily therapies at least 5 out of 7 days and close physiatry follow up. I participated in the rehab interdisciplinary team meeting; the patient progressed to ambulate 150 ft RW cg.  #Rehab of left thalamic lacunar infarct in the setting of bilateral carotid artery stenosis resulting in ataxic gait, incoordination and ADL dysfunction.   *MRI Brain: Left thalamic acute lacunar infarct. No acute hemorrhage  s/p DSA 11/28: non flow limiting stenosis bilateral carotid arteries. No carotid angioplasty/stenting warranted.     - continue inpatient Rehab   - Patient is on full rehab program of 3 hours a day of PT, OT, and/or SLP, for 5-6 days a week, for a total of 15 hours a week.  - Precautions/restrictions: Safety precautions, fall precautions  - Pain control: Tylenol 650mg q6hr PRN  - C/w  ASA 81 mg once daily & Brillinta 90mg qD.  (Plavix switched to Brilinta 90mg twice daily (per neuroendovascular request) given PRU result of 278. Patient will stay on that regimen for 90 days.  - c/w atorvastatin 80mg once daily    #Peripheral Polyneuropathy of unknown etiology  - significant loss of proprioception and cold sensitivity in both distal LEs.   - possibly associated with Remicade use  - DDx include acute inflammatory demyelinating polyneuropathy, metabolic demyelinization (2/2 vitamin deficiency), autoimmune disorder  - Additional testing: ESR 57 , CRP <3.0, Vitamin b12, folate >20, HbA1C 5.3, , Lyme antibody, TSH 2.19, IgA LEVEL, Ganglioside antibodies including anti GM1 (-), GM2 (+), GD1a (-), GD1b (-), and GQ1b (-), SPEP, UPEP, Vitb12 413, VitB1, Vit-E, Homocysteine, Methylmalonic acid. Copper, Zinc, HIV (-), RPR (-)     #HTN  - Losartan 100mg QD  - Atenolol 50mg QD  -Continue to monitor hemodynamics    #Hx of Asthma  -States no flares in the past several years. Albuterol at home  -c/w Proventil q6hr PRN    #Arthritis  -Patient has history of RA diagnosis in which she was taking Remicade for many years, however from recent rheumatology evaluation outpatient by Dr. Cruz, diagnosed as OA, not RA  - severe pain and limited mobility of tj ankles and shoulders and deformities of hands and knees.  -PT/OT      #Aortic valve stenosis, mod to severe  -Outpatient followup with Dr. Patel.   TTE: EF 50-55% with moderate to severe aortic valve stenosis.    #HLD  -Atorvastatin 80mg QD    #Urinary incontinence (chronic)  Patient has history of urinary frequency and incontinent episodes at home  -Discontinued Oxybutynin 5mg BID (patient reports dry mouth and poor tolerance to med)  -Started Fesoterdoine Fumerate 4mg (home, non formulary)    #macrocytic anemia  - stable, no overt bleeding  - iron studies 12/4/23 - , Retic 2.5%, absolute retic WNL, total iron 46, , TIBC 190  - transfuse if hemoglobin < 7     #Hypomagnesemia  - replete as appropriate, continue monitoring     -Pain control: Tylenol PRN    -GI/Bowel Mgmt: Senna, Miralax
Patient seen and examined with the resident. We discussed the case. I have directed the care. I edited the note. The patient requires acute rehab with 3 hours of daily therapies at least 5 out of 7 days and close physiatry follow up. She is progressing in therapies.  #Rehab of left thalamic lacunar infarct in the setting of bilateral carotid artery stenosis resulting in ataxic gait, incoordination and ADL dysfunction.   *MRI Brain: Left thalamic acute lacunar infarct. No acute hemorrhage  s/p DSA 11/28: non flow limiting stenosis bilateral carotid arteries. No carotid angioplasty/stenting warranted.     - continue inpatient Rehab   - Patient is on full rehab program of 3 hours a day of PT, OT, and/or SLP, for 5-6 days a week, for a total of 15 hours a week.  - Precautions/restrictions: Safety precautions, fall precautions  - Pain control: Tylenol 650mg q6hr PRN  - C/w  ASA 81 mg once daily & Brillinta 90mg qD.  (Plavix switched to Brilinta 90mg twice daily (per neuroendovascular request) given PRU result of 278. Patient will stay on that regimen for 90 days.  - c/w atorvastatin 80mg once daily    #Peripheral Polyneuropathy of unknown etiology  - significant loss of proprioception and cold sensitivity in both distal LEs.   - possibly associated with Remicade use  - DDx include acute inflammatory demyelinating polyneuropathy, metabolic demyelinization (2/2 vitamin deficiency), autoimmune disorder  - Additional testing: ESR 57 , CRP <3.0, Vitamin b12, folate >20, HbA1C 5.3, , Lyme antibody, TSH 2.19, IgA LEVEL, Ganglioside antibodies including anti GM1 (-), GM2 (+), GD1a (-), GD1b (-), and GQ1b (-), SPEP, UPEP, Vitb12 413, VitB1, Vit-E, Homocysteine, Methylmalonic acid. Copper, Zinc, HIV (-), RPR (-)     #HTN  - Losartan 100mg QD  - Atenolol 50mg QD  -Continue to monitor hemodynamics    #Hx of Asthma  -States no flares in the past several years. Albuterol at home  -c/w Proventil q6hr PRN    #Arthritis  -Patient has history of RA diagnosis in which she was taking Remicade for many years, however from recent rheumatology evaluation outpatient by Dr. Cruz, diagnosed as OA, not RA  - severe pain and limited mobility of tj ankles and shoulders and deformities of hands and knees.  -PT/OT      #Aortic valve stenosis, mod to severe  -Outpatient followup with Dr. Patel.   TTE: EF 50-55% with moderate to severe aortic valve stenosis.    #HLD  -Atorvastatin 80mg QD    #Urinary incontinence (chronic)  Patient has history of urinary frequency and incontinent episodes at home  -Discontinued Oxybutynin 5mg BID (patient reports dry mouth and poor tolerance to med)  -Started Fesoterdoine Fumerate 4mg (home, non formulary)    #macrocytic anemia  - stable, no overt bleeding  - iron studies 12/4/23 - , Retic 2.5%, absolute retic WNL, total iron 46, , TIBC 190  - transfuse if hemoglobin < 7
Patient seen and examined with the resident. We discussed the case. I have directed the care. I edited the note. The patient requires acute rehab with 3 hours of daily therapies at least 5 out of 7 days and close physiatry follow up. She is progressing in therapies.  #Rehab of left thalamic lacunar infarct in the setting of bilateral carotid artery stenosis resulting in ataxic gait, incoordination and ADL dysfunction.   *MRI Brain: Left thalamic acute lacunar infarct. No acute hemorrhage  s/p DSA 11/28: non flow limiting stenosis bilateral carotid arteries. No carotid angioplasty/stenting warranted.     - continue inpatient Rehab   - Patient is on full rehab program of 3 hours a day of PT, OT, and/or SLP, for 5-6 days a week, for a total of 15 hours a week.  - Precautions/restrictions: Safety precautions, fall precautions  - Pain control: Tylenol 650mg q6hr PRN  - C/w  ASA 81 mg once daily & Brillinta 90mg qD.  (Plavix switched to Brilinta 90mg twice daily (per neuroendovascular request) given PRU result of 278. Patient will stay on that regimen for 90 days.  - c/w atorvastatin 80mg once daily    #Peripheral Polyneuropathy of unknown etiology  - significant loss of proprioception and cold sensitivity in both distal LEs.   - possibly associated with Remicade use  - DDx include acute inflammatory demyelinating polyneuropathy, metabolic demyelinization (2/2 vitamin deficiency), autoimmune disorder  - Additional testing: ESR 57 , CRP <3.0, Vitamin b12, folate >20, HbA1C 5.3, , Lyme antibody, TSH 2.19, IgA LEVEL, Ganglioside antibodies including anti GM1 (-), GM2 (+), GD1a (-), GD1b (-), and GQ1b (-), SPEP, UPEP, Vitb12 413, VitB1, Vit-E, Homocysteine, Methylmalonic acid. Copper, Zinc, HIV (-), RPR (-)     #HTN  - Losartan 100mg QD  - Atenolol 50mg QD  -Continue to monitor hemodynamics    #Hx of Asthma  -States no flares in the past several years. Albuterol at home  -c/w Proventil q6hr PRN    #Arthritis  -Patient has history of RA diagnosis in which she was taking Remicade for many years, however from recent rheumatology evaluation outpatient by Dr. Cruz, diagnosed as OA, not RA  - severe pain and limited mobility of tj ankles and shoulders and deformities of hands and knees.  -PT/OT      #Aortic valve stenosis, mod to severe  -Outpatient followup with Dr. Patel.   TTE: EF 50-55% with moderate to severe aortic valve stenosis.    #HLD  -Atorvastatin 80mg QD    #Urinary incontinence (chronic)  Patient has history of urinary frequency and incontinent episodes at home  -Discontinued Oxybutynin 5mg BID (patient reports dry mouth and poor tolerance to med)  -Started Fesoterdoine Fumerate 4mg (home, non formulary)    #macrocytic anemia  - stable, no overt bleeding  - iron studies 12/4/23 - , Retic 2.5%, absolute retic WNL, total iron 46, , TIBC 190  - transfuse if hemoglobin < 7      -Pain control: Tylenol PRN    -GI/Bowel Mgmt: Senna, Miralax    -Bladder management: Urinary incontinent, will monitor and adjust meds     -Skin:  No active issues at this time    -FEN will monitor and supplement    - Diet: DASH
Rehab of stroke. Patient seen and examined with the resident. The treatment plan discussed. Agree with the above.
Rehab of stroke. Patient seen and examined with the resident. The treatment plan discussed. Agree with the above.
Patient seen and examined with the resident. We discussed the case. I have directed the care. I edited the note. The patient requires acute rehab with 3 hours of daily therapies at least 5 out of 7 days and close physiatry follow up.  #Rehab of left thalamic lacunar infarct in the setting of bilateral carotid artery stenosis resulting in ataxic gait, incoordination and ADL dysfunction.   *MRI Brain: Left thalamic acute lacunar infarct. No acute hemorrhage  s/p DSA 11/28: non flow limiting stenosis bilateral carotid arteries. No carotid angioplasty/stenting warranted.     - continue inpatient Rehab   - Patient is on full rehab program of 3 hours a day of PT, OT, and/or SLP, for 5-6 days a week, for a total of 15 hours a week.  - Precautions/restrictions: Safety precautions, fall precautions  - Pain control: Tylenol 650mg q6hr PRN  - C/w  ASA 81 mg once daily & Brillinta 90mg qD.  (Plavix switched to Brilinta 90mg twice daily (per neuroendovascular request) given PRU result of 278. Patient will stay on that regimen for 90 days.  - c/w atorvastatin 80mg once daily    #Peripheral Polyneuropathy of unknown etiology  - significant loss of proprioception and cold sensitivity in both distal LEs.   - possibly associated with Remicade use  - DDx include acute inflammatory demyelinating polyneuropathy, metabolic demyelinization (2/2 vitamin deficiency), autoimmune disorder  - Additional testing: ESR 57 , CRP <3.0, Vitamin b12, folate >20, HbA1C 5.3, , Lyme antibody, TSH 2.19, IgA LEVEL, Ganglioside antibodies including anti GM1 (-), GM2 (+), GD1a (-), GD1b (-), and GQ1b (-), SPEP, UPEP, Vitb12 413, VitB1, Vit-E, Homocysteine, Methylmalonic acid. Copper, Zinc, HIV (-), RPR (-)     #HTN  - Losartan 100mg QD  - Atenolol 50mg QD  -Continue to monitor hemodynamics    #Hx of Asthma  -States no flares in the past several years. Albuterol at home  -c/w Proventil q6hr PRN    #Arthritis  -Patient has history of RA diagnosis in which she was taking Remicade for many years, however from recent rheumatology evaluation outpatient by Dr. Latvin, diagnosed as OA, not RA  - severe pain and limited mobility of tj ankles and shoulders and deformities of hands and knees.  -PT/OT to     #Aortic valve stenosis, mod to severe  -Outpatient followup with Dr. Patel.   TTE: EF 50-55% with moderate to severe aortic valve stenosis.    #HLD  -Atorvastatin 80mg QD    #Urinary incontinence  Patient has history of urinary frequency and incontinent episodes at home  -Currently on Oxybutynin 5mg BID however patient reports dry mouth and poor tolerance to med  -Takes Fesoterdoine Fumerate 4mg at home. Will follow up for non formulary
I reviewed the chart and examined the patient with the resident and we discussed the findings and treatment plan.  The patient is tolerating the rehab program well. I agree with the findings and treatment plan above, which I modified as indicated. The patient requires 3 hrs a day of acute inpatient rehab. No new complaints. No SOP, palpitations. Tolerating therapies well. Vitals and labs stable. Continue full rehab program.    I read, edited and agree with the Assessment:  #Rehab of left thalamic lacunar infarct in the setting of bilateral carotid artery stenosis resulting in ataxic gait, incoordination and ADL dysfunction.   *MRI Brain: Left thalamic acute lacunar infarct. No acute hemorrhage  s/p DSA 11/28: non flow limiting stenosis bilateral carotid arteries. No carotid angioplasty/stenting warranted.     - continue inpatient Rehab   - Patient is on full rehab program of 3 hours a day of PT, OT, and/or SLP, for 5-6 days a week, for a total of 15 hours a week.  - Precautions/restrictions: Safety precautions, fall precautions  - Pain control: Tylenol 650mg q6hr PRN  - C/w  ASA 81 mg once daily & Brillinta 90mg qD.  (Plavix switched to Brilinta 90mg twice daily (per neuroendovascular request) given PRU result of 278. Patient will stay on that regimen for 90 days.  - c/w atorvastatin 80mg once daily    #Peripheral Polyneuropathy of unknown etiology  - significant loss of proprioception and cold sensitivity in both distal LEs.   - possibly associated with Remicade use  - DDx include acute inflammatory demyelinating polyneuropathy, metabolic demyelinization (2/2 vitamin deficiency), autoimmune disorder  - Additional testing: ESR 57 , CRP <3.0, Vitamin b12, folate >20, HbA1C 5.3, , Lyme antibody, TSH 2.19, IgA LEVEL, Ganglioside antibodies including anti GM1 (-), GM2 (+), GD1a (-), GD1b (-), and GQ1b (-), SPEP, UPEP, Vitb12 413, VitB1, Vit-E, Homocysteine, Methylmalonic acid. Copper, Zinc, HIV (-), RPR (-)     #HTN  - Losartan 100mg QD  - Atenolol 50mg QD  -Continue to monitor hemodynamics    #Hx of Asthma  -States no flares in the past several years. Albuterol at home  -c/w Proventil q6hr PRN    #Arthritis  -Patient has history of RA diagnosis in which she was taking Remicade for many years, however from recent rheumatology evaluation outpatient by Dr. Cruz, diagnosed as OA, not RA  - severe pain and limited mobility of tj ankles and shoulders and deformities of hands and knees.  -PT/OT      #Aortic valve stenosis, mod to severe  -Outpatient followup with Dr. Patel.   TTE: EF 50-55% with moderate to severe aortic valve stenosis.    #HLD  -Atorvastatin 80mg QD    #Urinary incontinence (chronic)  Patient has history of urinary frequency and incontinent episodes at home  -Discontinued Oxybutynin 5mg BID (patient reports dry mouth and poor tolerance to med)  -Started Fesoterdoine Fumerate 4mg (home, non formulary)    #macrocytic anemia  - stable, no overt bleeding  - fu iron studies   - transfuse if hemoglobin < 7
Patient seen and examined with the resident. We discussed the case. I have directed the care. I edited the note. The patient requires acute rehab with 3 hours of daily therapies at least 5 out of 7 days and close physiatry follow up. I participated in the rehab interdisciplinary team meeting; the patient progressed to ambulate 75 ft RW min assist.  #Rehab of left thalamic lacunar infarct in the setting of bilateral carotid artery stenosis resulting in ataxic gait, incoordination and ADL dysfunction.   *MRI Brain: Left thalamic acute lacunar infarct. No acute hemorrhage  s/p DSA 11/28: non flow limiting stenosis bilateral carotid arteries. No carotid angioplasty/stenting warranted.     - continue inpatient Rehab   - Patient is on full rehab program of 3 hours a day of PT, OT, and/or SLP, for 5-6 days a week, for a total of 15 hours a week.  - Precautions/restrictions: Safety precautions, fall precautions  - Pain control: Tylenol 650mg q6hr PRN  - C/w  ASA 81 mg once daily & Brillinta 90mg qD.  (Plavix switched to Brilinta 90mg twice daily (per neuroendovascular request) given PRU result of 278. Patient will stay on that regimen for 90 days.  - c/w atorvastatin 80mg once daily    #Peripheral Polyneuropathy of unknown etiology  - significant loss of proprioception and cold sensitivity in both distal LEs.   - possibly associated with Remicade use  - DDx include acute inflammatory demyelinating polyneuropathy, metabolic demyelinization (2/2 vitamin deficiency), autoimmune disorder  - Additional testing: ESR 57 , CRP <3.0, Vitamin b12, folate >20, HbA1C 5.3, , Lyme antibody, TSH 2.19, IgA LEVEL, Ganglioside antibodies including anti GM1 (-), GM2 (+), GD1a (-), GD1b (-), and GQ1b (-), SPEP, UPEP, Vitb12 413, VitB1, Vit-E, Homocysteine, Methylmalonic acid. Copper, Zinc, HIV (-), RPR (-)     #HTN  - Losartan 100mg QD  - Atenolol 50mg QD  -Continue to monitor hemodynamics    #Hx of Asthma  -States no flares in the past several years. Albuterol at home  -c/w Proventil q6hr PRN    #Arthritis  -Patient has history of RA diagnosis in which she was taking Remicade for many years, however from recent rheumatology evaluation outpatient by Dr. Cruz, diagnosed as OA, not RA  - severe pain and limited mobility of tj ankles and shoulders and deformities of hands and knees.  -PT/OT      #Aortic valve stenosis, mod to severe  -Outpatient followup with Dr. Patel.   TTE: EF 50-55% with moderate to severe aortic valve stenosis.    #HLD  -Atorvastatin 80mg QD    #Urinary incontinence (chronic)  Patient has history of urinary frequency and incontinent episodes at home  -Discontinued Oxybutynin 5mg BID (patient reports dry mouth and poor tolerance to med)  -Started Fesoterdoine Fumerate 4mg (home, non formulary)    #macrocytic anemia  - stable, no overt bleeding  - iron studies 12/4/23 - , Retic 2.5%, absolute retic WNL, total iron 46, , TIBC 190  - transfuse if hemoglobin < 7

## 2023-12-15 NOTE — PROGRESS NOTE ADULT - ASSESSMENT
Neuropsychology Follow up    Treatment Session Focused on Feedback and Psychoeducation      Patient was seen in person on 12/15/2023 to share feedback from neurocognitive testing, which was indicative of weaknesses in memory aspects of retrieval moderated by weak executive processes, including visuospatial skills and slow processing speed. Compared to previous testing, patient improved in immediate and delayed memory recall for verbal information. Results of the patient’s basic language, repetition, and verbal reasoning skills were relatively intact.      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 She would benefit from activities that require sustained attention and recall as well as complex day to day activities.     The patient was provided with psychoeducation of stroke recovery, risk factors, and its impact on cognition. Patient was referred for a more comprehensive neuropsychology evaluation.      The patient will benefit from cognitive rehabilitation to assist in developing memory aides, and to bolster executive function. In addition, the patient would likely benefit from techniques to maximize memory skills. To assist with encoding, consolidation, and retrieval, the patient would benefit from the following recommendations: 1) the patient should minimize all distractions in the immediate environment, 2) the patient should focus on one task at a time, 3) the patient should begin using a memory notebook, daily planner, and external aides (if lists or notes are made, they should be kept in the same spot), 4) the patient should be provided with frequent verbal cues and reminders, and 5) use of alarm clocks should be utilized to remind the patient of important tasks to complete. The patient was also provided with online brain-stimulating games, which can aid areas of memory functions, and a brain aging guide and caregiver resource sheet. The patient understood and agreed with the recommendations.       Goals: No further goals at this time   Plan: Discharge from neuropsychological services; reconsult as needed     Brain Injury Protocol No         Cognitive Behavioral Guidelines:  No

## 2023-12-15 NOTE — CHART NOTE - NSCHARTNOTEFT_GEN_A_CORE
On Brilinta  On discharge   [facility]  said she will have to pay $301 .00 , [employer] was contacted and they will use coupon and make it free for 1 month  and it will be available for  tomorrow , pt will be given 2 extra doses to cover this pm and am dose for tomorrow  **

## 2023-12-15 NOTE — PROGRESS NOTE ADULT - SUBJECTIVE AND OBJECTIVE BOX
Patient is a 83y old  Female who presents with a chief complaint of Rehab of left thalamic lacunar infarct resulting in ataxic gait, incoordination, and ADL dysfunction. (14 Dec 2023 18:10)      HPI:  83 year old female with PMHx of arthritis, hypertension, hyperlipidemia who presented to the ED for gait ataxia and peripheral extremity numbness. Patient reported that 3 days PTA she received a flu shot, and over the weekend while cleaning experienced onset of RUE pain/weakness, in which she rested on the cough and then after trying to get up endorsed problems with balance and experiencing distal numbness in bother her hands and feet, prompting presentation to the ED. On presentation, imaging revealed L thalamic acute lacunar infarct. CTA revealed marked calcification at the right and left common carotid artery bifurcations with severe stenosis at the level of the right carotid bulb, left CC bifurcation and ast the left carotid bulb. Patient underwent diagnostic cerebral angiogram on 11/28/23 due to stenotic findings on CTA, which revealed non flow limiting stenosis bilateral carotid arteries, and no carotid angioplasty/stenting was warranted. Symptoms of distal numbness improved overtime and patient hemodynamically stable. She has been medically stable. She has severe limitation of both shoulders at baseline.  Of note, the patient states that she was recently evaluated by a rheumatologist and was told that despite being on Remicade for years, that she has osteoarthritis and never had rheumatoid arthritis.     Living situation: Lives with family (daughter, son-in-law, grandkids) in private house, 0 Steps to enter, 12 Steps inside with stair lift.  Prior Level of Function: Ambulating independent with RW and rollator, Independent with ADL/iADLs    Evaluated by bedside physical therapy and occupational therapy. Patient is minimum to moderate assistance for bed mobility and transfers, ambulating 25 feet x3 with rolling walker minimum assistance.  Upper body dressing  and lower body dressing moderate/ max assistance, Prior to admission patient was independent with mobility and ambulation with a rolling walker, and independent in all ADLs, IADLs. Evaluated by physical medicine and rehabilitation consult and deemed an appropriate candidate for inpatient rehabilitation facility. Admitted to [facility] on  11/30/23   (30 Nov 2023 12:31)      TODAY'S SUBJECTIVE & REVIEW OF SYMPTOMS  Patient was seen and assessed at bedside. No overnight events.   Patient denies any complaints at this time.   Tolerating PT/OT.   Tolerating oral diet. Voiding and passing stool spontaneously.   Vital signs reviewed.  Patient is anticipated for discharge today.    Current Level of Function:  Bed Mobility: supervision  Transfers: supervision  Gait: steady assist 150ft with RW  Stairs: 10 steps with partial assist  Upper body dressing: setup  Lower body dressing: setup    Review of Systems:   Constitutional:    [  X ] WNL           [   ] poor appetite   [   ] insomnia   [   ] tired   Cardio:                [ X  ] WNL           [   ] CP   [   ] DEE   [   ] palpitations               Resp:                   [  X ] WNL           [   ] SOB   [   ] cough   [   ] wheezing   GI:                        [X   ] WNL           [   ] constipation   [   ] diarrhea   [   ] abdominal pain   [   ] nausea   [   ] emesis                                :                      [   ] WNL           [   ] LOPEZ  [   ] dysuria   [ X  ] difficulty voiding             Endo:                   [  X ] WNL          [   ] polyuria   [   ] temperature intolerance                 Skin:                     [  X ] WNL          [   ] pain   [   ] wound   [   ] rash   MSK:                    [   ] WNL          [   ] muscle pain   [  X ] joint pain/ stiffness shoulder, hips, knees, ankles/ feet     [   ] muscle tenderness   [   ] swelling   Neuro:                 [   ] WNL          [   ] HA   [   ] change in vision   [   ] tremor   [ X  ] weakness/ unsteady gait    [   ]dysphagia              Cognitive:           [ X  ] WNL           [   ]confusion      Psych:                  [ X  ] WNL           [   ] hallucinations   [   ]agitation   [   ] delusion   [   ]depression        PHYSICAL EXAM    Vital Signs Last 24 Hrs  T(C): 36.6 (15 Dec 2023 06:05), Max: 37.1 (14 Dec 2023 20:35)  T(F): 97.8 (15 Dec 2023 06:05), Max: 98.7 (14 Dec 2023 20:35)  HR: 77 (15 Dec 2023 06:05) (67 - 77)  BP: 151/67 (15 Dec 2023 06:05) (116/57 - 151/67)  BP(mean): 96 (15 Dec 2023 06:05) (82 - 96)  RR: 19 (15 Dec 2023 06:05) (18 - 20)  SpO2: --    General:[ X  ] NAD, Resting Comfortable,   [   ] other:                                HEENT: [  X ] NC/AT, EOMI, PERRL , Normal Conjunctivae,   [   ] other:  Cardio: [ X  ] RRR   [ X  ] other:  Grade 3 crescendo-decrescendo  systolic murmur auscultated in R 2nd intercostal space. RRR                             Pulm: [ X  ] No Respiratory Distress,  Lungs CTAB,   [   ] other:                       Abdomen: [X   ]ND/NT, Soft,   [   ] other:    : [  X ] NO LOPEZ CATHETER,        [   ] LOPEZ CATHETER- no meatal tear, no discharge,                                         MSK: [   ] No joint swelling, Full ROM,   [ X  ] other: Functional PROM both shoulders. (-) 30 deg. passive DF both ankles     No AROM of R shoulder, 0-30 degrees AROM of L shoulder (2/2 OA)                                   Ext: [ X ]   No C/C/E, No calf tenderness,   [   ]other:    Skin: [  X ] intact,   [   ] other:                                                                   Neurological Examination:  Cognitive: [   X ] AAO x 3,   [    ]  other:                                                                      Attention:  [    ] intact,   [ X   ]  other:   Impairment in tasks that require concentration and attention                Memory: [    ] intact,    [ X   ]  other:   2/3 with short term recall  Mood/Affect: [   X ] wnl,    [    ]  other:                                                                             Communication: [   X ]Fluent, no dysarthria, following commands:  [    ] other:   CN II - XII:  [  X  ] intact,  [    ] other:                                                                                        Motor:   RIGHT UE: [   ] WNL,  [X   ] other: 4-/5 shoulder abduction (limited ROM due to OA), 4+/5 elbow flexion/extension, 4+/5 finger  strength   LEFT    UE: [   ] WNL,  [ X  ] other: 4-/5 shoulder abduction (limited ROM due to OA), 4+/5 elbow flexion/extension, 4+/5 finger  strength   RIGHT LE: [   ] WNL,  [ X  ] other:  5-/5 hip flexion, 5/5 knee flexion/extension, 2-/5 dorsiflexion due to limited passive range  LEFT    LE: [   ] WNL,  [  X ] other: 5-/5 hip flexion, 5/5 knee flexion/extension, 2-/5  dorsiflexion due to limited passive range    Tone: [  X  ] wnl,   [    ]  other:  DTRs: [  X ]symmetric, [   ] other:  Coordination:   [    ] intact,   [  X  ] other: Incoordinate with FNF bilaterally (worse R>L UE)                                                                          Sensory: [  X  ] Intact to light touch, temperature   [    ] other:    Decreased proprioception bilateral lower extremities  (-) garcia, babinski, clonus b/l extremities      acetaminophen     Tablet .. 650 milliGRAM(s) Oral every 6 hours PRN  albuterol    90 MICROgram(s) HFA Inhaler 2 Puff(s) Inhalation every 6 hours PRN  aspirin enteric coated 81 milliGRAM(s) Oral daily  atenolol  Tablet 50 milliGRAM(s) Oral daily  atorvastatin 80 milliGRAM(s) Oral at bedtime  chlorhexidine 2% Cloths 1 Application(s) Topical <User Schedule>  cyanocobalamin 1000 MICROGram(s) Oral every 24 hours  fesoterodine ER Tablet 4 milliGRAM(s) Oral <User Schedule>  folic acid 1 milliGRAM(s) Oral daily  heparin   Injectable 5000 Unit(s) SubCutaneous every 12 hours  losartan 100 milliGRAM(s) Oral at bedtime  pantoprazole    Tablet 40 milliGRAM(s) Oral before breakfast  ticagrelor 90 milliGRAM(s) Oral every 12 hours      RECENT LABS/IMAGING                        Patient is a 83y old  Female who presents with a chief complaint of Rehab of left thalamic lacunar infarct resulting in ataxic gait, incoordination, and ADL dysfunction. (14 Dec 2023 18:10)      HPI:  83 year old female with PMHx of arthritis, hypertension, hyperlipidemia who presented to the ED for gait ataxia and peripheral extremity numbness. Patient reported that 3 days PTA she received a flu shot, and over the weekend while cleaning experienced onset of RUE pain/weakness, in which she rested on the cough and then after trying to get up endorsed problems with balance and experiencing distal numbness in bother her hands and feet, prompting presentation to the ED. On presentation, imaging revealed L thalamic acute lacunar infarct. CTA revealed marked calcification at the right and left common carotid artery bifurcations with severe stenosis at the level of the right carotid bulb, left CC bifurcation and ast the left carotid bulb. Patient underwent diagnostic cerebral angiogram on 11/28/23 due to stenotic findings on CTA, which revealed non flow limiting stenosis bilateral carotid arteries, and no carotid angioplasty/stenting was warranted. Symptoms of distal numbness improved overtime and patient hemodynamically stable. She has been medically stable. She has severe limitation of both shoulders at baseline.  Of note, the patient states that she was recently evaluated by a rheumatologist and was told that despite being on Remicade for years, that she has osteoarthritis and never had rheumatoid arthritis.     Living situation: Lives with family (daughter, son-in-law, grandkids) in private house, 0 Steps to enter, 12 Steps inside with stair lift.  Prior Level of Function: Ambulating independent with RW and rollator, Independent with ADL/iADLs    Evaluated by bedside physical therapy and occupational therapy. Patient is minimum to moderate assistance for bed mobility and transfers, ambulating 25 feet x3 with rolling walker minimum assistance.  Upper body dressing  and lower body dressing moderate/ max assistance, Prior to admission patient was independent with mobility and ambulation with a rolling walker, and independent in all ADLs, IADLs. Evaluated by physical medicine and rehabilitation consult and deemed an appropriate candidate for inpatient rehabilitation facility. Admitted to [facility] on  11/30/23   (30 Nov 2023 12:31)      TODAY'S SUBJECTIVE & REVIEW OF SYMPTOMS  Patient was seen and assessed at bedside. No overnight events.   Patient denies any complaints at this time.   Tolerating PT/OT.   Tolerating oral diet. Voiding and passing stool spontaneously.   Vital signs reviewed.  Patient is anticipated for discharge today.    Current Level of Function:  Bed Mobility: supervision  Transfers: supervision  Gait: steady assist 150ft with RW  Stairs: 10 steps with partial assist  Upper body dressing: setup  Lower body dressing: setup    Review of Systems:   Constitutional:    [  X ] WNL           [   ] poor appetite   [   ] insomnia   [   ] tired   Cardio:                [ X  ] WNL           [   ] CP   [   ] DEE   [   ] palpitations               Resp:                   [  X ] WNL           [   ] SOB   [   ] cough   [   ] wheezing   GI:                        [X   ] WNL           [   ] constipation   [   ] diarrhea   [   ] abdominal pain   [   ] nausea   [   ] emesis                                :                      [   ] WNL           [   ] LOPEZ  [   ] dysuria   [ X  ] difficulty voiding             Endo:                   [  X ] WNL          [   ] polyuria   [   ] temperature intolerance                 Skin:                     [  X ] WNL          [   ] pain   [   ] wound   [   ] rash   MSK:                    [   ] WNL          [   ] muscle pain   [  X ] joint pain/ stiffness shoulder, hips, knees, ankles/ feet     [   ] muscle tenderness   [   ] swelling   Neuro:                 [   ] WNL          [   ] HA   [   ] change in vision   [   ] tremor   [ X  ] weakness/ unsteady gait    [   ]dysphagia              Cognitive:           [ X  ] WNL           [   ]confusion      Psych:                  [ X  ] WNL           [   ] hallucinations   [   ]agitation   [   ] delusion   [   ]depression        PHYSICAL EXAM    Vital Signs Last 24 Hrs  T(C): 36.6 (15 Dec 2023 06:05), Max: 37.1 (14 Dec 2023 20:35)  T(F): 97.8 (15 Dec 2023 06:05), Max: 98.7 (14 Dec 2023 20:35)  HR: 77 (15 Dec 2023 06:05) (67 - 77)  BP: 151/67 (15 Dec 2023 06:05) (116/57 - 151/67)  BP(mean): 96 (15 Dec 2023 06:05) (82 - 96)  RR: 19 (15 Dec 2023 06:05) (18 - 20)  SpO2: --    General:[ X  ] NAD, Resting Comfortable,   [   ] other:                                HEENT: [  X ] NC/AT, EOMI, PERRL , Normal Conjunctivae,   [   ] other:  Cardio: [ X  ] RRR   [ X  ] other:  Grade 3 crescendo-decrescendo  systolic murmur auscultated in R 2nd intercostal space. RRR                             Pulm: [ X  ] No Respiratory Distress,  Lungs CTAB,   [   ] other:                       Abdomen: [X   ]ND/NT, Soft,   [   ] other:    : [  X ] NO LOPEZ CATHETER,        [   ] LOPEZ CATHETER- no meatal tear, no discharge,                                         MSK: [   ] No joint swelling, Full ROM,   [ X  ] other: Functional PROM both shoulders. (-) 30 deg. passive DF both ankles     No AROM of R shoulder, 0-30 degrees AROM of L shoulder (2/2 OA)                                   Ext: [ X ]   No C/C/E, No calf tenderness,   [   ]other:    Skin: [  X ] intact,   [   ] other:                                                                   Neurological Examination:  Cognitive: [   X ] AAO x 3,   [    ]  other:                                                                      Attention:  [    ] intact,   [ X   ]  other:   Impairment in tasks that require concentration and attention                Memory: [    ] intact,    [ X   ]  other:   2/3 with short term recall  Mood/Affect: [   X ] wnl,    [    ]  other:                                                                             Communication: [   X ]Fluent, no dysarthria, following commands:  [    ] other:   CN II - XII:  [  X  ] intact,  [    ] other:                                                                                        Motor:   RIGHT UE: [   ] WNL,  [X   ] other: 4-/5 shoulder abduction (limited ROM due to OA), 4+/5 elbow flexion/extension, 4+/5 finger  strength   LEFT    UE: [   ] WNL,  [ X  ] other: 4-/5 shoulder abduction (limited ROM due to OA), 4+/5 elbow flexion/extension, 4+/5 finger  strength   RIGHT LE: [   ] WNL,  [ X  ] other:  5-/5 hip flexion, 5/5 knee flexion/extension, 2-/5 dorsiflexion due to limited passive range  LEFT    LE: [   ] WNL,  [  X ] other: 5-/5 hip flexion, 5/5 knee flexion/extension, 2-/5  dorsiflexion due to limited passive range    Tone: [  X  ] wnl,   [    ]  other:  DTRs: [  X ]symmetric, [   ] other:  Coordination:   [    ] intact,   [  X  ] other: Incoordinate with FNF bilaterally (worse R>L UE)                                                                          Sensory: [  X  ] Intact to light touch, temperature   [    ] other:    Decreased proprioception bilateral lower extremities  (-) garcia, babinski, clonus b/l extremities      acetaminophen     Tablet .. 650 milliGRAM(s) Oral every 6 hours PRN  albuterol    90 MICROgram(s) HFA Inhaler 2 Puff(s) Inhalation every 6 hours PRN  aspirin enteric coated 81 milliGRAM(s) Oral daily  atenolol  Tablet 50 milliGRAM(s) Oral daily  atorvastatin 80 milliGRAM(s) Oral at bedtime  chlorhexidine 2% Cloths 1 Application(s) Topical <User Schedule>  cyanocobalamin 1000 MICROGram(s) Oral every 24 hours  fesoterodine ER Tablet 4 milliGRAM(s) Oral <User Schedule>  folic acid 1 milliGRAM(s) Oral daily  heparin   Injectable 5000 Unit(s) SubCutaneous every 12 hours  losartan 100 milliGRAM(s) Oral at bedtime  pantoprazole    Tablet 40 milliGRAM(s) Oral before breakfast  ticagrelor 90 milliGRAM(s) Oral every 12 hours      RECENT LABS/IMAGING

## 2023-12-15 NOTE — PROGRESS NOTE ADULT - PROVIDER SPECIALTY LIST ADULT
Physiatry
Rehab Medicine
Rehab Medicine
Neuropsychology
Rehab Medicine
Neuropsychology
Rehab Medicine
Rehab Medicine
Home
Physiatry
Neuropsychology
Rehab Medicine
Neuropsychology
Physiatry
Rehab Medicine
Rehab Medicine
Neuropsychology
Rehab Medicine
Rehab Medicine

## 2023-12-20 DIAGNOSIS — J45.909 UNSPECIFIED ASTHMA, UNCOMPLICATED: ICD-10-CM

## 2023-12-20 DIAGNOSIS — D53.9 NUTRITIONAL ANEMIA, UNSPECIFIED: ICD-10-CM

## 2023-12-20 DIAGNOSIS — Z96.651 PRESENCE OF RIGHT ARTIFICIAL KNEE JOINT: ICD-10-CM

## 2023-12-20 DIAGNOSIS — E83.42 HYPOMAGNESEMIA: ICD-10-CM

## 2023-12-20 DIAGNOSIS — I69.393 ATAXIA FOLLOWING CEREBRAL INFARCTION: ICD-10-CM

## 2023-12-20 DIAGNOSIS — E78.5 HYPERLIPIDEMIA, UNSPECIFIED: ICD-10-CM

## 2023-12-20 DIAGNOSIS — T39.4X5A ADVERSE EFFECT OF ANTIRHEUMATICS, NOT ELSEWHERE CLASSIFIED, INITIAL ENCOUNTER: ICD-10-CM

## 2023-12-20 DIAGNOSIS — I10 ESSENTIAL (PRIMARY) HYPERTENSION: ICD-10-CM

## 2023-12-20 DIAGNOSIS — G37.9 DEMYELINATING DISEASE OF CENTRAL NERVOUS SYSTEM, UNSPECIFIED: ICD-10-CM

## 2023-12-20 DIAGNOSIS — I35.0 NONRHEUMATIC AORTIC (VALVE) STENOSIS: ICD-10-CM

## 2023-12-20 DIAGNOSIS — R32 UNSPECIFIED URINARY INCONTINENCE: ICD-10-CM

## 2023-12-20 DIAGNOSIS — M19.90 UNSPECIFIED OSTEOARTHRITIS, UNSPECIFIED SITE: ICD-10-CM

## 2023-12-20 DIAGNOSIS — G61.0 GUILLAIN-BARRE SYNDROME: ICD-10-CM

## 2023-12-20 DIAGNOSIS — Z79.82 LONG TERM (CURRENT) USE OF ASPIRIN: ICD-10-CM

## 2023-12-20 DIAGNOSIS — G62.0 DRUG-INDUCED POLYNEUROPATHY: ICD-10-CM

## 2023-12-20 DIAGNOSIS — I65.23 OCCLUSION AND STENOSIS OF BILATERAL CAROTID ARTERIES: ICD-10-CM

## 2023-12-20 DIAGNOSIS — D89.9 DISORDER INVOLVING THE IMMUNE MECHANISM, UNSPECIFIED: ICD-10-CM

## 2023-12-26 ENCOUNTER — APPOINTMENT (OUTPATIENT)
Dept: NEUROLOGY | Facility: CLINIC | Age: 83
End: 2023-12-26

## 2024-01-11 ENCOUNTER — APPOINTMENT (OUTPATIENT)
Dept: UROGYNECOLOGY | Facility: CLINIC | Age: 84
End: 2024-01-11

## 2024-02-07 RX ORDER — FESOTERODINE FUMARATE 4 MG/1
4 TABLET, FILM COATED, EXTENDED RELEASE ORAL
Qty: 90 | Refills: 1 | Status: ACTIVE | COMMUNITY
Start: 2023-11-10 | End: 1900-01-01

## 2024-03-06 ENCOUNTER — APPOINTMENT (OUTPATIENT)
Dept: NEUROLOGY | Facility: CLINIC | Age: 84
End: 2024-03-06
Payer: MEDICARE

## 2024-03-06 VITALS
HEIGHT: 67 IN | BODY MASS INDEX: 28.41 KG/M2 | DIASTOLIC BLOOD PRESSURE: 68 MMHG | TEMPERATURE: 97.6 F | HEART RATE: 75 BPM | OXYGEN SATURATION: 97 % | SYSTOLIC BLOOD PRESSURE: 147 MMHG | WEIGHT: 181 LBS

## 2024-03-06 DIAGNOSIS — I63.9 CEREBRAL INFARCTION, UNSPECIFIED: ICD-10-CM

## 2024-03-06 DIAGNOSIS — I65.23 OCCLUSION AND STENOSIS OF BILATERAL CAROTID ARTERIES: ICD-10-CM

## 2024-03-06 PROCEDURE — 99214 OFFICE O/P EST MOD 30 MIN: CPT

## 2024-03-06 RX ORDER — PNV NO.95/FERROUS FUM/FOLIC AC 28MG-0.8MG
TABLET ORAL
Refills: 0 | Status: ACTIVE | COMMUNITY

## 2024-03-06 RX ORDER — OMEGA-3S/DHA/EPA/FISH OIL 980-1400MG
CAPSULE,DELAYED RELEASE (ENTERIC COATED) ORAL
Refills: 0 | Status: ACTIVE | COMMUNITY

## 2024-03-06 RX ORDER — ATORVASTATIN CALCIUM 80 MG/1
80 TABLET, FILM COATED ORAL
Refills: 0 | Status: ACTIVE | COMMUNITY

## 2024-03-06 NOTE — PHYSICAL EXAM
[General Appearance - Alert] : alert [General Appearance - In No Acute Distress] : in no acute distress [General Appearance - Well Nourished] : well nourished [General Appearance - Well Developed] : well developed [Oriented To Time, Place, And Person] : oriented to person, place, and time [Affect] : the affect was normal [Person] : oriented to person [Place] : oriented to place [Time] : oriented to time [Naming Objects] : no difficulty naming common objects [Fluency] : fluency intact [Comprehension] : comprehension intact [Cranial Nerves Optic (II)] : visual acuity intact bilaterally,  visual fields full to confrontation, pupils equal round and reactive to light [Cranial Nerves Oculomotor (III)] : extraocular motion intact [Cranial Nerves Trigeminal (V)] : facial sensation intact symmetrically [Cranial Nerves Facial (VII)] : face symmetrical [Cranial Nerves Vestibulocochlear (VIII)] : hearing was intact bilaterally [Cranial Nerves Glossopharyngeal (IX)] : tongue and palate midline [Cranial Nerves Hypoglossal (XII)] : there was no tongue deviation with protrusion [Motor Tone] : muscle tone was normal in all four extremities [Paresis Pronator Drift Right-Sided] : no pronator drift on the right [Paresis Pronator Drift Left-Sided] : no pronator drift on the left [Motor Strength Upper Extremities Bilaterally] : strength was normal in both upper extremities [Sensation Tactile Decrease] : light touch was intact [Coordination - Dysmetria Impaired Finger-to-Nose Bilateral] : not present [2+] : Biceps left 2+ [1+] : Patella left 1+ [FreeTextEntry6] : BLE 4/5

## 2024-03-06 NOTE — DISCUSSION/SUMMARY
[FreeTextEntry1] : Pt is a 82 yo F with PMHx of OA, s/p right TKR, HTN, HLD, right rotator cuff injury, BLE edema, who presents with her daughter and son in law for hospital follow up.   # Left thalamic ischemic stroke: etiology likely . CTA head/neck with severe b/l ICA stenosis, which was not noted to be as severe on DSA thus no intervention was performed. While in the hospital had PRU while on plavix which was elevated at 278, she was on brilinta for 3 months now back on ASA monotherapy. NIHSS 2 (BLE drift), mRS 2.  - Continue ASA 81mg daily - Continue atorvastatin 80mg daily, LDL goal <70 - Recheck lipid panel and CMP - MRA neck in 2 mo - Continue daily exercises.   RTC in 6 mo

## 2024-03-06 NOTE — HISTORY OF PRESENT ILLNESS
[FreeTextEntry1] : Pt is a 82 yo F with PMHx of OA, s/p right TKR, HTN, HLD, right rotator cuff injury, BLE edema, who presents with her daughter and son in law for hospital follow up. Pt presents with imbalance on 11/2023, found to have a left thalamic ischemic stroke. Also had neuropathy labs given symptoms, anti-GM2 was elevated, however in isolation is of unclear significance. Pt is back to using her walker, still has some weakness overall. She lives with her daughter and needs help with cooking but is able to dress herself, go to the bathroom and shower on her own. Denies new weakness, numbness, speech or vision difficulty, Denies recent falls. Pt has been doing her physical therapy exercises at home, twice a day.

## 2024-03-06 NOTE — REASON FOR VISIT
[Post Hospitalization] : a post hospitalization visit [Family Member] : family member [FreeTextEntry1] : stroke

## 2024-03-06 NOTE — REVIEW OF SYSTEMS
[Feeling Tired] : feeling tired [Lower Ext Edema] : lower extremity edema [As Noted in HPI] : as noted in HPI [Negative] : Eyes

## 2024-03-25 ENCOUNTER — APPOINTMENT (OUTPATIENT)
Dept: PULMONOLOGY | Facility: CLINIC | Age: 84
End: 2024-03-25

## 2024-03-25 ENCOUNTER — INPATIENT (INPATIENT)
Facility: HOSPITAL | Age: 84
LOS: 3 days | Discharge: HOME HEALTH | DRG: 192 | End: 2024-03-29
Attending: INTERNAL MEDICINE | Admitting: STUDENT IN AN ORGANIZED HEALTH CARE EDUCATION/TRAINING PROGRAM
Payer: MEDICARE

## 2024-03-25 VITALS
HEART RATE: 77 BPM | WEIGHT: 190.92 LBS | TEMPERATURE: 98 F | DIASTOLIC BLOOD PRESSURE: 66 MMHG | HEIGHT: 67 IN | OXYGEN SATURATION: 95 % | RESPIRATION RATE: 20 BRPM | SYSTOLIC BLOOD PRESSURE: 145 MMHG

## 2024-03-25 DIAGNOSIS — Z96.651 PRESENCE OF RIGHT ARTIFICIAL KNEE JOINT: Chronic | ICD-10-CM

## 2024-03-25 DIAGNOSIS — J44.9 CHRONIC OBSTRUCTIVE PULMONARY DISEASE, UNSPECIFIED: ICD-10-CM

## 2024-03-25 LAB
ALBUMIN SERPL ELPH-MCNC: 3.7 G/DL — SIGNIFICANT CHANGE UP (ref 3.5–5.2)
ALP SERPL-CCNC: 60 U/L — SIGNIFICANT CHANGE UP (ref 30–115)
ALT FLD-CCNC: 14 U/L — SIGNIFICANT CHANGE UP (ref 0–41)
ANION GAP SERPL CALC-SCNC: 11 MMOL/L — SIGNIFICANT CHANGE UP (ref 7–14)
AST SERPL-CCNC: 17 U/L — SIGNIFICANT CHANGE UP (ref 0–41)
BASOPHILS # BLD AUTO: 0.03 K/UL — SIGNIFICANT CHANGE UP (ref 0–0.2)
BASOPHILS NFR BLD AUTO: 0.7 % — SIGNIFICANT CHANGE UP (ref 0–1)
BILIRUB SERPL-MCNC: 0.7 MG/DL — SIGNIFICANT CHANGE UP (ref 0.2–1.2)
BUN SERPL-MCNC: 23 MG/DL — HIGH (ref 10–20)
CALCIUM SERPL-MCNC: 8.7 MG/DL — SIGNIFICANT CHANGE UP (ref 8.4–10.5)
CHLORIDE SERPL-SCNC: 105 MMOL/L — SIGNIFICANT CHANGE UP (ref 98–110)
CO2 SERPL-SCNC: 23 MMOL/L — SIGNIFICANT CHANGE UP (ref 17–32)
CREAT SERPL-MCNC: 1.1 MG/DL — SIGNIFICANT CHANGE UP (ref 0.7–1.5)
EGFR: 50 ML/MIN/1.73M2 — LOW
EOSINOPHIL # BLD AUTO: 0.03 K/UL — SIGNIFICANT CHANGE UP (ref 0–0.7)
EOSINOPHIL NFR BLD AUTO: 0.7 % — SIGNIFICANT CHANGE UP (ref 0–8)
GLUCOSE SERPL-MCNC: 92 MG/DL — SIGNIFICANT CHANGE UP (ref 70–99)
HCT VFR BLD CALC: 28.1 % — LOW (ref 37–47)
HGB BLD-MCNC: 8.7 G/DL — LOW (ref 12–16)
IMM GRANULOCYTES NFR BLD AUTO: 0.2 % — SIGNIFICANT CHANGE UP (ref 0.1–0.3)
LYMPHOCYTES # BLD AUTO: 0.58 K/UL — LOW (ref 1.2–3.4)
LYMPHOCYTES # BLD AUTO: 14.1 % — LOW (ref 20.5–51.1)
MAGNESIUM SERPL-MCNC: 1.9 MG/DL — SIGNIFICANT CHANGE UP (ref 1.8–2.4)
MCHC RBC-ENTMCNC: 31 G/DL — LOW (ref 32–37)
MCHC RBC-ENTMCNC: 32 PG — HIGH (ref 27–31)
MCV RBC AUTO: 103.3 FL — HIGH (ref 81–99)
MONOCYTES # BLD AUTO: 0.36 K/UL — SIGNIFICANT CHANGE UP (ref 0.1–0.6)
MONOCYTES NFR BLD AUTO: 8.8 % — SIGNIFICANT CHANGE UP (ref 1.7–9.3)
NEUTROPHILS # BLD AUTO: 3.1 K/UL — SIGNIFICANT CHANGE UP (ref 1.4–6.5)
NEUTROPHILS NFR BLD AUTO: 75.5 % — HIGH (ref 42.2–75.2)
NRBC # BLD: 0 /100 WBCS — SIGNIFICANT CHANGE UP (ref 0–0)
NT-PROBNP SERPL-SCNC: HIGH PG/ML (ref 0–300)
PLATELET # BLD AUTO: 143 K/UL — SIGNIFICANT CHANGE UP (ref 130–400)
PMV BLD: 11.3 FL — HIGH (ref 7.4–10.4)
POTASSIUM SERPL-MCNC: 4.8 MMOL/L — SIGNIFICANT CHANGE UP (ref 3.5–5)
POTASSIUM SERPL-SCNC: 4.8 MMOL/L — SIGNIFICANT CHANGE UP (ref 3.5–5)
PROT SERPL-MCNC: 6.3 G/DL — SIGNIFICANT CHANGE UP (ref 6–8)
RBC # BLD: 2.72 M/UL — LOW (ref 4.2–5.4)
RBC # FLD: 16 % — HIGH (ref 11.5–14.5)
SODIUM SERPL-SCNC: 139 MMOL/L — SIGNIFICANT CHANGE UP (ref 135–146)
TROPONIN T, HIGH SENSITIVITY RESULT: 25 NG/L — HIGH (ref 6–13)
TROPONIN T, HIGH SENSITIVITY RESULT: 35 NG/L — HIGH (ref 6–13)
WBC # BLD: 4.11 K/UL — LOW (ref 4.8–10.8)
WBC # FLD AUTO: 4.11 K/UL — LOW (ref 4.8–10.8)

## 2024-03-25 PROCEDURE — 97162 PT EVAL MOD COMPLEX 30 MIN: CPT | Mod: GP

## 2024-03-25 PROCEDURE — 82746 ASSAY OF FOLIC ACID SERUM: CPT

## 2024-03-25 PROCEDURE — 82728 ASSAY OF FERRITIN: CPT

## 2024-03-25 PROCEDURE — 93970 EXTREMITY STUDY: CPT

## 2024-03-25 PROCEDURE — 71045 X-RAY EXAM CHEST 1 VIEW: CPT | Mod: 26

## 2024-03-25 PROCEDURE — 83550 IRON BINDING TEST: CPT

## 2024-03-25 PROCEDURE — 82607 VITAMIN B-12: CPT

## 2024-03-25 PROCEDURE — 36415 COLL VENOUS BLD VENIPUNCTURE: CPT

## 2024-03-25 PROCEDURE — 80048 BASIC METABOLIC PNL TOTAL CA: CPT

## 2024-03-25 PROCEDURE — 99285 EMERGENCY DEPT VISIT HI MDM: CPT | Mod: FS

## 2024-03-25 PROCEDURE — 83010 ASSAY OF HAPTOGLOBIN QUANT: CPT

## 2024-03-25 PROCEDURE — 0225U NFCT DS DNA&RNA 21 SARSCOV2: CPT

## 2024-03-25 PROCEDURE — 86901 BLOOD TYPING SEROLOGIC RH(D): CPT

## 2024-03-25 PROCEDURE — 83735 ASSAY OF MAGNESIUM: CPT

## 2024-03-25 PROCEDURE — 86900 BLOOD TYPING SEROLOGIC ABO: CPT

## 2024-03-25 PROCEDURE — 83540 ASSAY OF IRON: CPT

## 2024-03-25 PROCEDURE — 84484 ASSAY OF TROPONIN QUANT: CPT

## 2024-03-25 PROCEDURE — 84443 ASSAY THYROID STIM HORMONE: CPT

## 2024-03-25 PROCEDURE — 85027 COMPLETE CBC AUTOMATED: CPT

## 2024-03-25 PROCEDURE — 97110 THERAPEUTIC EXERCISES: CPT | Mod: GP

## 2024-03-25 PROCEDURE — 93306 TTE W/DOPPLER COMPLETE: CPT

## 2024-03-25 PROCEDURE — 86850 RBC ANTIBODY SCREEN: CPT

## 2024-03-25 PROCEDURE — 85045 AUTOMATED RETICULOCYTE COUNT: CPT

## 2024-03-25 PROCEDURE — 93010 ELECTROCARDIOGRAM REPORT: CPT

## 2024-03-25 PROCEDURE — 83615 LACTATE (LD) (LDH) ENZYME: CPT

## 2024-03-25 PROCEDURE — 80053 COMPREHEN METABOLIC PANEL: CPT

## 2024-03-25 PROCEDURE — 97116 GAIT TRAINING THERAPY: CPT | Mod: GP

## 2024-03-25 RX ORDER — ENOXAPARIN SODIUM 100 MG/ML
80 INJECTION SUBCUTANEOUS ONCE
Refills: 0 | Status: COMPLETED | OUTPATIENT
Start: 2024-03-25 | End: 2024-03-25

## 2024-03-25 RX ORDER — FUROSEMIDE 40 MG
20 TABLET ORAL ONCE
Refills: 0 | Status: COMPLETED | OUTPATIENT
Start: 2024-03-25 | End: 2024-03-25

## 2024-03-25 RX ADMIN — Medication 20 MILLIGRAM(S): at 20:16

## 2024-03-25 RX ADMIN — ENOXAPARIN SODIUM 80 MILLIGRAM(S): 100 INJECTION SUBCUTANEOUS at 21:11

## 2024-03-25 NOTE — ED PROVIDER NOTE - DIFFERENTIAL DIAGNOSIS
see mdm    Independent interpretation of the X-Ray film(s) performed by MD Fair  Independent interpretation of the EKG performed by MD Fair Differential Diagnosis see mdm

## 2024-03-25 NOTE — ED PROVIDER NOTE - OBJECTIVE STATEMENT
83-year-old female with past medical history of hypertension, hyperlipidemia, and CVA who presents to the ED for evaluation.  Reports that she has been having bilateral lower extremity swelling for the past 3 days and has been progressively getting worse.  Also endorses shortness of breath on exertion.  Spoke with her cardiologist earlier today and was told to come to the ED for evaluation.  Denies fever, chest pain, nausea, vomiting, abdominal pain, urinary symptoms, and melena/hematochezia.

## 2024-03-25 NOTE — PATIENT PROFILE ADULT - FALL HARM RISK - HARM RISK INTERVENTIONS
Assistance with ambulation/Assistance OOB with selected safe patient handling equipment/Communicate Risk of Fall with Harm to all staff/Monitor gait and stability/Reinforce activity limits and safety measures with patient and family/Review medications for side effects contributing to fall risk/Sit up slowly, dangle for a short time, stand at bedside before walking/Tailored Fall Risk Interventions/Toileting schedule using arm’s reach rule for commode and bathroom/Visual Cue: Yellow wristband and red socks/Bed in lowest position, wheels locked, appropriate side rails in place/Call bell, personal items and telephone in reach/Instruct patient to call for assistance before getting out of bed or chair/Non-slip footwear when patient is out of bed/[place] to call system/Physically safe environment - no spills, clutter or unnecessary equipment/Purposeful Proactive Rounding/Room/bathroom lighting operational, light cord in reach

## 2024-03-25 NOTE — ED PROVIDER NOTE - ATTENDING APP SHARED VISIT CONTRIBUTION OF CARE
I have reviewed and agree with the mid-level note, except as documented in my note below.    83-year-old female history of HTN, HLD, CVA, RA, history significant for orthopedic surgeries, non-smoker, denies daily EtOH use, now presents with bilateral lower extremity edema worsening over the past 3 days, worse in the morning, also admits to worsening dyspnea on exertion for the past several months, states is compliant with home medications, denies fever, hemoptysis, orthopnea, PND, chest pain, LE pain, recent immobilization or travel or other associated complaints at present. Old chart reviewed. I have reviewed and agree with the initial nursing note, except as documented in my note.    VSS, awake, alert, non-toxic appearing, ears clear, oropharynx clear, no skin rash or lesions, no tracheal deviation, non-labored breathing without accessory muscle use apparent or pursed lip breathing, no retractions, speaks full sentences, equal BS BL, bibasilar crackles, +S1/S2, +sys murmur, abdomen soft, NT, ND, +BS, AO x 3, clear speech, 1+ BL LE edema.

## 2024-03-25 NOTE — ED PROVIDER NOTE - CARE PLAN
Facial Steaming: steamed Detail Level: Zone Treatment Type Override: 130 Extraction Method: sterile needle Comments (Sticky): Lactic facial 1 Principal Discharge DX:	Fluid overload  Secondary Diagnosis:	Afib   no

## 2024-03-25 NOTE — ED PROVIDER NOTE - WHICH SHOWED
EKG: afib at 64 at bpm, normal intervals, no contiguous lead changes suspicious for ischemia
Variable Decelerations

## 2024-03-25 NOTE — ED PROVIDER NOTE - PROGRESS NOTE DETAILS
Patient is admitted for afib, fluid overload. Pt is aware of the plan and agrees. Pt has been endorsed to MAR.

## 2024-03-25 NOTE — ED ADULT NURSE NOTE - OBJECTIVE STATEMENT
Pt presents to ED C/o increasing bilateral LE edema and SOB on exertion. Pt states at baseline she has +1 pitting edema, now it is +4 pitting edema. Pt has hx of stroke in november.

## 2024-03-25 NOTE — ED PROVIDER NOTE - PHYSICAL EXAMINATION
CONSTITUTIONAL: in no apparent distress.   ENT: Hearing is intact with good acuity to spoken voice.  Patient is speaking clearly, not muffled and airway is intact.   RESPIRATORY: No signs of respiratory distress. Lung sounds are clear in all lobes bilaterally without rales, rhonchi, or wheezes.  CARDIOVASCULAR: irregularly irregular. Bilateral lower extremity pitting edema noticed.  GI: Abdomen is soft, non-tender, and without distention. Bowel sounds are present and normoactive in all four quadrants. No masses are noted.   NEURO: A & O x 3. Normal speech. No focal deficit.  PSYCHOLOGICAL: Appropriate mood and affect. Good judgement and insight.

## 2024-03-25 NOTE — ED PROVIDER NOTE - CLINICAL SUMMARY MEDICAL DECISION MAKING FREE TEXT BOX
Independent interpretation of the X-Ray film(s) performed by MD Fair  Independent interpretation of the EKG performed by MD Fair    This patient with history of heart failure, presenting with SOB, likely secondary to acute decompensated heart failure causing volume overload and pulmonary edema, as well as new onset afib. Alternative etiologies I considered include cardiac (ACS, valvular disease, arrhythmia, myocarditis/endocarditis, dissection) however given unremarkable ekg, cardiac exam have low suspicion. Also considered but low risk for respiratory cause (COPD, asthma, PE, or PNA), medication noncompliance or dietary indiscretion, alcohol or drug abuse, endocrine (thyrotoxicosis), and anemia. Plan to admit for further evaluation and management.

## 2024-03-26 ENCOUNTER — TRANSCRIPTION ENCOUNTER (OUTPATIENT)
Age: 84
End: 2024-03-26

## 2024-03-26 LAB
ALBUMIN SERPL ELPH-MCNC: 3.7 G/DL — SIGNIFICANT CHANGE UP (ref 3.5–5.2)
ALP SERPL-CCNC: 66 U/L — SIGNIFICANT CHANGE UP (ref 30–115)
ALT FLD-CCNC: 15 U/L — SIGNIFICANT CHANGE UP (ref 0–41)
ANION GAP SERPL CALC-SCNC: 9 MMOL/L — SIGNIFICANT CHANGE UP (ref 7–14)
AST SERPL-CCNC: 14 U/L — SIGNIFICANT CHANGE UP (ref 0–41)
BILIRUB SERPL-MCNC: 0.8 MG/DL — SIGNIFICANT CHANGE UP (ref 0.2–1.2)
BLD GP AB SCN SERPL QL: SIGNIFICANT CHANGE UP
BUN SERPL-MCNC: 21 MG/DL — HIGH (ref 10–20)
CALCIUM SERPL-MCNC: 9.2 MG/DL — SIGNIFICANT CHANGE UP (ref 8.4–10.4)
CHLORIDE SERPL-SCNC: 103 MMOL/L — SIGNIFICANT CHANGE UP (ref 98–110)
CO2 SERPL-SCNC: 30 MMOL/L — SIGNIFICANT CHANGE UP (ref 17–32)
CREAT SERPL-MCNC: 1.3 MG/DL — SIGNIFICANT CHANGE UP (ref 0.7–1.5)
EGFR: 41 ML/MIN/1.73M2 — LOW
GLUCOSE SERPL-MCNC: 96 MG/DL — SIGNIFICANT CHANGE UP (ref 70–99)
HCT VFR BLD CALC: 29.5 % — LOW (ref 37–47)
HGB BLD-MCNC: 9 G/DL — LOW (ref 12–16)
IRON SATN MFR SERPL: 15 % — SIGNIFICANT CHANGE UP (ref 15–50)
IRON SATN MFR SERPL: 35 UG/DL — SIGNIFICANT CHANGE UP (ref 35–150)
LDH SERPL L TO P-CCNC: 224 — SIGNIFICANT CHANGE UP (ref 50–242)
MAGNESIUM SERPL-MCNC: 1.9 MG/DL — SIGNIFICANT CHANGE UP (ref 1.8–2.4)
MCHC RBC-ENTMCNC: 30.5 G/DL — LOW (ref 32–37)
MCHC RBC-ENTMCNC: 31 PG — SIGNIFICANT CHANGE UP (ref 27–31)
MCV RBC AUTO: 101.7 FL — HIGH (ref 81–99)
NRBC # BLD: 0 /100 WBCS — SIGNIFICANT CHANGE UP (ref 0–0)
PLATELET # BLD AUTO: 145 K/UL — SIGNIFICANT CHANGE UP (ref 130–400)
PMV BLD: 11.8 FL — HIGH (ref 7.4–10.4)
POTASSIUM SERPL-MCNC: 4.2 MMOL/L — SIGNIFICANT CHANGE UP (ref 3.5–5)
POTASSIUM SERPL-SCNC: 4.2 MMOL/L — SIGNIFICANT CHANGE UP (ref 3.5–5)
PROT SERPL-MCNC: 6.2 G/DL — SIGNIFICANT CHANGE UP (ref 6–8)
RBC # BLD: 2.9 M/UL — LOW (ref 4.2–5.4)
RBC # BLD: 2.9 M/UL — LOW (ref 4.2–5.4)
RBC # FLD: 16 % — HIGH (ref 11.5–14.5)
RETICS #: 101.2 K/UL — SIGNIFICANT CHANGE UP (ref 25–125)
RETICS/RBC NFR: 3.5 % — HIGH (ref 0.5–1.5)
SODIUM SERPL-SCNC: 142 MMOL/L — SIGNIFICANT CHANGE UP (ref 135–146)
TIBC SERPL-MCNC: 232 UG/DL — SIGNIFICANT CHANGE UP (ref 220–430)
TSH SERPL-MCNC: 1.6 UIU/ML — SIGNIFICANT CHANGE UP (ref 0.27–4.2)
UIBC SERPL-MCNC: 197 UG/DL — SIGNIFICANT CHANGE UP (ref 110–370)
WBC # BLD: 3.35 K/UL — LOW (ref 4.8–10.8)
WBC # FLD AUTO: 3.35 K/UL — LOW (ref 4.8–10.8)

## 2024-03-26 PROCEDURE — 93970 EXTREMITY STUDY: CPT | Mod: 26,1L

## 2024-03-26 PROCEDURE — 99223 1ST HOSP IP/OBS HIGH 75: CPT

## 2024-03-26 RX ORDER — ATENOLOL 25 MG/1
50 TABLET ORAL DAILY
Refills: 0 | Status: DISCONTINUED | OUTPATIENT
Start: 2024-03-26 | End: 2024-03-27

## 2024-03-26 RX ORDER — FOLIC ACID 0.8 MG
1 TABLET ORAL
Qty: 0 | Refills: 0 | DISCHARGE

## 2024-03-26 RX ORDER — LOSARTAN POTASSIUM 100 MG/1
100 TABLET, FILM COATED ORAL DAILY
Refills: 0 | Status: DISCONTINUED | OUTPATIENT
Start: 2024-03-26 | End: 2024-03-29

## 2024-03-26 RX ORDER — PANTOPRAZOLE SODIUM 20 MG/1
40 TABLET, DELAYED RELEASE ORAL
Refills: 0 | Status: DISCONTINUED | OUTPATIENT
Start: 2024-03-26 | End: 2024-03-29

## 2024-03-26 RX ORDER — ATORVASTATIN CALCIUM 80 MG/1
1 TABLET, FILM COATED ORAL
Refills: 0 | DISCHARGE

## 2024-03-26 RX ORDER — FOLIC ACID 0.8 MG
1 TABLET ORAL DAILY
Refills: 0 | Status: DISCONTINUED | OUTPATIENT
Start: 2024-03-26 | End: 2024-03-29

## 2024-03-26 RX ORDER — ASPIRIN/CALCIUM CARB/MAGNESIUM 324 MG
81 TABLET ORAL DAILY
Refills: 0 | Status: DISCONTINUED | OUTPATIENT
Start: 2024-03-26 | End: 2024-03-29

## 2024-03-26 RX ORDER — LOSARTAN POTASSIUM 100 MG/1
1 TABLET, FILM COATED ORAL
Refills: 0 | DISCHARGE

## 2024-03-26 RX ORDER — FUROSEMIDE 40 MG
20 TABLET ORAL
Refills: 0 | Status: DISCONTINUED | OUTPATIENT
Start: 2024-03-26 | End: 2024-03-26

## 2024-03-26 RX ORDER — OXYBUTYNIN CHLORIDE 5 MG
5 TABLET ORAL
Refills: 0 | Status: DISCONTINUED | OUTPATIENT
Start: 2024-03-26 | End: 2024-03-29

## 2024-03-26 RX ORDER — FUROSEMIDE 40 MG
20 TABLET ORAL ONCE
Refills: 0 | Status: COMPLETED | OUTPATIENT
Start: 2024-03-26 | End: 2024-03-26

## 2024-03-26 RX ORDER — ASPIRIN/CALCIUM CARB/MAGNESIUM 324 MG
1 TABLET ORAL
Refills: 0 | DISCHARGE

## 2024-03-26 RX ORDER — ALBUTEROL 90 UG/1
2 AEROSOL, METERED ORAL
Refills: 0 | DISCHARGE

## 2024-03-26 RX ORDER — ALBUTEROL 90 UG/1
2 AEROSOL, METERED ORAL EVERY 6 HOURS
Refills: 0 | Status: DISCONTINUED | OUTPATIENT
Start: 2024-03-26 | End: 2024-03-29

## 2024-03-26 RX ORDER — ATORVASTATIN CALCIUM 80 MG/1
80 TABLET, FILM COATED ORAL AT BEDTIME
Refills: 0 | Status: DISCONTINUED | OUTPATIENT
Start: 2024-03-26 | End: 2024-03-29

## 2024-03-26 RX ORDER — APIXABAN 2.5 MG/1
5 TABLET, FILM COATED ORAL
Refills: 0 | Status: DISCONTINUED | OUTPATIENT
Start: 2024-03-26 | End: 2024-03-29

## 2024-03-26 RX ORDER — FUROSEMIDE 40 MG
40 TABLET ORAL DAILY
Refills: 0 | Status: DISCONTINUED | OUTPATIENT
Start: 2024-03-27 | End: 2024-03-27

## 2024-03-26 RX ORDER — IRON,CARBONYL 45 MG
1 TABLET ORAL
Refills: 0 | DISCHARGE

## 2024-03-26 RX ADMIN — Medication 20 MILLIGRAM(S): at 11:26

## 2024-03-26 RX ADMIN — APIXABAN 5 MILLIGRAM(S): 2.5 TABLET, FILM COATED ORAL at 05:10

## 2024-03-26 RX ADMIN — Medication 20 MILLIGRAM(S): at 05:10

## 2024-03-26 RX ADMIN — ATENOLOL 50 MILLIGRAM(S): 25 TABLET ORAL at 05:09

## 2024-03-26 RX ADMIN — ATORVASTATIN CALCIUM 80 MILLIGRAM(S): 80 TABLET, FILM COATED ORAL at 21:19

## 2024-03-26 RX ADMIN — Medication 5 MILLIGRAM(S): at 05:09

## 2024-03-26 RX ADMIN — Medication 81 MILLIGRAM(S): at 11:26

## 2024-03-26 RX ADMIN — APIXABAN 5 MILLIGRAM(S): 2.5 TABLET, FILM COATED ORAL at 18:00

## 2024-03-26 RX ADMIN — Medication 1 MILLIGRAM(S): at 11:28

## 2024-03-26 RX ADMIN — LOSARTAN POTASSIUM 100 MILLIGRAM(S): 100 TABLET, FILM COATED ORAL at 05:09

## 2024-03-26 RX ADMIN — PANTOPRAZOLE SODIUM 40 MILLIGRAM(S): 20 TABLET, DELAYED RELEASE ORAL at 05:19

## 2024-03-26 NOTE — H&P ADULT - ATTENDING COMMENTS
83-year-old female with past medical history of hypertension, hyperlipidemia, and recent CVA 12/23 who presents to the ED for evaluation swollen legs bilaterally.  Reports that she has been having bilateral lower extremity swelling for the past month that has been progressively getting worse. She also endorses dyspnea on exertion and orthopnea for the last month. No cough or fever.       Acute HFpEF  Moderate to severe AS  A-Fib new onset  HTN / DL  H/O recent CVA  Microcytic anemia                PLAN:    Tele reviewed.  Troponin: 35-->25  EKG on admission: A-fib 64/min. Non specific T wave changes (Interpreted by me)  CXR: Fibrotic changes L lung base.   Cont Lasix 40 mg iv daily  Check i's and o's and daily wt  Low salt diet and water restriction to 1.5 L/D  ECHO  Retic count is 3.5, serum iron is 35 and percent sat is 15. Check Ferritin level. Also check B12 and Folate  LDH is normal. Check Heptoglobin.   H/O CVA and [id number] score is 7. Started her on Eliquis 5 mg po q 12h.   Care d/w the cardiologist.        Progress Note Handoff    Pending (specify):  Consults_________, Tests________, Test Results_______, Other__Fluid overload_______  Family discussion:  Disposition: Home___/SNF___/Other________/Unknown at this time________    Huber Gamez MD  Spectra: [phone]

## 2024-03-26 NOTE — PHYSICAL THERAPY INITIAL EVALUATION ADULT - GAIT DEVIATIONS NOTED, PT EVAL
decreased cortney/increased time in double stance/decreased step length/decreased stride length/decreased weight-shifting ability

## 2024-03-26 NOTE — H&P ADULT - ASSESSMENT
83-year-old female with past medical history of hypertension, hyperlipidemia, and recent CVA 12/23 who presents to the ED for evaluation swollen legs bilaterally.  Reports that she has been having bilateral lower extremity swelling for the past month that has been progressively getting worse. She also endorses dypsnea on exertion and orthopnea for the last month.    #Bilateral lower extremity edema   #Volume overload   #Moderate-severe AS   - HD stable, on RA  - BNP 53838. Troponin downtrending   - CXR with left pleural effusion and vascular congestion - follow official read   - TTE 11/23: EF 50 to 55%. Grade II diastolic dysfunction. Moderate to severe aortic valve stenosis. ORLANDO 0.85 cm2   - s/p lasix 20 IV in ED   - c/w  lasix 20 BID, Caution for overdiuresis in AS   - Monitor strict in/out, maintain normotension/normovolemia   - follow up echo,   - follows wit Dr. Patel, cardio c/s    #Paroxysmal Afib  - No previous hx of Afib, Rate controlled  - Denies drinking, smoking, hx BREANNE   - CHADVAsc 7: Start eliquis   - F/u RVP, TSH     #Chronic leukopenia   #Chronic macrocytic anemia  - Follow up iron studies, b12, folate, hemolytic panel    - Active t/s. monitor CBC    #HTN  #HLD  #HO thalamic stroke 2023   - s/p 90 days DAPT   - c/w aspirin, statin, losartan, atenolol        83-year-old female with past medical history of hypertension, hyperlipidemia, and recent CVA 12/23 who presents to the ED for evaluation swollen legs bilaterally.  Reports that she has been having bilateral lower extremity swelling for the past month that has been progressively getting worse. She also endorses dypsnea on exertion and orthopnea for the last month.    #Bilateral lower extremity edema   #Volume overload   #Moderate-severe AS   - HD stable, on RA  - BNP 26777. Troponin downtrending   - CXR with left pleural effusion and vascular congestion - follow official read   - TTE 11/23: EF 50 to 55%. Grade II diastolic dysfunction. Moderate to severe aortic valve stenosis. ORLANDO 0.85 cm2   - s/p lasix 20 IV in ED   - c/w  lasix 20 BID. Lasix naive. Caution for overdiuresis in AS   - Monitor strict in/out, maintain normotension/normovolemia   - follow up echo, LE Duplex   - follows wit Dr. Patel, cardio c/s    #Paroxysmal Afib  - No previous hx of Afib, Rate controlled  - Denies drinking, smoking, hx BREANNE   - CHADVAsc 7: Start eliquis   - F/u RVP, TSH     #Chronic leukopenia   #Chronic macrocytic anemia  - Follow up iron studies, b12, folate, hemolytic panel    - Active t/s. monitor CBC    #HTN  #HLD  #HO thalamic stroke 2023   - s/p 90 days DAPT   - c/w aspirin, statin, losartan, atenolol        83-year-old female with past medical history of hypertension, hyperlipidemia, and recent CVA 12/23 who presents to the ED for evaluation swollen legs bilaterally.  Reports that she has been having bilateral lower extremity swelling for the past month that has been progressively getting worse. She also endorses dypsnea on exertion and orthopnea for the last month.    #Bilateral lower extremity edema   #Volume overload   #Moderate-severe AS   - HD stable, on RA  - BNP 91285. Troponin downtrending   - CXR with left pleural effusion and vascular congestion - follow official read   - TTE 11/23: EF 50 to 55%. Grade II diastolic dysfunction. Moderate to severe aortic valve stenosis. ORLANDO 0.85 cm2   - s/p lasix 20 IV in ED   - c/w  lasix 20 BID. Lasix naive. Caution for overdiuresis in AS   - Monitor strict in/out, maintain normotension/normovolemia   - follow up echo, LE Duplex   - follows wit Dr. Patel, cardio c/s    #Paroxysmal Afib  - No previous hx of Afib, Rate controlled  - Denies drinking, smoking, hx BREANNE   - CHADVAsc 7: Start eliquis   - F/u RVP, TSH     #Chronic leukopenia   #Chronic macrocytic anemia  - Follow up iron studies, b12, folate, hemolytic panel    - Active t/s. monitor CBC    #HTN  #HLD  #HO thalamic stroke 2023   - s/p 90 days DAPT   - c/w aspirin, statin, losartan, atenolol     #DVT: eliquis   #Diet: DASH   #GI: Protonix  #Code: patient considering DNR/DNI please follow up in am    83-year-old female with past medical history of hypertension, hyperlipidemia, and recent CVA 12/23 who presents to the ED for evaluation swollen legs bilaterally.  Reports that she has been having bilateral lower extremity swelling for the past month that has been progressively getting worse. She also endorses dypsnea on exertion and orthopnea for the last month.    #Bilateral lower extremity edema   #Volume overload   #Moderate-severe AS   - HD stable, on RA  - BNP 24934. Troponin downtrending   - CXR with left pleural effusion and vascular congestion - follow official read   - TTE 11/23: EF 50 to 55%. Grade II diastolic dysfunction. Moderate to severe aortic valve stenosis. OR[place]O 0.85 cm2   - s/p lasix 20 IV in ED   - c/w  lasix 20 BID. Lasix naive. Caution for overdiuresis in AS   - Monitor strict in/out, maintain normotension/normovolemia   - follow up echo, LE Duplex   - follows wit Dr. Patel, cardio c/s    #Paroxysmal Afib  - No previous hx of Afib, Rate controlled  - Denies drinking, smoking, hx BREANNE   - CHADVAsc 7: Start eliquis   - F/u RVP, TSH     #Chronic leukopenia   #Chronic macrocytic anemia  - Follow up iron studies, b12, folate, hemolytic panel    - Active t/s. monitor CBC  - OP f/u     #HTN  #HLD  #HO thalamic stroke 2023   - s/p 90 days DAPT   - c/w aspirin, statin, losartan, atenolol     #DVT: eliquis   #Diet: DASH   #GI: Protonix  #Code: patient considering DNR/DNI please follow up in am

## 2024-03-26 NOTE — CONSULT NOTE ADULT - SUBJECTIVE AND OBJECTIVE BOX
Patient was seen and examined earlier today  by me on 3C.  EMR reviewed.      Ms. Edith Camarena is an 83-year-old  woman with a past medical history of ASHD, Hypertension, Aortic Stenosis, Mitral Valve Regurgitation, Hyperlipidemia, Asthmatic Bronchitis, Depression, CVA, and Osteoarthritis.    She presented at [facility] because of exertional dyspnea with worsening leg edema.  On 3C, she is comfortable in bed.  She denies any exertional chest pain.  Her breathing is improved.    Physical Exam:  Not in distress  Alert, oriented x 3  No JVD; irregular rhythm; nl S1, S2 soft; TARAN III/VI base  Bilateral breath sounds  Abdomen soft  Pitting edema in both legs    ROS: as stated in HPI  Labs: noted    Telemetry: Atrial Fibrillation          Patient is a 83y old  Female who presents with a chief complaint of Fluid overload (26 Mar 2024 07:30)      REASON FOR CONSULT     HPI:  83-year-old female with past medical history of hypertension, hyperlipidemia, and recent CVA 12/23 who presents to the ED for evaluation swollen legs bilaterally.  Reports that she has been having bilateral lower extremity swelling for the past month that has been progressively getting worse. She also endorses dypsnea on exertion and orthopnea for the last month. She spoke with her cardiologist earlier today and was told to come to the ED for evaluation.  Denies fever, chest pain, palpitations, nausea, vomiting, abdominal pain, urinary symptoms, and melena/hematochezia.  In the ED pt HD stable, afebrile. Labs remarkable for elevated pro-BNP   CXR with left pleural effusion and vascular congestion   EKG remarkable for Atrial fibrillation     T(C): 36.6 (25 Mar 2024 22:48), Max: 36.9 (25 Mar 2024 16:54)  T(F): 97.9 (25 Mar 2024 22:48), Max: 98.4 (25 Mar 2024 16:54)  HR: 58 (25 Mar 2024 22:48) (58 - 77)  BP: 166/71 (25 Mar 2024 22:48) (145/66 - 166/71)  RR: 18 (25 Mar 2024 22:48) (18 - 20)  SpO2: 95% (25 Mar 2024 16:54) (95% - 95%)    Patient On (Oxygen Delivery Method): room air       (26 Mar 2024 01:02)      PAST MEDICAL & SURGICAL HISTORY:  Rheumatoid arthritis  Hyperlipemia  Hypertension  Stress incontinence  Asthma  Arthritis  S/P total knee replacement, right              SOCIAL HISTORY:     FAMILY HISTORY:  FH: hypertension      sulfa drugs (Rash)  penicillins (Rash)      MEDICATIONS  (STANDING):  apixaban 5 milliGRAM(s) Oral two times a day  aspirin enteric coated 81 milliGRAM(s) Oral daily  atenolol  Tablet 50 milliGRAM(s) Oral daily  atorvastatin 80 milliGRAM(s) Oral at bedtime  folic acid 1 milliGRAM(s) Oral daily  losartan 100 milliGRAM(s) Oral daily  oxybutynin 5 milliGRAM(s) Oral two times a day  pantoprazole    Tablet 40 milliGRAM(s) Oral before breakfast    MEDICATIONS  (PRN):  albuterol    90 MICROgram(s) HFA Inhaler 2 Puff(s) Inhalation every 6 hours PRN for bronchospasm      Vital Signs Last 24 Hrs  T(C): 36.7 (26 Mar 2024 12:32), Max: 36.7 (26 Mar 2024 12:32)  T(F): 98 (26 Mar 2024 12:32), Max: 98 (26 Mar 2024 12:32)  HR: 55 (26 Mar 2024 12:32) (55 - 62)  BP: 149/66 (26 Mar 2024 12:32) (135/67 - 166/71)  BP(mean): --  RR: 18 (26 Mar 2024 12:32) (18 - 18)  SpO2: --     I&O's Detail    25 Mar 2024 07:01  -  26 Mar 2024 07:00  --------------------------------------------------------  IN:  Total IN: 0 mL    OUT:    Voided (mL): 1300 mL  Total OUT: 1300 mL    Total NET: -1300 mL      26 Mar 2024 07:01  -  26 Mar 2024 17:59  --------------------------------------------------------  IN:    Oral Fluid: 390 mL  Total IN: 390 mL    OUT:    Voided (mL): 1900 mL  Total OUT: 1900 mL    Total NET: -1510 mL      LABS:                        9.0    3.35  )-----------( 145      ( 26 Mar 2024 08:45 )             29.5     03-26    142  |  103  |  21<H>  ----------------------------<  96  4.2   |  30  |  1.3    Ca    9.2      26 Mar 2024 08:45  Mg     1.9     03-26    TPro  6.2  /  Alb  3.7  /  TBili  0.8  /  DBili  x   /  AST  14  /  ALT  15  /  AlkPhos  66  03-26          I&O's Summary    25 Mar 2024 07:01  -  26 Mar 2024 07:00  --------------------------------------------------------  IN: 0 mL / OUT: 1300 mL / NET: -1300 mL    26 Mar 2024 07:01  -  26 Mar 2024 17:59  --------------------------------------------------------  IN: 390 mL / OUT: 1900 mL / NET: -1510 mL       Patient was seen and examined earlier today  by me on 3C.  EMR reviewed.      Ms. Edith Camarena is an 83-year-old  woman with a past medical history of ASHD, Hypertension, Aortic Stenosis, Mitral Valve Regurgitation, Hyperlipidemia, Asthmatic Bronchitis, Depression, CVA, and Osteoarthritis.    She presented at [facility] because of exertional dyspnea with worsening leg edema.  On 3C, she is comfortable in bed.  She denies any exertional chest pain.  Her breathing is improved.    Physical Exam:  Not in distress  Alert, oriented x 3  No JVD; irregular rhythm; nl S1, S2 soft; TARAN III/VI base  Bilateral breath sounds  Abdomen soft  Pitting edema in both legs    ROS: as stated in HPI  Labs: noted    Telemetry: Atrial Fibrillation    < from: TTE Echo Complete w/o Contrast w/ Doppler (11.21.23 @ 15:37) >  Summary:   1. Low-normal global left ventricular systolic function.   2. Left ventricular ejection fraction, by visual estimation, is 50 to   55%.   3. Spectral Doppler shows pseudonormal pattern of left ventricular   myocardial filling (Grade II diastolic dysfunction).   4. Normal right ventricular size and function.   5. Mildly enlarged left atrium.   6. Mildly enlarged right atrium.   7. Moderate to severe aortic valve stenosis (Vmax 3.37 m/s, PG/MG 45/27   mmHg, ORLANDO 0.85 cm2 and DVI 0.27. Suspect LFLG severe AS (SVi 27.8 mL/m2)   vs moderate AS with inaccurate LVOT VTI.   8. Mild mitral valve regurgitation.   9. Adequate TR velocity was not obtained to accurately assess RVSP.    < end of copied text >    < from: Xray Chest 1 View- PORTABLE-Urgent (03.25.24 @ 17:53) >  Findings:    The heart size is within normal limits.    Calcification within the thoracic aorta.    Bilateral hilar prominence likely secondary to enlarged pulmonary   arteries.    Fibrotic changes in the left lung base.    Degenerative changes involving the glenohumeral joints and   acromioclavicular joints.    Impression:    No radiographic evidence of acute cardiopulmonary disease.        --- End of Report ---    < end of copied text >    ______________________________________________________________________    Patient is a 83y old  Female who presents with a chief complaint of Fluid overload (26 Mar 2024 07:30)      REASON FOR CONSULT     HPI:  83-year-old female with past medical history of hypertension, hyperlipidemia, and recent CVA 12/23 who presents to the ED for evaluation swollen legs bilaterally.  Reports that she has been having bilateral lower extremity swelling for the past month that has been progressively getting worse. She also endorses dypsnea on exertion and orthopnea for the last month. She spoke with her cardiologist earlier today and was told to come to the ED for evaluation.  Denies fever, chest pain, palpitations, nausea, vomiting, abdominal pain, urinary symptoms, and melena/hematochezia.  In the ED pt HD stable, afebrile. Labs remarkable for elevated pro-BNP   CXR with left pleural effusion and vascular congestion   EKG remarkable for Atrial fibrillation     T(C): 36.6 (25 Mar 2024 22:48), Max: 36.9 (25 Mar 2024 16:54)  T(F): 97.9 (25 Mar 2024 22:48), Max: 98.4 (25 Mar 2024 16:54)  HR: 58 (25 Mar 2024 22:48) (58 - 77)  BP: 166/71 (25 Mar 2024 22:48) (145/66 - 166/71)  RR: 18 (25 Mar 2024 22:48) (18 - 20)  SpO2: 95% (25 Mar 2024 16:54) (95% - 95%)    Patient On (Oxygen Delivery Method): room air       (26 Mar 2024 01:02)      PAST MEDICAL & SURGICAL HISTORY:  Rheumatoid arthritis  Hyperlipemia  Hypertension  Stress incontinence  Asthma  Arthritis  S/P total knee replacement, right    SOCIAL HISTORY:     FAMILY HISTORY:  FH: hypertension      sulfa drugs (Rash)  penicillins (Rash)      MEDICATIONS  (STANDING):  apixaban 5 milliGRAM(s) Oral two times a day  aspirin enteric coated 81 milliGRAM(s) Oral daily  atenolol  Tablet 50 milliGRAM(s) Oral daily  atorvastatin 80 milliGRAM(s) Oral at bedtime  folic acid 1 milliGRAM(s) Oral daily  losartan 100 milliGRAM(s) Oral daily  oxybutynin 5 milliGRAM(s) Oral two times a day  pantoprazole    Tablet 40 milliGRAM(s) Oral before breakfast    MEDICATIONS  (PRN):  albuterol    90 MICROgram(s) HFA Inhaler 2 Puff(s) Inhalation every 6 hours PRN for bronchospasm      Vital Signs Last 24 Hrs  T(C): 36.7 (26 Mar 2024 12:32), Max: 36.7 (26 Mar 2024 12:32)  T(F): 98 (26 Mar 2024 12:32), Max: 98 (26 Mar 2024 12:32)  HR: 55 (26 Mar 2024 12:32) (55 - 62)  BP: 149/66 (26 Mar 2024 12:32) (135/67 - 166/71)  BP(mean): --  RR: 18 (26 Mar 2024 12:32) (18 - 18)  SpO2: --     I&O's Detail    25 Mar 2024 07:01  -  26 Mar 2024 07:00  --------------------------------------------------------  IN:  Total IN: 0 mL    OUT:    Voided (mL): 1300 mL  Total OUT: 1300 mL    Total NET: -1300 mL      26 Mar 2024 07:01  -  26 Mar 2024 17:59  --------------------------------------------------------  IN:    Oral Fluid: 390 mL  Total IN: 390 mL    OUT:    Voided (mL): 1900 mL  Total OUT: 1900 mL    Total NET: -1510 mL      LABS:                        9.0    3.35  )-----------( 145      ( 26 Mar 2024 08:45 )             29.5     03-26    142  |  103  |  21<H>  ----------------------------<  96  4.2   |  30  |  1.3    Ca    9.2      26 Mar 2024 08:45  Mg     1.9     03-26    TPro  6.2  /  Alb  3.7  /  TBili  0.8  /  DBili  x   /  AST  14  /  ALT  15  /  AlkPhos  66  03-26          I&O's Summary    25 Mar 2024 07:01  -  26 Mar 2024 07:00  --------------------------------------------------------  IN: 0 mL / OUT: 1300 mL / NET: -1300 mL    26 Mar 2024 07:01  -  26 Mar 2024 17:59  --------------------------------------------------------  IN: 390 mL / OUT: 1900 mL / NET: -1510 mL

## 2024-03-26 NOTE — DISCHARGE NOTE NURSING/CASE MANAGEMENT/SOCIAL WORK - PATIENT PORTAL LINK FT
You can access the FollowMyHealth Patient Portal offered by [facility] by registering at the following website: [email or web address]. By joining [facility]’s FollowMyHealth portal, you will also be able to view your health information using other applications (apps) compatible with our system.

## 2024-03-26 NOTE — PROGRESS NOTE ADULT - ASSESSMENT
83-year-old female with past medical history of hypertension, hyperlipidemia, and recent CVA 12/23 who presents to the ED for evaluation swollen legs bilaterally.  Reports that she has been having bilateral lower extremity swelling for the past month that has been progressively getting worse. She also endorses dypsnea on exertion and orthopnea for the last month.      Acute HFpEF  Moderate to severe AS  HTN / DL  H/O recent CVA  Microcytic anemia                PLAN:    ·	Tele reviewed.  ·	EKG on admission  ·	CXR         83-year-old female with past medical history of hypertension, hyperlipidemia, and recent CVA 12/23 who presents to the ED for evaluation swollen legs bilaterally.  Reports that she has been having bilateral lower extremity swelling for the past month that has been progressively getting worse. She also endorses dyspnea on exertion and orthopnea for the last month. No cough or fever.       Acute HFpEF  Moderate to severe AS  A-Fib new onset  HTN / DL  H/O recent CVA  Microcytic anemia                PLAN:    ·	Tele reviewed.  ·	Troponin: 35-->25  ·	EKG on admission: A-fib 64/min. Non specific T wave changes (Interpreted by me)  ·	CXR: Fibrotic changes L lung base.   ·	Cont Lasix 40 mg iv daily  ·	Check i's and o's and daily wt  ·	Low salt diet and water restriction to 1.5 L/D  ·	ECHO  ·	Retic count is 3.5, serum iron is 35 and percent sat is 15. Check Ferritin level. Also check B12 and Folate  ·	LDH is normal. Check Heptoglobin.   ·	H/O CVA and [id number] score is 7. Started her on Eliquis 5 mg po q 12h.   ·	Care d/w the cardiologist.        Progress Note Handoff    Pending (specify):  Consults_________, Tests________, Test Results_______, Other__Fluid overload_______  Family discussion:  Disposition: Home___/SNF___/Other________/Unknown at this time________    Huber Gamez MD  Spectra: [phone]

## 2024-03-26 NOTE — CONSULT NOTE ADULT - ASSESSMENT
1] HFpEF, acute      Complicated by Valvular Heart Disease (Severe AS)  - BNP 55594  - Continue diuretic therapy.  Keep K>4 and Mg >2  - Daily weight (upright) and chart    2] Atrial Fibrillation - new onset  - Start OAC Eliquis 5 mg tablet q 12 hrs.  - Rate control with beta-blocker.  Switch to Metoprolol Tartrate 25 mg tablet twice daily    3] ASHD  - Stable    4] Aortic Stenosis  - Repeat echo to ascertain LVEF    5] HTN  - Continue to monitor    6] Hyperlipidemia  - On statin therapy    Plan discussed with Medical Attending and House staff during morning rounds    Henri Patel MD  [phone] Office  On TEAMS 1] HFpEF, acute      Complicated by Valvular Heart Disease (Severe AS)  - BNP 29513  - Continue diuretic therapy.  Keep K>4 and Mg >2  - Daily weight (upright) and chart    2] Atrial Fibrillation - new onset  - High [id number] score.    - Start OAC Eliquis 5 mg tablet q 12 hrs.  - Rate control with beta-blocker.  Switch to Metoprolol Tartrate 25 mg tablet twice daily    3] ASHD  - Stable    4] Aortic Stenosis  - Repeat echo to ascertain LVEF    5] HTN  - Continue to monitor    6] Hyperlipidemia  - On statin therapy    Will update family (daughter: Rayna Payton)    Plan discussed with Medical Attending and House staff during morning rounds    Henri Patel MD  [phone] Office  On TEAMS

## 2024-03-26 NOTE — PHYSICAL THERAPY INITIAL EVALUATION ADULT - ADDITIONAL COMMENTS
Lives with daughter and family, no steps to enter (through garage), +chair lift to main level, uses Rollator at baseline, RW as needed, family assists with food, laundry.

## 2024-03-26 NOTE — PHYSICAL THERAPY INITIAL EVALUATION ADULT - RANGE OF MOTION EXAMINATION, REHAB EVAL
b/l shoulders trace AROM( baseline),  PROM grossly WFL's/bilateral lower extremity ROM was WFL (within functional limits)/deficits as listed below

## 2024-03-26 NOTE — PROGRESS NOTE ADULT - SUBJECTIVE AND OBJECTIVE BOX
MADISON GLORIA  83y Female    CHIEF COMPLAINT:    Patient is a 83y old  Female who presents with a chief complaint of Fluid overload (26 Mar 2024 01:02)      INTERVAL HPI/OVERNIGHT EVENTS:    Patient seen and examined.    ROS: All other systems are negative.    Vital Signs:    T(F): 97.1 (24 @ 05:23), Max: 98.4 (24 @ 16:54)  HR: 62 (24 @ 05:23) (58 - 77)  BP: 135/67 (24 @ 05:23) (135/67 - 166/71)  RR: 18 (24 @ 05:23) (18 - 20)  SpO2: 95% (24 @ 16:54) (95% - 95%)  I&O's Summary    25 Mar 2024 07:01  -  26 Mar 2024 07:00  --------------------------------------------------------  IN: 0 mL / OUT: 1300 mL / NET: -1300 mL      Daily Height in cm: 170.18 (25 Mar 2024 22:48)    Daily Weight in k.8 (26 Mar 2024 05:23)  CAPILLARY BLOOD GLUCOSE          PHYSICAL EXAM:    GENERAL:  NAD  SKIN: No rashes or lesions  HENT: Atraumatic. Normocephalic. PERRL. Moist membranes.  NECK: Supple, No JVD. No lymphadenopathy.  PULMONARY: CTA B/L. No wheezing. No rales  CVS: Normal S1, S2. Rate and Rhythm are regular. No murmurs.  ABDOMEN/GI: Soft, Nontender, Nondistended; BS present  EXTREMITIES: Peripheral pulses intact. No edema B/L LE.  NEUROLOGIC:  No motor or sensory deficit.  PSYCH: Alert & oriented x 3    Consultant(s) Notes Reviewed:  [x ] YES  [ ] NO  Care Discussed with Consultants/Other Providers [ x] YES  [ ] NO    EKG reviewed  Telemetry reviewed    LABS:                        8.7    4.11  )-----------( 143      ( 25 Mar 2024 17:38 )             28.1         139  |  105  |  23<H>  ----------------------------<  92  4.8   |  23  |  1.1    Ca    8.7      25 Mar 2024 17:38  Mg     1.9         TPro  6.3  /  Alb  3.7  /  TBili  0.7  /  DBili  x   /  AST  17  /  ALT  14  /  AlkPhos  60                RADIOLOGY & ADDITIONAL TESTS:      Imaging or report Personally Reviewed:  [x ] YES  [ ] NO    Medications:  Standing  apixaban 5 milliGRAM(s) Oral two times a day  aspirin enteric coated 81 milliGRAM(s) Oral daily  atenolol  Tablet 50 milliGRAM(s) Oral daily  atorvastatin 80 milliGRAM(s) Oral at bedtime  folic acid 1 milliGRAM(s) Oral daily  furosemide   Injectable 20 milliGRAM(s) IV Push two times a day  losartan 100 milliGRAM(s) Oral daily  oxybutynin 5 milliGRAM(s) Oral two times a day  pantoprazole    Tablet 40 milliGRAM(s) Oral before breakfast    PRN Meds  albuterol    90 MICROgram(s) HFA Inhaler 2 Puff(s) Inhalation every 6 hours PRN      Case discussed with resident    Care discussed with pt/family           JUANI MADISON  83y Female    CHIEF COMPLAINT:    Patient is a 83y old  Female who presents with a chief complaint of Fluid overload (26 Mar 2024 01:02)      INTERVAL HPI/OVERNIGHT EVENTS:    Patient seen and examined. C/O LE swelling and DEE.     ROS: All other systems are negative.    Vital Signs:    T(F): 97.1 (24 @ 05:23), Max: 98.4 (24 @ 16:54)  HR: 62 (24 @ 05:23) (58 - 77)  BP: 135/67 (24 @ 05:23) (135/67 - 166/71)  RR: 18 (24 @ 05:23) (18 - 20)  SpO2: 95% (24 @ 16:54) (95% - 95%)  I&O's Summary    25 Mar 2024 07:01  -  26 Mar 2024 07:00  --------------------------------------------------------  IN: 0 mL / OUT: 1300 mL / NET: -1300 mL      Daily Height in cm: 170.18 (25 Mar 2024 22:48)    Daily Weight in k.8 (26 Mar 2024 05:23)  CAPILLARY BLOOD GLUCOSE          PHYSICAL EXAM:    GENERAL:  NAD  SKIN: No rashes or lesions  HENT: Atraumatic. Normocephalic. PERRL. Moist membranes.  NECK: Supple, No JVD. No lymphadenopathy.  PULMONARY: Decreased BS in the bases B/L. No wheezing. No rales  CVS: Normal S1, S2. Rate and Rhythm are regular. A III/VI systolic mm over aortic area.   ABDOMEN/GI: Soft, Nontender, Nondistended; BS present  EXTREMITIES: Peripheral pulses intact. 1+ pitting edema B/L LE.  NEUROLOGIC:  No motor or sensory deficit.  PSYCH: Alert & oriented x 3    Consultant(s) Notes Reviewed:  [x ] YES  [ ] NO  Care Discussed with Consultants/Other Providers [ x] YES  [ ] NO    EKG reviewed  Telemetry reviewed    LABS:                        8.7    4.11  )-----------( 143      ( 25 Mar 2024 17:38 )             28.1         139  |  105  |  23<H>  ----------------------------<  92  4.8   |  23  |  1.1    Ca    8.7      25 Mar 2024 17:38  Mg     1.9         TPro  6.3  /  Alb  3.7  /  TBili  0.7  /  DBili  x   /  AST  17  /  ALT  14  /  AlkPhos  60                RADIOLOGY & ADDITIONAL TESTS:    < from: Xray Chest 1 View- PORTABLE-Urgent (24 @ 17:53) >    Impression:    No radiographic evidence of acute cardiopulmonary disease.      < end of copied text >    Imaging or report Personally Reviewed:  [x ] YES  [ ] NO    Medications:  Standing  apixaban 5 milliGRAM(s) Oral two times a day  aspirin enteric coated 81 milliGRAM(s) Oral daily  atenolol  Tablet 50 milliGRAM(s) Oral daily  atorvastatin 80 milliGRAM(s) Oral at bedtime  folic acid 1 milliGRAM(s) Oral daily  furosemide   Injectable 20 milliGRAM(s) IV Push two times a day  losartan 100 milliGRAM(s) Oral daily  oxybutynin 5 milliGRAM(s) Oral two times a day  pantoprazole    Tablet 40 milliGRAM(s) Oral before breakfast    PRN Meds  albuterol    90 MICROgram(s) HFA Inhaler 2 Puff(s) Inhalation every 6 hours PRN      Case discussed with resident    Care discussed with pt/family

## 2024-03-26 NOTE — H&P ADULT - NSHPLABSRESULTS_GEN_ALL_CORE
LABS:                         8.7    4.11  )-----------( 143      ( 25 Mar 2024 17:38 )             28.1     03-25    139  |  105  |  23<H>  ----------------------------<  92  4.8   |  23  |  1.1    Ca    8.7      25 Mar 2024 17:38  Mg     1.9     03-25    TPro  6.3  /  Alb  3.7  /  TBili  0.7  /  DBili  x   /  AST  17  /  ALT  14  /  AlkPhos  60  03-25      Urinalysis Basic - ( 25 Mar 2024 17:38 )    Color: x / Appearance: x / SG: x / pH: x  Gluc: 92 mg/dL / Ketone: x  / Bili: x / Urobili: x   Blood: x / Protein: x / Nitrite: x   Leuk Esterase: x / RBC: x / WBC x   Sq Epi: x / Non Sq Epi: x / Bacteria: x    RADIOLOGY, EKG & ADDITIONAL TESTS: Reviewed.

## 2024-03-26 NOTE — H&P ADULT - HISTORY OF PRESENT ILLNESS
83-year-old female with past medical history of hypertension, hyperlipidemia, and recent CVA 12/23 who presents to the ED for evaluation swollen legs bilaterally.  Reports that she has been having bilateral lower extremity swelling for the past month that has been progressively getting worse. She also endorses dypsnea on exertion and orthopnea for the last month. She spoke with her cardiologist earlier today and was told to come to the ED for evaluation.  Denies fever, chest pain, palpitations, nausea, vomiting, abdominal pain, urinary symptoms, and melena/hematochezia.  In the ED pt HD stable, afebrile. Labs remarkable for elevated pro-BNP   CXR with left pleural effusion and vascular congestion   EKG remarkable for Atrial fibrillation     T(C): 36.6 (25 Mar 2024 22:48), Max: 36.9 (25 Mar 2024 16:54)  T(F): 97.9 (25 Mar 2024 22:48), Max: 98.4 (25 Mar 2024 16:54)  HR: 58 (25 Mar 2024 22:48) (58 - 77)  BP: 166/71 (25 Mar 2024 22:48) (145/66 - 166/71)  RR: 18 (25 Mar 2024 22:48) (18 - 20)  SpO2: 95% (25 Mar 2024 16:54) (95% - 95%)    Patient On (Oxygen Delivery Method): room air

## 2024-03-26 NOTE — PHYSICAL THERAPY INITIAL EVALUATION ADULT - PERTINENT HX OF CURRENT PROBLEM, REHAB EVAL
83-year-old female with past medical history of hypertension, hyperlipidemia, and recent CVA 12/23 who presents to the ED for evaluation swollen legs bilaterally. She also endorses dyspnea on exertion and orthopnea for the last month.

## 2024-03-27 ENCOUNTER — RESULT REVIEW (OUTPATIENT)
Age: 84
End: 2024-03-27

## 2024-03-27 LAB
ANION GAP SERPL CALC-SCNC: 11 MMOL/L — SIGNIFICANT CHANGE UP (ref 7–14)
B PERT DNA SPEC QL NAA+PROBE: SIGNIFICANT CHANGE UP
BUN SERPL-MCNC: 22 MG/DL — HIGH (ref 10–20)
C PNEUM DNA SPEC QL NAA+PROBE: SIGNIFICANT CHANGE UP
CALCIUM SERPL-MCNC: 8.9 MG/DL — SIGNIFICANT CHANGE UP (ref 8.4–10.4)
CHLORIDE SERPL-SCNC: 101 MMOL/L — SIGNIFICANT CHANGE UP (ref 98–110)
CO2 SERPL-SCNC: 32 MMOL/L — SIGNIFICANT CHANGE UP (ref 17–32)
CREAT SERPL-MCNC: 1.4 MG/DL — SIGNIFICANT CHANGE UP (ref 0.7–1.5)
EGFR: 38 ML/MIN/1.73M2 — LOW
FERRITIN SERPL-MCNC: 300 NG/ML — SIGNIFICANT CHANGE UP (ref 13–330)
FLUAV H1 2009 PAND RNA SPEC QL NAA+PROBE: SIGNIFICANT CHANGE UP
FLUAV H1 RNA SPEC QL NAA+PROBE: SIGNIFICANT CHANGE UP
FLUAV H3 RNA SPEC QL NAA+PROBE: SIGNIFICANT CHANGE UP
FLUAV SUBTYP SPEC NAA+PROBE: SIGNIFICANT CHANGE UP
FLUBV RNA SPEC QL NAA+PROBE: SIGNIFICANT CHANGE UP
FOLATE SERPL-MCNC: >20 NG/ML — SIGNIFICANT CHANGE UP
GLUCOSE SERPL-MCNC: 100 MG/DL — HIGH (ref 70–99)
HADV DNA SPEC QL NAA+PROBE: SIGNIFICANT CHANGE UP
HAPTOGLOB SERPL-MCNC: 203 MG/DL — HIGH (ref 34–200)
HCOV PNL SPEC NAA+PROBE: SIGNIFICANT CHANGE UP
HCT VFR BLD CALC: 30.5 % — LOW (ref 37–47)
HGB BLD-MCNC: 9.6 G/DL — LOW (ref 12–16)
HMPV RNA SPEC QL NAA+PROBE: SIGNIFICANT CHANGE UP
HPIV1 RNA SPEC QL NAA+PROBE: SIGNIFICANT CHANGE UP
HPIV2 RNA SPEC QL NAA+PROBE: SIGNIFICANT CHANGE UP
HPIV3 RNA SPEC QL NAA+PROBE: SIGNIFICANT CHANGE UP
HPIV4 RNA SPEC QL NAA+PROBE: SIGNIFICANT CHANGE UP
MAGNESIUM SERPL-MCNC: 1.7 MG/DL — LOW (ref 1.8–2.4)
MCHC RBC-ENTMCNC: 31.5 G/DL — LOW (ref 32–37)
MCHC RBC-ENTMCNC: 31.9 PG — HIGH (ref 27–31)
MCV RBC AUTO: 101.3 FL — HIGH (ref 81–99)
NRBC # BLD: 0 /100 WBCS — SIGNIFICANT CHANGE UP (ref 0–0)
PLATELET # BLD AUTO: 168 K/UL — SIGNIFICANT CHANGE UP (ref 130–400)
PMV BLD: 11.6 FL — HIGH (ref 7.4–10.4)
POTASSIUM SERPL-MCNC: 3.9 MMOL/L — SIGNIFICANT CHANGE UP (ref 3.5–5)
POTASSIUM SERPL-SCNC: 3.9 MMOL/L — SIGNIFICANT CHANGE UP (ref 3.5–5)
RAPID RVP RESULT: SIGNIFICANT CHANGE UP
RBC # BLD: 3.01 M/UL — LOW (ref 4.2–5.4)
RBC # FLD: 16 % — HIGH (ref 11.5–14.5)
RSV RNA SPEC QL NAA+PROBE: SIGNIFICANT CHANGE UP
RV+EV RNA SPEC QL NAA+PROBE: SIGNIFICANT CHANGE UP
SARS-COV-2 RNA SPEC QL NAA+PROBE: SIGNIFICANT CHANGE UP
SODIUM SERPL-SCNC: 144 MMOL/L — SIGNIFICANT CHANGE UP (ref 135–146)
VIT B12 SERPL-MCNC: 466 PG/ML — SIGNIFICANT CHANGE UP (ref 232–1245)
WBC # BLD: 3.76 K/UL — LOW (ref 4.8–10.8)
WBC # FLD AUTO: 3.76 K/UL — LOW (ref 4.8–10.8)

## 2024-03-27 PROCEDURE — 93306 TTE W/DOPPLER COMPLETE: CPT | Mod: 26

## 2024-03-27 PROCEDURE — 99232 SBSQ HOSP IP/OBS MODERATE 35: CPT

## 2024-03-27 RX ORDER — MAGNESIUM SULFATE 500 MG/ML
2 VIAL (ML) INJECTION ONCE
Refills: 0 | Status: COMPLETED | OUTPATIENT
Start: 2024-03-27 | End: 2024-03-27

## 2024-03-27 RX ORDER — METOPROLOL TARTRATE 50 MG
25 TABLET ORAL
Refills: 0 | Status: DISCONTINUED | OUTPATIENT
Start: 2024-03-27 | End: 2024-03-29

## 2024-03-27 RX ORDER — FUROSEMIDE 40 MG
20 TABLET ORAL DAILY
Refills: 0 | Status: DISCONTINUED | OUTPATIENT
Start: 2024-03-28 | End: 2024-03-29

## 2024-03-27 RX ORDER — POTASSIUM CHLORIDE 20 MEQ
40 PACKET (EA) ORAL ONCE
Refills: 0 | Status: COMPLETED | OUTPATIENT
Start: 2024-03-27 | End: 2024-03-27

## 2024-03-27 RX ADMIN — APIXABAN 5 MILLIGRAM(S): 2.5 TABLET, FILM COATED ORAL at 05:16

## 2024-03-27 RX ADMIN — Medication 81 MILLIGRAM(S): at 12:09

## 2024-03-27 RX ADMIN — Medication 1 MILLIGRAM(S): at 12:09

## 2024-03-27 RX ADMIN — Medication 40 MILLIEQUIVALENT(S): at 16:00

## 2024-03-27 RX ADMIN — LOSARTAN POTASSIUM 100 MILLIGRAM(S): 100 TABLET, FILM COATED ORAL at 05:16

## 2024-03-27 RX ADMIN — ATENOLOL 50 MILLIGRAM(S): 25 TABLET ORAL at 05:15

## 2024-03-27 RX ADMIN — ATORVASTATIN CALCIUM 80 MILLIGRAM(S): 80 TABLET, FILM COATED ORAL at 21:20

## 2024-03-27 RX ADMIN — APIXABAN 5 MILLIGRAM(S): 2.5 TABLET, FILM COATED ORAL at 17:11

## 2024-03-27 RX ADMIN — Medication 40 MILLIGRAM(S): at 05:15

## 2024-03-27 RX ADMIN — Medication 25 MILLIGRAM(S): at 17:11

## 2024-03-27 RX ADMIN — PANTOPRAZOLE SODIUM 40 MILLIGRAM(S): 20 TABLET, DELAYED RELEASE ORAL at 05:15

## 2024-03-27 RX ADMIN — Medication 25 GRAM(S): at 15:49

## 2024-03-27 NOTE — PROGRESS NOTE ADULT - SUBJECTIVE AND OBJECTIVE BOX
----------Daily Progress Note----------    HISTORY OF PRESENT ILLNESS:  Patient is an 84 yo F w/PMH of HTN, HLD, moderate-severe aortic stenosis, and recent CVA in 12/2023 who presented to the ED for new onset BL LE swelling progressively worsening over the last month with dyspnea on exertion and orthopnea w/o history of CHF exacerbation w/ED labs showing pro-BNP 89729 and trop 35 -> 25, CXR notable for left pleural effusion and vascular congestion, and EKG remarkable for afib, which the patient said she never had before. Of note, her hgb was also low at 8.7. She was admitted for work up and management of new onset CHF and Afib.    Today is hospital day 2d.     INTERVAL HOSPITAL COURSE / OVERNIGHT EVENTS:    Patient was examined and seen at bedside. This morning she is resting comfortably in bed and reports no new issues or overnight events.     Review of Systems: Otherwise unremarkable     <<<<<PAST MEDICAL & SURGICAL HISTORY>>>>>  Rheumatoid arthritis    Hyperlipemia    Hypertension    Stress incontinence    Asthma    Arthritis    S/P total knee replacement, right      ALLERGIES  sulfa drugs (Rash)  penicillins (Rash)      Home Medications:  Albuterol (Eqv-ProAir HFA) 90 mcg/inh inhalation aerosol: 2 puff(s) inhaled every 6 hours as needed for  bronchospasm (26 Mar 2024 01:24)  aspirin 81 mg oral delayed release tablet: 1 tab(s) orally once a day (26 Mar 2024 01:24)  atenolol 50 mg oral tablet: 1 tab(s) orally once a day (26 Mar 2024 01:24)  atorvastatin 80 mg oral tablet: 1 tab(s) orally once a day (at bedtime) (26 Mar 2024 01:21)  carbonyl iron 45 mg oral tablet: 1 tab(s) orally once a day (26 Mar 2024 01:24)  folic acid 1 mg oral tablet: 1 tab(s) orally once a day (26 Mar 2024 01:24)  losartan 100 mg oral tablet: 1 tab(s) orally once a day (at bedtime) (26 Mar 2024 01:24)        MEDICATIONS  STANDING MEDICATIONS  apixaban 5 milliGRAM(s) Oral two times a day  aspirin enteric coated 81 milliGRAM(s) Oral daily  atenolol  Tablet 50 milliGRAM(s) Oral daily  atorvastatin 80 milliGRAM(s) Oral at bedtime  folic acid 1 milliGRAM(s) Oral daily  furosemide   Injectable 40 milliGRAM(s) IV Push daily  losartan 100 milliGRAM(s) Oral daily  oxybutynin 5 milliGRAM(s) Oral two times a day  pantoprazole    Tablet 40 milliGRAM(s) Oral before breakfast    PRN MEDICATIONS  albuterol    90 MICROgram(s) HFA Inhaler 2 Puff(s) Inhalation every 6 hours PRN    VITALS:  T(F): 97.4  HR: 73  BP: 136/64  RR: 18  SpO2: --    <<<<<PHYSICAL EXAM>>>>>  GENERAL: Well developed, well nourished and in no acute distress. Resting comfortably in bed.  HEENT: Normocephalic, atraumatic, mucous membranes moist, EOMI, PERRLA, bilateral sclera anicteric, no conjunctival injection  Neck: Supple, non-tender, no lymphadenopathy.  PULMONARY: Clear to auscultation bilaterally. No rales, rhonchi, or wheezing.  CARDIOVASCULAR: Regular rate and rhythm, S1-S2, no murmurs  GASTROINTESTINAL: Soft, non-tender, non-distended, no guarding.  RENAL: No CVA tenderness.  SKIN/EXTREMITIES: No clubbing or edema  NEUROLOGIC/MUSCULOSKELETAL: AOx4, grossly moving all extremities, no focal deficits    <<<<<LABS>>>>>                        9.0    3.35  )-----------( 145      ( 26 Mar 2024 08:45 )             29.5     03-26    142  |  103  |  21<H>  ----------------------------<  96  4.2   |  30  |  1.3    Ca    9.2      26 Mar 2024 08:45  Mg     1.9     03-26    TPro  6.2  /  Alb  3.7  /  TBili  0.8  /  DBili  x   /  AST  14  /  ALT  15  /  AlkPhos  66  03-26      Urinalysis Basic - ( 26 Mar 2024 08:45 )    Color: x / Appearance: x / SG: x / pH: x  Gluc: 96 mg/dL / Ketone: x  / Bili: x / Urobili: x   Blood: x / Protein: x / Nitrite: x   Leuk Esterase: x / RBC: x / WBC x   Sq Epi: x / Non Sq Epi: x / Bacteria: x          [id number]        <<<<<RADIOLOGY>>>>>          ----------------------------------------------------------------------------------------------------------------------------------------------------------------------------------------------- ----------Daily Progress Note----------    HISTORY OF PRESENT ILLNESS:  Patient is an 84 yo F w/PMH of HTN, HLD, moderate-severe aortic stenosis, and recent CVA in 12/2023 who presented to the ED for new onset BL LE swelling progressively worsening over the last month with dyspnea on exertion and orthopnea w/o history of CHF exacerbation w/ED labs showing pro-BNP 34008 and trop 35 -> 25, CXR notable for left pleural effusion and vascular congestion, and EKG remarkable for afib, which the patient said she never had before. Of note, her hgb was also low at 8.7. She was admitted for work up and management of new onset CHF and Afib.    Today is hospital day 2d.     INTERVAL HOSPITAL COURSE / OVERNIGHT EVENTS:  O/N event:  None    24 hr event:  None    Patient was examined and seen at bedside. Reports improvement in shortness of breath, still has swelling in her legs, denies any chest pain, endorses good appetite, had BM yesterday, denies any other issues    Review of Systems: Otherwise unremarkable     <<<<<PAST MEDICAL & SURGICAL HISTORY>>>>>  Rheumatoid arthritis    Hyperlipemia    Hypertension    Stress incontinence    Asthma    Arthritis    S/P total knee replacement, right      ALLERGIES  sulfa drugs (Rash)  penicillins (Rash)      Home Medications:  Albuterol (Eqv-ProAir HFA) 90 mcg/inh inhalation aerosol: 2 puff(s) inhaled every 6 hours as needed for  bronchospasm (26 Mar 2024 01:24)  aspirin 81 mg oral delayed release tablet: 1 tab(s) orally once a day (26 Mar 2024 01:24)  atenolol 50 mg oral tablet: 1 tab(s) orally once a day (26 Mar 2024 01:24)  atorvastatin 80 mg oral tablet: 1 tab(s) orally once a day (at bedtime) (26 Mar 2024 01:21)  carbonyl iron 45 mg oral tablet: 1 tab(s) orally once a day (26 Mar 2024 01:24)  folic acid 1 mg oral tablet: 1 tab(s) orally once a day (26 Mar 2024 01:24)  losartan 100 mg oral tablet: 1 tab(s) orally once a day (at bedtime) (26 Mar 2024 01:24)        MEDICATIONS  STANDING MEDICATIONS  apixaban 5 milliGRAM(s) Oral two times a day  aspirin enteric coated 81 milliGRAM(s) Oral daily  atenolol  Tablet 50 milliGRAM(s) Oral daily  atorvastatin 80 milliGRAM(s) Oral at bedtime  folic acid 1 milliGRAM(s) Oral daily  furosemide   Injectable 40 milliGRAM(s) IV Push daily  losartan 100 milliGRAM(s) Oral daily  oxybutynin 5 milliGRAM(s) Oral two times a day  pantoprazole    Tablet 40 milliGRAM(s) Oral before breakfast    PRN MEDICATIONS  albuterol    90 MICROgram(s) HFA Inhaler 2 Puff(s) Inhalation every 6 hours PRN    VITALS:  T(F): 97.4  HR: 73  BP: 136/64  RR: 18  SpO2: --    <<<<<PHYSICAL EXAM>>>>>  GENERAL: NAD  PULMONARY: Clear to auscultation bilaterally. No rales, rhonchi, or wheezing.  CARDIOVASCULAR: Regular rate and rhythm, S1-S2, no murmurs  GASTROINTESTINAL: Soft, non-tender, non-distended, no guarding.  RENAL: No CVA tenderness.  SKIN/EXTREMITIES: No clubbing or edema  NEUROLOGIC/MUSCULOSKELETAL: AOx4, grossly moving all extremities, no focal deficits    <<<<<LABS>>>>>                        9.0    3.35  )-----------( 145      ( 26 Mar 2024 08:45 )             29.5     03-26    142  |  103  |  21<H>  ----------------------------<  96  4.2   |  30  |  1.3    Ca    9.2      26 Mar 2024 08:45  Mg     1.9     03-26    TPro  6.2  /  Alb  3.7  /  TBili  0.8  /  DBili  x   /  AST  14  /  ALT  15  /  AlkPhos  66  03-26      Urinalysis Basic - ( 26 Mar 2024 08:45 )    Color: x / Appearance: x / SG: x / pH: x  Gluc: 96 mg/dL / Ketone: x  / Bili: x / Urobili: x   Blood: x / Protein: x / Nitrite: x   Leuk Esterase: x / RBC: x / WBC x   Sq Epi: x / Non Sq Epi: x / Bacteria: x          [id number]        <<<<<RADIOLOGY>>>>>          ----------------------------------------------------------------------------------------------------------------------------------------------------------------------------------------------- ----------Daily Progress Note----------    HISTORY OF PRESENT ILLNESS:  Patient is an 82 yo F w/PMH of HTN, HLD, moderate-severe aortic stenosis, and recent CVA in 12/2023 who presented to the ED for new onset BL LE swelling progressively worsening over the last month with dyspnea on exertion and orthopnea w/o history of CHF exacerbation w/ED labs showing pro-BNP 79311 and trop 35 -> 25, CXR notable for left pleural effusion and vascular congestion, and EKG remarkable for afib, which the patient said she never had before. Of note, her hgb was also low at 8.7. She was admitted for work up and management of new onset CHF and Afib.    Today is hospital day 2d.     INTERVAL HOSPITAL COURSE / OVERNIGHT EVENTS:  O/N event:  None    24 hr event:  None    Patient was examined and seen at bedside. Reports improvement in shortness of breath, still has swelling in her legs, denies any chest pain, endorses good appetite, had BM yesterday, denies any other issues    Review of Systems: Otherwise unremarkable     <<<<<PAST MEDICAL & SURGICAL HISTORY>>>>>  Rheumatoid arthritis    Hyperlipemia    Hypertension    Stress incontinence    Asthma    Arthritis    S/P total knee replacement, right      ALLERGIES  sulfa drugs (Rash)  penicillins (Rash)      Home Medications:  Albuterol (Eqv-ProAir HFA) 90 mcg/inh inhalation aerosol: 2 puff(s) inhaled every 6 hours as needed for  bronchospasm (26 Mar 2024 01:24)  aspirin 81 mg oral delayed release tablet: 1 tab(s) orally once a day (26 Mar 2024 01:24)  atenolol 50 mg oral tablet: 1 tab(s) orally once a day (26 Mar 2024 01:24)  atorvastatin 80 mg oral tablet: 1 tab(s) orally once a day (at bedtime) (26 Mar 2024 01:21)  carbonyl iron 45 mg oral tablet: 1 tab(s) orally once a day (26 Mar 2024 01:24)  folic acid 1 mg oral tablet: 1 tab(s) orally once a day (26 Mar 2024 01:24)  losartan 100 mg oral tablet: 1 tab(s) orally once a day (at bedtime) (26 Mar 2024 01:24)        MEDICATIONS  STANDING MEDICATIONS  apixaban 5 milliGRAM(s) Oral two times a day  aspirin enteric coated 81 milliGRAM(s) Oral daily  atenolol  Tablet 50 milliGRAM(s) Oral daily  atorvastatin 80 milliGRAM(s) Oral at bedtime  folic acid 1 milliGRAM(s) Oral daily  furosemide   Injectable 40 milliGRAM(s) IV Push daily  losartan 100 milliGRAM(s) Oral daily  oxybutynin 5 milliGRAM(s) Oral two times a day  pantoprazole    Tablet 40 milliGRAM(s) Oral before breakfast    PRN MEDICATIONS  albuterol    90 MICROgram(s) HFA Inhaler 2 Puff(s) Inhalation every 6 hours PRN    VITALS:  T(F): 97.4  HR: 73  BP: 136/64  RR: 18  SpO2: --    <<<<<PHYSICAL EXAM>>>>>  GENERAL: NAD  PULMONARY: Clear to auscultation bilaterally. No rales, rhonchi, or wheezing.  CARDIOVASCULAR: Regular rate and rhythm, S1-S2, no murmurs  GASTROINTESTINAL: Soft, non-tender, non-distended, no guarding  SKIN/EXTREMITIES: Warm, 1+ tibialis posterior pulses, BL LE 2+ pitting edema  NEUROLOGIC/MUSCULOSKELETAL: AOx4, grossly moving all extremities, no focal deficits    <<<<<LABS>>>>>                        9.0    3.35  )-----------( 145      ( 26 Mar 2024 08:45 )             29.5     03-26    142  |  103  |  21<H>  ----------------------------<  96  4.2   |  30  |  1.3    Ca    9.2      26 Mar 2024 08:45  Mg     1.9     03-26    TPro  6.2  /  Alb  3.7  /  TBili  0.8  /  DBili  x   /  AST  14  /  ALT  15  /  AlkPhos  66  03-26      Urinalysis Basic - ( 26 Mar 2024 08:45 )    Color: x / Appearance: x / SG: x / pH: x  Gluc: 96 mg/dL / Ketone: x  / Bili: x / Urobili: x   Blood: x / Protein: x / Nitrite: x   Leuk Esterase: x / RBC: x / WBC x   Sq Epi: x / Non Sq Epi: x / Bacteria: x          [id number]        <<<<<RADIOLOGY>>>>>          -----------------------------------------------------------------------------------------------------------------------------------------------------------------------------------------------

## 2024-03-27 NOTE — PROGRESS NOTE ADULT - SUBJECTIVE AND OBJECTIVE BOX
Vitals:  T(C): 36.3 (03-27-24 @ 04:33), Max: 36.9 (03-26-24 @ 19:57)  HR: 73 (03-27-24 @ 04:33) (55 - 73)  BP: 136/64 (03-27-24 @ 04:33) (125/62 - 166/71)  RR: 18 (03-27-24 @ 04:33) (18 - 18)  SpO2: --  ECG:    LABS:                        9.6    3.76  )-----------( 168      ( 27 Mar 2024 08:14 )             30.5     03-27    144  |  101  |  22<H>  ----------------------------<  100<H>  3.9   |  32  |  1.4    Ca    8.9      27 Mar 2024 08:14  Mg     1.7     03-27    TPro  6.2  /  Alb  3.7  /  TBili  0.8  /  DBili  x   /  AST  14  /  ALT  15  /  AlkPhos  66  03-26      MEDICATIONS  (STANDING):  apixaban 5 milliGRAM(s) Oral two times a day  aspirin enteric coated 81 milliGRAM(s) Oral daily  atorvastatin 80 milliGRAM(s) Oral at bedtime  folic acid 1 milliGRAM(s) Oral daily  losartan 100 milliGRAM(s) Oral daily  metoprolol tartrate 25 milliGRAM(s) Oral two times a day  oxybutynin 5 milliGRAM(s) Oral two times a day  pantoprazole    Tablet 40 milliGRAM(s) Oral before breakfast    MEDICATIONS  (PRN):  albuterol    90 MICROgram(s) HFA Inhaler 2 Puff(s) Inhalation every 6 hours PRN for bronchospasm      PHYSICAL EXAM:             Patient was seen and examined earlier today by me on 3C.  EMR reviewed    Her breathing is improved.  Admits to have exertional shortness of breath with ambulation.   She denies any exertional chest pain.  Walks with walker at home.  Lives with daughter    Vitals:  T(C): 36.3 (03-27-24 @ 04:33), Max: 36.9 (03-26-24 @ 19:57)  HR: 73 (03-27-24 @ 04:33) (55 - 73)  BP: 136/64 (03-27-24 @ 04:33) (125/62 - 166/71)  RR: 18 (03-27-24 @ 04:33) (18 - 18)  SpO2: --    Telemetry: Atrial Fibrillation    LABS:                        9.6    3.76  )-----------( 168      ( 27 Mar 2024 08:14 )             30.5     03-27    144  |  101  |  22<H>  ----------------------------<  100<H>  3.9   |  32  |  1.4    Ca    8.9      27 Mar 2024 08:14  Mg     1.7     03-27    TPro  6.2  /  Alb  3.7  /  TBili  0.8  /  DBili  x   /  AST  14  /  ALT  15  /  AlkPhos  66  03-26      MEDICATIONS  (STANDING):  apixaban 5 milliGRAM(s) Oral two times a day  aspirin enteric coated 81 milliGRAM(s) Oral daily  atorvastatin 80 milliGRAM(s) Oral at bedtime  folic acid 1 milliGRAM(s) Oral daily  losartan 100 milliGRAM(s) Oral daily  metoprolol tartrate 25 milliGRAM(s) Oral two times a day  oxybutynin 5 milliGRAM(s) Oral two times a day  pantoprazole    Tablet 40 milliGRAM(s) Oral before breakfast    MEDICATIONS  (PRN):  albuterol    90 MICROgram(s) HFA Inhaler 2 Puff(s) Inhalation every 6 hours PRN for bronchospasm      PHYSICAL EXAM:  Not in distress  Alert, oriented x 3  Irregular rhythm; nl S1S2; TARAN III/VI  Bilateral breath sounds  Abdomen soft  No edema  Moving all extremities    < from: TTE Echo Complete w/o Contrast w/ Doppler (03.27.24 @ 11:38) >  Summary:   1. Normal global left ventricularsystolic function.   2. LV Ejection Fraction by Daniels's Method with a biplane EF of 51 %.   3. The left ventricular diastolic function could not be assessed in this   study.   4. Mildly reduced RV systolic function.   5. Severely enlarged left atrium.   6. Moderately enlarged right atrium.   7. Mild thickening of the anterior and posterior mitral valve leaflets.   8. Mild mitral valve regurgitation.   9. Mild tricuspid regurgitation.  10. Severe aortic valve stenosis. Peak/mean PG 50/24 mmHg, [place] 0.9 cm^2.  11. Mild aortic regurgitation.  12. Small pericardial effusion.    < end of copied text >

## 2024-03-27 NOTE — PROGRESS NOTE ADULT - ASSESSMENT
83-year-old female with past medical history of hypertension, hyperlipidemia, and recent CVA 12/23 who presents to the ED for evaluation swollen legs bilaterally.  Reports that she has been having bilateral lower extremity swelling for the past month that has been progressively getting worse. She also endorses dyspnea on exertion and orthopnea for the last month. No cough or fever.       Acute HFpEF  Moderate to severe AS  A-Fib new onset  HTN / DL  H/O recent CVA  Microcytic anemia  HEATHER likely due to over diuresis                 PLAN:    ·	Tele reviewed. A-Fib rate controlled  ·	Troponin: 35-->25  ·	EKG on admission: A-fib 64/min. Non specific T wave changes (Interpreted by me)  ·	CXR: Fibrotic changes L lung base.   ·	Labs noted. Cr is trending up. Hold Lasix  ·	HEATHER likely due to overdiuresis. Monitor renal function  ·	Check i's and o's and daily wt  ·	Low salt diet and water restriction to 1.5 L/D  ·	ECHO  ·	Retic count is 3.5, serum iron is 35 and percent sat is 15. Check Ferritin level. Also check B12 and Folate  ·	LDH is normal. Check Heptoglobin.   ·	H/O CVA and [id number] score is 7. Started her on Eliquis 5 mg po q 12h.   ·	Pt's Atenolol was stopped and switched to Metoprolol.   ·	Care d/w the cardiologist today. Recommended ECHO prior to discharge.   ·	Pt eval noted. Home with home PT       Progress Note Handoff    Pending (specify):  Consults_________, Tests___ECHO_____, Test Results_______, Other__AKI_______  Family discussion:  Disposition: Home___/SNF___/Other________/Unknown at this time________    Huber Gamez MD  Spectra: [id number]

## 2024-03-27 NOTE — PROGRESS NOTE ADULT - ASSESSMENT
1] HFpEF, acute      Complicated by Valvular Heart Disease (Severe AS)  - BNP 49909  - Continue diuretic therapy.  Keep K>4 and Mg >2  - Daily weight (upright) and chart    2] Atrial Fibrillation - new onset  - High [id number] score.    - Start OAC Eliquis 5 mg tablet q 12 hrs.  - Rate control with beta-blocker.  Switch to Metoprolol Tartrate 25 mg tablet twice daily    3] ASHD  - Stable    4] Aortic Stenosis, severe  - Findings on echo noted    5] HTN  - Continue to monitor    6] Hyperlipidemia  - On statin therapy    I spoke with daughter last night regarding clinical status of her mother.  I also discussed with patient the option of invasive workup in view of history of AS and cardiac symptoms.  Patient is reluctant at this time to pursue any invasive workup.    Discharge planning per primary team.    Plan discussed with Medical Attending and House staff during morning rounds    Henri Patel MD  [phone] Office  On TEAMS

## 2024-03-27 NOTE — PROGRESS NOTE ADULT - SUBJECTIVE AND OBJECTIVE BOX
MADISON GLORIA  83y Female    CHIEF COMPLAINT:    Patient is a 83y old  Female who presents with a chief complaint of Fluid overload (27 Mar 2024 06:17)      INTERVAL HPI/OVERNIGHT EVENTS:    Patient seen and examined. Pt states that her breathing has improved but still having DEE    ROS: All other systems are negative.    Vital Signs:    T(F): 97.4 (03-27-24 @ 04:33), Max: 98.5 (03-26-24 @ 19:57)  HR: 73 (03-27-24 @ 04:33) (55 - 73)  BP: 136/64 (03-27-24 @ 04:33) (125/62 - 136/64)  RR: 18 (03-27-24 @ 04:33) (18 - 18)  SpO2: --  I&O's Summary    26 Mar 2024 07:01  -  27 Mar 2024 07:00  --------------------------------------------------------  IN: 390 mL / OUT: 2750 mL / NET: -2360 mL    27 Mar 2024 07:01  -  27 Mar 2024 13:02  --------------------------------------------------------  IN: 325 mL / OUT: 1300 mL / NET: -975 mL      Daily     Daily   CAPILLARY BLOOD GLUCOSE          PHYSICAL EXAM:    GENERAL:  NAD  SKIN: No rashes or lesions  HENT: Atraumatic. Normocephalic. PERRL. Moist membranes.  NECK: Supple, No JVD. No lymphadenopathy.  PULMONARY: CTA B/L. No wheezing. No rales  CVS: Normal S1, S2. Rate and Rhythm are regular. A III/VI systolic mm over aortic area.   ABDOMEN/GI: Soft, Nontender, Nondistended; BS present  EXTREMITIES: Peripheral pulses intact. No edema B/L LE.  NEUROLOGIC:  No motor or sensory deficit.  PSYCH: Alert & oriented x 3    Consultant(s) Notes Reviewed:  [x ] YES  [ ] NO  Care Discussed with Consultants/Other Providers [ x] YES  [ ] NO    EKG reviewed  Telemetry reviewed    LABS:                        9.6    3.76  )-----------( 168      ( 27 Mar 2024 08:14 )             30.5     03-27    144  |  101  |  22<H>  ----------------------------<  100<H>      Creatinine Trend: 1.4<--, 1.3<--, 1.1<--  3.9   |  32  |  1.4    Ca    8.9      27 Mar 2024 08:14  Mg     1.7     03-27    TPro  6.2  /  Alb  3.7  /  TBili  0.8  /  DBili  x   /  AST  14  /  ALT  15  /  AlkPhos  66  03-26              RADIOLOGY & ADDITIONAL TESTS:      Imaging or report Personally Reviewed:  [ ] YES  [ ] NO    Medications:  Standing  apixaban 5 milliGRAM(s) Oral two times a day  aspirin enteric coated 81 milliGRAM(s) Oral daily  atorvastatin 80 milliGRAM(s) Oral at bedtime  folic acid 1 milliGRAM(s) Oral daily  losartan 100 milliGRAM(s) Oral daily  metoprolol tartrate 25 milliGRAM(s) Oral two times a day  oxybutynin 5 milliGRAM(s) Oral two times a day  pantoprazole    Tablet 40 milliGRAM(s) Oral before breakfast    PRN Meds  albuterol    90 MICROgram(s) HFA Inhaler 2 Puff(s) Inhalation every 6 hours PRN      Case discussed with resident    Care discussed with pt/family

## 2024-03-27 NOTE — PROGRESS NOTE ADULT - ASSESSMENT
Patient is an 84 yo F w/PMH of HTN, HLD, and recent CVA in 12/2023 who presented to the ED for new onset BL LE swelling progressively worsening over the last month with dyspnea on exertion and orthopnea w/o history of CHF exacerbation w/ED labs showing pro-BNP 58973 and trop 35 -> 25, CXR notable for left pleural effusion and vascular congestion, and EKG remarkable for afib, which the patient said she never had before.  She was admitted for work up and management of new onset CHF and Afib.    #Volume overload   #Hx of Moderate-severe AS   #HFpEF exacerbation likely 2/2 worsening aortic stenosis  CXR (3/25): Bilateral hilar prominence likely secondary to enlarged pulmonary arteries. Fibrotic changes in the left lung base.  - HD stable, on RA  - BNP 81890. Troponin 35 -> 25  - TTE 11/23: EF 50 to 55%. Grade II diastolic dysfunction. Moderate to severe aortic valve stenosis. ORLANDO 0.85 cm2   - s/p lasix 20 IV in ED   - c/w lasix 40 mg IV qD. Caution for overdiuresis in AS   - Monitor strict in/out, maintain normotension/normovolemia   - Pending TTE and BL LE venous duplex  - Cardiology recs (3/26): - Continue diuretic therapy.  Keep K>4 and Mg >2, Daily weight (upright) and chart,  Start OAC Eliquis 5 mg tablet q 12 hrs.  - Rate control with beta-blocker.  Switch to Metoprolol Tartrate 25 mg tablet twice daily  - Appreciate card recs    #New onset paroxysmal Afib  - No previous hx of Afib, Rate controlled  - Denies drinking, smoking, hx BREANNE   - CHADVAsc 7: Start eliquis   - c/w eliquis 5 mg bid  - TSH 1.6  - f/u RVP  - Cardiology recs (3/26): Start OAC Eliquis 5 mg tablet q 12 hrs, Rate control with beta-blocker. Switch to Metoprolol Tartrate 25 mg tablet twice daily  - c/w lopressor 25 mg bid  - Appreciate card recs    #Chronic leukopenia   #Chronic macrocytic anemia, likely IRLANDA, but possibly anemia of chronic disease  - Hgb 9 (3/26)  - Iron sat 15%, iron T 35, Ferritin 300  - B12 466, Folate >20  - Haptoglobin 203  - Retic count 3.5  - Active t/s. 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 cardioembolic from afib  - s/p 90 days DAPT   - c/w aspirin, statin, losartan  - switch atenolol to lopressor 25 mg bid    #MISC  - DVT: eliquis   - Diet: DASH   - GI: Protonix  - Code: patient considering DNR/DNI please follow up in am      Patient is an 82 yo F w/PMH of HTN, HLD, and recent CVA in 12/2023 who presented to the ED for new onset BL LE swelling progressively worsening over the last month with dyspnea on exertion and orthopnea w/o history of CHF exacerbation w/ED labs showing pro-BNP 65748 and trop 35 -> 25, CXR notable for left pleural effusion and vascular congestion, and EKG remarkable for afib, which the patient said she never had before.  She was admitted for work up and management of new onset CHF and Afib.    #Volume overload   #Hx of Moderate-severe AS   #HFpEF exacerbation likely 2/2 worsening aortic stenosis  *CXR (3/25): Bilateral hilar prominence likely secondary to enlarged pulmonary arteries. Fibrotic changes in the left lung base  *BL LE venous duplex (3/26): No evidence of deep venous thrombosis in either lower extremity  - HD stable, on RA  - BNP 65880. Troponin 35 -> 25  - TTE 11/23: EF 50 to 55%. Grade II diastolic dysfunction. Moderate to severe aortic valve stenosis. ORLANDO 0.85 cm2   - s/p lasix 20 IV in ED   - s/p lasix 40 mg IV qD.   - start lasix 20 mg PO qD. Caution for overdiuresis in AS   - Monitor strict in/out, maintain normotension/normovolemia   - Cardiology recs (3/26): Continue diuretic therapy.  Keep K>4 and Mg >2, Daily weight (upright) and chart,  Start OAC Eliquis 5 mg tablet q 12 hrs.  - TTE (3/27):  LVEF 51%, severely enlarged left atrium, moderately enlarged R atrium, severe aortic stenosis, diastolic function could not be assessed  - Recall cardiology regarding TAVR  - Switch to Metoprolol Tartrate 25 mg tablet twice daily  - PT eval (3/26): Home w/home PT  - Appreciate card recs    #New onset paroxysmal Afib  - No previous hx of Afib, Rate controlled  - Denies drinking, smoking, hx BREANNE   - CHADVAsc 7: Start eliquis   - c/w eliquis 5 mg bid  - TSH 1.6  - RVP neg  - Cardiology recs (3/26): Start OAC Eliquis 5 mg tablet q 12 hrs, Rate control with beta-blocker. Switch to Metoprolol Tartrate 25 mg tablet twice daily  - c/w lopressor 25 mg bid  - Appreciate card recs    #Chronic leukopenia   #Chronic macrocytic anemia, likely IRLANDA, but possibly anemia of chronic disease  - Hgb 9 (3/26)  - Iron sat 15%, iron T 35, Ferritin 300  - B12 466, Folate >20  - Haptoglobin 203  - Retic count 3.5  - Active t/s. 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

## 2024-03-28 ENCOUNTER — TRANSCRIPTION ENCOUNTER (OUTPATIENT)
Age: 84
End: 2024-03-28

## 2024-03-28 LAB
ANION GAP SERPL CALC-SCNC: 8 MMOL/L — SIGNIFICANT CHANGE UP (ref 7–14)
BUN SERPL-MCNC: 20 MG/DL — SIGNIFICANT CHANGE UP (ref 10–20)
CALCIUM SERPL-MCNC: 9.2 MG/DL — SIGNIFICANT CHANGE UP (ref 8.4–10.5)
CHLORIDE SERPL-SCNC: 103 MMOL/L — SIGNIFICANT CHANGE UP (ref 98–110)
CO2 SERPL-SCNC: 34 MMOL/L — HIGH (ref 17–32)
CREAT SERPL-MCNC: 1.3 MG/DL — SIGNIFICANT CHANGE UP (ref 0.7–1.5)
EGFR: 41 ML/MIN/1.73M2 — LOW
GLUCOSE SERPL-MCNC: 100 MG/DL — HIGH (ref 70–99)
HCT VFR BLD CALC: 31.6 % — LOW (ref 37–47)
HGB BLD-MCNC: 9.7 G/DL — LOW (ref 12–16)
MAGNESIUM SERPL-MCNC: 2.2 MG/DL — SIGNIFICANT CHANGE UP (ref 1.8–2.4)
MCHC RBC-ENTMCNC: 30.7 G/DL — LOW (ref 32–37)
MCHC RBC-ENTMCNC: 31.2 PG — HIGH (ref 27–31)
MCV RBC AUTO: 101.6 FL — HIGH (ref 81–99)
NRBC # BLD: 0 /100 WBCS — SIGNIFICANT CHANGE UP (ref 0–0)
PLATELET # BLD AUTO: 167 K/UL — SIGNIFICANT CHANGE UP (ref 130–400)
PMV BLD: 11 FL — HIGH (ref 7.4–10.4)
POTASSIUM SERPL-MCNC: 5.4 MMOL/L — HIGH (ref 3.5–5)
POTASSIUM SERPL-SCNC: 5.4 MMOL/L — HIGH (ref 3.5–5)
RBC # BLD: 3.11 M/UL — LOW (ref 4.2–5.4)
RBC # FLD: 15.9 % — HIGH (ref 11.5–14.5)
SODIUM SERPL-SCNC: 145 MMOL/L — SIGNIFICANT CHANGE UP (ref 135–146)
WBC # BLD: 4.08 K/UL — LOW (ref 4.8–10.8)
WBC # FLD AUTO: 4.08 K/UL — LOW (ref 4.8–10.8)

## 2024-03-28 PROCEDURE — 99232 SBSQ HOSP IP/OBS MODERATE 35: CPT

## 2024-03-28 RX ORDER — SODIUM ZIRCONIUM CYCLOSILICATE 10 G/10G
10 POWDER, FOR SUSPENSION ORAL ONCE
Refills: 0 | Status: COMPLETED | OUTPATIENT
Start: 2024-03-28 | End: 2024-03-28

## 2024-03-28 RX ORDER — FUROSEMIDE 40 MG
1 TABLET ORAL
Qty: 0 | Refills: 0 | DISCHARGE
Start: 2024-03-28

## 2024-03-28 RX ORDER — METOPROLOL TARTRATE 50 MG
1 TABLET ORAL
Qty: 0 | Refills: 0 | DISCHARGE
Start: 2024-03-28

## 2024-03-28 RX ORDER — ATENOLOL 25 MG/1
1 TABLET ORAL
Qty: 0 | Refills: 0 | DISCHARGE

## 2024-03-28 RX ORDER — APIXABAN 2.5 MG/1
1 TABLET, FILM COATED ORAL
Qty: 0 | Refills: 0 | DISCHARGE
Start: 2024-03-28

## 2024-03-28 RX ADMIN — Medication 5 MILLIGRAM(S): at 05:05

## 2024-03-28 RX ADMIN — APIXABAN 5 MILLIGRAM(S): 2.5 TABLET, FILM COATED ORAL at 17:16

## 2024-03-28 RX ADMIN — Medication 25 MILLIGRAM(S): at 17:16

## 2024-03-28 RX ADMIN — ATORVASTATIN CALCIUM 80 MILLIGRAM(S): 80 TABLET, FILM COATED ORAL at 21:36

## 2024-03-28 RX ADMIN — APIXABAN 5 MILLIGRAM(S): 2.5 TABLET, FILM COATED ORAL at 05:04

## 2024-03-28 RX ADMIN — Medication 81 MILLIGRAM(S): at 11:24

## 2024-03-28 RX ADMIN — Medication 1 MILLIGRAM(S): at 11:23

## 2024-03-28 RX ADMIN — Medication 20 MILLIGRAM(S): at 05:04

## 2024-03-28 RX ADMIN — PANTOPRAZOLE SODIUM 40 MILLIGRAM(S): 20 TABLET, DELAYED RELEASE ORAL at 05:04

## 2024-03-28 RX ADMIN — SODIUM ZIRCONIUM CYCLOSILICATE 10 GRAM(S): 10 POWDER, FOR SUSPENSION ORAL at 12:12

## 2024-03-28 RX ADMIN — LOSARTAN POTASSIUM 100 MILLIGRAM(S): 100 TABLET, FILM COATED ORAL at 05:04

## 2024-03-28 RX ADMIN — Medication 25 MILLIGRAM(S): at 05:04

## 2024-03-28 NOTE — PROGRESS NOTE ADULT - ASSESSMENT
1] HFpEF, acute      Complicated by Valvular Heart Disease (Severe AS)  - Continue diuretic therapy.  Keep K>4 and Mg >2  - Daily weight (upright) and chart    2] Atrial Fibrillation - new onset  - High [id number] score.    - Start OAC Eliquis 5 mg tablet q 12 hrs.  - Rate control with beta-blocker.  Switch to Metoprolol Tartrate 25 mg tablet twice daily    3] ASHD  - Stable    4] Aortic Stenosis, severe  - Findings on echo noted    5] HTN  - Continue to monitor    6] Hyperlipidemia  - On statin therapy    Indications for AV Surgery again discussed.  She is now agreeable to pursue invasive workup.  She will be discharge and bring back as outpatient for workup.    Discharge planning per primary team.  Plan discussed with Dr Gamez and House Staff during morning roundss    Henri Patel MD  [phone] Office  On TEAMS

## 2024-03-28 NOTE — DISCHARGE NOTE PROVIDER - CARE PROVIDERS DIRECT ADDRESSES
,[email or web address]

## 2024-03-28 NOTE — PROGRESS NOTE ADULT - SUBJECTIVE AND OBJECTIVE BOX
ERIN GLORIAENZA  83y Female    CHIEF COMPLAINT:    Patient is a 83y old  Female who presents with a chief complaint of Fluid overload (27 Mar 2024 19:40)      INTERVAL HPI/OVERNIGHT EVENTS:    Patient seen and examined.    ROS: All other systems are negative.    Vital Signs:    T(F): 98 (24 @ 05:00), Max: 98.8 (24 @ 20:20)  HR: 73 (24 @ 05:00) (63 - 73)  BP: 140/65 (24 @ 05:00) (114/65 - 140/65)  RR: 18 (24 @ 05:00) (18 - 18)  SpO2: --  I&O's Summary    27 Mar 2024 07:01  -  28 Mar 2024 07:00  --------------------------------------------------------  IN: 887 mL / OUT: 3850 mL / NET: -2963 mL      Daily     Daily Weight in k.9 (28 Mar 2024 05:00)  CAPILLARY BLOOD GLUCOSE          PHYSICAL EXAM:    GENERAL:  NAD  SKIN: No rashes or lesions  HENT: Atraumatic. Normocephalic. PERRL. Moist membranes.  NECK: Supple, No JVD. No lymphadenopathy.  PULMONARY: CTA B/L. No wheezing. No rales  CVS: Normal S1, S2. Rate and Rhythm are regular. No murmurs.  ABDOMEN/GI: Soft, Nontender, Nondistended; BS present  EXTREMITIES: Peripheral pulses intact. No edema B/L LE.  NEUROLOGIC:  No motor or sensory deficit.  PSYCH: Alert & oriented x 3    Consultant(s) Notes Reviewed:  [x ] YES  [ ] NO  Care Discussed with Consultants/Other Providers [ x] YES  [ ] NO    EKG reviewed  Telemetry reviewed    LABS:                        9.6    3.76  )-----------( 168      ( 27 Mar 2024 08:14 )             30.5         144  |  101  |  22<H>  ----------------------------<  100<H>  3.9   |  32  |  1.4    Ca    8.9      27 Mar 2024 08:14  Mg     1.7         TPro  6.2  /  Alb  3.7  /  TBili  0.8  /  DBili  x   /  AST  14  /  ALT  15  /  AlkPhos  66                RADIOLOGY & ADDITIONAL TESTS:    < from: TTE Echo Complete w/o Contrast w/ Doppler (24 @ 11:38) >    Summary:   1. Normal global left ventricularsystolic function.   2. LV Ejection Fraction by Daniels's Method with a biplane EF of 51 %.   3. The left ventricular diastolic function could not be assessed in this   study.   4. Mildly reduced RV systolic function.   5. Severely enlarged left atrium.   6. Moderately enlarged right atrium.   7. Mild thickening of the anterior and posterior mitral valve leaflets.   8. Mild mitral valve regurgitation.   9. Mild tricuspid regurgitation.  10. Severe aortic valve stenosis. Peak/mean PG 50/24 mmHg, ORLANDO 0.9 cm^2.  11. Mild aortic regurgitation.  12. Small pericardial effusion.    < end of copied text >    Imaging or report Personally Reviewed:  [x ] YES  [ ] NO    Medications:  Standing  apixaban 5 milliGRAM(s) Oral two times a day  aspirin enteric coated 81 milliGRAM(s) Oral daily  atorvastatin 80 milliGRAM(s) Oral at bedtime  folic acid 1 milliGRAM(s) Oral daily  furosemide    Tablet 20 milliGRAM(s) Oral daily  losartan 100 milliGRAM(s) Oral daily  metoprolol tartrate 25 milliGRAM(s) Oral two times a day  oxybutynin 5 milliGRAM(s) Oral two times a day  pantoprazole    Tablet 40 milliGRAM(s) Oral before breakfast    PRN Meds  albuterol    90 MICROgram(s) HFA Inhaler 2 Puff(s) Inhalation every 6 hours PRN      Case discussed with resident    Care discussed with pt/family           MADISON GLORIA  83y Female    CHIEF COMPLAINT:    Patient is a 83y old  Female who presents with a chief complaint of Fluid overload (27 Mar 2024 19:40)      INTERVAL HPI/OVERNIGHT EVENTS:    Patient seen and examined. Pt states that her breathing has improved. Now wants TAVR procedure.     ROS: All other systems are negative.    Vital Signs:    T(F): 98 (24 @ 05:00), Max: 98.8 (24 @ 20:20)  HR: 73 (24 @ 05:00) (63 - 73)  BP: 140/65 (24 @ 05:00) (114/65 - 140/65)  RR: 18 (24 @ 05:00) (18 - 18)  SpO2: --  I&O's Summary    27 Mar 2024 07:01  -  28 Mar 2024 07:00  --------------------------------------------------------  IN: 887 mL / OUT: 3850 mL / NET: -2963 mL      Daily     Daily Weight in k.9 (28 Mar 2024 05:00)  CAPILLARY BLOOD GLUCOSE          PHYSICAL EXAM:    GENERAL:  NAD  SKIN: No rashes or lesions  HENT: Atraumatic. Normocephalic. PERRL. Moist membranes.  NECK: Supple, No JVD. No lymphadenopathy.  PULMONARY: CTA B/L. No wheezing. No rales  CVS: Normal S1, S2. Rate and Rhythm are regular. A III/VI systolic mm over aortic area.   ABDOMEN/GI: Soft, Nontender, Nondistended; BS present  EXTREMITIES: Peripheral pulses intact. No edema B/L LE.  NEUROLOGIC:  No motor or sensory deficit.  PSYCH: Alert & oriented x 3    Consultant(s) Notes Reviewed:  [x ] YES  [ ] NO  Care Discussed with Consultants/Other Providers [ x] YES  [ ] NO    EKG reviewed  Telemetry reviewed    LABS:                        9.6    3.76  )-----------( 168      ( 27 Mar 2024 08:14 )             30.5         144  |  101  |  22<H>  ----------------------------<  100<H>   Creatinine Trend: 1.3<--, 1.4<--, 1.3<--, 1.1<--  3.9   |  32  |  1.4    Ca    8.9      27 Mar 2024 08:14  Mg     1.7         TPro  6.2  /  Alb  3.7  /  TBili  0.8  /  DBili  x   /  AST  14  /  ALT  15  /  AlkPhos  66                RADIOLOGY & ADDITIONAL TESTS:    < from: TTE Echo Complete w/o Contrast w/ Doppler (24 @ 11:38) >    Summary:   1. Normal global left ventricularsystolic function.   2. LV Ejection Fraction by Daniels's Method with a biplane EF of 51 %.   3. The left ventricular diastolic function could not be assessed in this   study.   4. Mildly reduced RV systolic function.   5. Severely enlarged left atrium.   6. Moderately enlarged right atrium.   7. Mild thickening of the anterior and posterior mitral valve leaflets.   8. Mild mitral valve regurgitation.   9. Mild tricuspid regurgitation.  10. Severe aortic valve stenosis. Peak/mean PG 50/24 mmHg, ORLANDO 0.9 cm^2.  11. Mild aortic regurgitation.  12. Small pericardial effusion.    < end of copied text >    Imaging or report Personally Reviewed:  [x ] YES  [ ] NO    Medications:  Standing  apixaban 5 milliGRAM(s) Oral two times a day  aspirin enteric coated 81 milliGRAM(s) Oral daily  atorvastatin 80 milliGRAM(s) Oral at bedtime  folic acid 1 milliGRAM(s) Oral daily  furosemide    Tablet 20 milliGRAM(s) Oral daily  losartan 100 milliGRAM(s) Oral daily  metoprolol tartrate 25 milliGRAM(s) Oral two times a day  oxybutynin 5 milliGRAM(s) Oral two times a day  pantoprazole    Tablet 40 milliGRAM(s) Oral before breakfast    PRN Meds  albuterol    90 MICROgram(s) HFA Inhaler 2 Puff(s) Inhalation every 6 hours PRN      Case discussed with resident    Care discussed with pt/family

## 2024-03-28 NOTE — DISCHARGE NOTE PROVIDER - NSDCCPCAREPLAN_GEN_ALL_CORE_FT
PRINCIPAL DISCHARGE DIAGNOSIS  Diagnosis: Fluid overload  Assessment and Plan of Treatment: You came into the hospital with swelling in your legs and were treated with diuretics. You were found to have worsened narrowing of a valve in your heart with the condition being called severe aortic stenosis. To treat this, you will need replacement of a heart valve called the aortic valve. Please follow up outpatient with Dr. Patel within 1-2 weeks to set up an appointment for the aortic valve replacement. Also, please take your new medications as prescribed. Please follow up with your primary medical doctor within 2 weeks. If you notice your leg swelling or breathing worsening, please return to the emergency room.

## 2024-03-28 NOTE — DISCHARGE NOTE PROVIDER - PROVIDER TOKENS
PROVIDER:[TOKEN:[[id number]],FOLLOWUP:[1 week],ESTABLISHEDPATIENT:[T]],PROVIDER:[TOKEN:[[id number]],FOLLOWUP:[2 weeks]]

## 2024-03-28 NOTE — PROGRESS NOTE ADULT - SUBJECTIVE AND OBJECTIVE BOX
----------Daily Progress Note----------    HISTORY OF PRESENT ILLNESS:  Patient is an 82 yo F w/PMH of HTN, HLD, moderate-severe aortic stenosis, and recent CVA in 12/2023 who presented to the ED for new onset BL LE swelling progressively worsening over the last month with dyspnea on exertion and orthopnea w/o history of CHF exacerbation w/ED labs showing pro-BNP 98242 and trop 35 -> 25, CXR notable for left pleural effusion and vascular congestion, and EKG remarkable for afib, which the patient said she never had before. Of note, her hgb was also low at 8.7. She was admitted for work up and management of new onset CHF and Afib.    Today is hospital day 3d.     INTERVAL HOSPITAL COURSE / OVERNIGHT EVENTS:  O/N event:  None    24 hr event:  None    Patient was examined and seen at bedside.    Review of Systems: Otherwise unremarkable     <<<<<PAST MEDICAL & SURGICAL HISTORY>>>>>  Rheumatoid arthritis    Hyperlipemia    Hypertension    Stress incontinence    Asthma    Arthritis    S/P total knee replacement, right      ALLERGIES  sulfa drugs (Rash)  penicillins (Rash)      Home Medications:  Albuterol (Eqv-ProAir HFA) 90 mcg/inh inhalation aerosol: 2 puff(s) inhaled every 6 hours as needed for  bronchospasm (26 Mar 2024 01:24)  aspirin 81 mg oral delayed release tablet: 1 tab(s) orally once a day (26 Mar 2024 01:24)  atenolol 50 mg oral tablet: 1 tab(s) orally once a day (26 Mar 2024 01:24)  atorvastatin 80 mg oral tablet: 1 tab(s) orally once a day (at bedtime) (26 Mar 2024 01:21)  carbonyl iron 45 mg oral tablet: 1 tab(s) orally once a day (26 Mar 2024 01:24)  folic acid 1 mg oral tablet: 1 tab(s) orally once a day (26 Mar 2024 01:24)  losartan 100 mg oral tablet: 1 tab(s) orally once a day (at bedtime) (26 Mar 2024 01:24)        MEDICATIONS  STANDING MEDICATIONS  apixaban 5 milliGRAM(s) Oral two times a day  aspirin enteric coated 81 milliGRAM(s) Oral daily  atorvastatin 80 milliGRAM(s) Oral at bedtime  folic acid 1 milliGRAM(s) Oral daily  furosemide    Tablet 20 milliGRAM(s) Oral daily  losartan 100 milliGRAM(s) Oral daily  metoprolol tartrate 25 milliGRAM(s) Oral two times a day  oxybutynin 5 milliGRAM(s) Oral two times a day  pantoprazole    Tablet 40 milliGRAM(s) Oral before breakfast    PRN MEDICATIONS  albuterol    90 MICROgram(s) HFA Inhaler 2 Puff(s) Inhalation every 6 hours PRN    VITALS:  T(F): 98  HR: 73  BP: 140/65  RR: 18  SpO2: --    <<<<<PHYSICAL EXAM>>>>>  GENERAL: NAD  PULMONARY: Clear to auscultation bilaterally. No rales, rhonchi, or wheezing.  CARDIOVASCULAR: Regular rate and rhythm, S1-S2, no murmurs  GASTROINTESTINAL: Soft, non-tender, non-distended, no guarding  SKIN/EXTREMITIES: Warm, 1+ tibialis posterior pulses, no LE edema  NEUROLOGIC/MUSCULOSKELETAL: AOx4, grossly moving all extremities, no focal deficits    <<<<<LABS>>>>>                        9.7    4.08  )-----------( 167      ( 28 Mar 2024 07:31 )             31.6     03-28    145  |  103  |  20  ----------------------------<  100<H>  5.4<H>   |  34<H>  |  1.3    Ca    9.2      28 Mar 2024 07:31  Mg     2.2     03-28        Urinalysis Basic - ( 28 Mar 2024 07:31 )    Color: x / Appearance: x / SG: x / pH: x  Gluc: 100 mg/dL / Ketone: x  / Bili: x / Urobili: x   Blood: x / Protein: x / Nitrite: x   Leuk Esterase: x / RBC: x / WBC x   Sq Epi: x / Non Sq Epi: x / Bacteria: x          [id number]        <<<<<RADIOLOGY>>>>>        -----------------------------------------------------------------------------------------------------------------------------------------------------------------------------------------------

## 2024-03-28 NOTE — DISCHARGE NOTE PROVIDER - HOSPITAL COURSE
Patient is an 84 yo F w/PMH of HTN, HLD, and recent CVA in 12/2023 who presented to the ED for new onset BL LE swelling progressively worsening over the last month with dyspnea on exertion and orthopnea w/o history of CHF exacerbation w/ED labs showing pro-BNP 54344 and trop 35 -> 25, CXR notable for left pleural effusion and vascular congestion, and EKG remarkable for afib, which the patient said she never had before.  She was admitted for work up and management of new onset CHF and Afib.    #Volume overload   #Hx of Moderate-severe AS   #HFpEF exacerbation likely 2/2 worsening aortic stenosis  - TTE (3/27):  LVEF 51%, severely enlarged left atrium, moderately enlarged R atrium, severe aortic stenosis, diastolic function could not be assessed  - c/w lasix 20 mg PO qD  - As per Dr. 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  She was admitted for work up and management of new onset CHF and Afib.    1. Acute diastolic CHF in the setting of severe aortic stenosis    - TTE (3/27):  LVEF 51%, severely enlarged left atrium, moderately enlarged R atrium, severe aortic stenosis, diastolic function could not be assessed  - c/w lasix 20 mg PO qD. DC on lasix 20mg po daily  - As per Dr. Patel, TAVR outpatient, okay to discharge patient    2. New onset paroxysmal Afib  - c/w lopressor 25 mg bid. DC on lopressor 25mg po bid   - c/w Eliquis 5 mg bid. DC on eliquis 5mg po bid    3. Chronic leukopenia /Chronic macrocytic anemia, possibly anemia of chronic disease vs IRLANDA  - Hgb 9 (3/26)  - Iron sat 15%, iron T 35, Ferritin 300  - B12 466, Folate >20  - Haptoglobin 203  - Retic count 3.5    4. HTN/HLD/HO thalamic stroke 2023 likely cardioembolic from afib  - s/p 90 days DAPT   - c/w aspirin, statin, losartan  - c/w lopressor 25 mg bid

## 2024-03-28 NOTE — PROGRESS NOTE ADULT - ASSESSMENT
Patient is an 84 yo F w/PMH of HTN, HLD, and recent CVA in 12/2023 who presented to the ED for new onset BL LE swelling progressively worsening over the last month with dyspnea on exertion and orthopnea w/o history of CHF exacerbation w/ED labs showing pro-BNP 16039 and trop 35 -> 25, CXR notable for left pleural effusion and vascular congestion, and EKG remarkable for afib, which the patient said she never had before.  She was admitted for work up and management of new onset CHF and Afib.    #Volume overload   #Hx of Moderate-severe AS   #HFpEF exacerbation likely 2/2 worsening aortic stenosis  *CXR (3/25): Bilateral hilar prominence likely secondary to enlarged pulmonary arteries. Fibrotic changes in the left lung base  *BL LE venous duplex (3/26): No evidence of deep venous thrombosis in either lower extremity  - HD stable, on RA  - BNP 84796. Troponin 35 -> 25  - TTE 11/23: EF 50 to 55%. Grade II diastolic dysfunction. Moderate to severe aortic valve stenosis. ORLANDO 0.85 cm2   - s/p lasix 20 IV in ED   - s/p lasix 40 mg IV qD.   - start lasix 20 mg PO qD. Caution for overdiuresis in AS   - Monitor strict in/out, maintain normotension/normovolemia   - Cardiology recs (3/26): Continue diuretic therapy.  Keep K>4 and Mg >2, Daily weight (upright) and chart,  Start OAC Eliquis 5 mg tablet q 12 hrs.  - TTE (3/27):  LVEF 51%, severely enlarged left atrium, moderately enlarged R atrium, severe aortic stenosis, diastolic function could not be assessed  - As per Dr. Patel, TAVR outpatient, okay to discharge patient  - Switch to Metoprolol Tartrate 25 mg tablet twice daily  - PT eval (3/26): Home w/home PT  - Appreciate card recs    #New onset paroxysmal Afib  - No previous hx of Afib, Rate controlled  - Denies drinking, smoking, hx BREANNE   - CHADVAsc 7: Start eliquis   - c/w eliquis 5 mg bid  - TSH 1.6  - RVP neg  - Cardiology recs (3/26): Start OAC Eliquis 5 mg tablet q 12 hrs, Rate control with beta-blocker. Switch to Metoprolol Tartrate 25 mg tablet twice daily  - c/w lopressor 25 mg bid  - Appreciate card recs    #Chronic leukopenia   #Chronic macrocytic anemia, likely IRLANDA, but possibly anemia of chronic disease  - Hgb 9 (3/26)  - Iron sat 15%, iron T 35, Ferritin 300  - B12 466, Folate >20  - Haptoglobin 203  - Retic count 3.5  - Active t/s. 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

## 2024-03-28 NOTE — DISCHARGE NOTE PROVIDER - CARE PROVIDER_API CALL
Henri Patel  Cardiovascular Disease  [address], [address]  [place] [address]  Phone: [phone]  Fax: [phone]  Established Patient  Follow Up Time: 1 week    Martínez Jain  Internal Medicine  [address] [place] [address]  Phone: [phone]  Fax: [phone]  Follow Up Time: 2 weeks

## 2024-03-28 NOTE — PROGRESS NOTE ADULT - ASSESSMENT
83-year-old female with past medical history of hypertension, hyperlipidemia, and recent CVA 12/23 who presents to the ED for evaluation swollen legs bilaterally.  Reports that she has been having bilateral lower extremity swelling for the past month that has been progressively getting worse. She also endorses dyspnea on exertion and orthopnea for the last month. No cough or fever.       Acute HFpEF  Moderate to severe AS  A-Fib new onset  HTN / DL  H/O recent CVA  Microcytic anemia  HEATHER likely due to over diuresis                 PLAN:    ·	Tele reviewed. A-Fib rate controlled  ·	Troponin: 35-->25  ·	EKG on admission: A-fib 64/min. Non specific T wave changes (Interpreted by me)  ·	CXR: Fibrotic changes L lung base.   ·	Labs noted. Cr is trending up. Hold Lasix  ·	HEATHER likely due to overdiuresis. Monitor renal function  ·	Check i's and o's and daily wt  ·	Low salt diet and water restriction to 1.5 L/D  ·	ECHO  ·	Retic count is 3.5, serum iron is 35 and percent sat is 15. Check Ferritin level. Also check B12 and Folate  ·	LDH is normal. Check Heptoglobin.   ·	H/O CVA and [id number] score is 7. Started her on Eliquis 5 mg po q 12h.   ·	Pt's Atenolol was stopped and switched to Metoprolol.   ·	Care d/w the cardiologist today. Recommended ECHO prior to discharge.   ·	Pt eval noted. Home with home PT       Progress Note Handoff    Pending (specify):  Consults_________, Tests_______, Test Results_______, Other__AKI_______  Family discussion:  Disposition: Home___/SNF___/Other________/Unknown at this time________    Huber Gamez MD  Spectra: [phone]  83-year-old female with past medical history of hypertension, hyperlipidemia, and recent CVA 12/23 who presents to the ED for evaluation swollen legs bilaterally.  Reports that she has been having bilateral lower extremity swelling for the past month that has been progressively getting worse. She also endorses dyspnea on exertion and orthopnea for the last month. No cough or fever.       Acute HFpEF  Moderate to severe AS  A-Fib new onset  HTN / DL  H/O recent CVA  Microcytic anemia  HEATHER likely due to over diuresis                 PLAN:    ·	Tele reviewed. A-Fib rate controlled  ·	Troponin: 35-->25  ·	EKG on admission: A-fib 64/min. Non specific T wave changes (Interpreted by me)  ·	CXR: Fibrotic changes L lung base.   ·	Labs noted. Cr started trending down. Started her on Lasix 20 mg po daily  ·	HEATHER likely due to overdiuresis. Monitor renal function  ·	Check i's and o's and daily wt  ·	Low salt diet and water restriction to 1.5 L/D  ·	ECHO noted. EF is 51%. Severe aortic stenosis. Explained the findings to the pt. Pt now has decided to go for TAVR  ·	D/W the pt's cardiologist Dr. Patel. Recommended to do TAVR w/u as an out pt and d/c pt home in AM  ·	Retic count is 3.5, serum iron is 35 and percent sat is 15. Ferritin is 300. B12 and Folate levels are normal  ·	LDH is normal. Heptoglobin level is high.  ·	H/O CVA and [id number] score is 7. Started her on Eliquis 5 mg po q 12h.   ·	Pt's Atenolol was stopped and switched to Metoprolol.    ·	Pt eval noted. Home with home PT  ·	Anticipate d/c in AM       Progress Note Handoff    Pending (specify):  Consults_________, Tests_______, Test Results_______, Other__Anticipate d/c in AM_______  Family discussion:  Disposition: Home___/SNF___/Other________/Unknown at this time________    Huber Gamez MD  Spectra: [phone]

## 2024-03-28 NOTE — PROGRESS NOTE ADULT - SUBJECTIVE AND OBJECTIVE BOX
Patient was seen and examined earlier by me on 3C.  EMR reviewed    She is comfortably sitting in chair.  No new complaints.  She is now agreeable to purse invasive workup    Vitals:  T(C): 36.7 (03-28-24 @ 05:00), Max: 37.1 (03-27-24 @ 20:20)  HR: 73 (03-28-24 @ 05:00) (55 - 73)  BP: 140/65 (03-28-24 @ 05:00) (114/65 - 149/66)  RR: 18 (03-28-24 @ 05:00) (18 - 18)  SpO2: --    Telemetry: Atrial Fib    LABS:                        9.7    4.08  )-----------( 167      ( 28 Mar 2024 07:31 )             31.6     03-28    145  |  103  |  20  ----------------------------<  100<H>  5.4<H>   |  34<H>  |  1.3    Ca    9.2      28 Mar 2024 07:31  Mg     2.2     03-28        MEDICATIONS  (STANDING):  apixaban 5 milliGRAM(s) Oral two times a day  aspirin enteric coated 81 milliGRAM(s) Oral daily  atorvastatin 80 milliGRAM(s) Oral at bedtime  folic acid 1 milliGRAM(s) Oral daily  furosemide    Tablet 20 milliGRAM(s) Oral daily  losartan 100 milliGRAM(s) Oral daily  metoprolol tartrate 25 milliGRAM(s) Oral two times a day  oxybutynin 5 milliGRAM(s) Oral two times a day  pantoprazole    Tablet 40 milliGRAM(s) Oral before breakfast  sodium zirconium cyclosilicate 10 Gram(s) Oral once    MEDICATIONS  (PRN):  albuterol    90 MICROgram(s) HFA Inhaler 2 Puff(s) Inhalation every 6 hours PRN for bronchospasm      PHYSICAL EXAM:           Patient was seen and examined earlier by me on 3C.  EMR reviewed    She is comfortably sitting in chair.  No new complaints.  She is now agreeable to purse invasive workup    Vitals:  T(C): 36.7 (03-28-24 @ 05:00), Max: 37.1 (03-27-24 @ 20:20)  HR: 73 (03-28-24 @ 05:00) (55 - 73)  BP: 140/65 (03-28-24 @ 05:00) (114/65 - 149/66)  RR: 18 (03-28-24 @ 05:00) (18 - 18)  SpO2: --    Telemetry: Atrial Fib    LABS:                        9.7    4.08  )-----------( 167      ( 28 Mar 2024 07:31 )             31.6     03-28    145  |  103  |  20  ----------------------------<  100<H>  5.4<H>   |  34<H>  |  1.3    Ca    9.2      28 Mar 2024 07:31  Mg     2.2     03-28        MEDICATIONS  (STANDING):  apixaban 5 milliGRAM(s) Oral two times a day  aspirin enteric coated 81 milliGRAM(s) Oral daily  atorvastatin 80 milliGRAM(s) Oral at bedtime  folic acid 1 milliGRAM(s) Oral daily  furosemide    Tablet 20 milliGRAM(s) Oral daily  losartan 100 milliGRAM(s) Oral daily  metoprolol tartrate 25 milliGRAM(s) Oral two times a day  oxybutynin 5 milliGRAM(s) Oral two times a day  pantoprazole    Tablet 40 milliGRAM(s) Oral before breakfast  sodium zirconium cyclosilicate 10 Gram(s) Oral once    MEDICATIONS  (PRN):  albuterol    90 MICROgram(s) HFA Inhaler 2 Puff(s) Inhalation every 6 hours PRN for bronchospasm      PHYSICAL EXAM:  Comfortably sitting in chair  Alert, oriented x3  Irregular rhythm; nl S1S2, TARAN III/VI   Bilateral breath sounds  Abdomen soft  No edema  Moving all extremities

## 2024-03-28 NOTE — DISCHARGE NOTE PROVIDER - NSDCMRMEDTOKEN_GEN_ALL_CORE_FT
Albuterol (Eqv-ProAir HFA) 90 mcg/inh inhalation aerosol: 2 puff(s) inhaled every 6 hours as needed for  bronchospasm  aspirin 81 mg oral delayed release tablet: 1 tab(s) orally once a day  atenolol 50 mg oral tablet: 1 tab(s) orally once a day  atorvastatin 80 mg oral tablet: 1 tab(s) orally once a day (at bedtime)  carbonyl iron 45 mg oral tablet: 1 tab(s) orally once a day  fesoterodine 4 mg oral tablet, extended release: 1 tab(s) orally once a day  folic acid 1 mg oral tablet: 1 tab(s) orally once a day  losartan 100 mg oral tablet: 1 tab(s) orally once a day (at bedtime)  pantoprazole 40 mg oral delayed release tablet: 1 tab(s) orally once a day   Albuterol (Eqv-ProAir HFA) 90 mcg/inh inhalation aerosol: 2 puff(s) inhaled every 6 hours as needed for  bronchospasm  apixaban 5 mg oral tablet: 1 tab(s) orally 2 times a day  aspirin 81 mg oral delayed release tablet: 1 tab(s) orally once a day  atorvastatin 80 mg oral tablet: 1 tab(s) orally once a day (at bedtime)  carbonyl iron 45 mg oral tablet: 1 tab(s) orally once a day  fesoterodine 4 mg oral tablet, extended release: 1 tab(s) orally once a day  folic acid 1 mg oral tablet: 1 tab(s) orally once a day  furosemide 20 mg oral tablet: 1 tab(s) orally once a day  losartan 100 mg oral tablet: 1 tab(s) orally once a day (at bedtime)  metoprolol tartrate 25 mg oral tablet: 1 tab(s) orally 2 times a day  pantoprazole 40 mg oral delayed release tablet: 1 tab(s) orally once a day

## 2024-03-29 VITALS
DIASTOLIC BLOOD PRESSURE: 74 MMHG | SYSTOLIC BLOOD PRESSURE: 170 MMHG | TEMPERATURE: 98 F | RESPIRATION RATE: 17 BRPM | HEART RATE: 107 BPM

## 2024-03-29 PROCEDURE — 99239 HOSP IP/OBS DSCHRG MGMT >30: CPT

## 2024-03-29 RX ORDER — APIXABAN 2.5 MG/1
1 TABLET, FILM COATED ORAL
Qty: 60 | Refills: 0
Start: 2024-03-29 | End: 2024-04-27

## 2024-03-29 RX ORDER — METOPROLOL TARTRATE 50 MG
1 TABLET ORAL
Qty: 60 | Refills: 0
Start: 2024-03-29 | End: 2024-04-27

## 2024-03-29 RX ORDER — FUROSEMIDE 40 MG
1 TABLET ORAL
Qty: 28 | Refills: 0
Start: 2024-03-29 | End: 2024-04-25

## 2024-03-29 RX ADMIN — PANTOPRAZOLE SODIUM 40 MILLIGRAM(S): 20 TABLET, DELAYED RELEASE ORAL at 05:25

## 2024-03-29 RX ADMIN — Medication 81 MILLIGRAM(S): at 12:56

## 2024-03-29 RX ADMIN — Medication 5 MILLIGRAM(S): at 05:24

## 2024-03-29 RX ADMIN — LOSARTAN POTASSIUM 100 MILLIGRAM(S): 100 TABLET, FILM COATED ORAL at 05:24

## 2024-03-29 RX ADMIN — Medication 20 MILLIGRAM(S): at 05:24

## 2024-03-29 RX ADMIN — Medication 25 MILLIGRAM(S): at 05:24

## 2024-03-29 RX ADMIN — Medication 1 MILLIGRAM(S): at 12:56

## 2024-03-29 RX ADMIN — APIXABAN 5 MILLIGRAM(S): 2.5 TABLET, FILM COATED ORAL at 05:24

## 2024-03-29 NOTE — PROGRESS NOTE ADULT - ASSESSMENT
Patient is an 82 yo F w/PMH of HTN, HLD, and recent CVA in 12/2023 who presented to the ED for new onset BL LE swelling progressively worsening over the last month with dyspnea on exertion and orthopnea w/o history of CHF exacerbation w/ED labs showing pro-BNP 69015 and trop 35 -> 25, CXR notable for left pleural effusion and vascular congestion, and EKG remarkable for afib, which the patient said she never had before.  She was admitted for work up and management of new onset CHF and Afib.    #Volume overload   #Hx of Moderate-severe AS   #HFpEF exacerbation likely 2/2 worsening aortic stenosis  *CXR (3/25): Bilateral hilar prominence likely secondary to enlarged pulmonary arteries. Fibrotic changes in the left lung base  *BL LE venous duplex (3/26): No evidence of deep venous thrombosis in either lower extremity  - HD stable, on RA  - BNP 77443. Troponin 35 -> 25  - TTE 11/23: EF 50 to 55%. Grade II diastolic dysfunction. Moderate to severe aortic valve stenosis. ORLANDO 0.85 cm2   - s/p lasix 20 IV in ED   - s/p lasix 40 mg IV qD.   - start lasix 20 mg PO qD. Caution for overdiuresis in AS   - Monitor strict in/out, maintain normotension/normovolemia   - Cardiology recs (3/26): Continue diuretic therapy.  Keep K>4 and Mg >2, Daily weight (upright) and chart,  Start OAC Eliquis 5 mg tablet q 12 hrs.  - TTE (3/27):  LVEF 51%, severely enlarged left atrium, moderately enlarged R atrium, severe aortic stenosis, diastolic function could not be assessed  - As per Dr. Patel, TAVR outpatient, okay to discharge patient  - Switch to Metoprolol Tartrate 25 mg tablet twice daily  - PT eval (3/26): Home w/home PT  - Appreciate card recs    #New onset paroxysmal Afib  - No previous hx of Afib, Rate controlled  - Denies drinking, smoking, hx BREANNE   - CHADVAsc 7: Start eliquis   - c/w eliquis 5 mg bid  - TSH 1.6  - RVP neg  - Cardiology recs (3/26): Start OAC Eliquis 5 mg tablet q 12 hrs, Rate control with beta-blocker. Switch to Metoprolol Tartrate 25 mg tablet twice daily  - c/w lopressor 25 mg bid  - Appreciate card recs    #Chronic leukopenia   #Chronic macrocytic anemia, likely IRLANDA, but possibly anemia of chronic disease  - Hgb 9 (3/26)  - Iron sat 15%, iron T 35, Ferritin 300  - B12 466, Folate >20  - Haptoglobin 203  - Retic count 3.5  - Active t/s. 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

## 2024-03-29 NOTE — PROGRESS NOTE ADULT - ASSESSMENT
1] HFpEF, acute.  Compensated state now      Complicated by Valvular Heart Disease (Severe AS)  - Continue diuretic therapy.  Keep K>4 and Mg >2  - Avoid overdiuresis    2] Atrial Fibrillation - new onset  - High [id number] score.  H/O CVA  - Start OAC Eliquis 5 mg tablet q 12 hrs.  - Rate control with beta-blocker.  Switch to Metoprolol Tartrate 25 mg tablet twice daily    3] ASHD  - Stable    4] Aortic Stenosis, severe  - Findings on echo noted  - TAVR workup as outpatient.    5] HTN  - Continue to monitor    6] Hyperlipidemia  - On statin therapy    Discharge planning per primary team.     Henri Patel MD  [phone] Office  On TEAMS

## 2024-03-29 NOTE — PROGRESS NOTE ADULT - SUBJECTIVE AND OBJECTIVE BOX
Patient was seen and examined by me earlier this morning on 3C.  EMR reviewed    She is comfortable in bed.  No chest pain.  No shortness of breath.    Vitals:  T(C): 35.8 (03-29-24 @ 05:00), Max: 37.2 (03-28-24 @ 14:52)  HR: 61 (03-29-24 @ 05:00) (61 - 73)  BP: 159/73 (03-29-24 @ 05:00) (114/65 - 161/74)  RR: 18 (03-29-24 @ 05:00) (18 - 18)  SpO2: --    Telemetry: Atrial Fib    LABS:                        9.7    4.08  )-----------( 167      ( 28 Mar 2024 07:31 )             31.6     03-28    145  |  103  |  20  ----------------------------<  100<H>  5.4<H>   |  34<H>  |  1.3    Ca    9.2      28 Mar 2024 07:31  Mg     2.2     03-28        MEDICATIONS  (STANDING):  apixaban 5 milliGRAM(s) Oral two times a day  aspirin enteric coated 81 milliGRAM(s) Oral daily  atorvastatin 80 milliGRAM(s) Oral at bedtime  folic acid 1 milliGRAM(s) Oral daily  furosemide    Tablet 20 milliGRAM(s) Oral daily  losartan 100 milliGRAM(s) Oral daily  metoprolol tartrate 25 milliGRAM(s) Oral two times a day  oxybutynin 5 milliGRAM(s) Oral two times a day  pantoprazole    Tablet 40 milliGRAM(s) Oral before breakfast    MEDICATIONS  (PRN):  albuterol    90 MICROgram(s) HFA Inhaler 2 Puff(s) Inhalation every 6 hours PRN for bronchospasm      PHYSICAL EXAM:  Not in distress   Alert, oriented x 3; responds appropriately to simple queries  No JVD; irregular rhythm; nl S1 soft S2; TARAN III/VI (crescendo decrescendo) right ICS  Bilateral breath sounds  No edema in both legs  Moving all extremities

## 2024-03-29 NOTE — PROGRESS NOTE ADULT - SUBJECTIVE AND OBJECTIVE BOX
----------Daily Progress Note----------    HISTORY OF PRESENT ILLNESS:  Patient is an 82 yo F w/PMH of HTN, HLD, moderate-severe aortic stenosis, and recent CVA in 12/2023 who presented to the ED for new onset BL LE swelling progressively worsening over the last month with dyspnea on exertion and orthopnea w/o history of CHF exacerbation w/ED labs showing pro-BNP 90341 and trop 35 -> 25, CXR notable for left pleural effusion and vascular congestion, and EKG remarkable for afib, which the patient said she never had before. Of note, her hgb was also low at 8.7. She was admitted for work up and management of new onset CHF and Afib.    Today is hospital day 4d.     INTERVAL HOSPITAL COURSE / OVERNIGHT EVENTS:  O/N event:      24 hr event:      Patient was examined and seen at bedside.    Review of Systems: Otherwise unremarkable       <<<<<PAST MEDICAL & SURGICAL HISTORY>>>>>  Rheumatoid arthritis    Hyperlipemia    Hypertension    Stress incontinence    Asthma    Arthritis    S/P total knee replacement, right      ALLERGIES  sulfa drugs (Rash)  penicillins (Rash)      Home Medications:  Albuterol (Eqv-ProAir HFA) 90 mcg/inh inhalation aerosol: 2 puff(s) inhaled every 6 hours as needed for  bronchospasm (26 Mar 2024 01:24)  apixaban 5 mg oral tablet: 1 tab(s) orally 2 times a day (28 Mar 2024 14:22)  aspirin 81 mg oral delayed release tablet: 1 tab(s) orally once a day (26 Mar 2024 01:24)  atorvastatin 80 mg oral tablet: 1 tab(s) orally once a day (at bedtime) (26 Mar 2024 01:21)  carbonyl iron 45 mg oral tablet: 1 tab(s) orally once a day (26 Mar 2024 01:24)  folic acid 1 mg oral tablet: 1 tab(s) orally once a day (26 Mar 2024 01:24)  furosemide 20 mg oral tablet: 1 tab(s) orally once a day (28 Mar 2024 14:22)  losartan 100 mg oral tablet: 1 tab(s) orally once a day (at bedtime) (26 Mar 2024 01:24)  metoprolol tartrate 25 mg oral tablet: 1 tab(s) orally 2 times a day (28 Mar 2024 14:22)        MEDICATIONS  STANDING MEDICATIONS  apixaban 5 milliGRAM(s) Oral two times a day  aspirin enteric coated 81 milliGRAM(s) Oral daily  atorvastatin 80 milliGRAM(s) Oral at bedtime  folic acid 1 milliGRAM(s) Oral daily  furosemide    Tablet 20 milliGRAM(s) Oral daily  losartan 100 milliGRAM(s) Oral daily  metoprolol tartrate 25 milliGRAM(s) Oral two times a day  oxybutynin 5 milliGRAM(s) Oral two times a day  pantoprazole    Tablet 40 milliGRAM(s) Oral before breakfast    PRN MEDICATIONS  albuterol    90 MICROgram(s) HFA Inhaler 2 Puff(s) Inhalation every 6 hours PRN    VITALS:  T(F): 96.5  HR: 61  BP: 159/73  RR: 18  SpO2: --    <<<<<PHYSICAL EXAM>>>>>  GENERAL: NAD  PULMONARY: Clear to auscultation bilaterally. No rales, rhonchi, or wheezing.  CARDIOVASCULAR: Regular rate and rhythm, S1-S2, no murmurs  GASTROINTESTINAL: Soft, non-tender, non-distended, no guarding  SKIN/EXTREMITIES: Warm, 1+ tibialis posterior pulses, no LE edema  NEUROLOGIC/MUSCULOSKELETAL: AOx4, grossly moving all extremities, no focal deficits    <<<<<LABS>>>>>                        9.7    4.08  )-----------( 167      ( 28 Mar 2024 07:31 )             31.6     03-28    145  |  103  |  20  ----------------------------<  100<H>  5.4<H>   |  34<H>  |  1.3    Ca    9.2      28 Mar 2024 07:31  Mg     2.2     03-28        Urinalysis Basic - ( 28 Mar 2024 07:31 )    Color: x / Appearance: x / SG: x / pH: x  Gluc: 100 mg/dL / Ketone: x  / Bili: x / Urobili: x   Blood: x / Protein: x / Nitrite: x   Leuk Esterase: x / RBC: x / WBC x   Sq Epi: x / Non Sq Epi: x / Bacteria: x          [id number]        <<<<<RADIOLOGY>>>>>      ----------------------------------------------------------------------------------------------------------------------------------------------------------------------------------------------- ----------Daily Progress Note----------    HISTORY OF PRESENT ILLNESS:  Patient is an 82 yo F w/PMH of HTN, HLD, moderate-severe aortic stenosis, and recent CVA in 12/2023 who presented to the ED for new onset BL LE swelling progressively worsening over the last month with dyspnea on exertion and orthopnea w/o history of CHF exacerbation w/ED labs showing pro-BNP 68282 and trop 35 -> 25, CXR notable for left pleural effusion and vascular congestion, and EKG remarkable for afib, which the patient said she never had before. Of note, her hgb was also low at 8.7. She was admitted for work up and management of new onset CHF and Afib.    Today is hospital day 4d.     INTERVAL HOSPITAL COURSE / OVERNIGHT EVENTS:  O/N event:  None    24 hr event:  None    Patient was examined and seen at bedside. Endorses feeling well, denies any new issues    Review of Systems: Otherwise unremarkable     <<<<<PAST MEDICAL & SURGICAL HISTORY>>>>>  Rheumatoid arthritis    Hyperlipemia    Hypertension    Stress incontinence    Asthma    Arthritis    S/P total knee replacement, right      ALLERGIES  sulfa drugs (Rash)  penicillins (Rash)      Home Medications:  Albuterol (Eqv-ProAir HFA) 90 mcg/inh inhalation aerosol: 2 puff(s) inhaled every 6 hours as needed for  bronchospasm (26 Mar 2024 01:24)  apixaban 5 mg oral tablet: 1 tab(s) orally 2 times a day (28 Mar 2024 14:22)  aspirin 81 mg oral delayed release tablet: 1 tab(s) orally once a day (26 Mar 2024 01:24)  atorvastatin 80 mg oral tablet: 1 tab(s) orally once a day (at bedtime) (26 Mar 2024 01:21)  carbonyl iron 45 mg oral tablet: 1 tab(s) orally once a day (26 Mar 2024 01:24)  folic acid 1 mg oral tablet: 1 tab(s) orally once a day (26 Mar 2024 01:24)  furosemide 20 mg oral tablet: 1 tab(s) orally once a day (28 Mar 2024 14:22)  losartan 100 mg oral tablet: 1 tab(s) orally once a day (at bedtime) (26 Mar 2024 01:24)  metoprolol tartrate 25 mg oral tablet: 1 tab(s) orally 2 times a day (28 Mar 2024 14:22)        MEDICATIONS  STANDING MEDICATIONS  apixaban 5 milliGRAM(s) Oral two times a day  aspirin enteric coated 81 milliGRAM(s) Oral daily  atorvastatin 80 milliGRAM(s) Oral at bedtime  folic acid 1 milliGRAM(s) Oral daily  furosemide    Tablet 20 milliGRAM(s) Oral daily  losartan 100 milliGRAM(s) Oral daily  metoprolol tartrate 25 milliGRAM(s) Oral two times a day  oxybutynin 5 milliGRAM(s) Oral two times a day  pantoprazole    Tablet 40 milliGRAM(s) Oral before breakfast    PRN MEDICATIONS  albuterol    90 MICROgram(s) HFA Inhaler 2 Puff(s) Inhalation every 6 hours PRN    VITALS:  T(F): 96.5  HR: 61  BP: 159/73  RR: 18  SpO2: --    <<<<<PHYSICAL EXAM>>>>>  GENERAL: NAD  PULMONARY: Clear to auscultation bilaterally. No rales, rhonchi, or wheezing.  CARDIOVASCULAR: Regular rate and rhythm, S1-S2, no murmurs  GASTROINTESTINAL: Soft, non-tender, non-distended, no guarding  SKIN/EXTREMITIES: Warm, 1+ tibialis posterior pulses, no LE edema  NEUROLOGIC/MUSCULOSKELETAL: AOx4, grossly moving all extremities, no focal deficits    <<<<<LABS>>>>>                        9.7    4.08  )-----------( 167      ( 28 Mar 2024 07:31 )             31.6     03-28    145  |  103  |  20  ----------------------------<  100<H>  5.4<H>   |  34<H>  |  1.3    Ca    9.2      28 Mar 2024 07:31  Mg     2.2     03-28        Urinalysis Basic - ( 28 Mar 2024 07:31 )    Color: x / Appearance: x / SG: x / pH: x  Gluc: 100 mg/dL / Ketone: x  / Bili: x / Urobili: x   Blood: x / Protein: x / Nitrite: x   Leuk Esterase: x / RBC: x / WBC x   Sq Epi: x / Non Sq Epi: x / Bacteria: x          [id number]        <<<<<RADIOLOGY>>>>>      -----------------------------------------------------------------------------------------------------------------------------------------------------------------------------------------------

## 2024-03-29 NOTE — PROGRESS NOTE ADULT - REASON FOR ADMISSION
Fluid overload

## 2024-03-29 NOTE — PROGRESS NOTE ADULT - SUBJECTIVE AND OBJECTIVE BOX
MADISON GLORIA  83y  [facility] [address]      Patient is a 83y old  Female who presents with a chief complaint of Fluid overload (29 Mar 2024 05:30)      INTERVAL HPI/OVERNIGHT EVENTS:        REVIEW OF SYSTEMS:        FAMILY HISTORY:  FH: hypertension      T(C): 35.8 (03-29-24 @ 05:00), Max: 37.2 (03-28-24 @ 14:52)  HR: 61 (03-29-24 @ 05:00) (61 - 72)  BP: 159/73 (03-29-24 @ 05:00) (158/71 - 161/74)  RR: 18 (03-29-24 @ 05:00) (18 - 18)  SpO2: --  Wt(kg): --Vital Signs Last 24 Hrs  T(C): 35.8 (29 Mar 2024 05:00), Max: 37.2 (28 Mar 2024 14:52)  T(F): 96.5 (29 Mar 2024 05:00), Max: 98.9 (28 Mar 2024 14:52)  HR: 61 (29 Mar 2024 05:00) (61 - 72)  BP: 159/73 (29 Mar 2024 05:00) (158/71 - 161/74)  BP(mean): --  RR: 18 (29 Mar 2024 05:00) (18 - 18)  SpO2: --        PHYSICAL EXAM:  GENERAL: NAD, well-groomed, well-developed  HEAD:  Atraumatic, Normocephalic  EYES: EOMI, PERRLA, conjunctiva and sclera clear  ENMT: No tonsillar erythema, exudates, or enlargement; Moist mucous membranes, Good dentition, No lesions  NECK: Supple, No JVD, Normal thyroid  NERVOUS SYSTEM:  Alert & Oriented X3, Good concentration; Motor Strength 5/5 B/L upper and lower extremities; DTRs 2+ intact and symmetric  PULM: Clear to auscultation bilaterally  CARDIAC: Regular rate and rhythm; No murmurs, rubs, or gallops  GI: Soft, Nontender, Nondistended; Bowel sounds present  EXTREMITIES:  2+ Peripheral Pulses, No clubbing, cyanosis, or edema  LYMPH: No lymphadenopathy noted  SKIN: No rashes or lesions    Consultant(s) Notes Reviewed:  [x ] YES  [ ] NO  Care Discussed with Consultants/Other Providers [ x] YES  [ ] NO    LABS:                            9.7    4.08  )-----------( 167      ( 28 Mar 2024 07:31 )             31.6   03-28    145  |  103  |  20  ----------------------------<  100<H>  5.4<H>   |  34<H>  |  1.3    Ca    9.2      28 Mar 2024 07:31  Mg     2.2     03-28              albuterol    90 MICROgram(s) HFA Inhaler 2 Puff(s) Inhalation every 6 hours PRN  apixaban 5 milliGRAM(s) Oral two times a day  aspirin enteric coated 81 milliGRAM(s) Oral daily  atorvastatin 80 milliGRAM(s) Oral at bedtime  folic acid 1 milliGRAM(s) Oral daily  furosemide    Tablet 20 milliGRAM(s) Oral daily  losartan 100 milliGRAM(s) Oral daily  metoprolol tartrate 25 milliGRAM(s) Oral two times a day  oxybutynin 5 milliGRAM(s) Oral two times a day  pantoprazole    Tablet 40 milliGRAM(s) Oral before breakfast        83-year-old female with past medical history of hypertension, hyperlipidemia, and recent CVA 12/23 who presents to the ED for evaluation swollen legs bilaterally.  Reports that she has been having bilateral lower extremity swelling for the past month that has been progressively getting worse. She also endorses dyspnea on exertion and orthopnea for the last month. No cough or fever.       1. Acute HFpEF in a pt with Moderate to severe AS  - Telemetry       - EKG on admission: A-fib 64/min. Non specific T wave changes    - CXR: fibrotic changes   - was on iv lasix now on po lasix   - Troponin: 35-->25. demand ischemia   - Low salt diet and water restriction to 1.5 L/D  - Pt eval noted. Home with home PT  - D/W the pt's cardiologist Dr. Patel. Recommended to do TAVR w/u as an out pt and d/c pt home in AM  - ECHO noted. EF is 51%. Severe aortic stenosis. Explained the findings to the pt. Pt now has decided to go for TAVR    2. A-Fib new onset  Tele reviewed. A-Fib rate controlled  - Pt's Atenolol was stopped and switched to Metoprolol.   - H/O CVA and [id number] score is 7. Started her on Eliquis 5 mg po q 12h.      3. HTN / DL      4. H/O recent CVA      5. Microcytic anemia      6. HEATHER likely due to over diuresis

## 2024-03-29 NOTE — PROGRESS NOTE ADULT - PROVIDER SPECIALTY LIST ADULT
Cardiology
Hospitalist
Internal Medicine
Cardiology
Cardiology
Hospitalist
Internal Medicine
Hospitalist
Hospitalist
Internal Medicine

## 2024-04-04 DIAGNOSIS — Z88.0 ALLERGY STATUS TO PENICILLIN: ICD-10-CM

## 2024-04-04 DIAGNOSIS — M06.9 RHEUMATOID ARTHRITIS, UNSPECIFIED: ICD-10-CM

## 2024-04-04 DIAGNOSIS — Z79.82 LONG TERM (CURRENT) USE OF ASPIRIN: ICD-10-CM

## 2024-04-04 DIAGNOSIS — I25.10 ATHEROSCLEROTIC HEART DISEASE OF NATIVE CORONARY ARTERY WITHOUT ANGINA PECTORIS: ICD-10-CM

## 2024-04-04 DIAGNOSIS — Z88.2 ALLERGY STATUS TO SULFONAMIDES: ICD-10-CM

## 2024-04-04 DIAGNOSIS — T50.2X5A ADVERSE EFFECT OF CARBONIC-ANHYDRASE INHIBITORS, BENZOTHIADIAZIDES AND OTHER DIURETICS, INITIAL ENCOUNTER: ICD-10-CM

## 2024-04-04 DIAGNOSIS — I50.33 ACUTE ON CHRONIC DIASTOLIC (CONGESTIVE) HEART FAILURE: ICD-10-CM

## 2024-04-04 DIAGNOSIS — I35.0 NONRHEUMATIC AORTIC (VALVE) STENOSIS: ICD-10-CM

## 2024-04-04 DIAGNOSIS — Z96.651 PRESENCE OF RIGHT ARTIFICIAL KNEE JOINT: ICD-10-CM

## 2024-04-04 DIAGNOSIS — I11.0 HYPERTENSIVE HEART DISEASE WITH HEART FAILURE: ICD-10-CM

## 2024-04-04 DIAGNOSIS — D50.9 IRON DEFICIENCY ANEMIA, UNSPECIFIED: ICD-10-CM

## 2024-04-04 DIAGNOSIS — Z86.73 PERSONAL HISTORY OF TRANSIENT ISCHEMIC ATTACK (TIA), AND CEREBRAL INFARCTION WITHOUT RESIDUAL DEFICITS: ICD-10-CM

## 2024-04-04 DIAGNOSIS — N17.9 ACUTE KIDNEY FAILURE, UNSPECIFIED: ICD-10-CM

## 2024-04-04 DIAGNOSIS — D53.9 NUTRITIONAL ANEMIA, UNSPECIFIED: ICD-10-CM

## 2024-04-04 DIAGNOSIS — I48.0 PAROXYSMAL ATRIAL FIBRILLATION: ICD-10-CM

## 2024-04-04 DIAGNOSIS — E78.5 HYPERLIPIDEMIA, UNSPECIFIED: ICD-10-CM

## 2024-04-04 DIAGNOSIS — D72.819 DECREASED WHITE BLOOD CELL COUNT, UNSPECIFIED: ICD-10-CM

## 2024-04-04 DIAGNOSIS — J45.909 UNSPECIFIED ASTHMA, UNCOMPLICATED: ICD-10-CM
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
## 2024-04-04 DIAGNOSIS — I24.89 OTHER FORMS OF ACUTE ISCHEMIC HEART DISEASE: ICD-10-CM

## 2024-04-04 DIAGNOSIS — D63.8 ANEMIA IN OTHER CHRONIC DISEASES CLASSIFIED ELSEWHERE: ICD-10-CM

## 2024-07-21 ENCOUNTER — EMERGENCY (EMERGENCY)
Facility: HOSPITAL | Age: 84
LOS: 0 days | Discharge: HOME | End: 2024-07-22
Attending: STUDENT IN AN ORGANIZED HEALTH CARE EDUCATION/TRAINING PROGRAM
Payer: MEDICARE

## 2024-07-21 VITALS
HEART RATE: 98 BPM | TEMPERATURE: 99 F | OXYGEN SATURATION: 98 % | DIASTOLIC BLOOD PRESSURE: 67 MMHG | SYSTOLIC BLOOD PRESSURE: 137 MMHG | WEIGHT: 175.05 LBS | RESPIRATION RATE: 20 BRPM

## 2024-07-21 DIAGNOSIS — Z79.01 LONG TERM (CURRENT) USE OF ANTICOAGULANTS: ICD-10-CM

## 2024-07-21 DIAGNOSIS — Z86.73 PERSONAL HISTORY OF TRANSIENT ISCHEMIC ATTACK (TIA), AND CEREBRAL INFARCTION WITHOUT RESIDUAL DEFICITS: ICD-10-CM

## 2024-07-21 DIAGNOSIS — S70.01XA CONTUSION OF RIGHT HIP, INITIAL ENCOUNTER: ICD-10-CM
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
## 2024-07-21 DIAGNOSIS — Z96.651 PRESENCE OF RIGHT ARTIFICIAL KNEE JOINT: Chronic | ICD-10-CM

## 2024-07-21 DIAGNOSIS — Z88.0 ALLERGY STATUS TO PENICILLIN: ICD-10-CM

## 2024-07-21 DIAGNOSIS — E78.5 HYPERLIPIDEMIA, UNSPECIFIED: ICD-10-CM

## 2024-07-21 DIAGNOSIS — Z88.2 ALLERGY STATUS TO SULFONAMIDES: ICD-10-CM

## 2024-07-21 DIAGNOSIS — I48.91 UNSPECIFIED ATRIAL FIBRILLATION: ICD-10-CM
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
## 2024-07-21 DIAGNOSIS — I10 ESSENTIAL (PRIMARY) HYPERTENSION: ICD-10-CM

## 2024-07-21 LAB
ALBUMIN SERPL ELPH-MCNC: 4 G/DL — SIGNIFICANT CHANGE UP (ref 3.5–5.2)
ALP SERPL-CCNC: 77 U/L — SIGNIFICANT CHANGE UP (ref 30–115)
ALT FLD-CCNC: 12 U/L — SIGNIFICANT CHANGE UP (ref 0–41)
ANION GAP SERPL CALC-SCNC: 13 MMOL/L — SIGNIFICANT CHANGE UP (ref 7–14)
APTT BLD: 34.8 SEC — SIGNIFICANT CHANGE UP (ref 27–39.2)
AST SERPL-CCNC: 24 U/L — SIGNIFICANT CHANGE UP (ref 0–41)
BASOPHILS # BLD AUTO: 0.04 K/UL — SIGNIFICANT CHANGE UP (ref 0–0.2)
BASOPHILS NFR BLD AUTO: 0.8 % — SIGNIFICANT CHANGE UP (ref 0–1)
BILIRUB SERPL-MCNC: 0.6 MG/DL — SIGNIFICANT CHANGE UP (ref 0.2–1.2)
BUN SERPL-MCNC: 36 MG/DL — HIGH (ref 10–20)
CALCIUM SERPL-MCNC: 9.3 MG/DL — SIGNIFICANT CHANGE UP (ref 8.4–10.5)
CHLORIDE SERPL-SCNC: 95 MMOL/L — LOW (ref 98–110)
CO2 SERPL-SCNC: 27 MMOL/L — SIGNIFICANT CHANGE UP (ref 17–32)
CREAT SERPL-MCNC: 1.6 MG/DL — HIGH (ref 0.7–1.5)
EGFR: 32 ML/MIN/1.73M2 — LOW
EOSINOPHIL # BLD AUTO: 0.06 K/UL — SIGNIFICANT CHANGE UP (ref 0–0.7)
EOSINOPHIL NFR BLD AUTO: 1.2 % — SIGNIFICANT CHANGE UP (ref 0–8)
GLUCOSE SERPL-MCNC: 123 MG/DL — HIGH (ref 70–99)
HCT VFR BLD CALC: 32.4 % — LOW (ref 37–47)
HGB BLD-MCNC: 10.2 G/DL — LOW (ref 12–16)
IMM GRANULOCYTES NFR BLD AUTO: 0.4 % — HIGH (ref 0.1–0.3)
INR BLD: 1.4 RATIO — HIGH (ref 0.65–1.3)
LYMPHOCYTES # BLD AUTO: 1.05 K/UL — LOW (ref 1.2–3.4)
LYMPHOCYTES # BLD AUTO: 21.6 % — SIGNIFICANT CHANGE UP (ref 20.5–51.1)
MCHC RBC-ENTMCNC: 31.5 G/DL — LOW (ref 32–37)
MCHC RBC-ENTMCNC: 31.5 PG — HIGH (ref 27–31)
MCV RBC AUTO: 100 FL — HIGH (ref 81–99)
MONOCYTES # BLD AUTO: 0.43 K/UL — SIGNIFICANT CHANGE UP (ref 0.1–0.6)
MONOCYTES NFR BLD AUTO: 8.8 % — SIGNIFICANT CHANGE UP (ref 1.7–9.3)
NEUTROPHILS # BLD AUTO: 3.26 K/UL — SIGNIFICANT CHANGE UP (ref 1.4–6.5)
NEUTROPHILS NFR BLD AUTO: 67.2 % — SIGNIFICANT CHANGE UP (ref 42.2–75.2)
NRBC # BLD: 0 /100 WBCS — SIGNIFICANT CHANGE UP (ref 0–0)
PLATELET # BLD AUTO: 168 K/UL — SIGNIFICANT CHANGE UP (ref 130–400)
PMV BLD: 11.2 FL — HIGH (ref 7.4–10.4)
POTASSIUM SERPL-MCNC: 4.9 MMOL/L — SIGNIFICANT CHANGE UP (ref 3.5–5)
POTASSIUM SERPL-SCNC: 4.9 MMOL/L — SIGNIFICANT CHANGE UP (ref 3.5–5)
PROT SERPL-MCNC: 7.2 G/DL — SIGNIFICANT CHANGE UP (ref 6–8)
PROTHROM AB SERPL-ACNC: 16 SEC — HIGH (ref 9.95–12.87)
RBC # BLD: 3.24 M/UL — LOW (ref 4.2–5.4)
RBC # FLD: 14.2 % — SIGNIFICANT CHANGE UP (ref 11.5–14.5)
SODIUM SERPL-SCNC: 135 MMOL/L — SIGNIFICANT CHANGE UP (ref 135–146)
WBC # BLD: 4.86 K/UL — SIGNIFICANT CHANGE UP (ref 4.8–10.8)
WBC # FLD AUTO: 4.86 K/UL — SIGNIFICANT CHANGE UP (ref 4.8–10.8)

## 2024-07-21 PROCEDURE — 85610 PROTHROMBIN TIME: CPT

## 2024-07-21 PROCEDURE — 99285 EMERGENCY DEPT VISIT HI MDM: CPT | Mod: 25

## 2024-07-21 PROCEDURE — 93010 ELECTROCARDIOGRAM REPORT: CPT

## 2024-07-21 PROCEDURE — 82962 GLUCOSE BLOOD TEST: CPT

## 2024-07-21 PROCEDURE — 36415 COLL VENOUS BLD VENIPUNCTURE: CPT

## 2024-07-21 PROCEDURE — 85025 COMPLETE CBC W/AUTO DIFF WBC: CPT

## 2024-07-21 PROCEDURE — 93005 ELECTROCARDIOGRAM TRACING: CPT

## 2024-07-21 PROCEDURE — 80053 COMPREHEN METABOLIC PANEL: CPT

## 2024-07-21 PROCEDURE — 71045 X-RAY EXAM CHEST 1 VIEW: CPT

## 2024-07-21 PROCEDURE — 76705 ECHO EXAM OF ABDOMEN: CPT | Mod: 26

## 2024-07-21 PROCEDURE — 76705 ECHO EXAM OF ABDOMEN: CPT

## 2024-07-21 PROCEDURE — 72170 X-RAY EXAM OF PELVIS: CPT | Mod: 26

## 2024-07-21 PROCEDURE — 74176 CT ABD & PELVIS W/O CONTRAST: CPT | Mod: MC

## 2024-07-21 PROCEDURE — 85730 THROMBOPLASTIN TIME PARTIAL: CPT

## 2024-07-21 PROCEDURE — 74176 CT ABD & PELVIS W/O CONTRAST: CPT | Mod: 26,MC

## 2024-07-21 PROCEDURE — 70450 CT HEAD/BRAIN W/O DYE: CPT | Mod: 26,MC

## 2024-07-21 PROCEDURE — 99285 EMERGENCY DEPT VISIT HI MDM: CPT | Mod: GC

## 2024-07-21 PROCEDURE — 70450 CT HEAD/BRAIN W/O DYE: CPT | Mod: MC

## 2024-07-21 PROCEDURE — 71045 X-RAY EXAM CHEST 1 VIEW: CPT | Mod: 26

## 2024-07-21 PROCEDURE — 72170 X-RAY EXAM OF PELVIS: CPT

## 2024-07-21 NOTE — ED PROVIDER NOTE - NSFOLLOWUPINSTRUCTIONS_ED_ALL_ED_FT
Fall Prevention for Older Adults    WHAT YOU NEED TO KNOW:    What is fall prevention? Fall prevention includes ways to make your home and other areas safer. Prevention also includes ways you can move more carefully to prevent a fall.    What increases my risk for falls? As you age, your muscles weaken and your risk for falls increases. Your risk also increases if you take medicines that make you sleepy or dizzy. You may also be at risk if you have vision or joint problems, have low blood pressure, or are not active.    How can I help protect myself from falls? Falls are a common cause of injury for older adults. Do the following to help protect yourself:    Stay active. Exercise can help strengthen your muscles and improve your balance. Your healthcare provider may recommend water aerobics, walking, or Aram Chi. He or she may also recommend physical therapy to improve your coordination. Never start an exercise program without asking your healthcare provider first.  Water Aerobics for Seniors  Aram Chi for Seniors      Wear shoes that fit well and have soles that . Wear shoes both inside and outside. Use slippers with good . Avoid shoes with high heels.    Use assistive devices as directed. Your healthcare provider may suggest that you use a cane or walker to help you keep your balance. You may need to have grab bars put in your bathroom near the toilet or in the shower.    Stand or sit up slowly. This may help you keep your balance and prevent falls.    Manage your medical conditions. Keep all appointments with your healthcare providers. Visit your eye doctor as directed.  How can I make my home safer?  Fall Prevention for Seniors    Add items to prevent falls in the bathroom. Put nonslip strips on your bath or shower floor to prevent you from slipping. Use a bath mat if you do not have carpet in the bathroom. This will prevent you from falling when you step out of the bath or shower. Use a shower seat so you do not need to stand while you shower. Sit on the toilet or a chair in your bathroom to dry yourself and put on clothing. This will prevent you from losing your balance from drying or dressing yourself while you are standing.    Keep paths clear. Remove books, shoes, and other objects from walkways and stairs. Place cords for telephones and lamps out of the way so that you do not need to walk over them. Tape them down if you cannot move them. Remove small rugs. If you cannot remove a rug, secure it with double-sided tape. This will prevent you from tripping.    Install bright lights in your home. Use night lights to help light paths to the bathroom or kitchen. Always turn on the light before you start walking.    Keep items you use often on shelves within reach. Do not use a step stool to help you reach an item.    Paint or place reflective tape on the edges of your stairs. This will help you see the stairs better.  How do I plan ahead in case I do fall? Talk with family members, friends, and neighbors to create a fall plan. Someone will need to call for emergency help if you are injured or found unconscious. If possible, keep a mobile phone with you at all times, or wear an emergency alert device. You can contact emergency services by pressing a button on the device. Ask your healthcare provider for more information.    Arrange to have someone call your local emergency number (911 in the [place]) if:    You have fallen and are found unconscious.    You have fallen and cannot move part of your body.  When should I call my doctor?    You have fallen and have pain or a headache.    You have questions or concerns about your condition or care.

## 2024-07-21 NOTE — ED PROVIDER NOTE - PATIENT PORTAL LINK FT
You can access the FollowMyHealth Patient Portal offered by [facility] by registering at the following website: [email or web address]. By joining [facility] FollowMyHealth portal, you will also be able to view your health information using other applications (apps) compatible with our system.

## 2024-07-21 NOTE — ED PROVIDER NOTE - PROGRESS NOTE DETAILS
Patient reevaluated, patient able to ambulate.  Patient ready for discharge.  Spoke to patient's grandson who will be coming to  patient.

## 2024-07-21 NOTE — ED ADULT NURSE REASSESSMENT NOTE - NSFALLHARMRISKINTERV_ED_ALL_ED

## 2024-07-21 NOTE — ED ADULT NURSE NOTE - NSFALLUNIVINTERV_ED_ALL_ED
Bed/Stretcher in lowest position, wheels locked, appropriate side rails in place/Call bell, personal items and telephone in reach/Instruct patient to call for assistance before getting out of bed/chair/stretcher/Non-slip footwear applied when patient is off stretcher/[place] to call system/Physically safe environment - no spills, clutter or unnecessary equipment/Purposeful proactive rounding/Room/bathroom lighting operational, light cord in reach

## 2024-07-21 NOTE — ED PROVIDER NOTE - CLINICAL SUMMARY MEDICAL DECISION MAKING FREE TEXT BOX
s/o received from Dr. Melgar    84 y/o F h/o HTN, HLD, CVA presents with mech trip and fall onto R side. C/o R hip pain. No HS, no LOC, no AC. Prior team ordered imaging and labs and signed out to me to follow up.    Labs show mild HEATHER. No traumatic injury found on CT or XR imaging. Pt ambulatory with walker now. Stable for dc.     labs and imaging reviewed with pt. given good instructions when to return to ED and importance of f/u.  all incidental findings were discussed with pt as well. pt verbalized understanding. patient was given opportunity to ask questions.

## 2024-07-21 NOTE — ED PROVIDER NOTE - OBJECTIVE STATEMENT
83-year-old female with past medical history of hypertension, hyperlipidemia, CVA 12/23 on anticoagulation presenting after mechanical fall.  Patient denies any head trauma or LOC.  Patient reports she was trying to take her jacket off when she tripped and fell on her rare hitting her right side on a chair.  Patient endorsing contusion to right hip.  No headache, dizziness, lightness, chest pain, shortness of breath, neck pain, back pain, extremity pain.  Patient has no other complaints at this time.

## 2024-07-21 NOTE — ED PROVIDER NOTE - ATTENDING CONTRIBUTION TO CARE
I personally evaluated patient. I agree with the findings and plan with all documentation on chart except as documented  in my note.    83-year-old female past medical history hypertension, dyslipidemia, CVA presents to the emergency department status post mechanical trip and fall.  Patient was trying to take a jacket off when she tripped and fell hitting her right side on a chair.  Patient has pain to her right hip.  Patient denied any preceding lightheadedness, chest pain, shortness of breath.  No fever or recent illness.    On exam, vital signs reviewed.  GCS 15.  Primary survey is intact.  Secondary survey shows patient has an irregular regular rhythm with no signs of fluid overload, no rib tenderness, clear lungs bilaterally.  Patient has no abdominal tenderness.  Patient has mild tenderness to right mid hip with hematoma.  Patient has full range of motion of bilateral hips and knees.  Patient's good pulses.  Patient has peripheral ankle swelling.  IV placed and labs sent and we will get appropriate trauma imaging performed.  FAST exam done and negative.  Will follow-up workup and reassess.

## 2024-07-21 NOTE — ED PROVIDER NOTE - PHYSICAL EXAMINATION
Constitutional: Well developed, well nourished, no acute distress  Head: Normocephalic, Atraumatic  Eyes: PERRLA, EOMI, conjunctiva and sclera WNL  ENT: Moist mucous membranes, no rhinorrhea,    Neck: Supple, Nontender,  No midline tenderness  Respiratory: Normal chest excursion with respiration; Breath sounds clear and equal B/L; No wheezes, rales, or rhonchi   Cardiovascular: RRR; Normal S1, S2; No murmurs, rubs or gallops   ABD/GI:   Nondistended; Nontender; No guarding, rigidity or rebound; No CVA tenderness  EXT/MS: Moving all extremities; Distal pulses 2+ B/L; Bilateral lower extremity peripheral edema, No midline spinal tenderness  Skin: Ecchymosis to right hip with mild tenderness  Neurologic: AAO x 4; CN 2-12 intact; Normal motor and sensory function  Psychiatric: Appropriate affect, normal mood

## 2024-07-22 VITALS
RESPIRATION RATE: 18 BRPM | DIASTOLIC BLOOD PRESSURE: 67 MMHG | HEART RATE: 64 BPM | TEMPERATURE: 98 F | SYSTOLIC BLOOD PRESSURE: 146 MMHG | OXYGEN SATURATION: 95 %

## 2024-07-22 NOTE — ED ADULT NURSE REASSESSMENT NOTE - NS ED NURSE REASSESS COMMENT FT1
pt denies any pain at this time. pt ambulated with walker. denies any dizziness, chest pain or sob. VSS at this time. call bell and bed alarm active
received handoff from previous rn. pt A&Ox4; presented to ED s/p trip and fall (-) HT, (-) LOC, (+) AC. pt endorses she hit her R thigh on chair. VSS at this time. Pt on room air. Denies any chest pain or SOB at this time. received pt with LAC 20G IV in place and intact. bed alarm active. Pt and family made aware of plan of care.

## 2024-07-31 ENCOUNTER — RX RENEWAL (OUTPATIENT)
Age: 84
End: 2024-07-31

## 2024-09-20 ENCOUNTER — APPOINTMENT (OUTPATIENT)
Dept: UROGYNECOLOGY | Facility: CLINIC | Age: 84
End: 2024-09-20
Payer: MEDICARE

## 2024-09-20 VITALS
SYSTOLIC BLOOD PRESSURE: 144 MMHG | BODY MASS INDEX: 27.47 KG/M2 | DIASTOLIC BLOOD PRESSURE: 70 MMHG | WEIGHT: 175 LBS | HEART RATE: 87 BPM | HEIGHT: 67 IN

## 2024-09-20 DIAGNOSIS — N39.41 URGE INCONTINENCE: ICD-10-CM

## 2024-09-20 DIAGNOSIS — N90.89 OTHER SPECIFIED NONINFLAMMATORY DISORDERS OF VULVA AND PERINEUM: ICD-10-CM

## 2024-09-20 LAB
BILIRUB UR QL STRIP: NORMAL
GLUCOSE UR-MCNC: NORMAL
HCG UR QL: 0.2 EU/DL
HGB UR QL STRIP.AUTO: NORMAL
KETONES UR-MCNC: NORMAL
LEUKOCYTE ESTERASE UR QL STRIP: NORMAL
NITRITE UR QL STRIP: NORMAL
PH UR STRIP: 5.5
PROT UR STRIP-MCNC: ABNORMAL
SP GR UR STRIP: 1.01

## 2024-09-20 PROCEDURE — 81003 URINALYSIS AUTO W/O SCOPE: CPT | Mod: QW

## 2024-09-20 PROCEDURE — 99215 OFFICE O/P EST HI 40 MIN: CPT

## 2024-09-20 RX ORDER — APIXABAN 5 MG/1
5 TABLET, FILM COATED ORAL
Refills: 0 | Status: ACTIVE | COMMUNITY

## 2024-09-20 RX ORDER — CHROMIUM 200 MCG
TABLET ORAL
Refills: 0 | Status: ACTIVE | COMMUNITY

## 2024-09-20 RX ORDER — FUROSEMIDE 20 MG/1
20 TABLET ORAL
Refills: 0 | Status: ACTIVE | COMMUNITY

## 2024-09-23 RX ORDER — MIRABEGRON 25 MG/1
25 TABLET, EXTENDED RELEASE ORAL
Qty: 30 | Refills: 1 | Status: ACTIVE | COMMUNITY
Start: 2024-09-23 | End: 1900-01-01

## 2024-09-23 RX ORDER — VIBEGRON 75 MG/1
75 TABLET, FILM COATED ORAL
Qty: 30 | Refills: 2 | Status: DISCONTINUED | COMMUNITY
Start: 2024-09-20 | End: 2024-09-23

## 2024-09-26 ENCOUNTER — APPOINTMENT (OUTPATIENT)
Dept: UROGYNECOLOGY | Facility: CLINIC | Age: 84
End: 2024-09-26

## 2024-10-25 ENCOUNTER — APPOINTMENT (OUTPATIENT)
Dept: NEUROLOGY | Facility: CLINIC | Age: 84
End: 2024-10-25

## 2024-11-13 NOTE — DISCHARGE NOTE NURSING/CASE MANAGEMENT/SOCIAL WORK - NSDCPETBCESMAN_GEN_ALL_CORE
Hospital Medicine Daily Progress Note    Date of Service  11/13/2024    Chief Complaint  Yenifer Beasley is a 74 y.o. female admitted 11/7/2024 with GLF    Hospital Course  This is a 74-year-old female with past medical history of paroxysmal atrial fibrillation, mechanical aortic valve replacement on Coumadin,  hypertension, CKD stage IIIb hypothyroidism, glaucoma who was admitted on 11/7/2024 after sustaining a ground-level fall.    CT imaging revealed a right femoral lateral condyle fracture.  Orthopedic surgery consulted, once INR was less than 1.5, patient underwent internal fixation of the right lateral condyle distal femoral fracture on 11/8.  Recommendations for toe-touch weightbearing in right lower extremity, can perform knee range of motion as tolerated and will not require any bracing.  Patient restarted on Coumadin 24 hours postoperatively.  She is to follow-up with orthopedic surgery in about 2 to 3 weeks.    Patient noted to have significant bleeding postoperatively, initially on heparin gtt. and Coumadin until INR is therapeutic.  Patient required 1 unit PRBCs.    Interval Problem Update  Right femoral neck fracture status post repair.    Atrial fibrillation, aortic valve replacement - goal INR 2.5-3.5 - now therapeutic.     No further bleeding at the surgical site.  Hemoglobin stable.    Patient had bowel movement 11/12 and 11/13.    Patient reports her pain is not controlled, discontinued long-acting pain medication as patient noted to have encephalopathy.  Started patient on muscle relaxers, scheduled Tylenol, patient does not tolerate gabapentin did not start this.  Continue with as needed oral opioids.    Patient's sister at bedside, updated on plan of care, all questions answered.    I have discussed this patient's plan of care and discharge plan at IDT rounds today with Case Management, Nursing, Nursing leadership, and other members of the IDT  team.    Consultants/Specialty  orthopedics    Code Status  Full Code    Disposition  Patient is not medically clear.    I have placed the appropriate orders for post-discharge needs.    Review of Systems  Review of Systems   Endo/Heme/Allergies:  Bruises/bleeds easily.   All other systems reviewed and are negative.       Physical Exam  Temp:  [36 °C (96.8 °F)-36.9 °C (98.4 °F)] 36 °C (96.8 °F)  Pulse:  [64-78] 64  Resp:  [15-18] 17  BP: ()/(43-62) 128/52  SpO2:  [92 %-99 %] 99 %    Physical Exam  Vitals and nursing note reviewed.   Constitutional:       General: She is not in acute distress.     Appearance: Normal appearance.   HENT:      Head: Normocephalic and atraumatic.      Mouth/Throat:      Mouth: Mucous membranes are moist.      Pharynx: No oropharyngeal exudate.   Eyes:      Extraocular Movements: Extraocular movements intact.      Pupils: Pupils are equal, round, and reactive to light.   Cardiovascular:      Rate and Rhythm: Normal rate and regular rhythm.      Pulses: Normal pulses.      Heart sounds: No murmur heard.     No friction rub. No gallop.   Pulmonary:      Effort: Pulmonary effort is normal. No respiratory distress.      Breath sounds: No wheezing, rhonchi or rales.   Abdominal:      General: Bowel sounds are normal. There is no distension.      Palpations: Abdomen is soft. There is no mass.      Tenderness: There is no abdominal tenderness.   Musculoskeletal:         General: No swelling or tenderness. Normal range of motion.      Cervical back: Normal range of motion. No rigidity. No muscular tenderness.      Right lower leg: No edema.      Left lower leg: No edema.      Comments: Surgical site intact, with copious amounts of bleeding, packing and Ace bandage in place.   Skin:     General: Skin is warm and dry.      Capillary Refill: Capillary refill takes less than 2 seconds.      Findings: No erythema or rash.   Neurological:      General: No focal deficit present.      Mental  Status: She is alert and oriented to person, place, and time.      Motor: No weakness.      Gait: Gait normal.         Fluids    Intake/Output Summary (Last 24 hours) at 11/13/2024 1431  Last data filed at 11/13/2024 0600  Gross per 24 hour   Intake --   Output 700 ml   Net -700 ml        Laboratory  Recent Labs     11/11/24 0132 11/11/24 2355 11/12/24  0838 11/12/24  1254 11/12/24  2114 11/13/24  0303   WBC 5.4 6.7  --   --   --  10.7   RBC 2.77* 2.77*  --   --   --  2.63*   HEMOGLOBIN 8.3* 8.2*   < > 7.0* 8.3* 8.1*   HEMATOCRIT 25.3* 25.6*   < > 21.6* 23.9* 23.7*   MCV 91.3 92.4  --   --   --  90.1   MCH 30.0 29.6  --   --   --  30.8   MCHC 32.8 32.0*  --   --   --  34.2   RDW 44.0 43.8  --   --   --  43.0   PLATELETCT 121* 168  --   --   --  166   MPV 10.6 10.1  --   --   --  9.8    < > = values in this interval not displayed.     Recent Labs     11/11/24 0132 11/11/24 2355 11/13/24  0303   SODIUM 136 137 133*   POTASSIUM 4.3 4.3 4.3   CHLORIDE 103 104 102   CO2 24 26 22   GLUCOSE 116* 139* 132*   BUN 49* 49* 57*   CREATININE 2.13* 2.13* 1.88*   CALCIUM 8.0* 8.4* 8.5     Recent Labs     11/11/24 2355 11/12/24  1254 11/13/24  0303   INR 2.07* 2.50* 2.56*               Imaging  DX-KNEE 3 VIEWS LEFT   Final Result   Impression:      1. Left knee joint effusion. No fracture evident.      2. Prominent tricompartmental degenerative changes.      3. Surgical clips in the medial soft tissues.                     DX-PORTABLE FLUORO > 1 HOUR   Final Result      Portable fluoroscopy utilized for 1 minute 6 seconds.         INTERPRETING LOCATION: [address] [place] [address], [id number]      DX-FEMUR-2+ RIGHT   Final Result      Digitized intraoperative radiograph is submitted for review. This examination is not for diagnostic purpose but for guidance during a surgical procedure. Please see the patient's chart for full procedural details.         INTERPRETING LOCATION: [address] [place] [address], [place], [id number]      CT-KNEE W/O PLUS RECONS RIGHT    Final Result      1.  Lateral femoral condyle intra-articular fracture      2.  Associated joint effusion      3.  No other fracture      4.  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  Small vessel atherosclerotic plaque      DX-CHEST-LIMITED (1 VIEW)   Final Result      1.  Persistently enlarged cardiac silhouette   2.  Atherosclerosis   3.  Increased BILATERAL pulmonary opacities, favor pulmonary edema      DX-FEMUR-2+ RIGHT   Final Result      1.  Lateral femoral condylar fracture redemonstrated with a large knee joint effusion   2.  No additional fracture   3.  Osteoarthritis      DX-KNEE 2- RIGHT   Final Result      Acute mildly displaced intra-articular fracture of the lateral femoral condyle.           Assessment/Plan  * Nondisplaced unspecified condyle fracture of lower end of right femur, initial encounter for closed fracture (HCC)- (present on admission)  Assessment & Plan  CT imaging revealed a right femoral lateral condyle fracture.  Orthopedic surgery consulted, once INR was less than 1.5, patient underwent internal fixation of the right lateral condyle distal femoral fracture on 11/8.  Recommendations for toe-touch weightbearing in right lower extremity, can perform knee range of motion as tolerated and will not require any bracing.  Patient restarted on Coumadin 24 hours postoperatively.  She is to follow-up with orthopedic surgery in about 2 to 3 weeks.    Acute blood loss anemia  Assessment & Plan  Hemoglobin of 7 on 11/2  1 unit of PRBCs transfused  INR therapeutic 11/12  Serial CBC      Paroxysmal atrial fibrillation (HCC)- (present on admission)  Assessment & Plan  Currently in sinus  Resume anticoagulation     Hx of mechanical aortic valve replacement [V43.3]- (present on admission)  Assessment & Plan  Patient received vitamin K on admission  Reviewed outpatient records she has a history of mechanical aortic valve  Patient started on anticoagulation  postoperatively after discussion with orthopedics   goal INR 2.5-3.5 INR today 2.5.  Heparin gtt discontinued. Continue with Coumadin.    Chronic kidney disease (CKD) stage G3b/A1, moderately decreased glomerular filtration rate (GFR) between 30-44 mL/min/1.73 square meter and albuminuria creatinine ratio less than 30 mg/g- (present on admission)  Assessment & Plan  Monitor renal function electrolytes  Avoid sedating agents  Hold diuretics today and evaluate volume status daily    Intractable pain  Assessment & Plan  Patient reports her pain is not controlled, discontinued long-acting pain medication as patient noted to have encephalopathy.  Started patient on muscle relaxers, scheduled Tylenol, patient does not tolerate gabapentin did not start this.  Continue with as needed oral opioids.    Hyperglycemia- (present on admission)  Assessment & Plan  Most recent hemoglobin A1c last month 5.6    Class 3 severe obesity due to excess calories with serious comorbidity and body mass index (BMI) of 40.0 to 44.9 in adult (HCC)- (present on admission)  Assessment & Plan  Body mass index is 44.19 kg/m².     Essential hypertension- (present on admission)  Assessment & Plan    Monitor blood pressure and adjust accordingly   as needed labetalol  Hold diuretics and ARB today and monitor renal function    Tricuspid regurgitation- (present on admission)  Assessment & Plan  Reviewed echocardiogram patient with moderate to severe tricuspid regurgitation moderate mitral stenosis and mechanical aortic valve replacement with increased gradients    Given DIAMOND will hold torsemide and Micardis today and monitor volume status  Repeat chemistry panel ordered      Hypothyroidism- (present on admission)  Assessment & Plan  Continue home Synthroid at 125 mcg daily  Most recent TSH was approximately 1 month ago was normal at 3.95         VTE prophylaxis: Coumadin    I have performed a physical exam and reviewed and updated ROS and Plan today  (11/13/2024). In review of yesterday's note (11/12/2024), there are no changes except as documented above.         If you are a smoker, it is important for your health to stop smoking. Please be aware that second hand smoke is also harmful.

## 2024-11-14 NOTE — ED ADULT NURSE NOTE - NSHOSCREENINGQ1_ED_ALL_ED
No
pt reports lives alone in private home with spouse, 0 steps to enter, first floor setup inside. Prior to admission Ind with ADLs/ambulating, no AD/DME. Owns cane.

## 2024-12-09 NOTE — DISCHARGE NOTE PROVIDER - NSDCQMMRS_NEU_ALL_CORE
Maylin call Mrs. Acosta and tell her that she does have a urinary tract infection with a bacteria that should respond to Levaquin 500 once a day for a week.  Have her take it daily for 1 week.  Then I would like her to come in and repeat the UA sometime the week after she has completed the antibiotic 4 - Moderately severe disability. Unable to own bodily needs without assistance and unable to walk unassisted

## 2024-12-17 NOTE — PROGRESS NOTE ADULT - SUBJECTIVE AND OBJECTIVE BOX
Noted in chart, patient seen in UC and ED multiple times over past week for oozing from skin tear on his forearm.    No medications changes, patient each time area redressed and patient discharged.    Offered patient appointment sooner than his scheduled [facility] appt 12/24/24. Patient accepted and scheduled in [facility] 12/18/24   MADISON GLORIA 78y Female  MRN#: [id number]     SUBJECTIVE  Patient is a 78y old Female who presents with a chief complaint of Fever and chills (13 Jul 2019 09:11)  Currently admitted to medicine with the primary diagnosis of Cholangitis    Today is hospital day 2d, and this morning she is    OBJECTIVE  PAST MEDICAL & SURGICAL HISTORY  Asthma  Stress incontinence  Hypertension  Hyperlipemia  Rheumatoid arthritis  S/P total knee replacement, right    ALLERGIES:  penicillins (Rash)  sulfa drugs (Rash)    MEDICATIONS:  STANDING MEDICATIONS  amLODIPine   Tablet 5 milliGRAM(s) Oral daily  ATENolol  Tablet 50 milliGRAM(s) Oral daily  aztreonam  IVPB 2000 milliGRAM(s) IV Intermittent every 12 hours  chlorhexidine 4% Liquid 1 Application(s) Topical <User Schedule>  enoxaparin Injectable 40 milliGRAM(s) SubCutaneous every 24 hours  folic acid 1 milliGRAM(s) Oral daily  lactated ringers. 1000 milliLiter(s) IV Continuous <Continuous>  metroNIDAZOLE  IVPB 500 milliGRAM(s) IV Intermittent every 8 hours  oxybutynin 10 milliGRAM(s) Oral daily  pantoprazole    Tablet 40 milliGRAM(s) Oral before breakfast    PRN MEDICATIONS      VITAL SIGNS: Last 24 Hours  T(C): 36.9 (14 Jul 2019 05:17), Max: 37.9 (13 Jul 2019 19:37)  T(F): 98.4 (14 Jul 2019 05:17), Max: 100.2 (13 Jul 2019 19:37)  HR: 93 (14 Jul 2019 05:17) (77 - 100)  BP: 168/79 (14 Jul 2019 05:17) (144/80 - 168/79)  BP(mean): --  RR: 18 (14 Jul 2019 05:17) (18 - 20)  SpO2: 94% (13 Jul 2019 19:30) (94% - 96%)    LABS:                      RADIOLOGY:      PHYSICAL EXAM:    GENERAL: NAD, well-developed, AAOx3  HEENT:  Atraumatic, Normocephalic. EOMI, PERRLA, conjunctiva and sclera clear, No JVD  PULMONARY: Clear to auscultation bilaterally; No wheeze  CARDIOVASCULAR: Regular rate and rhythm; No murmurs, rubs, or gallops  GASTROINTESTINAL: Soft, Nontender, Nondistended; Bowel sounds present  MUSCULOSKELETAL:  2+ Peripheral Pulses, No clubbing, cyanosis, or edema  NEUROLOGY: non-focal  SKIN: No rashes or lesions      ADMISSION SUMMARY  Patient is a 78y old Female who presents with a chief complaint of Fever and chills (13 Jul 2019 09:11)  Currently admitted to medicine with the primary diagnosis of Cholangitis  Hospital course has been complicated by _______.       ASSESSMENT & PLAN    1. CHOLANGITIS      2.    3. Asthma  Stress incontinence  Hypertension  Hyperlipemia  Rheumatoid arthritis        Present today:  ( ) Congestive Heart Failure, Yes? ( )Acute / ( )Acute on Chronic / ( )Chronic  :  ( )Systolic / ( )Diastolic               Plan:  ( ) Complicated Pneumonia, Type?  ( )Parapneumonic effusion / ( )Abscess / ( ) Multilobar / ( )Other               Plan:  ( ) Morbid Obesity, Yes? BMI:               Plan:  ( ) Functional Quadriplegia               Plan:  ( ) Encephalopathy               Plan:    ( ) Discussion with patient and/or family regarding goals of care  ( ) Discussed Case and Plan with Medical Attending, Name: MADISON GLORIA 78y Female  MRN#: [id number]     SUBJECTIVE  Patient is a 78y old Female who presents with a chief complaint of Fever and chills (13 Jul 2019 09:11)  Currently admitted to medicine with the primary diagnosis of Cholangitis    Today is hospital day 2d, and this morning she is well, afebrile and comfortable. No abdominal pain.     OBJECTIVE  PAST MEDICAL & SURGICAL HISTORY  Asthma  Stress incontinence  Hypertension  Hyperlipemia  Rheumatoid arthritis  S/P total knee replacement, right    ALLERGIES:  penicillins (Rash)  sulfa drugs (Rash)    MEDICATIONS:  STANDING MEDICATIONS  amLODIPine   Tablet 5 milliGRAM(s) Oral daily  ATENolol  Tablet 50 milliGRAM(s) Oral daily  aztreonam  IVPB 2000 milliGRAM(s) IV Intermittent every 12 hours  chlorhexidine 4% Liquid 1 Application(s) Topical <User Schedule>  enoxaparin Injectable 40 milliGRAM(s) SubCutaneous every 24 hours  folic acid 1 milliGRAM(s) Oral daily  lactated ringers. 1000 milliLiter(s) IV Continuous <Continuous>  metroNIDAZOLE  IVPB 500 milliGRAM(s) IV Intermittent every 8 hours  oxybutynin 10 milliGRAM(s) Oral daily  pantoprazole    Tablet 40 milliGRAM(s) Oral before breakfast    PRN MEDICATIONS      VITAL SIGNS: Last 24 Hours  T(C): 36.9 (14 Jul 2019 05:17), Max: 37.9 (13 Jul 2019 19:37)  T(F): 98.4 (14 Jul 2019 05:17), Max: 100.2 (13 Jul 2019 19:37)  HR: 93 (14 Jul 2019 05:17) (77 - 100)  BP: 168/79 (14 Jul 2019 05:17) (144/80 - 168/79)  BP(mean): --  RR: 18 (14 Jul 2019 05:17) (18 - 20)  SpO2: 94% (13 Jul 2019 19:30) (94% - 96%)    LABS:                        9.3    4.85  )-----------( 107      ( 14 Jul 2019 08:20 )             30.1   07-14    144  |  106  |  12  ----------------------------<  115<H>  3.5   |  25  |  0.7    Ca    8.2<L>      14 Jul 2019 08:20  Phos  2.6     07-14  Mg     1.8     07-14    TPro  5.6<L>  /  Alb  2.7<L>  /  TBili  2.5<H>  /  DBili  2.0<H>  /  AST  88<H>  /  ALT  149<H>  /  AlkPhos  389<H>  07-14         PHYSICAL EXAM:    GENERAL: NAD, well-developed, AAOx3  HEENT:  Atraumatic, Normocephalic. EOMI, PERRLA, mildly icteric sclera  PULMONARY: Clear to auscultation bilaterally; No wheeze  CARDIOVASCULAR: Regular rate and rhythm; No murmurs, rubs, or gallops  GASTROINTESTINAL: Soft, Nontender, Nondistended; Bowel sounds present  MUSCULOSKELETAL:  2+ Peripheral Pulses, No clubbing, cyanosis, or edema  NEUROLOGY: non-focal  SKIN: No rashes or lesions, no apparent jaundice.       ADMISSION SUMMARY  Patient is a 78y old Female who presents with a chief complaint of Fever and chills (13 Jul 2019 09:11)  Currently admitted to medicine with the primary diagnosis of Cholangitis    ASSESSMENT & PLAN  78 year old female   1. CHOLANGITIS      2.    3. Asthma  Stress incontinence  Hypertension  Hyperlipemia  Rheumatoid arthritis        Present today:  ( ) Congestive Heart Failure, Yes? ( )Acute / ( )Acute on Chronic / ( )Chronic  :  ( )Systolic / ( )Diastolic               Plan:  ( ) Complicated Pneumonia, Type?  ( )Parapneumonic effusion / ( )Abscess / ( ) Multilobar / ( )Other               Plan:  ( ) Morbid Obesity, Yes? BMI:               Plan:  ( ) Functional Quadriplegia               Plan:  ( ) Encephalopathy               Plan:    ( ) Discussion with patient and/or family regarding goals of care  ( ) Discussed Case and Plan with Medical Attending, Name: MADISON GLORIA 78y Female  MRN#: [id number]     SUBJECTIVE  Patient is a 78y old Female who presents with a chief complaint of Fever and chills (13 Jul 2019 09:11)  Currently admitted to medicine with the primary diagnosis of Cholangitis    Today is hospital day 2d, and this morning she is well, afebrile and comfortable. No abdominal pain.     OBJECTIVE  PAST MEDICAL & SURGICAL HISTORY  Asthma  Stress incontinence  Hypertension  Hyperlipemia  Rheumatoid arthritis  S/P total knee replacement, right    ALLERGIES:  penicillins (Rash)  sulfa drugs (Rash)    MEDICATIONS:  STANDING MEDICATIONS  amLODIPine   Tablet 5 milliGRAM(s) Oral daily  ATENolol  Tablet 50 milliGRAM(s) Oral daily  aztreonam  IVPB 2000 milliGRAM(s) IV Intermittent every 12 hours  chlorhexidine 4% Liquid 1 Application(s) Topical <User Schedule>  enoxaparin Injectable 40 milliGRAM(s) SubCutaneous every 24 hours  folic acid 1 milliGRAM(s) Oral daily  lactated ringers. 1000 milliLiter(s) IV Continuous <Continuous>  metroNIDAZOLE  IVPB 500 milliGRAM(s) IV Intermittent every 8 hours  oxybutynin 10 milliGRAM(s) Oral daily  pantoprazole    Tablet 40 milliGRAM(s) Oral before breakfast    PRN MEDICATIONS      VITAL SIGNS: Last 24 Hours  T(C): 36.9 (14 Jul 2019 05:17), Max: 37.9 (13 Jul 2019 19:37)  T(F): 98.4 (14 Jul 2019 05:17), Max: 100.2 (13 Jul 2019 19:37)  HR: 93 (14 Jul 2019 05:17) (77 - 100)  BP: 168/79 (14 Jul 2019 05:17) (144/80 - 168/79)  BP(mean): --  RR: 18 (14 Jul 2019 05:17) (18 - 20)  SpO2: 94% (13 Jul 2019 19:30) (94% - 96%)    LABS:                        9.3    4.85  )-----------( 107      ( 14 Jul 2019 08:20 )             30.1   07-14    144  |  106  |  12  ----------------------------<  115<H>  3.5   |  25  |  0.7    Ca    8.2<L>      14 Jul 2019 08:20  Phos  2.6     07-14  Mg     1.8     07-14    TPro  5.6<L>  /  Alb  2.7<L>  /  TBili  2.5<H>  /  DBili  2.0<H>  /  AST  88<H>  /  ALT  149<H>  /  AlkPhos  389<H>  07-14     RADIOLOGY  < from: CT Abdomen and Pelvis w/ IV Cont (07.12.19 @ 23:22) >  MPRESSION:    Mildly dilated CBD to 9 mm secondary to apparent distal CBD   choledocholithiasis measuring 1 cm    Suggestion of pancreatic divisum with minimal dilatation of the   pancreatic duct to 4 to 5 mm.    < end of copied text >      PHYSICAL EXAM:    GENERAL: NAD, well-developed, AAOx3  HEENT:  Atraumatic, Normocephalic. EOMI, PERRLA, mildly icteric sclera  PULMONARY: Clear to auscultation bilaterally; No wheeze  CARDIOVASCULAR: Regular rate and rhythm; No murmurs, rubs, or gallops  GASTROINTESTINAL: Soft, Nontender, Nondistended; Bowel sounds present  MUSCULOSKELETAL:  2+ Peripheral Pulses, No clubbing, cyanosis, or edema  NEUROLOGY: non-focal  SKIN: No rashes or lesions, no apparent jaundice.       ADMISSION SUMMARY  Patient is a 78y old Female who presents with a chief complaint of Fever and chills (13 Jul 2019 09:11)  Currently admitted to medicine with the primary diagnosis of Cholangitis    ASSESSMENT & PLAN  78 year old female  presenting with fever, with the most likely cause of fever being cholangitis as evident by findings seen on CT abdomen and ultrasound.    Infectious disease/ GI: Cholangitis  - on metronidazole and aztreonam day 2  - will do MRI/MRCP today   - will follow up with GI for possible intervention (ERCP?).   - if patient becomes septic please call GI stat for urgent ERCP  - blood cultures taken and will follow up.   - will trend LFTs daily     Pulmo: patient has asthma  - no current active issues   - will reconcile home meds     Cardio: hypertension   - controlled  - continue atenolol and amlodipine     Urology: stress incontinence   - continue oxybutynin     DVT prophylaxis with lovenox MADISON GLORIA 78y Female  MRN#: [id number]     SUBJECTIVE  Patient is a 78y old Female who presents with a chief complaint of Fever and chills (13 Jul 2019 09:11)  Currently admitted to medicine with the primary diagnosis of Cholangitis    Today is hospital day 2d, and this morning she is well, afebrile and comfortable. No abdominal pain.     OBJECTIVE  PAST MEDICAL & SURGICAL HISTORY  Asthma  Stress incontinence  Hypertension  Hyperlipemia  Rheumatoid arthritis  S/P total knee replacement, right    ALLERGIES:  penicillins (Rash)  sulfa drugs (Rash)    MEDICATIONS:  STANDING MEDICATIONS  amLODIPine   Tablet 5 milliGRAM(s) Oral daily  ATENolol  Tablet 50 milliGRAM(s) Oral daily  aztreonam  IVPB 2000 milliGRAM(s) IV Intermittent every 12 hours  chlorhexidine 4% Liquid 1 Application(s) Topical <User Schedule>  enoxaparin Injectable 40 milliGRAM(s) SubCutaneous every 24 hours  folic acid 1 milliGRAM(s) Oral daily  lactated ringers. 1000 milliLiter(s) IV Continuous <Continuous>  metroNIDAZOLE  IVPB 500 milliGRAM(s) IV Intermittent every 8 hours  oxybutynin 10 milliGRAM(s) Oral daily  pantoprazole    Tablet 40 milliGRAM(s) Oral before breakfast    PRN MEDICATIONS      VITAL SIGNS: Last 24 Hours  T(C): 36.9 (14 Jul 2019 05:17), Max: 37.9 (13 Jul 2019 19:37)  T(F): 98.4 (14 Jul 2019 05:17), Max: 100.2 (13 Jul 2019 19:37)  HR: 93 (14 Jul 2019 05:17) (77 - 100)  BP: 168/79 (14 Jul 2019 05:17) (144/80 - 168/79)  BP(mean): --  RR: 18 (14 Jul 2019 05:17) (18 - 20)  SpO2: 94% (13 Jul 2019 19:30) (94% - 96%)    LABS:                        9.3    4.85  )-----------( 107      ( 14 Jul 2019 08:20 )             30.1   07-14    144  |  106  |  12  ----------------------------<  115<H>  3.5   |  25  |  0.7    Ca    8.2<L>      14 Jul 2019 08:20  Phos  2.6     07-14  Mg     1.8     07-14    TPro  5.6<L>  /  Alb  2.7<L>  /  TBili  2.5<H>  /  DBili  2.0<H>  /  AST  88<H>  /  ALT  149<H>  /  AlkPhos  389<H>  07-14     RADIOLOGY  < from: CT Abdomen and Pelvis w/ IV Cont (07.12.19 @ 23:22) >  MPRESSION:    Mildly dilated CBD to 9 mm secondary to apparent distal CBD   choledocholithiasis measuring 1 cm    Suggestion of pancreatic divisum with minimal dilatation of the   pancreatic duct to 4 to 5 mm.    < end of copied text >      PHYSICAL EXAM:    GENERAL: NAD, well-developed, AAOx3  HEENT:  Atraumatic, Normocephalic. EOMI, PERRLA, mildly icteric sclera  PULMONARY: Clear to auscultation bilaterally; No wheeze  CARDIOVASCULAR: Regular rate and rhythm; systolic murmur appreciated.   GASTROINTESTINAL: Soft, Nontender, Nondistended; Bowel sounds present  MUSCULOSKELETAL:  2+ Peripheral Pulses, No clubbing, cyanosis, or edema  NEUROLOGY: non-focal  SKIN: No rashes or lesions, no apparent jaundice.       ADMISSION SUMMARY  Patient is a 78y old Female who presents with a chief complaint of Fever and chills (13 Jul 2019 09:11)  Currently admitted to medicine with the primary diagnosis of Cholangitis    ASSESSMENT & PLAN  78 year old female  presenting with fever, with the most likely cause of fever being cholangitis as evident by findings seen on CT abdomen and ultrasound.    Infectious disease/ GI: Cholangitis  - on metronidazole and aztreonam day 2  - will do MRI/MRCP today   - will follow up with GI for possible intervention (ERCP?).   - if patient becomes septic please call GI stat for urgent ERCP  - blood cultures taken and will follow up.   - will trend LFTs daily     Pulmo: patient has asthma  - no current active issues   - will reconcile home meds     Cardio: hypertension   - controlled  - continue atenolol and amlodipine     Urology: stress incontinence   - continue oxybutynin     DVT prophylaxis with lovenox

## 2024-12-20 ENCOUNTER — APPOINTMENT (OUTPATIENT)
Dept: UROGYNECOLOGY | Facility: CLINIC | Age: 84
End: 2024-12-20

## 2025-01-10 ENCOUNTER — APPOINTMENT (OUTPATIENT)
Dept: NEUROLOGY | Facility: CLINIC | Age: 85
End: 2025-01-10